# Patient Record
Sex: FEMALE | Race: BLACK OR AFRICAN AMERICAN | Employment: UNEMPLOYED | URBAN - METROPOLITAN AREA
[De-identification: names, ages, dates, MRNs, and addresses within clinical notes are randomized per-mention and may not be internally consistent; named-entity substitution may affect disease eponyms.]

---

## 2017-12-13 ENCOUNTER — HOSPITAL ENCOUNTER (OUTPATIENT)
Dept: WOMENS IMAGING | Age: 62
Discharge: HOME | End: 2017-12-13
Attending: INTERNAL MEDICINE | Admitting: INTERNAL MEDICINE

## 2017-12-13 DIAGNOSIS — Z12.31 VISIT FOR SCREENING MAMMOGRAM: ICD-10-CM

## 2018-01-22 ENCOUNTER — HOSPITAL ENCOUNTER (OUTPATIENT)
Dept: ULTRASOUND IMAGING | Age: 63
Discharge: HOME | End: 2018-01-22
Attending: PHYSICIAN ASSISTANT | Admitting: PHYSICIAN ASSISTANT

## 2018-01-22 DIAGNOSIS — N95.0 POST-MENOPAUSAL BLEEDING: ICD-10-CM

## 2018-01-22 DIAGNOSIS — C78.80 SECONDARY MALIGNANT NEOPLASM OF DIGESTIVE ORGAN (HCC): ICD-10-CM

## 2019-01-28 ENCOUNTER — HOSPITAL ENCOUNTER (OUTPATIENT)
Dept: WOMENS IMAGING | Age: 64
Discharge: HOME | End: 2019-01-28
Payer: OTHER GOVERNMENT

## 2019-01-28 ENCOUNTER — HOSPITAL ENCOUNTER (OUTPATIENT)
Dept: ULTRASOUND IMAGING | Age: 64
Discharge: HOME | End: 2019-01-28
Payer: OTHER GOVERNMENT

## 2019-01-28 DIAGNOSIS — N63.20 LEFT BREAST MASS: ICD-10-CM

## 2019-01-28 DIAGNOSIS — N63.0 LUMP OR MASS IN BREAST: ICD-10-CM

## 2019-01-28 PROCEDURE — 76642 ULTRASOUND BREAST LIMITED: CPT

## 2019-01-28 PROCEDURE — 77066 DX MAMMO INCL CAD BI: CPT

## 2019-11-14 ENCOUNTER — APPOINTMENT (OUTPATIENT)
Dept: GENERAL RADIOLOGY | Age: 64
End: 2019-11-14
Payer: OTHER GOVERNMENT

## 2019-11-14 ENCOUNTER — APPOINTMENT (OUTPATIENT)
Dept: CT IMAGING | Age: 64
End: 2019-11-14
Payer: OTHER GOVERNMENT

## 2019-11-14 ENCOUNTER — HOSPITAL ENCOUNTER (EMERGENCY)
Age: 64
Discharge: HOME | End: 2019-11-15
Attending: EMERGENCY MEDICINE
Payer: OTHER GOVERNMENT

## 2019-11-14 DIAGNOSIS — E87.6 HYPOKALEMIA: ICD-10-CM

## 2019-11-14 DIAGNOSIS — R79.89 ELEVATED LACTIC ACID LEVEL: Primary | ICD-10-CM

## 2019-11-14 DIAGNOSIS — R79.0 LOW MAGNESIUM LEVEL: ICD-10-CM

## 2019-11-14 DIAGNOSIS — D72.829 LEUKOCYTOSIS, UNSPECIFIED TYPE: ICD-10-CM

## 2019-11-14 DIAGNOSIS — G89.18 POST-OP PAIN: ICD-10-CM

## 2019-11-14 LAB
ALBUMIN SERPL-MCNC: 3.5 GM/DL (ref 3.4–5)
ALP BLD-CCNC: 87 IU/L (ref 40–129)
ALT SERPL-CCNC: 13 U/L (ref 10–40)
ANION GAP SERPL CALCULATED.3IONS-SCNC: 14 MMOL/L (ref 4–16)
AST SERPL-CCNC: 19 IU/L (ref 15–37)
BASOPHILS ABSOLUTE: 0.1 K/CU MM
BASOPHILS RELATIVE PERCENT: 0.4 % (ref 0–1)
BILIRUB SERPL-MCNC: 0.6 MG/DL (ref 0–1)
BUN BLDV-MCNC: 5 MG/DL (ref 6–23)
CALCIUM SERPL-MCNC: 9.6 MG/DL (ref 8.3–10.6)
CHLORIDE BLD-SCNC: 98 MMOL/L (ref 99–110)
CO2: 23 MMOL/L (ref 21–32)
CREAT SERPL-MCNC: 0.5 MG/DL (ref 0.6–1.1)
DIFFERENTIAL TYPE: ABNORMAL
EOSINOPHILS ABSOLUTE: 0.2 K/CU MM
EOSINOPHILS RELATIVE PERCENT: 1.1 % (ref 0–3)
GFR AFRICAN AMERICAN: >60 ML/MIN/1.73M2
GFR NON-AFRICAN AMERICAN: >60 ML/MIN/1.73M2
GLUCOSE BLD-MCNC: 317 MG/DL (ref 70–99)
HCT VFR BLD CALC: 44.7 % (ref 37–47)
HEMOGLOBIN: 14.9 GM/DL (ref 12.5–16)
IMMATURE NEUTROPHIL %: 0.4 % (ref 0–0.43)
LACTATE: 2.4 MMOL/L (ref 0.4–2)
LACTATE: ABNORMAL MMOL/L (ref 0.4–2)
LYMPHOCYTES ABSOLUTE: 3.8 K/CU MM
LYMPHOCYTES RELATIVE PERCENT: 27 % (ref 24–44)
MAGNESIUM: 1.6 MG/DL (ref 1.8–2.4)
MCH RBC QN AUTO: 32.1 PG (ref 27–31)
MCHC RBC AUTO-ENTMCNC: 33.3 % (ref 32–36)
MCV RBC AUTO: 96.3 FL (ref 78–100)
MONOCYTES ABSOLUTE: 0.9 K/CU MM
MONOCYTES RELATIVE PERCENT: 6.1 % (ref 0–4)
NUCLEATED RBC %: 0 %
PDW BLD-RTO: 11.8 % (ref 11.7–14.9)
PLATELET # BLD: 253 K/CU MM (ref 140–440)
PMV BLD AUTO: 10 FL (ref 7.5–11.1)
POTASSIUM SERPL-SCNC: 3.3 MMOL/L (ref 3.5–5.1)
RBC # BLD: 4.64 M/CU MM (ref 4.2–5.4)
SEGMENTED NEUTROPHILS ABSOLUTE COUNT: 9.3 K/CU MM
SEGMENTED NEUTROPHILS RELATIVE PERCENT: 65 % (ref 36–66)
SODIUM BLD-SCNC: 135 MMOL/L (ref 135–145)
TOTAL IMMATURE NEUTOROPHIL: 0.05 K/CU MM
TOTAL NUCLEATED RBC: 0 K/CU MM
TOTAL PROTEIN: 6.8 GM/DL (ref 6.4–8.2)
WBC # BLD: 14.2 K/CU MM (ref 4–10.5)

## 2019-11-14 PROCEDURE — 96375 TX/PRO/DX INJ NEW DRUG ADDON: CPT

## 2019-11-14 PROCEDURE — 80053 COMPREHEN METABOLIC PANEL: CPT

## 2019-11-14 PROCEDURE — 81001 URINALYSIS AUTO W/SCOPE: CPT

## 2019-11-14 PROCEDURE — 87040 BLOOD CULTURE FOR BACTERIA: CPT

## 2019-11-14 PROCEDURE — 93005 ELECTROCARDIOGRAM TRACING: CPT | Performed by: EMERGENCY MEDICINE

## 2019-11-14 PROCEDURE — 87086 URINE CULTURE/COLONY COUNT: CPT

## 2019-11-14 PROCEDURE — 96365 THER/PROPH/DIAG IV INF INIT: CPT

## 2019-11-14 PROCEDURE — 96366 THER/PROPH/DIAG IV INF ADDON: CPT

## 2019-11-14 PROCEDURE — 83735 ASSAY OF MAGNESIUM: CPT

## 2019-11-14 PROCEDURE — 71046 X-RAY EXAM CHEST 2 VIEWS: CPT

## 2019-11-14 PROCEDURE — 83605 ASSAY OF LACTIC ACID: CPT

## 2019-11-14 PROCEDURE — 74177 CT ABD & PELVIS W/CONTRAST: CPT

## 2019-11-14 PROCEDURE — 2580000003 HC RX 258: Performed by: PHYSICIAN ASSISTANT

## 2019-11-14 PROCEDURE — 6360000004 HC RX CONTRAST MEDICATION: Performed by: PHYSICIAN ASSISTANT

## 2019-11-14 PROCEDURE — 71275 CT ANGIOGRAPHY CHEST: CPT

## 2019-11-14 PROCEDURE — 99284 EMERGENCY DEPT VISIT MOD MDM: CPT

## 2019-11-14 PROCEDURE — 85025 COMPLETE CBC W/AUTO DIFF WBC: CPT

## 2019-11-14 PROCEDURE — 6360000002 HC RX W HCPCS: Performed by: PHYSICIAN ASSISTANT

## 2019-11-14 RX ORDER — ONDANSETRON 2 MG/ML
4 INJECTION INTRAMUSCULAR; INTRAVENOUS ONCE
Status: COMPLETED | OUTPATIENT
Start: 2019-11-14 | End: 2019-11-14

## 2019-11-14 RX ORDER — SODIUM CHLORIDE 0.9 % (FLUSH) 0.9 %
10 SYRINGE (ML) INJECTION
Status: COMPLETED | OUTPATIENT
Start: 2019-11-14 | End: 2019-11-14

## 2019-11-14 RX ORDER — MORPHINE SULFATE 4 MG/ML
4 INJECTION, SOLUTION INTRAMUSCULAR; INTRAVENOUS ONCE
Status: COMPLETED | OUTPATIENT
Start: 2019-11-14 | End: 2019-11-14

## 2019-11-14 RX ORDER — 0.9 % SODIUM CHLORIDE 0.9 %
1000 INTRAVENOUS SOLUTION INTRAVENOUS ONCE
Status: COMPLETED | OUTPATIENT
Start: 2019-11-14 | End: 2019-11-14

## 2019-11-14 RX ORDER — KETOROLAC TROMETHAMINE 30 MG/ML
30 INJECTION, SOLUTION INTRAMUSCULAR; INTRAVENOUS ONCE
Status: COMPLETED | OUTPATIENT
Start: 2019-11-14 | End: 2019-11-14

## 2019-11-14 RX ORDER — MAGNESIUM SULFATE IN WATER 40 MG/ML
2 INJECTION, SOLUTION INTRAVENOUS ONCE
Status: COMPLETED | OUTPATIENT
Start: 2019-11-14 | End: 2019-11-14

## 2019-11-14 RX ORDER — MORPHINE SULFATE 4 MG/ML
4 INJECTION, SOLUTION INTRAMUSCULAR; INTRAVENOUS EVERY 30 MIN PRN
Status: DISCONTINUED | OUTPATIENT
Start: 2019-11-14 | End: 2019-11-15 | Stop reason: HOSPADM

## 2019-11-14 RX ADMIN — Medication 10 ML: at 21:35

## 2019-11-14 RX ADMIN — MAGNESIUM SULFATE HEPTAHYDRATE 2 G: 40 INJECTION, SOLUTION INTRAVENOUS at 21:09

## 2019-11-14 RX ADMIN — SODIUM CHLORIDE 1000 ML: 9 INJECTION, SOLUTION INTRAVENOUS at 20:15

## 2019-11-14 RX ADMIN — ONDANSETRON 4 MG: 2 INJECTION INTRAMUSCULAR; INTRAVENOUS at 20:16

## 2019-11-14 RX ADMIN — IOPAMIDOL 80 ML: 755 INJECTION, SOLUTION INTRAVENOUS at 21:34

## 2019-11-14 RX ADMIN — KETOROLAC TROMETHAMINE 30 MG: 30 INJECTION, SOLUTION INTRAMUSCULAR; INTRAVENOUS at 21:28

## 2019-11-14 RX ADMIN — MORPHINE SULFATE 4 MG: 4 INJECTION, SOLUTION INTRAMUSCULAR; INTRAVENOUS at 20:16

## 2019-11-14 ASSESSMENT — PAIN SCALES - GENERAL
PAINLEVEL_OUTOF10: 7
PAINLEVEL_OUTOF10: 8

## 2019-11-15 VITALS
HEART RATE: 93 BPM | BODY MASS INDEX: 20.09 KG/M2 | DIASTOLIC BLOOD PRESSURE: 77 MMHG | TEMPERATURE: 97.5 F | OXYGEN SATURATION: 100 % | SYSTOLIC BLOOD PRESSURE: 98 MMHG | RESPIRATION RATE: 18 BRPM | WEIGHT: 125 LBS | HEIGHT: 66 IN

## 2019-11-15 LAB
BACTERIA: ABNORMAL /HPF
BILIRUBIN URINE: NEGATIVE MG/DL
BLOOD, URINE: NEGATIVE
CLARITY: ABNORMAL
COLOR: YELLOW
GLUCOSE, URINE: >500 MG/DL
KETONES, URINE: NEGATIVE MG/DL
LEUKOCYTE ESTERASE, URINE: ABNORMAL
MUCUS: ABNORMAL HPF
NITRITE URINE, QUANTITATIVE: NEGATIVE
PH, URINE: 5 (ref 5–8)
PROTEIN UA: NEGATIVE MG/DL
RBC URINE: 2 /HPF (ref 0–6)
SPECIFIC GRAVITY UA: >1.06 (ref 1–1.03)
SPECIFIC GRAVITY UA: ABNORMAL (ref 1–1.03)
SQUAMOUS EPITHELIAL: 51 /HPF
TRICHOMONAS: ABNORMAL /HPF
UROBILINOGEN, URINE: NORMAL MG/DL (ref 0.2–1)
WBC UA: 5 /HPF (ref 0–5)

## 2019-11-15 PROCEDURE — 93010 ELECTROCARDIOGRAM REPORT: CPT | Performed by: INTERNAL MEDICINE

## 2019-11-15 RX ORDER — CEPHALEXIN 500 MG/1
500 CAPSULE ORAL 2 TIMES DAILY
Qty: 6 CAPSULE | Refills: 0 | Status: SHIPPED | OUTPATIENT
Start: 2019-11-15 | End: 2019-11-18

## 2019-11-15 RX ORDER — OXYCODONE HYDROCHLORIDE AND ACETAMINOPHEN 5; 325 MG/1; MG/1
1 TABLET ORAL EVERY 6 HOURS PRN
Qty: 20 TABLET | Refills: 0 | Status: SHIPPED | OUTPATIENT
Start: 2019-11-15 | End: 2019-11-20

## 2019-11-16 LAB
CULTURE: ABNORMAL
Lab: ABNORMAL
SPECIMEN: ABNORMAL
TOTAL COLONY COUNT: ABNORMAL

## 2019-11-19 LAB
CULTURE: NORMAL
CULTURE: NORMAL
Lab: NORMAL
Lab: NORMAL
SPECIMEN: NORMAL
SPECIMEN: NORMAL

## 2019-11-20 LAB
EKG ATRIAL RATE: 79 BPM
EKG DIAGNOSIS: NORMAL
EKG P AXIS: 59 DEGREES
EKG P-R INTERVAL: 180 MS
EKG Q-T INTERVAL: 428 MS
EKG QRS DURATION: 74 MS
EKG QTC CALCULATION (BAZETT): 490 MS
EKG R AXIS: 18 DEGREES
EKG T AXIS: 27 DEGREES
EKG VENTRICULAR RATE: 79 BPM

## 2020-10-02 ENCOUNTER — HOSPITAL ENCOUNTER (INPATIENT)
Age: 65
LOS: 1 days | Discharge: HOME | DRG: 392 | End: 2020-10-04
Attending: EMERGENCY MEDICINE | Admitting: INTERNAL MEDICINE
Payer: OTHER GOVERNMENT

## 2020-10-02 ENCOUNTER — APPOINTMENT (OUTPATIENT)
Dept: GENERAL RADIOLOGY | Age: 65
DRG: 392 | End: 2020-10-02
Payer: OTHER GOVERNMENT

## 2020-10-02 LAB
ALBUMIN SERPL-MCNC: 4.1 GM/DL (ref 3.4–5)
ALBUMIN SERPL-MCNC: 4.1 GM/DL (ref 3.4–5)
ALP BLD-CCNC: 62 IU/L (ref 40–129)
ALP BLD-CCNC: 62 IU/L (ref 40–129)
ALT SERPL-CCNC: 16 U/L (ref 10–40)
ALT SERPL-CCNC: 16 U/L (ref 10–40)
ANION GAP SERPL CALCULATED.3IONS-SCNC: 13 MMOL/L (ref 4–16)
AST SERPL-CCNC: 22 IU/L (ref 15–37)
AST SERPL-CCNC: 22 IU/L (ref 15–37)
BASOPHILS ABSOLUTE: 0 K/CU MM
BASOPHILS RELATIVE PERCENT: 0.4 % (ref 0–1)
BILIRUB SERPL-MCNC: 0.7 MG/DL (ref 0–1)
BILIRUB SERPL-MCNC: 0.7 MG/DL (ref 0–1)
BILIRUBIN DIRECT: 0.2 MG/DL (ref 0–0.3)
BILIRUBIN, INDIRECT: 0.5 MG/DL (ref 0–0.7)
BUN BLDV-MCNC: 6 MG/DL (ref 6–23)
CALCIUM SERPL-MCNC: 9.2 MG/DL (ref 8.3–10.6)
CHLORIDE BLD-SCNC: 93 MMOL/L (ref 99–110)
CO2: 23 MMOL/L (ref 21–32)
CREAT SERPL-MCNC: 0.6 MG/DL (ref 0.6–1.1)
DIFFERENTIAL TYPE: ABNORMAL
EOSINOPHILS ABSOLUTE: 0.1 K/CU MM
EOSINOPHILS RELATIVE PERCENT: 0.6 % (ref 0–3)
GFR AFRICAN AMERICAN: >60 ML/MIN/1.73M2
GFR NON-AFRICAN AMERICAN: >60 ML/MIN/1.73M2
GLUCOSE BLD-MCNC: 148 MG/DL (ref 70–99)
HCT VFR BLD CALC: 40 % (ref 37–47)
HEMOGLOBIN: 14 GM/DL (ref 12.5–16)
IMMATURE NEUTROPHIL %: 0.2 % (ref 0–0.43)
LIPASE: 8 IU/L (ref 13–60)
LYMPHOCYTES ABSOLUTE: 2.9 K/CU MM
LYMPHOCYTES RELATIVE PERCENT: 29.6 % (ref 24–44)
MCH RBC QN AUTO: 34.2 PG (ref 27–31)
MCHC RBC AUTO-ENTMCNC: 35 % (ref 32–36)
MCV RBC AUTO: 97.8 FL (ref 78–100)
MONOCYTES ABSOLUTE: 0.6 K/CU MM
MONOCYTES RELATIVE PERCENT: 6.3 % (ref 0–4)
NUCLEATED RBC %: 0 %
PDW BLD-RTO: 11.1 % (ref 11.7–14.9)
PLATELET # BLD: 233 K/CU MM (ref 140–440)
PMV BLD AUTO: 9.6 FL (ref 7.5–11.1)
POTASSIUM SERPL-SCNC: 3.7 MMOL/L (ref 3.5–5.1)
PRO-BNP: 108.2 PG/ML
RBC # BLD: 4.09 M/CU MM (ref 4.2–5.4)
SEGMENTED NEUTROPHILS ABSOLUTE COUNT: 6.2 K/CU MM
SEGMENTED NEUTROPHILS RELATIVE PERCENT: 62.9 % (ref 36–66)
SODIUM BLD-SCNC: 129 MMOL/L (ref 135–145)
TOTAL IMMATURE NEUTOROPHIL: 0.02 K/CU MM
TOTAL NUCLEATED RBC: 0 K/CU MM
TOTAL PROTEIN: 6.7 GM/DL (ref 6.4–8.2)
TOTAL PROTEIN: 6.7 GM/DL (ref 6.4–8.2)
TROPONIN T: <0.01 NG/ML
WBC # BLD: 9.9 K/CU MM (ref 4–10.5)

## 2020-10-02 PROCEDURE — 36415 COLL VENOUS BLD VENIPUNCTURE: CPT

## 2020-10-02 PROCEDURE — 96365 THER/PROPH/DIAG IV INF INIT: CPT

## 2020-10-02 PROCEDURE — 82248 BILIRUBIN DIRECT: CPT

## 2020-10-02 PROCEDURE — 80053 COMPREHEN METABOLIC PANEL: CPT

## 2020-10-02 PROCEDURE — 83880 ASSAY OF NATRIURETIC PEPTIDE: CPT

## 2020-10-02 PROCEDURE — 93005 ELECTROCARDIOGRAM TRACING: CPT | Performed by: EMERGENCY MEDICINE

## 2020-10-02 PROCEDURE — 2580000003 HC RX 258: Performed by: EMERGENCY MEDICINE

## 2020-10-02 PROCEDURE — 96375 TX/PRO/DX INJ NEW DRUG ADDON: CPT

## 2020-10-02 PROCEDURE — 82805 BLOOD GASES W/O2 SATURATION: CPT

## 2020-10-02 PROCEDURE — 85025 COMPLETE CBC W/AUTO DIFF WBC: CPT

## 2020-10-02 PROCEDURE — 99285 EMERGENCY DEPT VISIT HI MDM: CPT

## 2020-10-02 PROCEDURE — 80048 BASIC METABOLIC PNL TOTAL CA: CPT

## 2020-10-02 PROCEDURE — 84484 ASSAY OF TROPONIN QUANT: CPT

## 2020-10-02 PROCEDURE — 71046 X-RAY EXAM CHEST 2 VIEWS: CPT

## 2020-10-02 PROCEDURE — 6360000002 HC RX W HCPCS: Performed by: EMERGENCY MEDICINE

## 2020-10-02 PROCEDURE — 83690 ASSAY OF LIPASE: CPT

## 2020-10-02 RX ORDER — SODIUM CHLORIDE, SODIUM LACTATE, POTASSIUM CHLORIDE, CALCIUM CHLORIDE 600; 310; 30; 20 MG/100ML; MG/100ML; MG/100ML; MG/100ML
1000 INJECTION, SOLUTION INTRAVENOUS ONCE
Status: COMPLETED | OUTPATIENT
Start: 2020-10-02 | End: 2020-10-03

## 2020-10-02 RX ORDER — MORPHINE SULFATE 4 MG/ML
2 INJECTION, SOLUTION INTRAMUSCULAR; INTRAVENOUS EVERY 30 MIN PRN
Status: DISCONTINUED | OUTPATIENT
Start: 2020-10-02 | End: 2020-10-03 | Stop reason: ALTCHOICE

## 2020-10-02 RX ORDER — ONDANSETRON 2 MG/ML
4 INJECTION INTRAMUSCULAR; INTRAVENOUS EVERY 30 MIN PRN
Status: DISCONTINUED | OUTPATIENT
Start: 2020-10-02 | End: 2020-10-03 | Stop reason: SDUPTHER

## 2020-10-02 RX ADMIN — ONDANSETRON 4 MG: 2 INJECTION INTRAMUSCULAR; INTRAVENOUS at 23:21

## 2020-10-02 RX ADMIN — SODIUM CHLORIDE, POTASSIUM CHLORIDE, SODIUM LACTATE AND CALCIUM CHLORIDE 1000 ML: 600; 310; 30; 20 INJECTION, SOLUTION INTRAVENOUS at 23:21

## 2020-10-02 RX ADMIN — MORPHINE SULFATE 2 MG: 4 INJECTION, SOLUTION INTRAMUSCULAR; INTRAVENOUS at 23:22

## 2020-10-02 ASSESSMENT — PAIN SCALES - GENERAL
PAINLEVEL_OUTOF10: 5
PAINLEVEL_OUTOF10: 5

## 2020-10-02 ASSESSMENT — PAIN DESCRIPTION - PAIN TYPE: TYPE: ACUTE PAIN

## 2020-10-02 ASSESSMENT — PAIN DESCRIPTION - LOCATION: LOCATION: CHEST

## 2020-10-02 NOTE — ED PROVIDER NOTES
As physician-in-triage, I performed a virtual medical screening history and physical exam on this patient. HISTORY OF PRESENT ILLNESS  Hobart Nyhan is a 60 y.o. female with history of hypertension and diabetes who presents emergency department with complaints of chest pain and nausea with vomiting. Patient states over the last 3 to 4 days she has been experiencing pain in the substernal region of the chest and epigastric region. Pain radiates up into the esophageal region and is described as a throbbing and aching sensation. She does have some associated nausea as well and states that when the pain is severe she does feel short of breath. She has no prior history of MI or stroke that she is aware of. She has never experienced symptoms such as this in the past.  She does feel the symptoms are exacerbated with eating and drinking but states that the symptoms occurred separately eating or drinking and she cannot call any episodes of choking on food. Denies fevers, chills, dizziness, lightheadedness. PHYSICAL EXAM  BP (!) 114/90   Pulse 91   Temp 98.3 °F (36.8 °C) (Oral)   Resp 20   SpO2 96%     On exam, the patient appears in no acute distress. Speech is clear. Breathing is unlabored.   Moves all extremities      Orders: CBC, BMP, EKG, troponin, hepatic panel, lipase, chest x-ray     Osiris Cervantes DO  10/02/20 [id number]

## 2020-10-02 NOTE — ED PROVIDER NOTES
12 lead EKG per my interpretation:  Normal Sinus Rhythm 93  Axis is   Normal  QTc is  452  There is no specific T wave changes appreciated. There is no specific ST wave changes appreciated.     Prior EKG to compare with was not available        Reg English DO  10/02/20 2000

## 2020-10-03 ENCOUNTER — ANESTHESIA EVENT (OUTPATIENT)
Dept: ENDOSCOPY | Age: 65
DRG: 392 | End: 2020-10-03
Payer: OTHER GOVERNMENT

## 2020-10-03 ENCOUNTER — ANESTHESIA (OUTPATIENT)
Dept: ENDOSCOPY | Age: 65
DRG: 392 | End: 2020-10-03
Payer: OTHER GOVERNMENT

## 2020-10-03 ENCOUNTER — APPOINTMENT (OUTPATIENT)
Dept: CT IMAGING | Age: 65
DRG: 392 | End: 2020-10-03
Payer: OTHER GOVERNMENT

## 2020-10-03 VITALS — OXYGEN SATURATION: 95 % | DIASTOLIC BLOOD PRESSURE: 52 MMHG | SYSTOLIC BLOOD PRESSURE: 70 MMHG

## 2020-10-03 PROBLEM — R07.9 CHEST PAIN: Status: ACTIVE | Noted: 2020-10-03

## 2020-10-03 LAB
ALBUMIN SERPL-MCNC: 3.9 GM/DL (ref 3.4–5)
ALP BLD-CCNC: 55 IU/L (ref 40–128)
ALT SERPL-CCNC: 15 U/L (ref 10–40)
ANION GAP SERPL CALCULATED.3IONS-SCNC: 12 MMOL/L (ref 4–16)
APTT: 31 SECONDS (ref 25.1–37.1)
AST SERPL-CCNC: 18 IU/L (ref 15–37)
BACTERIA: ABNORMAL /HPF
BASE EXCESS MIXED: 1.3 (ref 0–2.3)
BILIRUB SERPL-MCNC: 0.7 MG/DL (ref 0–1)
BILIRUBIN URINE: NEGATIVE MG/DL
BLOOD, URINE: NEGATIVE
BUN BLDV-MCNC: 6 MG/DL (ref 6–23)
CALCIUM SERPL-MCNC: 8.7 MG/DL (ref 8.3–10.6)
CHLORIDE BLD-SCNC: 99 MMOL/L (ref 99–110)
CLARITY: ABNORMAL
CO2: 25 MMOL/L (ref 21–32)
COLOR: YELLOW
COMMENT: ABNORMAL
CREAT SERPL-MCNC: 0.7 MG/DL (ref 0.6–1.1)
GFR AFRICAN AMERICAN: >60 ML/MIN/1.73M2
GFR NON-AFRICAN AMERICAN: >60 ML/MIN/1.73M2
GLUCOSE BLD-MCNC: 129 MG/DL (ref 70–99)
GLUCOSE BLD-MCNC: 129 MG/DL (ref 70–99)
GLUCOSE BLD-MCNC: 133 MG/DL (ref 70–99)
GLUCOSE BLD-MCNC: 142 MG/DL (ref 70–99)
GLUCOSE BLD-MCNC: 185 MG/DL (ref 70–99)
GLUCOSE, URINE: NEGATIVE MG/DL
HCO3 VENOUS: 27.2 MMOL/L (ref 19–25)
INR BLD: 0.99 INDEX
KETONES, URINE: NEGATIVE MG/DL
LACTATE: 1.2 MMOL/L (ref 0.4–2)
LEUKOCYTE ESTERASE, URINE: ABNORMAL
MUCUS: ABNORMAL HPF
NITRITE URINE, QUANTITATIVE: NEGATIVE
O2 SAT, VEN: 52.6 % (ref 50–70)
PCO2, VEN: 47 MMHG (ref 38–52)
PH VENOUS: 7.37 (ref 7.32–7.42)
PH, URINE: 6 (ref 5–8)
PO2, VEN: 29 MMHG (ref 28–48)
POTASSIUM SERPL-SCNC: 3.9 MMOL/L (ref 3.5–5.1)
PROTEIN UA: NEGATIVE MG/DL
PROTHROMBIN TIME: 12 SECONDS (ref 11.7–14.5)
RBC URINE: ABNORMAL /HPF (ref 0–6)
SARS-COV-2, NAAT: NOT DETECTED
SODIUM BLD-SCNC: 136 MMOL/L (ref 135–145)
SOURCE: NORMAL
SPECIFIC GRAVITY UA: 1 (ref 1–1.03)
SQUAMOUS EPITHELIAL: 9 /HPF
TOTAL PROTEIN: 5.8 GM/DL (ref 6.4–8.2)
TRICHOMONAS: ABNORMAL /HPF
TROPONIN T: <0.01 NG/ML
UROBILINOGEN, URINE: NORMAL MG/DL (ref 0.2–1)
WBC UA: 4 /HPF (ref 0–5)

## 2020-10-03 PROCEDURE — 2500000003 HC RX 250 WO HCPCS: Performed by: NURSE ANESTHETIST, CERTIFIED REGISTERED

## 2020-10-03 PROCEDURE — 6360000002 HC RX W HCPCS: Performed by: NURSE ANESTHETIST, CERTIFIED REGISTERED

## 2020-10-03 PROCEDURE — 84484 ASSAY OF TROPONIN QUANT: CPT

## 2020-10-03 PROCEDURE — 3609012400 HC EGD TRANSORAL BIOPSY SINGLE/MULTIPLE: Performed by: SPECIALIST

## 2020-10-03 PROCEDURE — 3700000001 HC ADD 15 MINUTES (ANESTHESIA): Performed by: SPECIALIST

## 2020-10-03 PROCEDURE — 96366 THER/PROPH/DIAG IV INF ADDON: CPT

## 2020-10-03 PROCEDURE — 88305 TISSUE EXAM BY PATHOLOGIST: CPT

## 2020-10-03 PROCEDURE — 6360000002 HC RX W HCPCS: Performed by: INTERNAL MEDICINE

## 2020-10-03 PROCEDURE — 94761 N-INVAS EAR/PLS OXIMETRY MLT: CPT

## 2020-10-03 PROCEDURE — 6360000004 HC RX CONTRAST MEDICATION: Performed by: EMERGENCY MEDICINE

## 2020-10-03 PROCEDURE — 83605 ASSAY OF LACTIC ACID: CPT

## 2020-10-03 PROCEDURE — 6370000000 HC RX 637 (ALT 250 FOR IP): Performed by: INTERNAL MEDICINE

## 2020-10-03 PROCEDURE — 85610 PROTHROMBIN TIME: CPT

## 2020-10-03 PROCEDURE — 81001 URINALYSIS AUTO W/SCOPE: CPT

## 2020-10-03 PROCEDURE — 2580000003 HC RX 258: Performed by: EMERGENCY MEDICINE

## 2020-10-03 PROCEDURE — 87086 URINE CULTURE/COLONY COUNT: CPT

## 2020-10-03 PROCEDURE — 2580000003 HC RX 258: Performed by: INTERNAL MEDICINE

## 2020-10-03 PROCEDURE — 2140000000 HC CCU INTERMEDIATE R&B

## 2020-10-03 PROCEDURE — 36415 COLL VENOUS BLD VENIPUNCTURE: CPT

## 2020-10-03 PROCEDURE — 0DB68ZX EXCISION OF STOMACH, VIA NATURAL OR ARTIFICIAL OPENING ENDOSCOPIC, DIAGNOSTIC: ICD-10-PCS | Performed by: SPECIALIST

## 2020-10-03 PROCEDURE — 85730 THROMBOPLASTIN TIME PARTIAL: CPT

## 2020-10-03 PROCEDURE — U0002 COVID-19 LAB TEST NON-CDC: HCPCS

## 2020-10-03 PROCEDURE — 3700000000 HC ANESTHESIA ATTENDED CARE: Performed by: SPECIALIST

## 2020-10-03 PROCEDURE — 74177 CT ABD & PELVIS W/CONTRAST: CPT

## 2020-10-03 PROCEDURE — 93005 ELECTROCARDIOGRAM TRACING: CPT | Performed by: EMERGENCY MEDICINE

## 2020-10-03 PROCEDURE — 88342 IMHCHEM/IMCYTCHM 1ST ANTB: CPT

## 2020-10-03 PROCEDURE — 2709999900 HC NON-CHARGEABLE SUPPLY: Performed by: SPECIALIST

## 2020-10-03 PROCEDURE — 80053 COMPREHEN METABOLIC PANEL: CPT

## 2020-10-03 PROCEDURE — 82962 GLUCOSE BLOOD TEST: CPT

## 2020-10-03 PROCEDURE — 99222 1ST HOSP IP/OBS MODERATE 55: CPT | Performed by: INTERNAL MEDICINE

## 2020-10-03 RX ORDER — NICOTINE POLACRILEX 4 MG
15 LOZENGE BUCCAL PRN
Status: DISCONTINUED | OUTPATIENT
Start: 2020-10-03 | End: 2020-10-04 | Stop reason: HOSPADM

## 2020-10-03 RX ORDER — ACETAMINOPHEN 650 MG/1
650 SUPPOSITORY RECTAL EVERY 6 HOURS PRN
Status: DISCONTINUED | OUTPATIENT
Start: 2020-10-03 | End: 2020-10-04 | Stop reason: HOSPADM

## 2020-10-03 RX ORDER — DEXTROSE MONOHYDRATE 50 MG/ML
100 INJECTION, SOLUTION INTRAVENOUS PRN
Status: DISCONTINUED | OUTPATIENT
Start: 2020-10-03 | End: 2020-10-04 | Stop reason: HOSPADM

## 2020-10-03 RX ORDER — ASPIRIN 81 MG/1
81 TABLET, CHEWABLE ORAL DAILY
Status: DISCONTINUED | OUTPATIENT
Start: 2020-10-04 | End: 2020-10-04 | Stop reason: HOSPADM

## 2020-10-03 RX ORDER — POLYETHYLENE GLYCOL 3350 17 G/17G
17 POWDER, FOR SOLUTION ORAL DAILY PRN
Status: DISCONTINUED | OUTPATIENT
Start: 2020-10-03 | End: 2020-10-04 | Stop reason: HOSPADM

## 2020-10-03 RX ORDER — SODIUM CHLORIDE 0.9 % (FLUSH) 0.9 %
10 SYRINGE (ML) INJECTION EVERY 12 HOURS SCHEDULED
Status: DISCONTINUED | OUTPATIENT
Start: 2020-10-03 | End: 2020-10-04 | Stop reason: HOSPADM

## 2020-10-03 RX ORDER — SODIUM CHLORIDE 0.9 % (FLUSH) 0.9 %
10 SYRINGE (ML) INJECTION 2 TIMES DAILY
Status: DISCONTINUED | OUTPATIENT
Start: 2020-10-03 | End: 2020-10-04 | Stop reason: HOSPADM

## 2020-10-03 RX ORDER — ATORVASTATIN CALCIUM 40 MG/1
40 TABLET, FILM COATED ORAL NIGHTLY
Status: DISCONTINUED | OUTPATIENT
Start: 2020-10-03 | End: 2020-10-04 | Stop reason: HOSPADM

## 2020-10-03 RX ORDER — DEXTROSE MONOHYDRATE 25 G/50ML
12.5 INJECTION, SOLUTION INTRAVENOUS PRN
Status: DISCONTINUED | OUTPATIENT
Start: 2020-10-03 | End: 2020-10-04 | Stop reason: HOSPADM

## 2020-10-03 RX ORDER — CILOSTAZOL 100 MG/1
100 TABLET ORAL 2 TIMES DAILY
Status: DISCONTINUED | OUTPATIENT
Start: 2020-10-03 | End: 2020-10-04 | Stop reason: HOSPADM

## 2020-10-03 RX ORDER — SODIUM CHLORIDE 9 MG/ML
INJECTION, SOLUTION INTRAVENOUS CONTINUOUS PRN
Status: DISCONTINUED | OUTPATIENT
Start: 2020-10-03 | End: 2020-10-03 | Stop reason: SDUPTHER

## 2020-10-03 RX ORDER — PROMETHAZINE HYDROCHLORIDE 25 MG/1
12.5 TABLET ORAL EVERY 6 HOURS PRN
Status: DISCONTINUED | OUTPATIENT
Start: 2020-10-03 | End: 2020-10-04 | Stop reason: HOSPADM

## 2020-10-03 RX ORDER — SODIUM CHLORIDE 9 MG/ML
INJECTION, SOLUTION INTRAVENOUS CONTINUOUS
Status: DISCONTINUED | OUTPATIENT
Start: 2020-10-03 | End: 2020-10-04

## 2020-10-03 RX ORDER — PROPOFOL 10 MG/ML
INJECTION, EMULSION INTRAVENOUS PRN
Status: DISCONTINUED | OUTPATIENT
Start: 2020-10-03 | End: 2020-10-03 | Stop reason: SDUPTHER

## 2020-10-03 RX ORDER — LIDOCAINE HYDROCHLORIDE 20 MG/ML
INJECTION, SOLUTION INFILTRATION; PERINEURAL PRN
Status: DISCONTINUED | OUTPATIENT
Start: 2020-10-03 | End: 2020-10-03 | Stop reason: SDUPTHER

## 2020-10-03 RX ORDER — 0.9 % SODIUM CHLORIDE 0.9 %
1000 INTRAVENOUS SOLUTION INTRAVENOUS ONCE
Status: COMPLETED | OUTPATIENT
Start: 2020-10-03 | End: 2020-10-03

## 2020-10-03 RX ORDER — ACETAMINOPHEN 325 MG/1
650 TABLET ORAL EVERY 6 HOURS PRN
Status: DISCONTINUED | OUTPATIENT
Start: 2020-10-03 | End: 2020-10-04 | Stop reason: HOSPADM

## 2020-10-03 RX ORDER — SUCRALFATE 1 G/1
1 TABLET ORAL EVERY 6 HOURS SCHEDULED
Status: DISCONTINUED | OUTPATIENT
Start: 2020-10-03 | End: 2020-10-04 | Stop reason: HOSPADM

## 2020-10-03 RX ORDER — PANTOPRAZOLE SODIUM 40 MG/1
40 TABLET, DELAYED RELEASE ORAL
Status: DISCONTINUED | OUTPATIENT
Start: 2020-10-03 | End: 2020-10-04 | Stop reason: HOSPADM

## 2020-10-03 RX ORDER — CLONIDINE HYDROCHLORIDE 0.1 MG/1
0.1 TABLET ORAL 2 TIMES DAILY
Status: DISCONTINUED | OUTPATIENT
Start: 2020-10-03 | End: 2020-10-04 | Stop reason: HOSPADM

## 2020-10-03 RX ORDER — SODIUM CHLORIDE 0.9 % (FLUSH) 0.9 %
10 SYRINGE (ML) INJECTION PRN
Status: DISCONTINUED | OUTPATIENT
Start: 2020-10-03 | End: 2020-10-04 | Stop reason: HOSPADM

## 2020-10-03 RX ORDER — CILOSTAZOL 100 MG/1
100 TABLET ORAL 2 TIMES DAILY
COMMUNITY

## 2020-10-03 RX ORDER — ONDANSETRON 2 MG/ML
4 INJECTION INTRAMUSCULAR; INTRAVENOUS EVERY 6 HOURS PRN
Status: DISCONTINUED | OUTPATIENT
Start: 2020-10-03 | End: 2020-10-04 | Stop reason: HOSPADM

## 2020-10-03 RX ADMIN — SODIUM CHLORIDE, PRESERVATIVE FREE 10 ML: 5 INJECTION INTRAVENOUS at 11:43

## 2020-10-03 RX ADMIN — LISINOPRIL 15 MG: 5 TABLET ORAL at 11:37

## 2020-10-03 RX ADMIN — SODIUM CHLORIDE 1000 ML: 9 INJECTION, SOLUTION INTRAVENOUS at 02:47

## 2020-10-03 RX ADMIN — CILOSTAZOL 100 MG: 100 TABLET ORAL at 11:31

## 2020-10-03 RX ADMIN — IOPAMIDOL 80 ML: 755 INJECTION, SOLUTION INTRAVENOUS at 01:03

## 2020-10-03 RX ADMIN — SODIUM CHLORIDE: 9 INJECTION, SOLUTION INTRAVENOUS at 04:14

## 2020-10-03 RX ADMIN — SODIUM CHLORIDE: 9 INJECTION, SOLUTION INTRAVENOUS at 20:01

## 2020-10-03 RX ADMIN — SODIUM CHLORIDE, PRESERVATIVE FREE 10 ML: 5 INJECTION INTRAVENOUS at 21:10

## 2020-10-03 RX ADMIN — CLONIDINE HYDROCHLORIDE 0.1 MG: 0.1 TABLET ORAL at 21:08

## 2020-10-03 RX ADMIN — PANTOPRAZOLE SODIUM 40 MG: 40 TABLET, DELAYED RELEASE ORAL at 17:11

## 2020-10-03 RX ADMIN — ATORVASTATIN CALCIUM 40 MG: 40 TABLET, FILM COATED ORAL at 21:08

## 2020-10-03 RX ADMIN — SUCRALFATE 1 G: 1 TABLET ORAL at 05:43

## 2020-10-03 RX ADMIN — SODIUM CHLORIDE, PRESERVATIVE FREE 10 ML: 5 INJECTION INTRAVENOUS at 01:03

## 2020-10-03 RX ADMIN — ENOXAPARIN SODIUM 40 MG: 40 INJECTION SUBCUTANEOUS at 11:34

## 2020-10-03 RX ADMIN — LIDOCAINE HYDROCHLORIDE 100 MG: 20 INJECTION, SOLUTION INFILTRATION; PERINEURAL at 10:38

## 2020-10-03 RX ADMIN — PANTOPRAZOLE SODIUM 40 MG: 40 TABLET, DELAYED RELEASE ORAL at 05:43

## 2020-10-03 RX ADMIN — ACETAMINOPHEN 650 MG: 325 TABLET ORAL at 21:08

## 2020-10-03 RX ADMIN — SODIUM CHLORIDE, PRESERVATIVE FREE 10 ML: 5 INJECTION INTRAVENOUS at 04:13

## 2020-10-03 RX ADMIN — PROPOFOL 200 MG: 10 INJECTION, EMULSION INTRAVENOUS at 10:38

## 2020-10-03 RX ADMIN — SUCRALFATE 1 G: 1 TABLET ORAL at 11:33

## 2020-10-03 RX ADMIN — CLONIDINE HYDROCHLORIDE 0.1 MG: 0.1 TABLET ORAL at 11:32

## 2020-10-03 RX ADMIN — CILOSTAZOL 100 MG: 100 TABLET ORAL at 21:08

## 2020-10-03 RX ADMIN — SODIUM CHLORIDE: 9 INJECTION, SOLUTION INTRAVENOUS at 10:27

## 2020-10-03 RX ADMIN — SUCRALFATE 1 G: 1 TABLET ORAL at 17:14

## 2020-10-03 ASSESSMENT — ENCOUNTER SYMPTOMS
EYE REDNESS: 0
SHORTNESS OF BREATH: 1
ABDOMINAL PAIN: 0
ORTHOPNEA: 0
APNEA: 0
PHOTOPHOBIA: 0
FACIAL SWELLING: 0
VOICE CHANGE: 0
TROUBLE SWALLOWING: 0
BACK PAIN: 0
COUGH: 0
EYE ITCHING: 0
EYES NEGATIVE: 1
EYE DISCHARGE: 0
BLOOD IN STOOL: 0
CHEST TIGHTNESS: 0
HEARTBURN: 0
WHEEZING: 0
NAUSEA: 1
STRIDOR: 0
RECTAL PAIN: 0
VOMITING: 1
CHOKING: 0
CONSTIPATION: 0
SINUS PRESSURE: 0
SINUS PAIN: 0
EYE PAIN: 0
SORE THROAT: 0
DIARRHEA: 0
COLOR CHANGE: 0
RHINORRHEA: 0

## 2020-10-03 ASSESSMENT — PAIN DESCRIPTION - LOCATION: LOCATION: BACK

## 2020-10-03 ASSESSMENT — PAIN SCALES - GENERAL
PAINLEVEL_OUTOF10: 0
PAINLEVEL_OUTOF10: 7
PAINLEVEL_OUTOF10: 7
PAINLEVEL_OUTOF10: 0

## 2020-10-03 ASSESSMENT — PAIN DESCRIPTION - PROGRESSION: CLINICAL_PROGRESSION: GRADUALLY WORSENING

## 2020-10-03 ASSESSMENT — PAIN DESCRIPTION - DESCRIPTORS: DESCRIPTORS: ACHING

## 2020-10-03 ASSESSMENT — PAIN DESCRIPTION - ORIENTATION: ORIENTATION: LOWER

## 2020-10-03 ASSESSMENT — HEART SCORE: ECG: 1

## 2020-10-03 ASSESSMENT — PAIN DESCRIPTION - FREQUENCY: FREQUENCY: INTERMITTENT

## 2020-10-03 ASSESSMENT — PAIN DESCRIPTION - PAIN TYPE: TYPE: CHRONIC PAIN

## 2020-10-03 NOTE — ED PROVIDER NOTES
[id number] ENCOUNTER      Pt Name: Rick Cantu  MRN: [id number]  [place] 1955  Date of evaluation: 10/2/2020  Provider: Jane Hollingsworth, [address] [place] [address]       Chief Complaint   Patient presents with    Chest Pain    Shortness of Breath    Emesis         HISTORY OF PRESENT ILLNESS      Rick Cantu is a 60 y.o. female who presents to the emergency department  for   Chief Complaint   Patient presents with    Chest Pain    Shortness of Breath    Emesis       The history is provided by the patient, the spouse and medical records. No  was used. Chest Pain   Pain location:  Substernal area  Pain quality: aching, crushing and tightness    Pain radiates to:  Does not radiate  Pain severity:  Moderate  Duration:  4 days  Timing:  Intermittent  Progression:  Waxing and waning  Chronicity:  New  Context: breathing and eating    Context: not drug use, not intercourse, not lifting, not movement, not raising an arm, not at rest, not stress and not trauma    Relieved by:  None tried  Worsened by:  Nothing  Ineffective treatments:  None tried  Associated symptoms: anorexia, diaphoresis, nausea, palpitations, shortness of breath and vomiting    Associated symptoms: no abdominal pain, no AICD problem, no anxiety, no back pain, no claudication, no cough, no dizziness, no dysphagia, no fatigue, no fever, no headache, no heartburn, no lower extremity edema, no near-syncope, no numbness, no orthopnea, no PND, no syncope and no weakness    Risk factors: diabetes mellitus, high cholesterol, hypertension and smoking    Risk factors: no coronary artery disease, no immobilization, not male, not obese, no prior DVT/PE and no surgery          Nursing Notes, Triage Notes & Vital Signs were reviewed.       REVIEW OF SYSTEMS    (2-9 systems for level 4, 10 or more for level 5)     Review of Systems   Constitutional: Positive Taking? Authorizing Provider   metFORMIN (GLUCOPHAGE) 500 MG tablet Take 500 mg by mouth 2 times daily. Historical Provider, MD   lisinopril (PRINIVIL;ZESTRIL) 5 MG tablet Take 15 mg by mouth daily. Historical Provider, MD   cloNIDine (CATAPRES) 0.1 MG tablet Take 0.1 mg by mouth 2 times daily. Historical Provider, MD   simvastatin (ZOCOR) 20 MG tablet Take 20 mg by mouth nightly. Historical Provider, MD        There is no problem list on file for this patient. SURGICAL HISTORY       Past Surgical History:   Procedure Laterality Date    LUNG CANCER SURGERY Left 10/2019         CURRENT MEDICATIONS       Previous Medications    CLONIDINE (CATAPRES) 0.1 MG TABLET    Take 0.1 mg by mouth 2 times daily. LISINOPRIL (PRINIVIL;ZESTRIL) 5 MG TABLET    Take 15 mg by mouth daily. METFORMIN (GLUCOPHAGE) 500 MG TABLET    Take 500 mg by mouth 2 times daily. SIMVASTATIN (ZOCOR) 20 MG TABLET    Take 20 mg by mouth nightly. ALLERGIES     Patient has no known allergies. FAMILY HISTORY     No family history on file. SOCIAL HISTORY       Social History     Socioeconomic History    Marital status:      Spouse name: None    Number of children: None    Years of education: None    Highest education level: None   Occupational History    None   Social Needs    Financial resource strain: None    Food insecurity     Worry: None     Inability: None    Transportation needs     Medical: None     Non-medical: None   Tobacco Use    Smoking status: Former Smoker     Packs/day: 1.00     Types: Cigarettes    Smokeless tobacco: Never Used    Tobacco comment: 10/2019   Substance and Sexual Activity    Alcohol use:  Yes     Alcohol/week: 1.0 standard drinks     Types: 1 Cans of beer per week    Drug use: Yes     Types: Marijuana    Sexual activity: Yes     Partners: Male   Lifestyle    Physical activity     Days per week: None     Minutes per session: None    Stress: None   Relationships  Social connections     Talks on phone: None     Gets together: None     Attends Restorationism service: None     Active member of club or organization: None     Attends meetings of clubs or organizations: None     Relationship status: None    Intimate partner violence     Fear of current or ex partner: None     Emotionally abused: None     Physically abused: None     Forced sexual activity: None   Other Topics Concern    None   Social History Narrative    None       SCREENINGS    [place] Coma Scale  Eye Opening: Spontaneous  Best Verbal Response: Oriented  Best Motor Response: Obeys commands  [place] Coma Scale Score: 15 Heart Score for chest pain patients  History: Moderately Suspicious  ECG: Non-Specifc repolarization disturbance/LBTB/PM  Patient Age: > 39 and < 65 years  *Risk factors for Atherosclerotic disease: Cigarette smoking, Diabetes Mellitus, Hypercholesterolemia  Risk Factors: > 3 Risk factors or history of atherosclerotic disease*  Troponin: < 1X normal limit  Heart Score Total: 5        PHYSICAL EXAM    (up to 7 for level 4, 8 or more for level 5)     ED Triage Vitals [10/02/20 1953]   BP Temp Temp Source Pulse Resp SpO2 Height Weight   (!) 114/90 98.3 °F (36.8 °C) Oral 91 20 96 % -- --       Physical Exam  Vitals signs and nursing note reviewed. Constitutional:       General: She is not in acute distress. Appearance: She is well-developed. She is not diaphoretic. HENT:      Head: Normocephalic and atraumatic. Right Ear: External ear normal.      Left Ear: External ear normal.      Nose: Nose normal.      Mouth/Throat:      Pharynx: No oropharyngeal exudate. Eyes:      General: No scleral icterus. Right eye: No discharge. Left eye: No discharge. Pupils: Pupils are equal, round, and reactive to light. Neck:      Musculoskeletal: Normal range of motion. Thyroid: No thyromegaly. Vascular: No JVD. Trachea: No tracheal deviation.    Cardiovascular: Rate and Rhythm: Normal rate and regular rhythm. Heart sounds: Normal heart sounds. No murmur. No friction rub. No gallop. Pulmonary:      Effort: Pulmonary effort is normal. No respiratory distress. Breath sounds: Normal breath sounds. No stridor. No wheezing or rales. Chest:      Chest wall: No tenderness. Abdominal:      General: Bowel sounds are normal. There is no distension. Palpations: Abdomen is soft. There is no mass. Tenderness: There is no abdominal tenderness. There is no guarding or rebound. Musculoskeletal: Normal range of motion. General: No tenderness or deformity. Lymphadenopathy:      Cervical: No cervical adenopathy. Skin:     General: Skin is warm. Capillary Refill: Capillary refill takes less than 2 seconds. Coloration: Skin is not pale. Findings: No erythema or rash. Neurological:      Mental Status: She is alert and oriented to person, place, and time. Cranial Nerves: No cranial nerve deficit. Sensory: No sensory deficit. Motor: No abnormal muscle tone. Coordination: Coordination normal.      Deep Tendon Reflexes: Reflexes normal.   Psychiatric:         Behavior: Behavior normal.         Thought Content:  Thought content normal.         Judgment: Judgment normal.         DIAGNOSTIC RESULTS     Labs Reviewed   CBC WITH AUTO DIFFERENTIAL - Abnormal; Notable for the following components:       Result Value    RBC 4.09 (*)     MCH 34.2 (*)     RDW 11.1 (*)     Monocytes % 6.3 (*)     All other components within normal limits   COMPREHENSIVE METABOLIC PANEL - Abnormal; Notable for the following components:    Sodium 129 (*)     Chloride 93 (*)     Glucose 148 (*)     All other components within normal limits   LIPASE - Abnormal; Notable for the following components:    Lipase 8 (*)     All other components within normal limits   CULTURE, URINE   TROPONIN   HEPATIC FUNCTION PANEL   BRAIN NATRIURETIC PEPTIDE   COMPREHENSIVE METABOLIC PANEL W/ REFLEX TO MG FOR LOW K   LIPASE   URINALYSIS   LACTIC ACID, PLASMA   BLOOD GAS, VENOUS          EKG: All EKG's are interpreted by the Emergency Department Physician who either signs or Co-signs this chart in the absence of a cardiologist.       EKG Interpretation     Interpreted by emergency department physician    Rhythm: normal sinus   Rate: normal  Axis: normal  Ectopy: none  Conduction: normal  ST Segments: no acute change  T Waves: no acute change  Q Waves: none    Clinical Impression: no acute changes    Tami Echeverria     RADIOLOGY:     Non-plain film images such as CT, Ultrasound and MRI are read by the radiologist. Plain radiographic images are visualized and preliminarily interpreted by the emergency physician. Interpretation per the Radiologist below, if available at the time of this note:    CT ABDOMEN PELVIS W IV CONTRAST Additional Contrast? None   Preliminary Result   1. No acute findings. 2. Stable thickening of the gastric antrum is noted which could be related to   gastritis. This can be further evaluated with endoscopy non emergently. 3. Diffuse fatty infiltration of the liver. 4. Uterine leiomyomas. These are not significantly changed since the prior   examination of November 14, 2019. This can be further evaluated with MRI of   the pelvis non emergently.          XR CHEST (2 VW)   Final Result   Stable chest radiograph, without evidence of acute cardiopulmonary disease               ED BEDSIDE ULTRASOUND:   Performed by ED Physician Tami Echeverria DO       LABS:  Labs Reviewed   CBC WITH AUTO DIFFERENTIAL - Abnormal; Notable for the following components:       Result Value    RBC 4.09 (*)     MCH 34.2 (*)     RDW 11.1 (*)     Monocytes % 6.3 (*)     All other components within normal limits   COMPREHENSIVE METABOLIC PANEL - Abnormal; Notable for the following components:    Sodium 129 (*)     Chloride 93 (*)     Glucose 148 (*)     All other components within normal limits   LIPASE - Abnormal; Notable for the following components:    Lipase 8 (*)     All other components within normal limits   CULTURE, URINE   TROPONIN   HEPATIC FUNCTION PANEL   BRAIN NATRIURETIC PEPTIDE   COMPREHENSIVE METABOLIC PANEL W/ REFLEX TO MG FOR LOW K   LIPASE   URINALYSIS   LACTIC ACID, PLASMA   BLOOD GAS, VENOUS       All other labs were within normal range or not returned as of this dictation. EMERGENCY DEPARTMENT COURSE and DIFFERENTIAL DIAGNOSIS/MDM:   Vitals:    Vitals:    10/02/20 1953 10/02/20 2235 10/02/20 2332 10/03/20 0002   BP: (!) 114/90 (!) 143/86 130/82 123/72   Pulse: 91 74 74 74   Resp: 20 11 12 11   Temp: 98.3 °F (36.8 °C)      TempSrc: Oral      SpO2: 96% 99% 100% 98%           MDM  Number of Diagnoses or Management Options  Diagnosis management comments: 29-year-old female presents emergency department with chief complaint of chest pain, shortness of breath and vomiting. Patient reports symptoms have been intermittent over the last 4 days. Patient reports that vomiting can be elicited with food intake but she has had chest pain or shortness of breath independent of food intake. Patient does have multiple cardiac risk factors. Lab work, EKG and chest x-ray are unremarkable. Patient's heart score, however, is still 5. Will admit to hospitalist service for further cardiac rule out. Patient received Zofran in the emergency department.        Amount and/or Complexity of Data Reviewed  Clinical lab tests: ordered and reviewed  Tests in the radiology section of CPT®: ordered and reviewed  Tests in the medicine section of CPT®: ordered and reviewed    Risk of Complications, Morbidity, and/or Mortality  Presenting problems: moderate  Diagnostic procedures: moderate  Management options: moderate    Critical Care  Total time providing critical care: < 30 minutes    Patient Progress  Patient progress: improved        REASSESSMENT          CRITICAL CARE TIME     This excludes seperately billable procedures and family discussion time. Critical care time provided for obtaining history, conducting a physical exam, performing and monitoring interventions, ordering, collecting and interpreting tests, and establishing medical decision-making. There was a potential for life/limb threatening pathology requiring close evaluation and intervention with concern for patient decompensation. CONSULTS:  IP CONSULT TO HOSPITALIST    PROCEDURES:  None performed unless otherwise noted below     Procedures        FINAL IMPRESSION      1. Precordial pain    2. Dyspnea and respiratory abnormalities          DISPOSITION/PLAN   DISPOSITION Decision To Admit 10/03/2020 02:18:13 AM      PATIENT REFERRED TO:  No follow-up provider specified. DISCHARGE MEDICATIONS:  New Prescriptions    No medications on file       ED Provider Disposition Time  DISPOSITION Decision To Admit 10/03/2020 02:18:13 AM      Appropriate personal protective equipment was worn during the patient's evaluation. These included surgical, eye protection, surgical mask or in 95 respirator and gloves. The patient was also placed in a surgical mask for the prevention of possible spread of respiratory viral illnesses. The Patient was instructed to read the package inserts with any medication that was prescribed. Major potential reactions and medication interactions were discussed. The Patient understands that there are numerous possible adverse reactions not covered. The patient was also instructed to arrange follow-up with his or her primary care provider for review of any pending labwork or incidental findings on any radiology results that were obtained. All efforts were made to discuss any incidental findings that require further monitoring. Controlled Substances Monitoring:     No flowsheet data found.     (Please note that portions of this note were completed with a voice recognition program.  Efforts were made to edit the dictations but occasionally words are mis-transcribed.)    Nataliia Becker DO (electronically signed)  Attending Emergency Physician            Nataliia Becker DO  10/03/20 [id number]

## 2020-10-03 NOTE — ANESTHESIA PRE PROCEDURE
Department of Anesthesiology  Preprocedure Note       Name:  Aaron Marin   Age:  60 y.o.  :  1955                                          MRN:  [id number]         Date:  10/3/2020      Surgeon: Luis Coulter):  King Harry MD    Procedure: Procedure(s):  EGD ESOPHAGOGASTRODUODENOSCOPY    Medications prior to admission:   Prior to Admission medications    Medication Sig Start Date End Date Taking? Authorizing Provider   cilostazol (PLETAL) 100 MG tablet Take 100 mg by mouth 2 times daily   Yes Historical Provider, MD   metFORMIN (GLUCOPHAGE) 500 MG tablet Take 500 mg by mouth 2 times daily. Historical Provider, MD   lisinopril (PRINIVIL;ZESTRIL) 5 MG tablet Take 15 mg by mouth daily. Historical Provider, MD   cloNIDine (CATAPRES) 0.1 MG tablet Take 0.1 mg by mouth 2 times daily.     Historical Provider, MD   simvastatin (ZOCOR) 20 MG tablet Take 40 mg by mouth nightly     Historical Provider, MD       Current medications:    Current Facility-Administered Medications   Medication Dose Route Frequency Provider Last Rate Last Dose    sodium chloride flush 0.9 % injection 10 mL  10 mL Intravenous BID Tiffany Portillo, DO   10 mL at 10/03/20 0413    sodium chloride flush 0.9 % injection 10 mL  10 mL Intravenous 2 times per day Reji Hidalgo MD        sodium chloride flush 0.9 % injection 10 mL  10 mL Intravenous PRN Reji Hidalgo MD        acetaminophen (TYLENOL) tablet 650 mg  650 mg Oral Q6H PRN Reji Hidalgo MD        Or   [facility] acetaminophen (TYLENOL) suppository 650 mg  650 mg Rectal Q6H PRN Reji Hidalgo MD        polyethylene glycol (GLYCOLAX) packet 17 g  17 g Oral Daily PRN Reji Hidalgo MD        promethazine (PHENERGAN) tablet 12.5 mg  12.5 mg Oral Q6H PRN Reji Hidalgo MD        Or    ondansetron (ZOFRAN) injection 4 mg  4 mg Intravenous Q6H PRN Reji Hidalgo MD        atorvastatin (LIPITOR) tablet 40 mg  40 mg Oral Nightly Reji Hidalgo MD Alcohol/week: 1.0 standard drinks     Types: 1 Cans of beer per week                                Counseling given: Not Answered  Comment: 10/2019      Vital Signs (Current):   Vitals:    10/03/20 0232 10/03/20 0334 10/03/20 0405 10/03/20 0945   BP: 119/71 (!) 157/77  121/76   Pulse: 74 75  82   Resp: 13 17  14   Temp:  36.6 °C (97.8 °F)  36.8 °C (98.3 °F)   TempSrc:  Oral  Oral   SpO2: 98%      Weight:  147 lb (66.7 kg)     Height:   5' 6\" (1.676 m)                                               BP Readings from Last 3 Encounters:   10/03/20 121/76   11/15/19 98/77       NPO Status:                                                                                 BMI:   Wt Readings from Last 3 Encounters:   10/03/20 147 lb (66.7 kg)   11/14/19 125 lb (56.7 kg)     Body mass index is 23.73 kg/m².     CBC:   Lab Results   Component Value Date    WBC 9.9 10/02/2020    RBC 4.09 10/02/2020    HGB 14.0 10/02/2020    HCT 40.0 10/02/2020    MCV 97.8 10/02/2020    RDW 11.1 10/02/2020     10/02/2020       CMP:   Lab Results   Component Value Date     10/03/2020    K 3.9 10/03/2020    CL 99 10/03/2020    CO2 25 10/03/2020    BUN 6 10/03/2020    CREATININE 0.7 10/03/2020    GFRAA >60 10/03/2020    LABGLOM >60 10/03/2020    GLUCOSE 142 10/03/2020    PROT 5.8 10/03/2020    CALCIUM 8.7 10/03/2020    BILITOT 0.7 10/03/2020    ALKPHOS 55 10/03/2020    AST 18 10/03/2020    ALT 15 10/03/2020       POC Tests:   Recent Labs     10/03/20  0659   POCGLU 129*       Coags:   Lab Results   Component Value Date    PROTIME 12.0 10/03/2020    INR 0.99 10/03/2020    APTT 31.0 10/03/2020       HCG (If Applicable): No results found for: PREGTESTUR, PREGSERUM, HCG, HCGQUANT     ABGs: No results found for: PHART, PO2ART, KQD4MMU, FCH5DYZ, BEART, N9NPLNEM     Type & Screen (If Applicable):  No results found for: LABABO, LABRH    Drug/Infectious Status (If Applicable):  No results found for: HIV, HEPCAB    COVID-19 Screening (If Applicable):   Lab Results   Component Value Date    COVID19 NOT DETECTED 10/03/2020         Anesthesia Evaluation  Patient summary reviewed no history of anesthetic complications:   Airway: Mallampati: I  TM distance: >3 FB   Neck ROM: full  Mouth opening: > = 3 FB Dental:    (+) edentulous      Pulmonary:normal exam                              ROS comment: history of lung cancer, status post left lobectomy, no chemo or radiation  Former Smoker    Cardiovascular:  Exercise tolerance: good (>4 METS),   (+) hypertension:, hyperlipidemia         Beta Blocker:  Not on Beta Blocker         Neuro/Psych:               GI/Hepatic/Renal:            ROS comment: Abnormal CT. Endo/Other:    (+) Diabetes, . Abdominal:           Vascular:   + PVD, aortic or cerebral, . Anesthesia Plan      MAC     ASA 5       Induction: intravenous. Anesthetic plan and risks discussed with patient. CAYLA Estes CRNA   10/3/2020      Pre Anesthesia Evaluation complete. Anesthesia plan, risks, benefits, alternatives, and personnel discussed with patient and/or legal guardian. Patient and/or legal guardian verbalized an understanding and agreed to proceed. Anesthesia plan discussed with care team members and agreed upon.   CAYLA Estes CRNA  10/3/2020

## 2020-10-03 NOTE — CONSULTS
[address] [place] [address], [address] [facility]                                  CONSULTATION    PATIENT NAME: aMteo Reich                       :        1955  MED REC NO:   [id number]                          ROOM:       3105  ACCOUNT NO:   [de-identified]                           ADMIT DATE: 10/02/2020  PROVIDER:     Thao Hopkins MD    CONSULT DATE:  10/03/2020    PRIMARY CARE PROVIDER:  David Medina MD at [facility]. CHIEF COMPLAINT:  Upper abdominal pain with intractable nausea and  vomiting. HISTORY OF PRESENT ILLNESS:  The patient is a 43-year-old white female  who is being followed up at the [facility] with past medical history  significant for history of carcinoma of the lung diagnosed a year ago,  status post surgery with no radiation or chemotherapy, also history of  hypertension, diabetes mellitus, hyperlipidemia, peripheral vascular  disease, who presented to the emergency room yesterday with three-day  history of lower chest/upper abdominal pain along with nausea and  vomiting. There is no history of hematemesis, melena, or hematochezia. In the emergency room, the patient had a chem profile, LFTs and a CBC  drawn, which was unremarkable and the CAT scan of the abdomen and pelvis  was done, which showed stable thickening of the gastric antrum, which  could be related to gastritis, and the patient was admitted for further  workup and management. The patient is on IV Protonix. The patient has  never had an EGD done, but did have a couple of colonoscopies done. The  last colonoscopy was in 2020 at the [facility] in [place]. REVIEW OF SYSTEMS:  CENTRAL NERVOUS SYSTEM:  The patient denies headache or focal  sensorimotor symptoms. CARDIOVASCULAR SYSTEM:  The patient complains of lower chest pain, but  no shortness of breath or leg swelling.   GENITOURINARY SYSTEM:  No history of dysuria, pyuria, or hematuria. MUSCULOSKELETAL SYSTEM:  The patient complains of generalized weakness. RESPIRATORY SYSTEM:  No history of cough, hemoptysis, fever or chills. PAST MEDICAL HISTORY:  Significant for history of carcinoma of the left  lung diagnosed a year ago, status post surgery at [facility], and also  history of hypertension, diabetes mellitus, hyperlipidemia, and  peripheral vascular disease. FAMILY HISTORY:  The patient's father was diagnosed with carcinoma of  the lung. MEDICATIONS:  Please refer to the chart. The patient is on Pletal for  her peripheral vascular disease. SOCIOECONOMIC HISTORY:  The patient is a former smoker and the patient  also gives a history of EtOH use and heavy on occasion. There is also  history of recreational drug use and the patient smokes marijuana. PAST SURGICAL HISTORY:  The patient has had left lung surgery done. ALLERGIES:  No known drug allergies. PHYSICAL EXAMINATION:  GENERAL:  Shows a 63-year-old -American female of average build  and nutritional status, who is lying flat in bed, in no acute distress. She is awake, alert, and oriented, and pleasant to talk with. VITAL SIGNS:  Stable. HEENT:  Shows skull to be atraumatic. NECK:  Supple. CHEST:  Clear with diminished breath sounds. HEART:  S1 and S2 are normal.  ABDOMEN:  Soft with mild tenderness in the upper abdomen. No guarding  or rigidity. Liver and spleen are not palpable. Bowel sounds are  present. RECTAL:  Deferred. CNS:  Shows the patient to be awake, alert and oriented. There are no  focal sensorimotor signs. MUSCULOSKELETAL:  Shows evidence of degenerative joint disease changes. LABORATORY DATA:  As above mentioned.     IMPRESSION:  A 63-year-old white female with recently diagnosed  carcinoma of the lung presents with lower chest/upper abdominal pain  along with intractable nausea and vomiting and rule out peptic ulcer  disease/gastroesophageal reflux

## 2020-10-03 NOTE — H&P
HISTORY AND PHYSICAL  (Hospitalist, Internal Medicine)  IDENTIFYING INFORMATION   PATIENT:  Lake Chamberlain  MRN:  [id number]  ADMIT DATE: 10/2/2020      CHIEF COMPLAINT   Chest pain    HISTORY OF PRESENT ILLNESS   Lake Chamberlain is a 60 y.o. female with history of lung cancer, status post left lobectomy, no chemo or radiation, peripheral vascular disease, hypertension, hyperlipidemia, diabetes mellitus type 2 presented to ED with complaints of chest pain for the last 3 to 4 days. Patient pointing out to her lower sternum, epigastric area, described as sharp, 10/10 in intensity, intermittent, worse after eating. Patient also reported having nausea, vomiting after p.o. intake. Pain reported to be better after vomiting. Also reported pain worse with lying down. Admits to having food stuck in her throat. Denied similar symptoms in the past.  Denied any acidity or acid reflux symptoms. Denied any fever, chills, cough. At presentation patient was noted to have BP 1 one 4/90, HR 91, RR 20, temperature 98.3, saturating 96% on room air. Lab work significant for sodium 129, random glucose 148. UA-leukocyte esterase moderate, WBC 4, bacteria rare. Venous blood gas-pH 7.37, PCO2 47, PO2 29. Chest x-ray-no acute process. CT abdomen/pelvis-stable thickening of the gastric antrum noted which could be related to gastritis. Patient never had endoscopy. PAST MEDICAL HISTORY PAST SURGICAL HISTORY    history of lung cancer, status post left lobectomy, no chemo or radiation, peripheral vascular disease, hypertension, hyperlipidemia, diabetes mellitus type 2  left lobectomy for lung cancer-10/2019, tubal ligation   FAMILY HISTORY SOCIAL HISTORY    Reviewed and noncontributory   quit smoking a year ago, admits to drinking 6 pack beer per week, marijuana occasionally.    MEDICATIONS ALLERGIES   Simvastatin, cilostazol, metformin, clonidine, lisinopril   no known drug allergies       PAST MEDICAL, SURGICAL, FAMILY, and SOCIAL HISTORY         Past Medical History:   Diagnosis Date    Cancer ([facility]     Diabetes mellitus ([facility]     Hyperlipidemia     Hypertension      Past Surgical History:   Procedure Laterality Date    LUNG CANCER SURGERY Left 10/2019     No family history on file. Family Hx of HTN  Family Hx as reviewed above, otherwise non-contributory  Social History     Socioeconomic History    Marital status:      Spouse name: None    Number of children: None    Years of education: None    Highest education level: None   Occupational History    None   Social Needs    Financial resource strain: None    Food insecurity     Worry: None     Inability: None    Transportation needs     Medical: None     Non-medical: None   Tobacco Use    Smoking status: Former Smoker     Packs/day: 1.00     Types: Cigarettes    Smokeless tobacco: Never Used    Tobacco comment: 10/2019   Substance and Sexual Activity    Alcohol use: Yes     Alcohol/week: 1.0 standard drinks     Types: 1 Cans of beer per week    Drug use: Yes     Types: Marijuana    Sexual activity: Yes     Partners: Male   Lifestyle    Physical activity     Days per week: None     Minutes per session: None    Stress: None   Relationships    Social connections     Talks on phone: None     Gets together: None     Attends [employer] service: None     Active member of club or organization: None     Attends meetings of clubs or organizations: None     Relationship status: None    Intimate partner violence     Fear of current or ex partner: None     Emotionally abused: None     Physically abused: None     Forced sexual activity: None   Other Topics Concern    None   Social History Narrative    None       MEDICATIONS   Medications Prior to Admission  Not in a hospital admission.     Current Medications  Current Facility-Administered Medications   Medication Dose Route Frequency Provider Last Rate Last Dose    0.9 % sodium chloride bolus  1,000 mL Intravenous Once Ulus Fleet, DO        sodium chloride flush 0.9 % injection 10 mL  10 mL Intravenous BID Ulus Fleet, DO   10 mL at 10/03/20 0103    ondansetron (ZOFRAN) injection 4 mg  4 mg Intravenous Q30 Min PRN Ulus Fleet, DO   4 mg at 10/02/20 2321    morphine sulfate (PF) injection 2 mg  2 mg Intravenous Q30 Min PRN Ulus Fleet, DO   2 mg at 10/02/20 2322     Current Outpatient Medications   Medication Sig Dispense Refill    metFORMIN (GLUCOPHAGE) 500 MG tablet Take 500 mg by mouth 2 times daily.  lisinopril (PRINIVIL;ZESTRIL) 5 MG tablet Take 15 mg by mouth daily.  cloNIDine (CATAPRES) 0.1 MG tablet Take 0.1 mg by mouth 2 times daily.  simvastatin (ZOCOR) 20 MG tablet Take 20 mg by mouth nightly. Allergies  No Known Allergies    REVIEW OF SYSTEMS   Within above limitations. 14 point review of systems reviewed. Pertinent positive or negative as per HPI or otherwise negative per 14 point systems review. PHYSICAL EXAM     Wt Readings from Last 3 Encounters:   11/14/19 125 lb (56.7 kg)       Blood pressure 123/72, pulse 74, temperature 98.3 °F (36.8 °C), temperature source Oral, resp. rate 11, SpO2 98 %, not currently breastfeeding. GEN  -Awake, alert, NAD.   EYES   -PERRL. HENT  -MM are moist.   RESP  -LS CTA equal bilat, no wheezes, rales or rhonchi. Symmetric chest movement. No respiratory distress noted. C/V  -S1/S2 auscultated. RRR. No peripheral edema. No reproducible chest wall tenderness. GI  -Abdomen is soft non-distended, epigastric tenderness. No masses or guarding. + BS in all quadrants. Rectal exam deferred.   -No CVA tenderness. Rogers catheter is not present. MS  -B/L extremities- No gross joint deformities. No swelling, intact sensation symmetrical.   SKIN  -Normal coloration, warm, dry. NEURO  -CN 2-12 appear grossly intact, normal speech, no lateralizing weakness. PSYC  -Awake, alert, oriented x 4. Appropriate affect.      LABS AND IMAGING Results for Dakotah Phelps (MRN [id number]) as of 10/3/2020 05:15   Ref.  Range 10/2/2020 20:34   Sodium Latest Ref Range: 135 - 145 MMOL/L 129 (L)   Potassium Latest Ref Range: 3.5 - 5.1 MMOL/L 3.7   Chloride Latest Ref Range: 99 - 110 mMol/L 93 (L)   CO2 Latest Ref Range: 21 - 32 MMOL/L 23   BUN Latest Ref Range: 6 - 23 MG/DL 6   Creatinine Latest Ref Range: 0.6 - 1.1 MG/DL 0.6   Anion Gap Latest Ref Range: 4 - 16  13   GFR Non- Latest Ref Range: >60 mL/min/1.73m2 >60   GFR African American Latest Ref Range: >60 mL/min/1.73m2 >60   Glucose Latest Ref Range: 70 - 99 MG/ (H)   Calcium Latest Ref Range: 8.3 - 10.6 MG/DL 9.2   Total Protein Latest Ref Range: 6.4 - 8.2 GM/DL 6.7   Pro-BNP Latest Ref Range: <300 PG/.2   Troponin T Latest Ref Range: <0.01 NG/ML <0.010   Albumin Latest Ref Range: 3.4 - 5.0 GM/DL 4.1   Alk Phos Latest Ref Range: 40 - 129 IU/L 62   ALT Latest Ref Range: 10 - 40 U/L 16   AST Latest Ref Range: 15 - 37 IU/L 22   Bilirubin Latest Ref Range: 0.0 - 1.0 MG/DL 0.7   Bilirubin, Direct Latest Ref Range: 0.0 - 0.3 MG/DL    Bilirubin, Indirect Latest Ref Range: 0 - 0.7 MG/DL    Lipase Latest Ref Range: 13 - 60 IU/L 8 (L)   WBC Latest Ref Range: 4.0 - 10.5 K/CU MM 9.9   RBC Latest Ref Range: 4.2 - 5.4 M/CU MM 4.09 (L)   Hemoglobin Quant Latest Ref Range: 12.5 - 16.0 GM/DL 14.0   Hematocrit Latest Ref Range: 37 - 47 % 40.0   MCV Latest Ref Range: 78 - 100 FL 97.8   MCH Latest Ref Range: 27 - 31 PG 34.2 (H)   MCHC Latest Ref Range: 32.0 - 36.0 % 35.0   MPV Latest Ref Range: 7.5 - 11.1 FL 9.6   RDW Latest Ref Range: 11.7 - 14.9 % 11.1 (L)   Platelet Count Latest Ref Range: 140 - 440 K/CU    Lymphocyte % Latest Ref Range: 24 - 44 % 29.6   Monocytes % Latest Ref Range: 0 - 4 % 6.3 (H)   Eosinophils % Latest Ref Range: 0 - 3 % 0.6   Basophils % Latest Ref Range: 0 - 1 % 0.4   Lymphocytes Absolute Latest Units: K/CU MM 2.9   Monocytes Absolute Latest Units: K/CU MM 0.6 Eosinophils Absolute Latest Units: K/CU MM 0.1   Basophils Absolute Latest Units: K/CU MM 0.0   Differential Type Unknown AUTOMATED DIFFERENTIAL   Segs Relative Latest Ref Range: 36 - 66 % 62.9   Segs Absolute Latest Units: K/CU MM 6.2   Nucleated RBC % Latest Units: % 0.0   Immature Neutrophil % Latest Ref Range: 0 - 0.43 % 0.2   Total Immature Neutrophil Latest Units: K/CU MM 0.02   Total Nucleated RBC Latest Units: K/CU MM 0.0     Results for Patrick Davis (MRN [id number]) as of 10/3/2020 05:15   Ref. Range 10/2/2020 22:45   pH, Regino Latest Ref Range: 7.32 - 7.42  7.37   pCO2, Regino Latest Ref Range: 38 - 52 mmHG 47   pO2, Regino Latest Ref Range: 28 - 48 mmHG 29   HCO3, Venous Latest Ref Range: 19 - 25 MMOL/L 27.2 (H)   O2 Sat, Regino Latest Ref Range: 50 - 70 % 52.6   Base Exc, Mixed Latest Ref Range: 0 - 2.3  1.3     Results for Patrick Davis (MRN [id number]) as of 10/3/2020 05:15   Ref.  Range 10/3/2020 00:51   Color, UA Latest Ref Range: YELLOW  YELLOW   Clarity, UA Latest Ref Range: CLEAR  HAZY (A)   Bilirubin, Urine Latest Ref Range: NEGATIVE MG/DL NEGATIVE   Ketones, Urine Latest Ref Range: NEGATIVE MG/DL NEGATIVE   Specific Gravity, UA Latest Ref Range: 1.001 - 1.035  1.005   Blood, Urine Latest Ref Range: NEGATIVE  NEGATIVE   Protein, UA Latest Ref Range: NEGATIVE MG/DL NEGATIVE   Urobilinogen, Urine Latest Ref Range: 0.2 - 1.0 MG/DL NORMAL   Leukocyte Esterase, Urine Latest Ref Range: NEGATIVE  MODERATE (A)   Glucose, Urine Latest Ref Range: NEGATIVE MG/DL NEGATIVE   Nitrite Urine, Quantitative Latest Ref Range: NEGATIVE  NEGATIVE   pH, Urine Latest Ref Range: 5.0 - 8.0  6.0   Mucus, UA Latest Ref Range: NEGATIVE HPF RARE (A)   WBC, UA Latest Ref Range: 0 - 5 /HPF 4   RBC, UA Latest Ref Range: 0 - 6 /HPF NONE SEEN   Squam Epithel, UA Latest Units: /HPF 9   Bacteria, UA Latest Ref Range: NEGATIVE /HPF RARE (A)   Trichomonas, UA Latest Ref Range: NONE SEEN /HPF NONE SEEN     Recent Imaging    CT ABDOMEN PELVIS W gastric antral mucosal thickening may be due to gastritis or underdistention.  -Started on Protonix, sucralfate  -GI consult. #.  Mild hyponatremia: Sodium 129  -Could be secondary to nausea vomiting and volume depletion  -Continue IV fluids. #. history of lung cancer, status post left lobectomy, no chemo or radiation    #.  peripheral vascular disease  -Patient follows up at [facility]  -He is on cilostazol    #. Hypertension-patient is on clonidine, lisinopril    #. Hyperlipidemia-continue simvastatin    #. diabetes mellitus type 2  -Patient is on metformin 500 mg twice daily  -Continue insulin sliding scale with hypoglycemia protocol    DVT Prophylaxis: Lovenox  GI Prophylaxis: Protonix   code Status: FULL.       Case d/w ED physician    Stevie Trinh MD  Hospitalist, Internal Medicine  10/3/2020 at 2:37 AM

## 2020-10-03 NOTE — PROGRESS NOTES
Nutrient Intake, Diet Advancement/Tolerance  Physical Signs/Symptoms Outcomes:  Biochemical Data, GI Status, Skin, Weight, Nausea or Vomiting     Discharge Planning:     Too soon to determine     Electronically signed by Radha Jernigan RD, LD on 10/3/20 at 11:55 AM EDT    Contact: [phone]

## 2020-10-03 NOTE — BRIEF OP NOTE
Brief Postoperative Note      Daniel Martin is a 60 y.o. female     Pre-operative Diagnosis: ABNORMAL CT SCAN    Post-operative Diagnosis:HH--GASTRITIS    Procedure:EGD WITH BX    Anesthesia: MAC    Surgeons/Assistants:Valeriy Zabala     Estimated Blood Loss: NONE    Complications: None    Specimens: were obtained      Valeriy Zabala   10/3/2020   10:53 AM

## 2020-10-03 NOTE — PROGRESS NOTES
Hospitalist Progress Note      Name:  Cuco Lujan /Age/Sex: 1955  (66 y.o. female)   MRN & CSN:  [id number] & [id number] Admission Date/Time: 10/2/2020 10:06 PM   Location:  [address] [place] [address]- PCP: Jennifer Lezama MD         Hospital Day: 2    Assessment and Plan:   Cuco Lujan is a 60 y.o.  female  who presents with <principal problem not specified>    >  Chest pain:  -Troponin negative, EKG did not reveal any significant ST-T wave changes.  -Serial troponins, repeat EKG. -Consulted cardiology.     > Thickening of the gastric antrum:  -CT abdomen/pelvis-stable thickening of the gastric antrum is noted which could be related to gastritis. CT abdomen/pelvis-2019 gastric antral mucosal thickening may be due to gastritis or underdistention.  -Started on Protonix, sucralfate  -GI consulted. 10/03 EGD with Gastritis     >  Mild hyponatremia: Sodium 129  -Could be secondary to nausea vomiting and volume depletion  -resolved s/p IVF     > history of lung cancer, status post left lobectomy, no chemo or radiation     >  peripheral vascular disease  -Patient follows up at [place]  -He is on cilostazol     > Hypertension-patient is on clonidine, lisinopril     > Hyperlipidemia-continue simvastatin     > diabetes mellitus type 2  -Patient is on metformin 500 mg twice daily  -Continue insulin sliding scale with hypoglycemia protocol       Diet DIET GENERAL; Carb Control: 5 carb choices (75 gms)/meal   DVT Prophylaxis ? Lovenox   Code Status Full Code   Disposition  Home pending consult rec, anticipate tomorrow     History of Present Illness:     Pt S&E. Chest pain and abd pain resolved, no F/C, no N/V.     10-14 point ROS reviewed negative, unless as noted above    Objective:        Intake/Output Summary (Last 24 hours) at 10/3/2020 1302  Last data filed at 10/3/2020 1102  Gross per 24 hour   Intake 300 ml   Output --   Net 300 ml      Vitals:   Vitals:    10/03/20 1230   BP: (!) 149/90   Pulse: 75   Resp: 13 Temp: 98.3 °F (36.8 °C)   SpO2:      Physical Exam:    GEN Awake female, cooperative, no apparent distress. RESP Clear to auscultation, no wheezes, rales or rhonchi. Symmetric chest movement . CARDIO/VASC S1/S2 auscultated. Regular rate. GI Abdomen is soft without significant tenderness, Bowel sounds are normoactive. MSK No gross joint deformities. Spontaneous movement of all extremities  SKIN Normal coloration, warm, dry. NEURO normal speech, no lateralizing weakness. PSYCH Awake, alert, oriented x 4. Affect appropriate.     Medications:   Medications:    sodium chloride flush  10 mL Intravenous BID    sodium chloride flush  10 mL Intravenous 2 times per day    atorvastatin  40 mg Oral Nightly    [START ON 10/4/2020] aspirin  81 mg Oral Daily    enoxaparin  40 mg Subcutaneous Daily    pantoprazole  40 mg Oral BID AC    sucralfate  1 g Oral 4 times per day    insulin lispro  0-6 Units Subcutaneous TID WC    insulin lispro  0-3 Units Subcutaneous Nightly    cloNIDine  0.1 mg Oral BID    lisinopril  15 mg Oral Daily    cilostazol  100 mg Oral BID      Infusions:    sodium chloride 100 mL/hr at 10/03/20 0414    dextrose       PRN Meds: sodium chloride flush, 10 mL, PRN  acetaminophen, 650 mg, Q6H PRN    Or  acetaminophen, 650 mg, Q6H PRN  polyethylene glycol, 17 g, Daily PRN  promethazine, 12.5 mg, Q6H PRN    Or  ondansetron, 4 mg, Q6H PRN  glucose, 15 g, PRN  dextrose, 12.5 g, PRN  glucagon (rDNA), 1 mg, PRN  dextrose, 100 mL/hr, PRN      Electronically signed by Constantin Lake MD on 10/3/2020 at 1:02 PM

## 2020-10-03 NOTE — PROGRESS NOTES
1100 PHONE REPORT TO Pj Cruz RN, ADVISED OF FINDINGS AND ABLE TO HAVE DIABETIC DIET PER DR Alivia Davis

## 2020-10-03 NOTE — ANESTHESIA POSTPROCEDURE EVALUATION
Department of Anesthesiology  Postprocedure Note    Patient: Davin Salazar  MRN: [id number]  [place]: 1955  Date of evaluation: 10/3/2020  Time:  11:02 AM     Procedure Summary     Date:  10/03/20 Room / Location:  [address]    Anesthesia Start:  1026 Anesthesia Stop:  5298    Procedure:  EGD BIOPSY (N/A ) Diagnosis:  (abnormal ct scan)    Surgeon:  Delia Mitchell MD Responsible Provider:  CAYLA Duvall CRNA    Anesthesia Type:  MAC ASA Status:  5          Anesthesia Type: MAC    Kavya Phase I:  10    Kavya Phase II:  10    Last vitals: Reviewed and per EMR flowsheets.        Anesthesia Post Evaluation    Patient location during evaluation: bedside  Patient participation: complete - patient participated  Level of consciousness: awake and alert  Pain score: 0  Airway patency: patent  Nausea & Vomiting: no nausea and no vomiting  Complications: no  Cardiovascular status: hemodynamically stable  Respiratory status: acceptable, room air, spontaneous ventilation and nonlabored ventilation  Hydration status: euvolemic

## 2020-10-03 NOTE — PLAN OF CARE
Problem: Falls - Risk of:  Goal: Will remain free from falls  Description: Will remain free from falls  10/3/2020 1130 by Sharri Wolfe RN  Outcome: Ongoing  10/3/2020 0359 by Daniel Araujo RN  Outcome: Ongoing  Goal: Absence of physical injury  Description: Absence of physical injury  10/3/2020 1130 by Sharri Wolfe RN  Outcome: Ongoing  10/3/2020 0359 by Daniel Araujo RN  Outcome: Ongoing     Problem: Pain:  Goal: Pain level will decrease  Description: Pain level will decrease  10/3/2020 1130 by Sharri Wolfe RN  Outcome: Ongoing  10/3/2020 0359 by Daniel Araujo RN  Outcome: Ongoing  Goal: Control of acute pain  Description: Control of acute pain  10/3/2020 1130 by Sharri Wolfe RN  Outcome: Ongoing  10/3/2020 0359 by Daniel Araujo RN  Outcome: Ongoing  Goal: Control of chronic pain  Description: Control of chronic pain  10/3/2020 1130 by Sharri Wolfe RN  Outcome: Ongoing  10/3/2020 0359 by Daniel Araujo RN  Outcome: Ongoing

## 2020-10-03 NOTE — ED NOTES
Seen in [address] [facility], [address] [place] [address]  10/02/20 2047

## 2020-10-03 NOTE — CONSULTS
chloride flush 0.9 % injection 10 mL, BID  sodium chloride flush 0.9 % injection 10 mL, 2 times per day  sodium chloride flush 0.9 % injection 10 mL, PRN  acetaminophen (TYLENOL) tablet 650 mg, Q6H PRN    Or  acetaminophen (TYLENOL) suppository 650 mg, Q6H PRN  polyethylene glycol (GLYCOLAX) packet 17 g, Daily PRN  promethazine (PHENERGAN) tablet 12.5 mg, Q6H PRN    Or  ondansetron (ZOFRAN) injection 4 mg, Q6H PRN  atorvastatin (LIPITOR) tablet 40 mg, Nightly  [START ON 10/4/2020] aspirin chewable tablet 81 mg, Daily  0.9 % sodium chloride infusion, Continuous  enoxaparin (LOVENOX) injection 40 mg, Daily  pantoprazole (PROTONIX) tablet 40 mg, BID AC  sucralfate (CARAFATE) tablet 1 g, 4 times per day  insulin lispro (HUMALOG) injection vial 0-6 Units, TID WC  insulin lispro (HUMALOG) injection vial 0-3 Units, Nightly  glucose (GLUTOSE) 40 % oral gel 15 g, PRN  dextrose 50 % IV solution, PRN  glucagon (rDNA) injection 1 mg, PRN  dextrose 5 % solution, PRN  cloNIDine (CATAPRES) tablet 0.1 mg, BID  lisinopril (PRINIVIL;ZESTRIL) tablet 15 mg, Daily  cilostazol (PLETAL) tablet 100 mg, BID      Current Facility-Administered Medications   Medication Dose Route Frequency Provider Last Rate Last Dose    sodium chloride flush 0.9 % injection 10 mL  10 mL Intravenous BID Zo Lopez DO   10 mL at 10/03/20 1143    sodium chloride flush 0.9 % injection 10 mL  10 mL Intravenous 2 times per day Jayme Francisco MD        sodium chloride flush 0.9 % injection 10 mL  10 mL Intravenous PRN Jayme Francisco MD        acetaminophen (TYLENOL) tablet 650 mg  650 mg Oral Q6H PRN Jayme Francisco MD        Or   [facility] acetaminophen (TYLENOL) suppository 650 mg  650 mg Rectal Q6H PRN Jayme Francisco MD        polyethylene glycol (GLYCOLAX) packet 17 g  17 g Oral Daily PRN Jayme Francisco MD        promethazine (PHENERGAN) tablet 12.5 mg  12.5 mg Oral Q6H PRN Jayme Francisco MD        Or    ondansetron [facility]) injection 4 mg  4 mg Intravenous Q6H PRN Moriah Saucedo MD        atorvastatin (LIPITOR) tablet 40 mg  40 mg Oral Nightly Moriah Saucedo MD        [START ON 10/4/2020] aspirin chewable tablet 81 mg  81 mg Oral Daily Moriah Saucedo MD        0.9 % sodium chloride infusion   Intravenous Continuous Moriah Saucedo  mL/hr at 10/03/20 0414      enoxaparin (LOVENOX) injection 40 mg  40 mg Subcutaneous Daily Moriah Saucedo MD   40 mg at 10/03/20 1134    pantoprazole (PROTONIX) tablet 40 mg  40 mg Oral BID AC Moriah Saucedo MD   40 mg at 10/03/20 1711    sucralfate (CARAFATE) tablet 1 g  1 g Oral 4 times per day Moriah Saucedo MD   1 g at 10/03/20 1714    insulin lispro (HUMALOG) injection vial 0-6 Units  0-6 Units Subcutaneous TID WC Moriah Saucedo MD   1 Units at 10/03/20 1714    insulin lispro (HUMALOG) injection vial 0-3 Units  0-3 Units Subcutaneous Nightly Moriah Saucedo MD        glucose (GLUTOSE) 40 % oral gel 15 g  15 g Oral PRN Moriah Saucedo MD        dextrose 50 % IV solution  12.5 g Intravenous PRN Moriah Saucedo MD        glucagon (rDNA) injection 1 mg  1 mg Intramuscular PRN Moriah Saucedo MD        dextrose 5 % solution  100 mL/hr Intravenous PRN Moriah Saucedo MD        cloNIDine (CATAPRES) tablet 0.1 mg  0.1 mg Oral BID Moriah Saucedo MD   0.1 mg at 10/03/20 1132    lisinopril (PRINIVIL;ZESTRIL) tablet 15 mg  15 mg Oral Daily Moriah Saucedo MD   15 mg at 10/03/20 1137    cilostazol (PLETAL) tablet 100 mg  100 mg Oral BID Moriah Saucedo MD   100 mg at 10/03/20 1131     Review of Systems:   · Constitutional: No Fever or Weight Loss   · Eyes: No Decreased Vision  · ENT: No Headaches, Hearing Loss or Vertigo  · Cardiovascular: As per HPI  · Respiratory: As per HPI  · Gastrointestinal: No abdominal pain, appetite loss, blood in stools, constipation, diarrhea or heartburn  · Genitourinary: No dysuria, trouble voiding, or hematuria  · Musculoskeletal:  No gait disturbance, weakness or joint complaints  · Integumentary: No rash or pruritis  · Neurological: No TIA or stroke symptoms  · Psychiatric: No anxiety or depression  · Endocrine: No malaise, fatigue or temperature intolerance  · Hematologic/Lymphatic: No bleeding problems, blood clots or swollen lymph nodes  · Allergic/Immunologic: No nasal congestion or hives  All systems negative except as marked. Physical Examination:    Vitals:    10/03/20 0945 10/03/20 1149 10/03/20 1230 10/03/20 1600   BP: 121/76  (!) 149/90 110/72   Pulse: 82  75 77   Resp: 14 15 13 14   Temp: 98.3 °F (36.8 °C)  98.3 °F (36.8 °C) 98 °F (36.7 °C)   TempSrc: Oral  Oral Oral   SpO2:  95%  95%   Weight:       Height:  5' 6\" (1.676 m)         General Appearance:  No distress, conversant    Constitutional:  Well developed, Well nourished, No acute distress, Non-toxic appearance. HENT:  Normocephalic, Atraumatic, Bilateral external ears normal, Oropharynx moist, No oral exudates, Nose normal. Neck- Normal range of motion, No tenderness, Supple, No stridor,no apical-carotid delay  Lymphatics : no palpable lymph nodes  Eyes:  PERRL, EOMI, Conjunctiva normal, No discharge. Respiratory:  Normal breath sounds, No respiratory distress, No wheezing, No chest tenderness. ,no use of accessory muscles, crackles Absent   Cardiovascular: (PMI) apex non displaced,no lifts no thrills, ankle swelling Absent  , 1+, s1 and s2 audible,Murmur. Absent , JVD not noted    Abdomen /GI:  Bowel sounds normal, Soft, No tenderness, No masses, No gross visceromegaly   :  No costovertebral angle tenderness   Musculoskeletal:  No edema, no tenderness, no deformities.  Back- no tenderness  Integument:  Well hydrated, no rash   Lymphatic:  No lymphadenopathy noted   Neurologic:  Alert & oriented x 3, CN 2-12 normal, normal motor function, normal sensory function, no focal deficits noted Medical decision making and Data review:    Lab Review   Recent Labs     10/02/20  2034   WBC 9.9   HGB 14.0   HCT 40.0         Recent Labs     10/03/20  0554      K 3.9   CL 99   CO2 25   BUN 6   CREATININE 0.7     Recent Labs     10/02/20  2034 10/03/20  0554   AST 22  22 18   ALT 16  16 15   BILIDIR 0.2  --    BILITOT 0.7  0.7 0.7   ALKPHOS 62  62 55     Recent Labs     10/02/20  2034 10/03/20  0554   TROPONINT <0.010 <0.010       Recent Labs     10/02/20  2034   PROBNP 108.2     Lab Results   Component Value Date    INR 0.99 10/03/2020    PROTIME 12.0 10/03/2020       EKG: (reviewed by myself)    ECHO:(reviewed by myself)    Chest Xray:(reviewed by myself)  Xr Chest (2 Vw)    Result Date: 10/2/2020  EXAMINATION: TWO XRAY VIEWS OF THE CHEST 10/2/2020 7:15 pm COMPARISON: November 14, 2019 HISTORY: ORDERING SYSTEM PROVIDED HISTORY: shortness of breath TECHNOLOGIST PROVIDED HISTORY: Reason for exam:->shortness of breath Reason for Exam: shortness of breath, chest pain Acuity: Acute Type of Exam: Initial Additional signs and symptoms: na Relevant Medical/Surgical History: diabetes, hypertension FINDINGS: Adequate inspiration is noted. Postsurgical changes again seen involving the left hemithorax, related to partial pneumonectomy. Right lung is clear. Heart size, mediastinal contours and osseous structures are stable. Stable chest radiograph, without evidence of acute cardiopulmonary disease     Ct Abdomen Pelvis W Iv Contrast Additional Contrast? None    Result Date: 10/3/2020  EXAMINATION: CT OF THE ABDOMEN AND PELVIS WITH CONTRAST 10/3/2020 1:01 am TECHNIQUE: CT of the abdomen and pelvis was performed with the administration of intravenous contrast. Multiplanar reformatted images are provided for review. Dose modulation, iterative reconstruction, and/or weight based adjustment of the mA/kV was utilized to reduce the radiation dose to as low as reasonably achievable.  COMPARISON: CT of the abdomen and pelvis dated November 14, 2019 HISTORY: ORDERING SYSTEM PROVIDED HISTORY: abd pain TECHNOLOGIST PROVIDED HISTORY: Reason for exam:->abd pain Additional Contrast?->None Reason for Exam: abd pain Acuity: Acute Type of Exam: Initial Additional signs and symptoms: To ED for c/o chest pain ,shortness of breath and vomiting x 4 days Relevant Medical/Surgical History: isovue 370 80 ml FINDINGS: Lower Chest: There are no focal consolidations. Organs: Diffuse fatty infiltration of the liver is noted. The gallbladder, spleen, adrenal glands, pancreas, and kidneys are unremarkable. GI/Bowel: There is no bowel obstruction. The appendix is normal.  Stable thickening of the gastric antrum is noted which could be related to gastritis. Pelvis: The bladder is unremarkable. Multiple leiomyomas are seen within the uterus. Some of these are calcified. There is no free fluid. Small bilateral fat containing inguinal hernias are noted. Peritoneum/Retroperitoneum: There is no free air or lymphadenopathy. Diffuse atherosclerotic disease of the aorta is noted. Bones/Soft Tissues: No destructive osseous lesions are identified. 1. No acute findings. 2. Stable thickening of the gastric antrum is noted which could be related to gastritis. This can be further evaluated with endoscopy non emergently. 3. Diffuse fatty infiltration of the liver. 4. Uterine leiomyomas. These are not significantly changed since the prior examination of November 14, 2019. This can be further evaluated with MRI of the pelvis non emergently. All labs, medications and tests reviewed by myself including data  from outside source , patient and available family . Continue all other medications of all above medical condition listed as is. Impression:  Active Problems:    Chest pain  Resolved Problems:    * No resolved hospital problems. *      Assessment: 60 y. o.year old with PMH of  has a past medical history of Cancer ([facility] [address]

## 2020-10-04 VITALS
TEMPERATURE: 98.5 F | SYSTOLIC BLOOD PRESSURE: 139 MMHG | OXYGEN SATURATION: 99 % | BODY MASS INDEX: 24.84 KG/M2 | RESPIRATION RATE: 17 BRPM | HEIGHT: 66 IN | HEART RATE: 69 BPM | WEIGHT: 154.56 LBS | DIASTOLIC BLOOD PRESSURE: 99 MMHG

## 2020-10-04 PROBLEM — K29.00 ACUTE GASTRITIS WITHOUT HEMORRHAGE: Status: ACTIVE | Noted: 2020-10-04

## 2020-10-04 LAB
ANION GAP SERPL CALCULATED.3IONS-SCNC: 9 MMOL/L (ref 4–16)
BUN BLDV-MCNC: 4 MG/DL (ref 6–23)
CALCIUM SERPL-MCNC: 7.9 MG/DL (ref 8.3–10.6)
CHLORIDE BLD-SCNC: 106 MMOL/L (ref 99–110)
CO2: 23 MMOL/L (ref 21–32)
CREAT SERPL-MCNC: 0.7 MG/DL (ref 0.6–1.1)
CULTURE: NORMAL
EKG ATRIAL RATE: 93 BPM
EKG DIAGNOSIS: NORMAL
EKG P AXIS: 64 DEGREES
EKG P-R INTERVAL: 208 MS
EKG Q-T INTERVAL: 364 MS
EKG QRS DURATION: 66 MS
EKG QTC CALCULATION (BAZETT): 452 MS
EKG R AXIS: -13 DEGREES
EKG T AXIS: 40 DEGREES
EKG VENTRICULAR RATE: 93 BPM
GFR AFRICAN AMERICAN: >60 ML/MIN/1.73M2
GFR NON-AFRICAN AMERICAN: >60 ML/MIN/1.73M2
GLUCOSE BLD-MCNC: 152 MG/DL (ref 70–99)
GLUCOSE BLD-MCNC: 160 MG/DL (ref 70–99)
GLUCOSE BLD-MCNC: 171 MG/DL (ref 70–99)
HCT VFR BLD CALC: 34.7 % (ref 37–47)
HEMOGLOBIN: 11.9 GM/DL (ref 12.5–16)
Lab: NORMAL
MCH RBC QN AUTO: 34.3 PG (ref 27–31)
MCHC RBC AUTO-ENTMCNC: 34.3 % (ref 32–36)
MCV RBC AUTO: 100 FL (ref 78–100)
PDW BLD-RTO: 11.3 % (ref 11.7–14.9)
PLATELET # BLD: 185 K/CU MM (ref 140–440)
PMV BLD AUTO: 8.8 FL (ref 7.5–11.1)
POTASSIUM SERPL-SCNC: 3.7 MMOL/L (ref 3.5–5.1)
RBC # BLD: 3.47 M/CU MM (ref 4.2–5.4)
SODIUM BLD-SCNC: 138 MMOL/L (ref 135–145)
SPECIMEN: NORMAL
WBC # BLD: 8.9 K/CU MM (ref 4–10.5)

## 2020-10-04 PROCEDURE — 6360000002 HC RX W HCPCS: Performed by: INTERNAL MEDICINE

## 2020-10-04 PROCEDURE — 36415 COLL VENOUS BLD VENIPUNCTURE: CPT

## 2020-10-04 PROCEDURE — 93010 ELECTROCARDIOGRAM REPORT: CPT | Performed by: INTERNAL MEDICINE

## 2020-10-04 PROCEDURE — 80048 BASIC METABOLIC PNL TOTAL CA: CPT

## 2020-10-04 PROCEDURE — 93005 ELECTROCARDIOGRAM TRACING: CPT | Performed by: INTERNAL MEDICINE

## 2020-10-04 PROCEDURE — 82962 GLUCOSE BLOOD TEST: CPT

## 2020-10-04 PROCEDURE — 2580000003 HC RX 258: Performed by: INTERNAL MEDICINE

## 2020-10-04 PROCEDURE — 6370000000 HC RX 637 (ALT 250 FOR IP): Performed by: INTERNAL MEDICINE

## 2020-10-04 PROCEDURE — 94761 N-INVAS EAR/PLS OXIMETRY MLT: CPT

## 2020-10-04 PROCEDURE — 85027 COMPLETE CBC AUTOMATED: CPT

## 2020-10-04 RX ORDER — PANTOPRAZOLE SODIUM 40 MG/1
40 TABLET, DELAYED RELEASE ORAL DAILY
Qty: 30 TABLET | Refills: 0 | Status: SHIPPED | OUTPATIENT
Start: 2020-10-04 | End: 2022-08-29

## 2020-10-04 RX ADMIN — ASPIRIN 81 MG CHEWABLE TABLET 81 MG: 81 TABLET CHEWABLE at 07:58

## 2020-10-04 RX ADMIN — CLONIDINE HYDROCHLORIDE 0.1 MG: 0.1 TABLET ORAL at 07:58

## 2020-10-04 RX ADMIN — SUCRALFATE 1 G: 1 TABLET ORAL at 00:07

## 2020-10-04 RX ADMIN — SUCRALFATE 1 G: 1 TABLET ORAL at 05:54

## 2020-10-04 RX ADMIN — SODIUM CHLORIDE: 9 INJECTION, SOLUTION INTRAVENOUS at 05:53

## 2020-10-04 RX ADMIN — SUCRALFATE 1 G: 1 TABLET ORAL at 11:59

## 2020-10-04 RX ADMIN — ENOXAPARIN SODIUM 40 MG: 40 INJECTION SUBCUTANEOUS at 07:58

## 2020-10-04 RX ADMIN — LISINOPRIL 15 MG: 5 TABLET ORAL at 07:58

## 2020-10-04 RX ADMIN — CILOSTAZOL 100 MG: 100 TABLET ORAL at 07:58

## 2020-10-04 RX ADMIN — PANTOPRAZOLE SODIUM 40 MG: 40 TABLET, DELAYED RELEASE ORAL at 05:53

## 2020-10-04 ASSESSMENT — PAIN SCALES - GENERAL
PAINLEVEL_OUTOF10: 0
PAINLEVEL_OUTOF10: 0

## 2020-10-04 NOTE — DISCHARGE SUMMARY
Discharge Summary    Name:  Luzmaria Rojas /Age/Sex:   (60 y.o. female)   MRN & CSN:  [id number] & [id number] Admission Date/Time: 10/2/2020 10:06 PM   Attending:  Naya Oconnell MD Discharging Physician: Aysha Persaud MD     HPI:     Luzmaria Rojas is a 60 y.o. female with history of lung cancer, status post left lobectomy, no chemo or radiation, peripheral vascular disease, hypertension, hyperlipidemia, diabetes mellitus type 2 presented to ED with complaints of chest pain for the last 3 to 4 days. Patient pointing out to her lower sternum, epigastric area, described as sharp, 10/10 in intensity, intermittent, worse after eating. Patient also reported having nausea, vomiting after p.o. intake. Pain reported to be better after vomiting. Also reported pain worse with lying down. Admits to having food stuck in her throat. Denied similar symptoms in the past.  Denied any acidity or acid reflux symptoms. Denied any fever, chills, cough. At presentation patient was noted to have BP 1 one 4/90, HR 91, RR 20, temperature 98.3, saturating 96% on room air. Lab work significant for sodium 129, random glucose 148. UA-leukocyte esterase moderate, WBC 4, bacteria rare. Venous blood gas-pH 7.37, PCO2 47, PO2 29. Chest x-ray-no acute process. CT abdomen/pelvis-stable thickening of the gastric antrum noted which could be related to gastritis. Patient never had endoscopy. Hospital Course:   Luzmaria Rojas is a 60 y.o.  female  who presents with Acute gastritis without hemorrhage    >  Chest pain:  -Troponin negative, EKG did not reveal any significant ST-T wave changes.  -Consulted cardiology.   - impression was pain referred from GI, no further cardiac workup recommended  - chest pain resolved.      > Thickening of the gastric antrum:  -CT abdomen/pelvis-stable thickening of the gastric antrum is noted which could be related to gastritis.  CT abdomen/pelvis-2019 gastric antral mucosal thickening may be due to gastritis or underdistention.  -Started on Protonix, sucralfate  -GI consulted. 10/03 EGD with Gastritis  - PPI recommended, pt sx and clinically improved and was discharged home in a stable condition.      >  Mild hyponatremia: Sodium 129  -Could be secondary to nausea vomiting and volume depletion  -resolved s/p IVF     > history of lung cancer, status post left lobectomy, no chemo or radiation     >  peripheral vascular disease  -Patient follows up at [place]  -He is on cilostazol     > Hypertension-patient is on clonidine, lisinopril     > Hyperlipidemia-continue simvastatin     > diabetes mellitus type 2  -Patient is on metformin 500 mg twice daily  -Continue insulin sliding scale with hypoglycemia protocol       The patient expressed appropriate understanding of and agreement with the discharge recommendations, medications, and plan. Consults this admission:  IP CONSULT TO HOSPITALIST  IP CONSULT TO CARDIOLOGY  IP CONSULT TO GI    Discharge Instruction:   Follow up appointments:     See AVS    Disposition: Discharged to:   ? Home  Condition on discharge: Stable    Discharge Medications:      [facility] Medication Instructions BBE:[id number]    Printed on:10/04/20 0913   Medication Information                      cilostazol (PLETAL) 100 MG tablet  Take 100 mg by mouth 2 times daily             cloNIDine (CATAPRES) 0.1 MG tablet  Take 0.1 mg by mouth 2 times daily. lisinopril (PRINIVIL;ZESTRIL) 5 MG tablet  Take 15 mg by mouth daily. metFORMIN (GLUCOPHAGE) 500 MG tablet  Take 500 mg by mouth 2 times daily.              pantoprazole (PROTONIX) 40 MG tablet  Take 1 tablet by mouth daily             simvastatin (ZOCOR) 20 MG tablet  Take 40 mg by mouth nightly                  Objective Findings at Discharge:   /77   Pulse 73   Temp 98.3 °F (36.8 °C) (Oral)   Resp 12   Ht 5' 6\" (1.676 m)   Wt 154 lb 9 oz (70.1 kg)   SpO2 96%   BMI 24.95 kg/m² PHYSICAL EXAM   GEN    Awake female, cooperative, no apparent distress. RESP  Clear to auscultation, no wheezes, rales or rhonchi. Symmetric chest movement . CARDIO/VASC           S1/S2 auscultated. Regular rate. GI        Abdomen is soft without significant tenderness, Bowel sounds are normoactive. MSK    No gross joint deformities. Spontaneous movement of all extremities  SKIN    Normal coloration, warm, dry. NEURO           normal speech, no lateralizing weakness. PSYCH            Awake, alert, oriented x 4. Affect appropriate.     BMP/CBC  Recent Labs     10/02/20  2034 10/03/20  0554 10/04/20  0401   * 136 138   K 3.7 3.9 3.7   CL 93* 99 106   CO2 23 25 23   BUN 6 6 4*   CREATININE 0.6 0.7 0.7   WBC 9.9  --  8.9   HCT 40.0  --  34.7*     --  185       IMAGING:  Xr Chest (2 Vw)    Result Date: 10/2/2020  EXAMINATION: TWO XRAY VIEWS OF THE CHEST 10/2/2020 7:15 pm COMPARISON: November 14, 2019 HISTORY: ORDERING SYSTEM PROVIDED HISTORY: shortness of breath TECHNOLOGIST PROVIDED HISTORY: Reason for exam:->shortness of breath Reason for Exam: shortness of breath, chest pain Acuity: Acute Type of Exam: Initial Additional signs and symptoms: na Relevant Medical/Surgical History: diabetes, hypertension FINDINGS: Adequate inspiration is noted. Postsurgical changes again seen involving the left hemithorax, related to partial pneumonectomy. Right lung is clear. Heart size, mediastinal contours and osseous structures are stable. Stable chest radiograph, without evidence of acute cardiopulmonary disease     Ct Abdomen Pelvis W Iv Contrast Additional Contrast? None    Result Date: 10/3/2020  EXAMINATION: CT OF THE ABDOMEN AND PELVIS WITH CONTRAST 10/3/2020 1:01 am TECHNIQUE: CT of the abdomen and pelvis was performed with the administration of intravenous contrast. Multiplanar reformatted images are provided for review.  Dose modulation, iterative reconstruction, and/or weight based adjustment of the mA/kV was utilized to reduce the radiation dose to as low as reasonably achievable. COMPARISON: CT of the abdomen and pelvis dated November 14, 2019 HISTORY: ORDERING SYSTEM PROVIDED HISTORY: abd pain TECHNOLOGIST PROVIDED HISTORY: Reason for exam:->abd pain Additional Contrast?->None Reason for Exam: abd pain Acuity: Acute Type of Exam: Initial Additional signs and symptoms: To ED for c/o chest pain ,shortness of breath and vomiting x 4 days Relevant Medical/Surgical History: isovue 370 80 ml FINDINGS: Lower Chest: There are no focal consolidations. Organs: Diffuse fatty infiltration of the liver is noted. The gallbladder, spleen, adrenal glands, pancreas, and kidneys are unremarkable. GI/Bowel: There is no bowel obstruction. The appendix is normal.  Stable thickening of the gastric antrum is noted which could be related to gastritis. Pelvis: The bladder is unremarkable. Multiple leiomyomas are seen within the uterus. Some of these are calcified. There is no free fluid. Small bilateral fat containing inguinal hernias are noted. Peritoneum/Retroperitoneum: There is no free air or lymphadenopathy. Diffuse atherosclerotic disease of the aorta is noted. Bones/Soft Tissues: No destructive osseous lesions are identified. 1. No acute findings. 2. Stable thickening of the gastric antrum is noted which could be related to gastritis. This can be further evaluated with endoscopy non emergently. 3. Diffuse fatty infiltration of the liver. 4. Uterine leiomyomas. These are not significantly changed since the prior examination of November 14, 2019. This can be further evaluated with MRI of the pelvis non emergently.        Discharge Time of 35 minutes    Electronically signed by Billie Dubin, MD on 10/4/2020 at 9:13 AM

## 2020-10-04 NOTE — PLAN OF CARE
Problem: Falls - Risk of:  Goal: Will remain free from falls  Description: Will remain free from falls  10/4/2020 1138 by Payal Wallace RN  Outcome: Ongoing  10/3/2020 2254 by Mattie Ho RN  Outcome: Ongoing  Goal: Absence of physical injury  Description: Absence of physical injury  10/4/2020 1138 by Payal Wallace RN  Outcome: Ongoing  10/3/2020 2254 by Mattie Ho RN  Outcome: Ongoing     Problem: Pain:  Goal: Pain level will decrease  Description: Pain level will decrease  10/4/2020 1138 by Payal Wallace RN  Outcome: Ongoing  10/3/2020 2254 by Mattie Ho RN  Outcome: Ongoing  Goal: Control of acute pain  Description: Control of acute pain  10/4/2020 1138 by Payal Wallace RN  Outcome: Ongoing  10/3/2020 2254 by Mattie Ho RN  Outcome: Ongoing  Goal: Control of chronic pain  Description: Control of chronic pain  10/4/2020 1138 by Payal Wallace RN  Outcome: Ongoing  10/3/2020 2254 by Mattie Ho RN  Outcome: Ongoing

## 2020-10-04 NOTE — PROGRESS NOTES
DOING WELL C/O MILD UPPER ABD DISCOMFORT  VITALS STABLE   LABS NOTED   HOME TODAY WILL F/U ON BX RESULTS

## 2020-10-05 LAB
EKG ATRIAL RATE: 67 BPM
EKG ATRIAL RATE: 70 BPM
EKG ATRIAL RATE: 72 BPM
EKG DIAGNOSIS: NORMAL
EKG P AXIS: 24 DEGREES
EKG P AXIS: 65 DEGREES
EKG P AXIS: 68 DEGREES
EKG P-R INTERVAL: 190 MS
EKG P-R INTERVAL: 212 MS
EKG P-R INTERVAL: 224 MS
EKG Q-T INTERVAL: 410 MS
EKG Q-T INTERVAL: 422 MS
EKG Q-T INTERVAL: 438 MS
EKG QRS DURATION: 70 MS
EKG QRS DURATION: 76 MS
EKG QRS DURATION: 78 MS
EKG QTC CALCULATION (BAZETT): 442 MS
EKG QTC CALCULATION (BAZETT): 445 MS
EKG QTC CALCULATION (BAZETT): 479 MS
EKG R AXIS: 26 DEGREES
EKG R AXIS: 32 DEGREES
EKG R AXIS: 42 DEGREES
EKG T AXIS: 26 DEGREES
EKG T AXIS: 32 DEGREES
EKG T AXIS: 38 DEGREES
EKG VENTRICULAR RATE: 67 BPM
EKG VENTRICULAR RATE: 70 BPM
EKG VENTRICULAR RATE: 72 BPM

## 2020-10-05 PROCEDURE — 93010 ELECTROCARDIOGRAM REPORT: CPT | Performed by: INTERNAL MEDICINE

## 2020-10-29 ENCOUNTER — HOSPITAL ENCOUNTER (OUTPATIENT)
Dept: WOMENS IMAGING | Age: 65
Discharge: HOME | End: 2020-10-29
Payer: OTHER GOVERNMENT

## 2020-10-29 PROCEDURE — 77067 SCR MAMMO BI INCL CAD: CPT

## 2022-01-01 ENCOUNTER — APPOINTMENT (OUTPATIENT)
Dept: GENERAL RADIOLOGY | Age: 67
DRG: 377 | End: 2022-01-01
Attending: STUDENT IN AN ORGANIZED HEALTH CARE EDUCATION/TRAINING PROGRAM
Payer: MEDICARE

## 2022-01-01 ENCOUNTER — HOSPITAL ENCOUNTER (INPATIENT)
Age: 67
LOS: 3 days | DRG: 951 | End: 2022-12-25
Attending: FAMILY MEDICINE | Admitting: FAMILY MEDICINE
Payer: COMMERCIAL

## 2022-01-01 ENCOUNTER — HOSPITAL ENCOUNTER (INPATIENT)
Age: 67
LOS: 11 days | Discharge: ANOTHER HOSPITAL | DRG: 180 | End: 2022-12-19
Attending: FAMILY MEDICINE | Admitting: STUDENT IN AN ORGANIZED HEALTH CARE EDUCATION/TRAINING PROGRAM
Payer: MEDICARE

## 2022-01-01 ENCOUNTER — HOSPITAL ENCOUNTER (INPATIENT)
Age: 67
LOS: 3 days | Discharge: HOSPICE | DRG: 377 | End: 2022-12-22
Attending: STUDENT IN AN ORGANIZED HEALTH CARE EDUCATION/TRAINING PROGRAM | Admitting: STUDENT IN AN ORGANIZED HEALTH CARE EDUCATION/TRAINING PROGRAM
Payer: MEDICARE

## 2022-01-01 ENCOUNTER — TELEPHONE (OUTPATIENT)
Dept: WOUND CARE | Age: 67
End: 2022-01-01

## 2022-01-01 VITALS
WEIGHT: 196.21 LBS | DIASTOLIC BLOOD PRESSURE: 60 MMHG | RESPIRATION RATE: 16 BRPM | OXYGEN SATURATION: 93 % | TEMPERATURE: 100.4 F | HEIGHT: 66 IN | BODY MASS INDEX: 31.53 KG/M2 | HEART RATE: 92 BPM | SYSTOLIC BLOOD PRESSURE: 108 MMHG

## 2022-01-01 VITALS
OXYGEN SATURATION: 100 % | RESPIRATION RATE: 14 BRPM | SYSTOLIC BLOOD PRESSURE: 91 MMHG | TEMPERATURE: 98.4 F | WEIGHT: 185.6 LBS | HEART RATE: 77 BPM | BODY MASS INDEX: 29.83 KG/M2 | HEIGHT: 66 IN | DIASTOLIC BLOOD PRESSURE: 65 MMHG

## 2022-01-01 VITALS
HEART RATE: 112 BPM | WEIGHT: 185 LBS | OXYGEN SATURATION: 99 % | TEMPERATURE: 98.9 F | RESPIRATION RATE: 20 BRPM | SYSTOLIC BLOOD PRESSURE: 59 MMHG | DIASTOLIC BLOOD PRESSURE: 38 MMHG | BODY MASS INDEX: 29.86 KG/M2

## 2022-01-01 LAB
ANION GAP SERPL CALCULATED.3IONS-SCNC: 15 MMOL/L (ref 4–16)
ANION GAP SERPL CALCULATED.3IONS-SCNC: 17 MMOL/L (ref 4–16)
BASOPHILS ABSOLUTE: 0 K/CU MM
BASOPHILS ABSOLUTE: 0 K/CU MM
BASOPHILS RELATIVE PERCENT: 0.2 % (ref 0–1)
BASOPHILS RELATIVE PERCENT: 0.3 % (ref 0–1)
BUN BLDV-MCNC: 26 MG/DL (ref 6–23)
BUN BLDV-MCNC: 30 MG/DL (ref 6–23)
CALCIUM SERPL-MCNC: 9.3 MG/DL (ref 8.3–10.6)
CALCIUM SERPL-MCNC: 9.4 MG/DL (ref 8.3–10.6)
CHLORIDE BLD-SCNC: 106 MMOL/L (ref 99–110)
CHLORIDE BLD-SCNC: 110 MMOL/L (ref 99–110)
CO2: 22 MMOL/L (ref 21–32)
CO2: 23 MMOL/L (ref 21–32)
CREAT SERPL-MCNC: 0.8 MG/DL (ref 0.6–1.1)
CREAT SERPL-MCNC: 0.9 MG/DL (ref 0.6–1.1)
DIFFERENTIAL TYPE: ABNORMAL
DIFFERENTIAL TYPE: ABNORMAL
EKG ATRIAL RATE: 104 BPM
EKG DIAGNOSIS: NORMAL
EKG P AXIS: 89 DEGREES
EKG P-R INTERVAL: 152 MS
EKG Q-T INTERVAL: 340 MS
EKG QRS DURATION: 86 MS
EKG QTC CALCULATION (BAZETT): 447 MS
EKG R AXIS: 102 DEGREES
EKG T AXIS: 66 DEGREES
EKG VENTRICULAR RATE: 104 BPM
EOSINOPHILS ABSOLUTE: 0 K/CU MM
EOSINOPHILS ABSOLUTE: 0.1 K/CU MM
EOSINOPHILS RELATIVE PERCENT: 0 % (ref 0–3)
EOSINOPHILS RELATIVE PERCENT: 0.3 % (ref 0–3)
GFR SERPL CREATININE-BSD FRML MDRD: >60 ML/MIN/1.73M2
GFR SERPL CREATININE-BSD FRML MDRD: >60 ML/MIN/1.73M2
GLUCOSE BLD-MCNC: 114 MG/DL (ref 70–99)
GLUCOSE BLD-MCNC: 119 MG/DL (ref 70–99)
GLUCOSE BLD-MCNC: 120 MG/DL (ref 70–99)
GLUCOSE BLD-MCNC: 121 MG/DL (ref 70–99)
GLUCOSE BLD-MCNC: 122 MG/DL (ref 70–99)
GLUCOSE BLD-MCNC: 125 MG/DL (ref 70–99)
GLUCOSE BLD-MCNC: 135 MG/DL (ref 70–99)
GLUCOSE BLD-MCNC: 135 MG/DL (ref 70–99)
GLUCOSE BLD-MCNC: 139 MG/DL (ref 70–99)
GLUCOSE BLD-MCNC: 146 MG/DL (ref 70–99)
GLUCOSE BLD-MCNC: 148 MG/DL (ref 70–99)
GLUCOSE BLD-MCNC: 149 MG/DL (ref 70–99)
GLUCOSE BLD-MCNC: 150 MG/DL (ref 70–99)
GLUCOSE BLD-MCNC: 151 MG/DL (ref 70–99)
GLUCOSE BLD-MCNC: 153 MG/DL (ref 70–99)
GLUCOSE BLD-MCNC: 155 MG/DL (ref 70–99)
GLUCOSE BLD-MCNC: 158 MG/DL (ref 70–99)
GLUCOSE BLD-MCNC: 159 MG/DL (ref 70–99)
GLUCOSE BLD-MCNC: 159 MG/DL (ref 70–99)
GLUCOSE BLD-MCNC: 164 MG/DL (ref 70–99)
GLUCOSE BLD-MCNC: 164 MG/DL (ref 70–99)
GLUCOSE BLD-MCNC: 169 MG/DL (ref 70–99)
GLUCOSE BLD-MCNC: 169 MG/DL (ref 70–99)
GLUCOSE BLD-MCNC: 170 MG/DL (ref 70–99)
GLUCOSE BLD-MCNC: 172 MG/DL (ref 70–99)
GLUCOSE BLD-MCNC: 174 MG/DL (ref 70–99)
GLUCOSE BLD-MCNC: 174 MG/DL (ref 70–99)
GLUCOSE BLD-MCNC: 177 MG/DL (ref 70–99)
GLUCOSE BLD-MCNC: 178 MG/DL (ref 70–99)
GLUCOSE BLD-MCNC: 179 MG/DL (ref 70–99)
GLUCOSE BLD-MCNC: 180 MG/DL (ref 70–99)
GLUCOSE BLD-MCNC: 181 MG/DL (ref 70–99)
GLUCOSE BLD-MCNC: 184 MG/DL (ref 70–99)
GLUCOSE BLD-MCNC: 184 MG/DL (ref 70–99)
GLUCOSE BLD-MCNC: 185 MG/DL (ref 70–99)
GLUCOSE BLD-MCNC: 187 MG/DL (ref 70–99)
GLUCOSE BLD-MCNC: 187 MG/DL (ref 70–99)
GLUCOSE BLD-MCNC: 188 MG/DL (ref 70–99)
GLUCOSE BLD-MCNC: 188 MG/DL (ref 70–99)
GLUCOSE BLD-MCNC: 189 MG/DL (ref 70–99)
GLUCOSE BLD-MCNC: 191 MG/DL (ref 70–99)
GLUCOSE BLD-MCNC: 193 MG/DL (ref 70–99)
GLUCOSE BLD-MCNC: 195 MG/DL (ref 70–99)
GLUCOSE BLD-MCNC: 197 MG/DL (ref 70–99)
GLUCOSE BLD-MCNC: 197 MG/DL (ref 70–99)
GLUCOSE BLD-MCNC: 202 MG/DL (ref 70–99)
GLUCOSE BLD-MCNC: 202 MG/DL (ref 70–99)
GLUCOSE BLD-MCNC: 204 MG/DL (ref 70–99)
GLUCOSE BLD-MCNC: 206 MG/DL (ref 70–99)
GLUCOSE BLD-MCNC: 208 MG/DL (ref 70–99)
GLUCOSE BLD-MCNC: 211 MG/DL (ref 70–99)
GLUCOSE BLD-MCNC: 237 MG/DL (ref 70–99)
GLUCOSE BLD-MCNC: 241 MG/DL (ref 70–99)
GLUCOSE BLD-MCNC: 244 MG/DL (ref 70–99)
GLUCOSE BLD-MCNC: 262 MG/DL (ref 70–99)
GLUCOSE BLD-MCNC: 288 MG/DL (ref 70–99)
GLUCOSE BLD-MCNC: 67 MG/DL (ref 70–99)
GLUCOSE BLD-MCNC: 72 MG/DL (ref 70–99)
GLUCOSE BLD-MCNC: 82 MG/DL (ref 70–99)
GLUCOSE BLD-MCNC: 84 MG/DL (ref 70–99)
GLUCOSE BLD-MCNC: 88 MG/DL (ref 70–99)
GLUCOSE BLD-MCNC: 89 MG/DL (ref 70–99)
GLUCOSE BLD-MCNC: 92 MG/DL (ref 70–99)
HCT VFR BLD CALC: 30 % (ref 37–47)
HCT VFR BLD CALC: 31.5 % (ref 37–47)
HEMOGLOBIN: 8.9 GM/DL (ref 12.5–16)
HEMOGLOBIN: 9.4 GM/DL (ref 12.5–16)
IMMATURE NEUTROPHIL %: 0.4 % (ref 0–0.43)
IMMATURE NEUTROPHIL %: 0.5 % (ref 0–0.43)
LACTATE: 1.4 MMOL/L (ref 0.5–1.9)
LYMPHOCYTES ABSOLUTE: 1.3 K/CU MM
LYMPHOCYTES ABSOLUTE: 2.3 K/CU MM
LYMPHOCYTES RELATIVE PERCENT: 15.9 % (ref 24–44)
LYMPHOCYTES RELATIVE PERCENT: 6.6 % (ref 24–44)
MCH RBC QN AUTO: 27.4 PG (ref 27–31)
MCH RBC QN AUTO: 27.4 PG (ref 27–31)
MCHC RBC AUTO-ENTMCNC: 29.7 % (ref 32–36)
MCHC RBC AUTO-ENTMCNC: 29.8 % (ref 32–36)
MCV RBC AUTO: 91.8 FL (ref 78–100)
MCV RBC AUTO: 92.3 FL (ref 78–100)
MONOCYTES ABSOLUTE: 1.1 K/CU MM
MONOCYTES ABSOLUTE: 1.2 K/CU MM
MONOCYTES RELATIVE PERCENT: 6 % (ref 0–4)
MONOCYTES RELATIVE PERCENT: 7.2 % (ref 0–4)
NUCLEATED RBC %: 0 %
NUCLEATED RBC %: 0 %
PDW BLD-RTO: 13.8 % (ref 11.7–14.9)
PDW BLD-RTO: 14 % (ref 11.7–14.9)
PLATELET # BLD: 348 K/CU MM (ref 140–440)
PLATELET # BLD: 365 K/CU MM (ref 140–440)
PMV BLD AUTO: 8.5 FL (ref 7.5–11.1)
PMV BLD AUTO: 8.9 FL (ref 7.5–11.1)
POTASSIUM SERPL-SCNC: 4 MMOL/L (ref 3.5–5.1)
POTASSIUM SERPL-SCNC: 4.1 MMOL/L (ref 3.5–5.1)
RBC # BLD: 3.25 M/CU MM (ref 4.2–5.4)
RBC # BLD: 3.43 M/CU MM (ref 4.2–5.4)
SEGMENTED NEUTROPHILS ABSOLUTE COUNT: 11.2 K/CU MM
SEGMENTED NEUTROPHILS ABSOLUTE COUNT: 16.6 K/CU MM
SEGMENTED NEUTROPHILS RELATIVE PERCENT: 75.9 % (ref 36–66)
SEGMENTED NEUTROPHILS RELATIVE PERCENT: 86.7 % (ref 36–66)
SODIUM BLD-SCNC: 144 MMOL/L (ref 135–145)
SODIUM BLD-SCNC: 149 MMOL/L (ref 135–145)
TOTAL IMMATURE NEUTOROPHIL: 0.06 K/CU MM
TOTAL IMMATURE NEUTOROPHIL: 0.09 K/CU MM
TOTAL NUCLEATED RBC: 0 K/CU MM
TOTAL NUCLEATED RBC: 0 K/CU MM
TROPONIN T: 0.01 NG/ML
WBC # BLD: 14.7 K/CU MM (ref 4–10.5)
WBC # BLD: 19.1 K/CU MM (ref 4–10.5)

## 2022-01-01 PROCEDURE — 1250000000 HC SEMI PRIVATE HOSPICE R&B

## 2022-01-01 PROCEDURE — 6370000000 HC RX 637 (ALT 250 FOR IP): Performed by: FAMILY MEDICINE

## 2022-01-01 PROCEDURE — 94761 N-INVAS EAR/PLS OXIMETRY MLT: CPT

## 2022-01-01 PROCEDURE — 2500000003 HC RX 250 WO HCPCS: Performed by: FAMILY MEDICINE

## 2022-01-01 PROCEDURE — 2700000000 HC OXYGEN THERAPY PER DAY

## 2022-01-01 PROCEDURE — C9254 INJECTION, LACOSAMIDE: HCPCS | Performed by: FAMILY MEDICINE

## 2022-01-01 PROCEDURE — 6360000002 HC RX W HCPCS: Performed by: FAMILY MEDICINE

## 2022-01-01 PROCEDURE — 82962 GLUCOSE BLOOD TEST: CPT

## 2022-01-01 PROCEDURE — 1200000000 HC SEMI PRIVATE

## 2022-01-01 PROCEDURE — 2580000003 HC RX 258: Performed by: FAMILY MEDICINE

## 2022-01-01 PROCEDURE — 99231 SBSQ HOSP IP/OBS SF/LOW 25: CPT | Performed by: INTERNAL MEDICINE

## 2022-01-01 PROCEDURE — 89220 SPUTUM SPECIMEN COLLECTION: CPT

## 2022-01-01 PROCEDURE — 99213 OFFICE O/P EST LOW 20 MIN: CPT

## 2022-01-01 PROCEDURE — 87635 SARS-COV-2 COVID-19 AMP PRB: CPT

## 2022-01-01 PROCEDURE — 87040 BLOOD CULTURE FOR BACTERIA: CPT

## 2022-01-01 PROCEDURE — 84484 ASSAY OF TROPONIN QUANT: CPT

## 2022-01-01 PROCEDURE — 85025 COMPLETE CBC W/AUTO DIFF WBC: CPT

## 2022-01-01 PROCEDURE — 5A1935Z RESPIRATORY VENTILATION, LESS THAN 24 CONSECUTIVE HOURS: ICD-10-PCS | Performed by: FAMILY MEDICINE

## 2022-01-01 PROCEDURE — 36415 COLL VENOUS BLD VENIPUNCTURE: CPT

## 2022-01-01 PROCEDURE — 80048 BASIC METABOLIC PNL TOTAL CA: CPT

## 2022-01-01 PROCEDURE — 83605 ASSAY OF LACTIC ACID: CPT

## 2022-01-01 PROCEDURE — 71045 X-RAY EXAM CHEST 1 VIEW: CPT

## 2022-01-01 PROCEDURE — 93005 ELECTROCARDIOGRAM TRACING: CPT | Performed by: STUDENT IN AN ORGANIZED HEALTH CARE EDUCATION/TRAINING PROGRAM

## 2022-01-01 PROCEDURE — 93010 ELECTROCARDIOGRAM REPORT: CPT | Performed by: INTERNAL MEDICINE

## 2022-01-01 RX ORDER — MORPHINE SULFATE 2 MG/ML
2 INJECTION, SOLUTION INTRAMUSCULAR; INTRAVENOUS
Status: DISCONTINUED | OUTPATIENT
Start: 2022-01-01 | End: 2022-01-01 | Stop reason: HOSPADM

## 2022-01-01 RX ORDER — SCOLOPAMINE TRANSDERMAL SYSTEM 1 MG/1
1 PATCH, EXTENDED RELEASE TRANSDERMAL
Status: CANCELLED | OUTPATIENT
Start: 2022-01-01

## 2022-01-01 RX ORDER — LOSARTAN POTASSIUM 25 MG/1
50 TABLET ORAL DAILY
Status: CANCELLED | OUTPATIENT
Start: 2022-01-01

## 2022-01-01 RX ORDER — INSULIN LISPRO 100 [IU]/ML
0-8 INJECTION, SOLUTION INTRAVENOUS; SUBCUTANEOUS
Status: DISCONTINUED | OUTPATIENT
Start: 2022-01-01 | End: 2022-01-01

## 2022-01-01 RX ORDER — SODIUM CHLORIDE 9 MG/ML
INJECTION, SOLUTION INTRAVENOUS PRN
Status: DISCONTINUED | OUTPATIENT
Start: 2022-01-01 | End: 2022-01-01 | Stop reason: HOSPADM

## 2022-01-01 RX ORDER — ACETAMINOPHEN 160 MG/5ML
650 SUSPENSION, ORAL (FINAL DOSE FORM) ORAL EVERY 6 HOURS PRN
Status: DISCONTINUED | OUTPATIENT
Start: 2022-01-01 | End: 2022-01-01 | Stop reason: HOSPADM

## 2022-01-01 RX ORDER — DEXTROSE MONOHYDRATE 100 MG/ML
INJECTION, SOLUTION INTRAVENOUS CONTINUOUS PRN
Status: CANCELLED | OUTPATIENT
Start: 2022-01-01

## 2022-01-01 RX ORDER — INSULIN LISPRO 100 [IU]/ML
0-8 INJECTION, SOLUTION INTRAVENOUS; SUBCUTANEOUS
Status: CANCELLED | OUTPATIENT
Start: 2022-01-01

## 2022-01-01 RX ORDER — LOSARTAN POTASSIUM 25 MG/1
50 TABLET ORAL DAILY
Status: DISCONTINUED | OUTPATIENT
Start: 2022-01-01 | End: 2022-01-01 | Stop reason: HOSPADM

## 2022-01-01 RX ORDER — INSULIN LISPRO 100 [IU]/ML
0-4 INJECTION, SOLUTION INTRAVENOUS; SUBCUTANEOUS NIGHTLY
Status: DISCONTINUED | OUTPATIENT
Start: 2022-01-01 | End: 2022-01-01

## 2022-01-01 RX ORDER — METHADONE HYDROCHLORIDE 10 MG/ML
5 CONCENTRATE ORAL 2 TIMES DAILY
Status: DISCONTINUED | OUTPATIENT
Start: 2022-01-01 | End: 2022-01-01 | Stop reason: HOSPADM

## 2022-01-01 RX ORDER — ACETAMINOPHEN 160 MG/5ML
650 SOLUTION ORAL EVERY 6 HOURS PRN
Status: DISCONTINUED | OUTPATIENT
Start: 2022-01-01 | End: 2022-01-01 | Stop reason: HOSPADM

## 2022-01-01 RX ORDER — ACETAMINOPHEN 650 MG/1
650 SUPPOSITORY RECTAL EVERY 4 HOURS PRN
Status: CANCELLED | OUTPATIENT
Start: 2022-01-01

## 2022-01-01 RX ORDER — LORAZEPAM 2 MG/ML
1 INJECTION INTRAMUSCULAR
Status: DISCONTINUED | OUTPATIENT
Start: 2022-01-01 | End: 2022-01-01 | Stop reason: HOSPADM

## 2022-01-01 RX ORDER — INSULIN LISPRO 100 [IU]/ML
0-4 INJECTION, SOLUTION INTRAVENOUS; SUBCUTANEOUS NIGHTLY
Status: CANCELLED | OUTPATIENT
Start: 2022-01-01

## 2022-01-01 RX ORDER — METHADONE HYDROCHLORIDE 10 MG/ML
5 CONCENTRATE ORAL 2 TIMES DAILY
Status: CANCELLED | OUTPATIENT
Start: 2022-01-01

## 2022-01-01 RX ORDER — DEXTROSE MONOHYDRATE 100 MG/ML
INJECTION, SOLUTION INTRAVENOUS CONTINUOUS PRN
Status: DISCONTINUED | OUTPATIENT
Start: 2022-01-01 | End: 2022-01-01

## 2022-01-01 RX ORDER — INSULIN LISPRO 100 [IU]/ML
0-8 INJECTION, SOLUTION INTRAVENOUS; SUBCUTANEOUS
Status: DISCONTINUED | OUTPATIENT
Start: 2022-01-01 | End: 2022-01-01 | Stop reason: HOSPADM

## 2022-01-01 RX ORDER — SODIUM CHLORIDE 0.9 % (FLUSH) 0.9 %
5-40 SYRINGE (ML) INJECTION EVERY 12 HOURS SCHEDULED
Status: CANCELLED | OUTPATIENT
Start: 2022-01-01

## 2022-01-01 RX ORDER — BISACODYL 10 MG
10 SUPPOSITORY, RECTAL RECTAL DAILY PRN
Status: CANCELLED | OUTPATIENT
Start: 2022-01-01

## 2022-01-01 RX ORDER — SCOLOPAMINE TRANSDERMAL SYSTEM 1 MG/1
1 PATCH, EXTENDED RELEASE TRANSDERMAL
Status: DISCONTINUED | OUTPATIENT
Start: 2022-01-01 | End: 2022-01-01 | Stop reason: HOSPADM

## 2022-01-01 RX ORDER — LORAZEPAM 2 MG/ML
0.5 INJECTION INTRAMUSCULAR EVERY 4 HOURS PRN
Status: CANCELLED | OUTPATIENT
Start: 2022-01-01

## 2022-01-01 RX ORDER — LORAZEPAM 2 MG/ML
2 INJECTION INTRAMUSCULAR
Status: COMPLETED | OUTPATIENT
Start: 2022-01-01 | End: 2022-01-01

## 2022-01-01 RX ORDER — BISACODYL 10 MG
10 SUPPOSITORY, RECTAL RECTAL DAILY PRN
Status: DISCONTINUED | OUTPATIENT
Start: 2022-01-01 | End: 2022-01-01 | Stop reason: HOSPADM

## 2022-01-01 RX ORDER — ACETAMINOPHEN 650 MG/1
650 SUPPOSITORY RECTAL EVERY 4 HOURS PRN
Status: DISCONTINUED | OUTPATIENT
Start: 2022-01-01 | End: 2022-01-01 | Stop reason: HOSPADM

## 2022-01-01 RX ORDER — MORPHINE SULFATE 2 MG/ML
2 INJECTION, SOLUTION INTRAMUSCULAR; INTRAVENOUS EVERY 4 HOURS PRN
Status: CANCELLED | OUTPATIENT
Start: 2022-01-01

## 2022-01-01 RX ORDER — SULFAMETHOXAZOLE AND TRIMETHOPRIM 800; 160 MG/1; MG/1
1 TABLET ORAL EVERY 12 HOURS SCHEDULED
Status: DISCONTINUED | OUTPATIENT
Start: 2022-01-01 | End: 2022-01-01 | Stop reason: HOSPADM

## 2022-01-01 RX ORDER — CARVEDILOL 6.25 MG/1
6.25 TABLET ORAL 2 TIMES DAILY
Status: DISCONTINUED | OUTPATIENT
Start: 2022-01-01 | End: 2022-01-01 | Stop reason: HOSPADM

## 2022-01-01 RX ORDER — ACETAMINOPHEN 160 MG/5ML
650 SUSPENSION, ORAL (FINAL DOSE FORM) ORAL EVERY 6 HOURS PRN
Status: CANCELLED | OUTPATIENT
Start: 2022-01-01

## 2022-01-01 RX ORDER — INSULIN GLARGINE 100 [IU]/ML
10 INJECTION, SOLUTION SUBCUTANEOUS
Status: CANCELLED | OUTPATIENT
Start: 2022-01-01

## 2022-01-01 RX ORDER — LACOSAMIDE 10 MG/ML
200 SOLUTION ORAL 2 TIMES DAILY
Status: DISCONTINUED | OUTPATIENT
Start: 2022-01-01 | End: 2022-01-01 | Stop reason: ALTCHOICE

## 2022-01-01 RX ORDER — LOSARTAN POTASSIUM 25 MG/1
50 TABLET ORAL DAILY
Status: DISCONTINUED | OUTPATIENT
Start: 2022-01-01 | End: 2022-01-01

## 2022-01-01 RX ORDER — CARVEDILOL 6.25 MG/1
12.5 TABLET ORAL 2 TIMES DAILY WITH MEALS
Status: DISCONTINUED | OUTPATIENT
Start: 2022-01-01 | End: 2022-01-01

## 2022-01-01 RX ORDER — INSULIN GLARGINE 100 [IU]/ML
10 INJECTION, SOLUTION SUBCUTANEOUS
Status: DISCONTINUED | OUTPATIENT
Start: 2022-01-01 | End: 2022-01-01 | Stop reason: HOSPADM

## 2022-01-01 RX ORDER — GLYCOPYRROLATE 0.2 MG/ML
0.2 INJECTION INTRAMUSCULAR; INTRAVENOUS EVERY 4 HOURS PRN
Status: DISCONTINUED | OUTPATIENT
Start: 2022-01-01 | End: 2022-01-01 | Stop reason: HOSPADM

## 2022-01-01 RX ORDER — SULFAMETHOXAZOLE AND TRIMETHOPRIM 800; 160 MG/1; MG/1
1 TABLET ORAL EVERY 12 HOURS SCHEDULED
Status: CANCELLED | OUTPATIENT
Start: 2022-01-01 | End: 2023-01-12

## 2022-01-01 RX ORDER — LORAZEPAM 2 MG/ML
0.5 INJECTION INTRAMUSCULAR
Status: DISCONTINUED | OUTPATIENT
Start: 2022-01-01 | End: 2022-01-01 | Stop reason: HOSPADM

## 2022-01-01 RX ORDER — INSULIN GLARGINE 100 [IU]/ML
20 INJECTION, SOLUTION SUBCUTANEOUS
Status: DISCONTINUED | OUTPATIENT
Start: 2022-01-01 | End: 2022-01-01

## 2022-01-01 RX ORDER — HALOPERIDOL 5 MG/ML
1 INJECTION INTRAMUSCULAR
Status: DISCONTINUED | OUTPATIENT
Start: 2022-01-01 | End: 2022-01-01 | Stop reason: HOSPADM

## 2022-01-01 RX ORDER — SODIUM CHLORIDE 0.9 % (FLUSH) 0.9 %
5-40 SYRINGE (ML) INJECTION PRN
Status: CANCELLED | OUTPATIENT
Start: 2022-01-01

## 2022-01-01 RX ORDER — LACOSAMIDE 10 MG/ML
200 SOLUTION ORAL 2 TIMES DAILY
Status: DISCONTINUED | OUTPATIENT
Start: 2022-01-01 | End: 2022-01-01

## 2022-01-01 RX ORDER — MORPHINE SULFATE 2 MG/ML
2 INJECTION, SOLUTION INTRAMUSCULAR; INTRAVENOUS EVERY 4 HOURS PRN
Status: DISCONTINUED | OUTPATIENT
Start: 2022-01-01 | End: 2022-01-01 | Stop reason: HOSPADM

## 2022-01-01 RX ORDER — DEXTROSE MONOHYDRATE 100 MG/ML
INJECTION, SOLUTION INTRAVENOUS CONTINUOUS PRN
Status: DISCONTINUED | OUTPATIENT
Start: 2022-01-01 | End: 2022-01-01 | Stop reason: HOSPADM

## 2022-01-01 RX ORDER — MORPHINE SULFATE 4 MG/ML
4 INJECTION, SOLUTION INTRAMUSCULAR; INTRAVENOUS
Status: DISCONTINUED | OUTPATIENT
Start: 2022-01-01 | End: 2022-01-01 | Stop reason: HOSPADM

## 2022-01-01 RX ORDER — SODIUM CHLORIDE 0.9 % (FLUSH) 0.9 %
5-40 SYRINGE (ML) INJECTION EVERY 12 HOURS SCHEDULED
Status: DISCONTINUED | OUTPATIENT
Start: 2022-01-01 | End: 2022-01-01 | Stop reason: HOSPADM

## 2022-01-01 RX ORDER — LORAZEPAM 2 MG/ML
0.5 INJECTION INTRAMUSCULAR
Status: CANCELLED | OUTPATIENT
Start: 2022-01-01

## 2022-01-01 RX ORDER — MORPHINE SULFATE 2 MG/ML
2 INJECTION, SOLUTION INTRAMUSCULAR; INTRAVENOUS
Status: CANCELLED | OUTPATIENT
Start: 2022-01-01

## 2022-01-01 RX ORDER — METHADONE HYDROCHLORIDE 10 MG/ML
3 CONCENTRATE ORAL 2 TIMES DAILY
Status: DISCONTINUED | OUTPATIENT
Start: 2022-01-01 | End: 2022-01-01

## 2022-01-01 RX ORDER — CARVEDILOL 6.25 MG/1
12.5 TABLET ORAL 2 TIMES DAILY WITH MEALS
Status: CANCELLED | OUTPATIENT
Start: 2022-01-01

## 2022-01-01 RX ORDER — SODIUM CHLORIDE 9 MG/ML
INJECTION, SOLUTION INTRAVENOUS PRN
Status: CANCELLED | OUTPATIENT
Start: 2022-01-01

## 2022-01-01 RX ORDER — SODIUM CHLORIDE 0.9 % (FLUSH) 0.9 %
5-40 SYRINGE (ML) INJECTION PRN
Status: DISCONTINUED | OUTPATIENT
Start: 2022-01-01 | End: 2022-01-01 | Stop reason: HOSPADM

## 2022-01-01 RX ORDER — INSULIN GLARGINE 100 [IU]/ML
10 INJECTION, SOLUTION SUBCUTANEOUS
Status: DISCONTINUED | OUTPATIENT
Start: 2022-01-01 | End: 2022-01-01

## 2022-01-01 RX ORDER — INSULIN LISPRO 100 [IU]/ML
0-4 INJECTION, SOLUTION INTRAVENOUS; SUBCUTANEOUS NIGHTLY
Status: DISCONTINUED | OUTPATIENT
Start: 2022-01-01 | End: 2022-01-01 | Stop reason: HOSPADM

## 2022-01-01 RX ORDER — GLYCOPYRROLATE 0.2 MG/ML
0.2 INJECTION INTRAMUSCULAR; INTRAVENOUS EVERY 4 HOURS PRN
Status: CANCELLED | OUTPATIENT
Start: 2022-01-01

## 2022-01-01 RX ORDER — CARVEDILOL 6.25 MG/1
12.5 TABLET ORAL 2 TIMES DAILY WITH MEALS
Status: DISCONTINUED | OUTPATIENT
Start: 2022-01-01 | End: 2022-01-01 | Stop reason: HOSPADM

## 2022-01-01 RX ORDER — ENOXAPARIN SODIUM 100 MG/ML
30 INJECTION SUBCUTANEOUS EVERY 24 HOURS
Status: DISCONTINUED | OUTPATIENT
Start: 2022-01-01 | End: 2022-01-01 | Stop reason: HOSPADM

## 2022-01-01 RX ORDER — LORAZEPAM 2 MG/ML
0.5 INJECTION INTRAMUSCULAR EVERY 4 HOURS PRN
Status: DISCONTINUED | OUTPATIENT
Start: 2022-01-01 | End: 2022-01-01 | Stop reason: HOSPADM

## 2022-01-01 RX ORDER — METHADONE HYDROCHLORIDE 10 MG/ML
5 CONCENTRATE ORAL 2 TIMES DAILY
Status: DISCONTINUED | OUTPATIENT
Start: 2022-01-01 | End: 2022-01-01

## 2022-01-01 RX ADMIN — ACETAMINOPHEN 650 MG: 650 SUPPOSITORY RECTAL at 08:25

## 2022-01-01 RX ADMIN — SULFAMETHOXAZOLE AND TRIMETHOPRIM 1 TABLET: 800; 160 TABLET ORAL at 09:12

## 2022-01-01 RX ADMIN — MORPHINE SULFATE 2 MG: 2 INJECTION, SOLUTION INTRAMUSCULAR; INTRAVENOUS at 00:03

## 2022-01-01 RX ADMIN — SODIUM CHLORIDE, PRESERVATIVE FREE 10 ML: 5 INJECTION INTRAVENOUS at 20:49

## 2022-01-01 RX ADMIN — LOSARTAN POTASSIUM 50 MG: 25 TABLET, FILM COATED ORAL at 10:09

## 2022-01-01 RX ADMIN — LOSARTAN POTASSIUM 50 MG: 25 TABLET, FILM COATED ORAL at 10:37

## 2022-01-01 RX ADMIN — SULFAMETHOXAZOLE AND TRIMETHOPRIM 1 TABLET: 800; 160 TABLET ORAL at 10:06

## 2022-01-01 RX ADMIN — SODIUM CHLORIDE 200 MG: 9 INJECTION, SOLUTION INTRAVENOUS at 11:06

## 2022-01-01 RX ADMIN — INSULIN LISPRO 4 UNITS: 100 INJECTION, SOLUTION INTRAVENOUS; SUBCUTANEOUS at 09:09

## 2022-01-01 RX ADMIN — HALOPERIDOL LACTATE 1 MG: 5 INJECTION, SOLUTION INTRAMUSCULAR at 04:20

## 2022-01-01 RX ADMIN — SULFAMETHOXAZOLE AND TRIMETHOPRIM 1 TABLET: 800; 160 TABLET ORAL at 20:28

## 2022-01-01 RX ADMIN — SODIUM CHLORIDE, PRESERVATIVE FREE 10 ML: 5 INJECTION INTRAVENOUS at 09:45

## 2022-01-01 RX ADMIN — MORPHINE SULFATE 2 MG: 2 INJECTION, SOLUTION INTRAMUSCULAR; INTRAVENOUS at 06:09

## 2022-01-01 RX ADMIN — LOSARTAN POTASSIUM 50 MG: 25 TABLET, FILM COATED ORAL at 10:24

## 2022-01-01 RX ADMIN — METHADONE HYDROCHLORIDE 5 MG: 10 CONCENTRATE ORAL at 20:48

## 2022-01-01 RX ADMIN — MORPHINE SULFATE 4 MG: 4 INJECTION, SOLUTION INTRAMUSCULAR; INTRAVENOUS at 23:37

## 2022-01-01 RX ADMIN — GLYCOPYRROLATE 0.2 MG: 1 INJECTION INTRAMUSCULAR; INTRAVENOUS at 21:39

## 2022-01-01 RX ADMIN — LORAZEPAM 1 MG: 2 INJECTION INTRAMUSCULAR at 05:23

## 2022-01-01 RX ADMIN — MORPHINE SULFATE 4 MG: 4 INJECTION, SOLUTION INTRAMUSCULAR; INTRAVENOUS at 22:41

## 2022-01-01 RX ADMIN — SODIUM CHLORIDE, PRESERVATIVE FREE 10 ML: 5 INJECTION INTRAVENOUS at 22:13

## 2022-01-01 RX ADMIN — SULFAMETHOXAZOLE AND TRIMETHOPRIM 1 TABLET: 800; 160 TABLET ORAL at 09:08

## 2022-01-01 RX ADMIN — LORAZEPAM 1 MG: 2 INJECTION INTRAMUSCULAR at 17:09

## 2022-01-01 RX ADMIN — SODIUM CHLORIDE 200 MG: 9 INJECTION, SOLUTION INTRAVENOUS at 21:55

## 2022-01-01 RX ADMIN — SULFAMETHOXAZOLE AND TRIMETHOPRIM 1 TABLET: 800; 160 TABLET ORAL at 22:53

## 2022-01-01 RX ADMIN — SODIUM CHLORIDE 200 MG: 9 INJECTION, SOLUTION INTRAVENOUS at 22:35

## 2022-01-01 RX ADMIN — METHADONE HYDROCHLORIDE 5 MG: 10 CONCENTRATE ORAL at 20:19

## 2022-01-01 RX ADMIN — METHADONE HYDROCHLORIDE 3 MG: 10 CONCENTRATE ORAL at 10:35

## 2022-01-01 RX ADMIN — SODIUM CHLORIDE, PRESERVATIVE FREE 10 ML: 5 INJECTION INTRAVENOUS at 09:33

## 2022-01-01 RX ADMIN — INSULIN LISPRO 2 UNITS: 100 INJECTION, SOLUTION INTRAVENOUS; SUBCUTANEOUS at 13:07

## 2022-01-01 RX ADMIN — MORPHINE SULFATE 2 MG: 2 INJECTION, SOLUTION INTRAMUSCULAR; INTRAVENOUS at 01:42

## 2022-01-01 RX ADMIN — SULFAMETHOXAZOLE AND TRIMETHOPRIM 1 TABLET: 800; 160 TABLET ORAL at 22:08

## 2022-01-01 RX ADMIN — METHADONE HYDROCHLORIDE 5 MG: 10 CONCENTRATE ORAL at 09:07

## 2022-01-01 RX ADMIN — METHADONE HYDROCHLORIDE 3 MG: 10 CONCENTRATE ORAL at 22:10

## 2022-01-01 RX ADMIN — SULFAMETHOXAZOLE AND TRIMETHOPRIM 1 TABLET: 800; 160 TABLET ORAL at 21:28

## 2022-01-01 RX ADMIN — LOSARTAN POTASSIUM 50 MG: 25 TABLET, FILM COATED ORAL at 09:31

## 2022-01-01 RX ADMIN — SULFAMETHOXAZOLE AND TRIMETHOPRIM 1 TABLET: 800; 160 TABLET ORAL at 10:25

## 2022-01-01 RX ADMIN — SODIUM CHLORIDE, PRESERVATIVE FREE 10 ML: 5 INJECTION INTRAVENOUS at 20:19

## 2022-01-01 RX ADMIN — SULFAMETHOXAZOLE AND TRIMETHOPRIM 1 TABLET: 800; 160 TABLET ORAL at 22:35

## 2022-01-01 RX ADMIN — SODIUM CHLORIDE, PRESERVATIVE FREE 10 ML: 5 INJECTION INTRAVENOUS at 09:08

## 2022-01-01 RX ADMIN — LOSARTAN POTASSIUM 50 MG: 25 TABLET, FILM COATED ORAL at 08:24

## 2022-01-01 RX ADMIN — MORPHINE SULFATE 2 MG: 2 INJECTION, SOLUTION INTRAMUSCULAR; INTRAVENOUS at 04:00

## 2022-01-01 RX ADMIN — DEXTROSE MONOHYDRATE: 100 INJECTION, SOLUTION INTRAVENOUS at 17:07

## 2022-01-01 RX ADMIN — LORAZEPAM 1 MG: 2 INJECTION INTRAMUSCULAR at 03:13

## 2022-01-01 RX ADMIN — SODIUM CHLORIDE, PRESERVATIVE FREE 10 ML: 5 INJECTION INTRAVENOUS at 10:48

## 2022-01-01 RX ADMIN — MORPHINE SULFATE 2 MG: 2 INJECTION, SOLUTION INTRAMUSCULAR; INTRAVENOUS at 11:56

## 2022-01-01 RX ADMIN — LORAZEPAM 0.5 MG: 2 INJECTION INTRAMUSCULAR at 17:20

## 2022-01-01 RX ADMIN — MORPHINE SULFATE 2 MG: 2 INJECTION, SOLUTION INTRAMUSCULAR; INTRAVENOUS at 16:08

## 2022-01-01 RX ADMIN — MORPHINE SULFATE 4 MG: 4 INJECTION, SOLUTION INTRAMUSCULAR; INTRAVENOUS at 21:39

## 2022-01-01 RX ADMIN — MORPHINE SULFATE 2 MG: 2 INJECTION, SOLUTION INTRAMUSCULAR; INTRAVENOUS at 04:55

## 2022-01-01 RX ADMIN — INSULIN GLARGINE 20 UNITS: 100 INJECTION, SOLUTION SUBCUTANEOUS at 10:21

## 2022-01-01 RX ADMIN — LOSARTAN POTASSIUM 50 MG: 25 TABLET, FILM COATED ORAL at 11:55

## 2022-01-01 RX ADMIN — SODIUM CHLORIDE 200 MG: 9 INJECTION, SOLUTION INTRAVENOUS at 00:02

## 2022-01-01 RX ADMIN — CARVEDILOL 12.5 MG: 6.25 TABLET, FILM COATED ORAL at 19:04

## 2022-01-01 RX ADMIN — SODIUM CHLORIDE 200 MG: 9 INJECTION, SOLUTION INTRAVENOUS at 20:38

## 2022-01-01 RX ADMIN — CARVEDILOL 12.5 MG: 6.25 TABLET, FILM COATED ORAL at 10:08

## 2022-01-01 RX ADMIN — CARVEDILOL 12.5 MG: 6.25 TABLET, FILM COATED ORAL at 15:37

## 2022-01-01 RX ADMIN — MORPHINE SULFATE 4 MG: 4 INJECTION, SOLUTION INTRAMUSCULAR; INTRAVENOUS at 10:14

## 2022-01-01 RX ADMIN — METHADONE HYDROCHLORIDE 5 MG: 10 CONCENTRATE ORAL at 10:25

## 2022-01-01 RX ADMIN — CARVEDILOL 12.5 MG: 6.25 TABLET, FILM COATED ORAL at 11:55

## 2022-01-01 RX ADMIN — SODIUM CHLORIDE: 9 INJECTION, SOLUTION INTRAVENOUS at 21:06

## 2022-01-01 RX ADMIN — SODIUM CHLORIDE 200 MG: 9 INJECTION, SOLUTION INTRAVENOUS at 08:39

## 2022-01-01 RX ADMIN — MORPHINE SULFATE 2 MG: 2 INJECTION, SOLUTION INTRAMUSCULAR; INTRAVENOUS at 05:32

## 2022-01-01 RX ADMIN — MORPHINE SULFATE 4 MG: 4 INJECTION, SOLUTION INTRAMUSCULAR; INTRAVENOUS at 17:25

## 2022-01-01 RX ADMIN — LORAZEPAM 1 MG: 2 INJECTION INTRAMUSCULAR at 08:44

## 2022-01-01 RX ADMIN — MORPHINE SULFATE 2 MG: 2 INJECTION, SOLUTION INTRAMUSCULAR; INTRAVENOUS at 18:03

## 2022-01-01 RX ADMIN — SODIUM CHLORIDE 200 MG: 9 INJECTION, SOLUTION INTRAVENOUS at 21:07

## 2022-01-01 RX ADMIN — MORPHINE SULFATE 4 MG: 4 INJECTION, SOLUTION INTRAMUSCULAR; INTRAVENOUS at 05:59

## 2022-01-01 RX ADMIN — SODIUM CHLORIDE 200 MG: 9 INJECTION, SOLUTION INTRAVENOUS at 21:09

## 2022-01-01 RX ADMIN — METHADONE HYDROCHLORIDE 5 MG: 10 CONCENTRATE ORAL at 20:33

## 2022-01-01 RX ADMIN — CARVEDILOL 12.5 MG: 6.25 TABLET, FILM COATED ORAL at 08:24

## 2022-01-01 RX ADMIN — INSULIN LISPRO 2 UNITS: 100 INJECTION, SOLUTION INTRAVENOUS; SUBCUTANEOUS at 09:29

## 2022-01-01 RX ADMIN — SODIUM CHLORIDE 200 MG: 9 INJECTION, SOLUTION INTRAVENOUS at 21:21

## 2022-01-01 RX ADMIN — INSULIN GLARGINE 20 UNITS: 100 INJECTION, SOLUTION SUBCUTANEOUS at 10:45

## 2022-01-01 RX ADMIN — SULFAMETHOXAZOLE AND TRIMETHOPRIM 1 TABLET: 800; 160 TABLET ORAL at 21:55

## 2022-01-01 RX ADMIN — MORPHINE SULFATE 2 MG: 2 INJECTION, SOLUTION INTRAMUSCULAR; INTRAVENOUS at 16:15

## 2022-01-01 RX ADMIN — SODIUM CHLORIDE 200 MG: 9 INJECTION, SOLUTION INTRAVENOUS at 22:07

## 2022-01-01 RX ADMIN — LORAZEPAM 0.5 MG: 2 INJECTION INTRAMUSCULAR at 20:00

## 2022-01-01 RX ADMIN — SODIUM CHLORIDE, PRESERVATIVE FREE 10 ML: 5 INJECTION INTRAVENOUS at 20:28

## 2022-01-01 RX ADMIN — SODIUM CHLORIDE 200 MG: 9 INJECTION, SOLUTION INTRAVENOUS at 09:07

## 2022-01-01 RX ADMIN — MORPHINE SULFATE 2 MG: 2 INJECTION, SOLUTION INTRAMUSCULAR; INTRAVENOUS at 06:23

## 2022-01-01 RX ADMIN — HALOPERIDOL LACTATE 1 MG: 5 INJECTION, SOLUTION INTRAMUSCULAR at 09:12

## 2022-01-01 RX ADMIN — SODIUM CHLORIDE, PRESERVATIVE FREE 10 ML: 5 INJECTION INTRAVENOUS at 20:36

## 2022-01-01 RX ADMIN — SULFAMETHOXAZOLE AND TRIMETHOPRIM 1 TABLET: 800; 160 TABLET ORAL at 09:16

## 2022-01-01 RX ADMIN — SODIUM CHLORIDE, PRESERVATIVE FREE 10 ML: 5 INJECTION INTRAVENOUS at 21:27

## 2022-01-01 RX ADMIN — METHADONE HYDROCHLORIDE 3 MG: 10 CONCENTRATE ORAL at 20:34

## 2022-01-01 RX ADMIN — METHADONE HYDROCHLORIDE 5 MG: 10 CONCENTRATE ORAL at 21:16

## 2022-01-01 RX ADMIN — LORAZEPAM 1 MG: 2 INJECTION INTRAMUSCULAR at 15:38

## 2022-01-01 RX ADMIN — MORPHINE SULFATE 4 MG: 4 INJECTION, SOLUTION INTRAMUSCULAR; INTRAVENOUS at 14:18

## 2022-01-01 RX ADMIN — METHADONE HYDROCHLORIDE 5 MG: 10 CONCENTRATE ORAL at 22:39

## 2022-01-01 RX ADMIN — LORAZEPAM 0.5 MG: 2 INJECTION INTRAMUSCULAR at 18:03

## 2022-01-01 RX ADMIN — SODIUM CHLORIDE 200 MG: 9 INJECTION, SOLUTION INTRAVENOUS at 10:11

## 2022-01-01 RX ADMIN — INSULIN GLARGINE 20 UNITS: 100 INJECTION, SOLUTION SUBCUTANEOUS at 09:12

## 2022-01-01 RX ADMIN — SODIUM CHLORIDE 25 ML: 9 INJECTION, SOLUTION INTRAVENOUS at 10:16

## 2022-01-01 RX ADMIN — CARVEDILOL 12.5 MG: 6.25 TABLET, FILM COATED ORAL at 10:24

## 2022-01-01 RX ADMIN — INSULIN LISPRO 2 UNITS: 100 INJECTION, SOLUTION INTRAVENOUS; SUBCUTANEOUS at 11:43

## 2022-01-01 RX ADMIN — METHADONE HYDROCHLORIDE 5 MG: 10 CONCENTRATE ORAL at 20:00

## 2022-01-01 RX ADMIN — METHADONE HYDROCHLORIDE 5 MG: 10 CONCENTRATE ORAL at 11:54

## 2022-01-01 RX ADMIN — CARVEDILOL 6.25 MG: 6.25 TABLET, FILM COATED ORAL at 10:05

## 2022-01-01 RX ADMIN — MORPHINE SULFATE 2 MG: 2 INJECTION, SOLUTION INTRAMUSCULAR; INTRAVENOUS at 10:40

## 2022-01-01 RX ADMIN — LORAZEPAM 1 MG: 2 INJECTION INTRAMUSCULAR at 00:16

## 2022-01-01 RX ADMIN — INSULIN LISPRO 2 UNITS: 100 INJECTION, SOLUTION INTRAVENOUS; SUBCUTANEOUS at 17:31

## 2022-01-01 RX ADMIN — SODIUM CHLORIDE 200 MG: 9 INJECTION, SOLUTION INTRAVENOUS at 11:58

## 2022-01-01 RX ADMIN — SULFAMETHOXAZOLE AND TRIMETHOPRIM 1 TABLET: 800; 160 TABLET ORAL at 21:16

## 2022-01-01 RX ADMIN — SULFAMETHOXAZOLE AND TRIMETHOPRIM 1 TABLET: 800; 160 TABLET ORAL at 09:04

## 2022-01-01 RX ADMIN — SULFAMETHOXAZOLE AND TRIMETHOPRIM 1 TABLET: 800; 160 TABLET ORAL at 20:19

## 2022-01-01 RX ADMIN — METHADONE HYDROCHLORIDE 5 MG: 10 CONCENTRATE ORAL at 09:04

## 2022-01-01 RX ADMIN — SODIUM CHLORIDE 200 MG: 9 INJECTION, SOLUTION INTRAVENOUS at 09:06

## 2022-01-01 RX ADMIN — METHADONE HYDROCHLORIDE 3 MG: 10 CONCENTRATE ORAL at 08:44

## 2022-01-01 RX ADMIN — LORAZEPAM 2 MG: 2 INJECTION INTRAMUSCULAR at 17:58

## 2022-01-01 RX ADMIN — ACETAMINOPHEN 650 MG: 650 SUPPOSITORY RECTAL at 17:59

## 2022-01-01 RX ADMIN — SULFAMETHOXAZOLE AND TRIMETHOPRIM 1 TABLET: 800; 160 TABLET ORAL at 11:55

## 2022-01-01 RX ADMIN — LORAZEPAM 1 MG: 2 INJECTION INTRAMUSCULAR at 21:17

## 2022-01-01 RX ADMIN — SULFAMETHOXAZOLE AND TRIMETHOPRIM 1 TABLET: 800; 160 TABLET ORAL at 20:38

## 2022-01-01 RX ADMIN — HALOPERIDOL LACTATE 1 MG: 5 INJECTION, SOLUTION INTRAMUSCULAR at 13:04

## 2022-01-01 RX ADMIN — LORAZEPAM 1 MG: 2 INJECTION INTRAMUSCULAR at 10:01

## 2022-01-01 RX ADMIN — METHADONE HYDROCHLORIDE 5 MG: 10 CONCENTRATE ORAL at 13:26

## 2022-01-01 RX ADMIN — SODIUM CHLORIDE, PRESERVATIVE FREE 10 ML: 5 INJECTION INTRAVENOUS at 22:39

## 2022-01-01 RX ADMIN — SODIUM CHLORIDE: 9 INJECTION, SOLUTION INTRAVENOUS at 10:38

## 2022-01-01 RX ADMIN — LOSARTAN POTASSIUM 50 MG: 25 TABLET, FILM COATED ORAL at 09:27

## 2022-01-01 RX ADMIN — SULFAMETHOXAZOLE AND TRIMETHOPRIM 1 TABLET: 800; 160 TABLET ORAL at 20:59

## 2022-01-01 RX ADMIN — MORPHINE SULFATE 4 MG: 4 INJECTION, SOLUTION INTRAMUSCULAR; INTRAVENOUS at 12:22

## 2022-01-01 RX ADMIN — MORPHINE SULFATE 4 MG: 4 INJECTION, SOLUTION INTRAMUSCULAR; INTRAVENOUS at 12:34

## 2022-01-01 RX ADMIN — CARVEDILOL 12.5 MG: 6.25 TABLET, FILM COATED ORAL at 17:31

## 2022-01-01 RX ADMIN — MORPHINE SULFATE 4 MG: 4 INJECTION, SOLUTION INTRAMUSCULAR; INTRAVENOUS at 06:24

## 2022-01-01 RX ADMIN — SODIUM CHLORIDE, PRESERVATIVE FREE 10 ML: 5 INJECTION INTRAVENOUS at 21:56

## 2022-01-01 RX ADMIN — GLYCOPYRROLATE 0.2 MG: 1 INJECTION INTRAMUSCULAR; INTRAVENOUS at 21:18

## 2022-01-01 RX ADMIN — SULFAMETHOXAZOLE AND TRIMETHOPRIM 1 TABLET: 800; 160 TABLET ORAL at 09:31

## 2022-01-01 RX ADMIN — MORPHINE SULFATE 2 MG: 2 INJECTION, SOLUTION INTRAMUSCULAR; INTRAVENOUS at 11:27

## 2022-01-01 RX ADMIN — LORAZEPAM 1 MG: 2 INJECTION INTRAMUSCULAR at 13:04

## 2022-01-01 RX ADMIN — LORAZEPAM 0.5 MG: 2 INJECTION INTRAMUSCULAR at 02:55

## 2022-01-01 RX ADMIN — LORAZEPAM 1 MG: 2 INJECTION INTRAMUSCULAR at 18:44

## 2022-01-01 RX ADMIN — LOSARTAN POTASSIUM 50 MG: 25 TABLET, FILM COATED ORAL at 08:56

## 2022-01-01 RX ADMIN — SODIUM CHLORIDE 200 MG: 9 INJECTION, SOLUTION INTRAVENOUS at 09:11

## 2022-01-01 RX ADMIN — SULFAMETHOXAZOLE AND TRIMETHOPRIM 1 TABLET: 800; 160 TABLET ORAL at 08:25

## 2022-01-01 RX ADMIN — SODIUM CHLORIDE 200 MG: 9 INJECTION, SOLUTION INTRAVENOUS at 09:38

## 2022-01-01 RX ADMIN — SODIUM CHLORIDE, PRESERVATIVE FREE 10 ML: 5 INJECTION INTRAVENOUS at 10:05

## 2022-01-01 RX ADMIN — METHADONE HYDROCHLORIDE 5 MG: 10 CONCENTRATE ORAL at 20:59

## 2022-01-01 RX ADMIN — SODIUM CHLORIDE, PRESERVATIVE FREE 10 ML: 5 INJECTION INTRAVENOUS at 00:02

## 2022-01-01 RX ADMIN — INSULIN LISPRO 2 UNITS: 100 INJECTION, SOLUTION INTRAVENOUS; SUBCUTANEOUS at 17:20

## 2022-01-01 RX ADMIN — MORPHINE SULFATE 4 MG: 4 INJECTION, SOLUTION INTRAMUSCULAR; INTRAVENOUS at 01:40

## 2022-01-01 RX ADMIN — MORPHINE SULFATE 4 MG: 4 INJECTION, SOLUTION INTRAMUSCULAR; INTRAVENOUS at 17:07

## 2022-01-01 RX ADMIN — SODIUM CHLORIDE 200 MG: 9 INJECTION, SOLUTION INTRAVENOUS at 09:44

## 2022-01-01 RX ADMIN — LOSARTAN POTASSIUM 50 MG: 25 TABLET, FILM COATED ORAL at 09:45

## 2022-01-01 RX ADMIN — MORPHINE SULFATE 2 MG: 2 INJECTION, SOLUTION INTRAMUSCULAR; INTRAVENOUS at 15:37

## 2022-01-01 RX ADMIN — CARVEDILOL 12.5 MG: 6.25 TABLET, FILM COATED ORAL at 09:31

## 2022-01-01 RX ADMIN — SODIUM CHLORIDE, PRESERVATIVE FREE 10 ML: 5 INJECTION INTRAVENOUS at 09:47

## 2022-01-01 RX ADMIN — INSULIN GLARGINE 10 UNITS: 100 INJECTION, SOLUTION SUBCUTANEOUS at 09:09

## 2022-01-01 RX ADMIN — CARVEDILOL 12.5 MG: 6.25 TABLET, FILM COATED ORAL at 10:37

## 2022-01-01 RX ADMIN — SODIUM CHLORIDE, PRESERVATIVE FREE 10 ML: 5 INJECTION INTRAVENOUS at 08:58

## 2022-01-01 RX ADMIN — SODIUM CHLORIDE 200 MG: 9 INJECTION, SOLUTION INTRAVENOUS at 08:55

## 2022-01-01 RX ADMIN — SULFAMETHOXAZOLE AND TRIMETHOPRIM 1 TABLET: 800; 160 TABLET ORAL at 08:56

## 2022-01-01 RX ADMIN — LORAZEPAM 1 MG: 2 INJECTION INTRAMUSCULAR at 06:44

## 2022-01-01 RX ADMIN — MORPHINE SULFATE 2 MG: 2 INJECTION, SOLUTION INTRAMUSCULAR; INTRAVENOUS at 04:21

## 2022-01-01 RX ADMIN — MORPHINE SULFATE 4 MG: 4 INJECTION, SOLUTION INTRAMUSCULAR; INTRAVENOUS at 02:20

## 2022-01-01 RX ADMIN — SULFAMETHOXAZOLE AND TRIMETHOPRIM 1 TABLET: 800; 160 TABLET ORAL at 20:48

## 2022-01-01 RX ADMIN — LACOSAMIDE 200 MG: 10 SOLUTION ORAL at 20:48

## 2022-01-01 RX ADMIN — LORAZEPAM 0.5 MG: 2 INJECTION INTRAMUSCULAR at 16:15

## 2022-01-01 RX ADMIN — SODIUM CHLORIDE, PRESERVATIVE FREE 10 ML: 5 INJECTION INTRAVENOUS at 20:38

## 2022-01-01 RX ADMIN — LORAZEPAM 1 MG: 2 INJECTION INTRAMUSCULAR at 14:45

## 2022-01-01 RX ADMIN — SODIUM CHLORIDE 200 MG: 9 INJECTION, SOLUTION INTRAVENOUS at 20:45

## 2022-01-01 RX ADMIN — LORAZEPAM 2 MG: 2 INJECTION INTRAMUSCULAR at 10:17

## 2022-01-01 RX ADMIN — METHADONE HYDROCHLORIDE 3 MG: 10 CONCENTRATE ORAL at 20:37

## 2022-01-01 RX ADMIN — MORPHINE SULFATE 4 MG: 4 INJECTION, SOLUTION INTRAMUSCULAR; INTRAVENOUS at 16:29

## 2022-01-01 RX ADMIN — SODIUM CHLORIDE 200 MG: 9 INJECTION, SOLUTION INTRAVENOUS at 21:10

## 2022-01-01 RX ADMIN — MORPHINE SULFATE 4 MG: 4 INJECTION, SOLUTION INTRAMUSCULAR; INTRAVENOUS at 12:42

## 2022-01-01 RX ADMIN — LORAZEPAM 1 MG: 2 INJECTION INTRAMUSCULAR at 04:21

## 2022-01-01 RX ADMIN — CARVEDILOL 12.5 MG: 6.25 TABLET, FILM COATED ORAL at 17:25

## 2022-01-01 RX ADMIN — LORAZEPAM 1 MG: 2 INJECTION INTRAMUSCULAR at 13:15

## 2022-01-01 RX ADMIN — MORPHINE SULFATE 2 MG: 2 INJECTION, SOLUTION INTRAMUSCULAR; INTRAVENOUS at 23:02

## 2022-01-01 RX ADMIN — SODIUM CHLORIDE, PRESERVATIVE FREE 10 ML: 5 INJECTION INTRAVENOUS at 21:00

## 2022-01-01 RX ADMIN — CARVEDILOL 12.5 MG: 6.25 TABLET, FILM COATED ORAL at 10:10

## 2022-01-01 RX ADMIN — GLYCOPYRROLATE 0.2 MG: 1 INJECTION INTRAMUSCULAR; INTRAVENOUS at 06:23

## 2022-01-01 RX ADMIN — LORAZEPAM 0.5 MG: 2 INJECTION INTRAMUSCULAR at 01:31

## 2022-01-01 RX ADMIN — LORAZEPAM 1 MG: 2 INJECTION INTRAMUSCULAR at 21:48

## 2022-01-01 RX ADMIN — SODIUM CHLORIDE, PRESERVATIVE FREE 10 ML: 5 INJECTION INTRAVENOUS at 09:06

## 2022-01-01 RX ADMIN — SODIUM CHLORIDE, PRESERVATIVE FREE 10 ML: 5 INJECTION INTRAVENOUS at 10:21

## 2022-01-01 RX ADMIN — SODIUM CHLORIDE 200 MG: 9 INJECTION, SOLUTION INTRAVENOUS at 22:18

## 2022-01-01 RX ADMIN — CARVEDILOL 12.5 MG: 6.25 TABLET, FILM COATED ORAL at 09:46

## 2022-01-01 RX ADMIN — SODIUM CHLORIDE: 9 INJECTION, SOLUTION INTRAVENOUS at 20:37

## 2022-01-01 RX ADMIN — GLYCOPYRROLATE 0.2 MG: 1 INJECTION INTRAMUSCULAR; INTRAVENOUS at 05:54

## 2022-01-01 RX ADMIN — MORPHINE SULFATE 2 MG: 2 INJECTION, SOLUTION INTRAMUSCULAR; INTRAVENOUS at 08:46

## 2022-01-01 RX ADMIN — METHADONE HYDROCHLORIDE 3 MG: 10 CONCENTRATE ORAL at 09:12

## 2022-01-01 RX ADMIN — LORAZEPAM 1 MG: 2 INJECTION INTRAMUSCULAR at 08:01

## 2022-01-01 RX ADMIN — SODIUM CHLORIDE, PRESERVATIVE FREE 10 ML: 5 INJECTION INTRAVENOUS at 10:47

## 2022-01-01 RX ADMIN — SODIUM CHLORIDE: 9 INJECTION, SOLUTION INTRAVENOUS at 21:54

## 2022-01-01 RX ADMIN — CARVEDILOL 12.5 MG: 6.25 TABLET, FILM COATED ORAL at 08:56

## 2022-01-01 RX ADMIN — METHADONE HYDROCHLORIDE 5 MG: 10 CONCENTRATE ORAL at 11:25

## 2022-01-01 RX ADMIN — MORPHINE SULFATE 2 MG: 2 INJECTION, SOLUTION INTRAMUSCULAR; INTRAVENOUS at 17:58

## 2022-01-01 RX ADMIN — MORPHINE SULFATE 4 MG: 4 INJECTION, SOLUTION INTRAMUSCULAR; INTRAVENOUS at 22:30

## 2022-01-01 RX ADMIN — MORPHINE SULFATE 4 MG: 4 INJECTION, SOLUTION INTRAMUSCULAR; INTRAVENOUS at 12:43

## 2022-01-01 RX ADMIN — SODIUM CHLORIDE 200 MG: 9 INJECTION, SOLUTION INTRAVENOUS at 20:26

## 2022-01-01 RX ADMIN — METHADONE HYDROCHLORIDE 5 MG: 10 CONCENTRATE ORAL at 10:18

## 2022-01-01 RX ADMIN — LORAZEPAM 0.5 MG: 2 INJECTION INTRAMUSCULAR at 20:56

## 2022-01-01 RX ADMIN — GLYCOPYRROLATE 0.2 MG: 1 INJECTION INTRAMUSCULAR; INTRAVENOUS at 15:36

## 2022-01-01 RX ADMIN — MORPHINE SULFATE 2 MG: 2 INJECTION, SOLUTION INTRAMUSCULAR; INTRAVENOUS at 17:09

## 2022-01-01 RX ADMIN — MORPHINE SULFATE 4 MG: 4 INJECTION, SOLUTION INTRAMUSCULAR; INTRAVENOUS at 04:16

## 2022-01-01 RX ADMIN — SULFAMETHOXAZOLE AND TRIMETHOPRIM 1 TABLET: 800; 160 TABLET ORAL at 09:45

## 2022-01-01 RX ADMIN — LOSARTAN POTASSIUM 50 MG: 25 TABLET, FILM COATED ORAL at 10:06

## 2022-01-01 RX ADMIN — SULFAMETHOXAZOLE AND TRIMETHOPRIM 1 TABLET: 800; 160 TABLET ORAL at 21:03

## 2022-01-01 RX ADMIN — SODIUM CHLORIDE 200 MG: 9 INJECTION, SOLUTION INTRAVENOUS at 10:08

## 2022-01-01 RX ADMIN — SODIUM CHLORIDE, PRESERVATIVE FREE 10 ML: 5 INJECTION INTRAVENOUS at 21:18

## 2022-01-01 RX ADMIN — GLYCOPYRROLATE 0.2 MG: 1 INJECTION INTRAMUSCULAR; INTRAVENOUS at 01:42

## 2022-01-01 RX ADMIN — METHADONE HYDROCHLORIDE 5 MG: 10 CONCENTRATE ORAL at 21:03

## 2022-01-01 RX ADMIN — SODIUM CHLORIDE, PRESERVATIVE FREE 10 ML: 5 INJECTION INTRAVENOUS at 10:07

## 2022-01-01 RX ADMIN — MORPHINE SULFATE 2 MG: 2 INJECTION, SOLUTION INTRAMUSCULAR; INTRAVENOUS at 12:35

## 2022-01-01 RX ADMIN — SODIUM CHLORIDE, PRESERVATIVE FREE 10 ML: 5 INJECTION INTRAVENOUS at 10:18

## 2022-01-01 RX ADMIN — METHADONE HYDROCHLORIDE 5 MG: 10 CONCENTRATE ORAL at 10:37

## 2022-01-01 RX ADMIN — METHADONE HYDROCHLORIDE 5 MG: 10 CONCENTRATE ORAL at 22:53

## 2022-01-01 RX ADMIN — LORAZEPAM 0.5 MG: 2 INJECTION INTRAMUSCULAR at 23:15

## 2022-01-01 RX ADMIN — MORPHINE SULFATE 2 MG: 2 INJECTION, SOLUTION INTRAMUSCULAR; INTRAVENOUS at 12:34

## 2022-01-01 RX ADMIN — SULFAMETHOXAZOLE AND TRIMETHOPRIM 1 TABLET: 800; 160 TABLET ORAL at 10:09

## 2022-01-01 RX ADMIN — INSULIN LISPRO 2 UNITS: 100 INJECTION, SOLUTION INTRAVENOUS; SUBCUTANEOUS at 17:25

## 2022-01-01 RX ADMIN — CARVEDILOL 12.5 MG: 6.25 TABLET, FILM COATED ORAL at 09:26

## 2022-01-01 RX ADMIN — SODIUM CHLORIDE 200 MG: 9 INJECTION, SOLUTION INTRAVENOUS at 10:40

## 2022-01-01 RX ADMIN — GLYCOPYRROLATE 0.2 MG: 1 INJECTION INTRAMUSCULAR; INTRAVENOUS at 01:43

## 2022-01-01 RX ADMIN — SODIUM CHLORIDE 200 MG: 9 INJECTION, SOLUTION INTRAVENOUS at 21:02

## 2022-01-01 RX ADMIN — MORPHINE SULFATE 2 MG: 2 INJECTION, SOLUTION INTRAMUSCULAR; INTRAVENOUS at 23:15

## 2022-01-01 RX ADMIN — METHADONE HYDROCHLORIDE 5 MG: 10 CONCENTRATE ORAL at 09:27

## 2022-01-01 RX ADMIN — LORAZEPAM 2 MG: 2 INJECTION INTRAMUSCULAR at 12:34

## 2022-01-01 RX ADMIN — MORPHINE SULFATE 2 MG: 2 INJECTION, SOLUTION INTRAMUSCULAR; INTRAVENOUS at 05:23

## 2022-01-01 RX ADMIN — SODIUM CHLORIDE, PRESERVATIVE FREE 10 ML: 5 INJECTION INTRAVENOUS at 20:59

## 2022-01-01 RX ADMIN — LORAZEPAM 1 MG: 2 INJECTION INTRAMUSCULAR at 01:40

## 2022-01-01 RX ADMIN — SODIUM CHLORIDE, PRESERVATIVE FREE 10 ML: 5 INJECTION INTRAVENOUS at 21:07

## 2022-01-01 RX ADMIN — GLYCOPYRROLATE 0.2 MG: 1 INJECTION INTRAMUSCULAR; INTRAVENOUS at 02:20

## 2022-01-01 RX ADMIN — MORPHINE SULFATE 4 MG: 4 INJECTION, SOLUTION INTRAMUSCULAR; INTRAVENOUS at 10:17

## 2022-01-01 RX ADMIN — MORPHINE SULFATE 2 MG: 2 INJECTION, SOLUTION INTRAMUSCULAR; INTRAVENOUS at 01:49

## 2022-01-01 RX ADMIN — SODIUM CHLORIDE 200 MG: 9 INJECTION, SOLUTION INTRAVENOUS at 10:17

## 2022-01-01 RX ADMIN — MORPHINE SULFATE 2 MG: 2 INJECTION, SOLUTION INTRAMUSCULAR; INTRAVENOUS at 00:17

## 2022-01-01 RX ADMIN — MORPHINE SULFATE 2 MG: 2 INJECTION, SOLUTION INTRAMUSCULAR; INTRAVENOUS at 21:15

## 2022-01-01 RX ADMIN — LORAZEPAM 1 MG: 2 INJECTION INTRAMUSCULAR at 11:09

## 2022-01-01 RX ADMIN — MORPHINE SULFATE 4 MG: 4 INJECTION, SOLUTION INTRAMUSCULAR; INTRAVENOUS at 14:27

## 2022-01-01 RX ADMIN — GLYCOPYRROLATE 0.2 MG: 1 INJECTION INTRAMUSCULAR; INTRAVENOUS at 10:28

## 2022-01-01 RX ADMIN — MORPHINE SULFATE 2 MG: 2 INJECTION, SOLUTION INTRAMUSCULAR; INTRAVENOUS at 10:27

## 2022-01-01 RX ADMIN — METHADONE HYDROCHLORIDE 3 MG: 10 CONCENTRATE ORAL at 19:57

## 2022-01-01 RX ADMIN — MORPHINE SULFATE 4 MG: 4 INJECTION, SOLUTION INTRAMUSCULAR; INTRAVENOUS at 09:22

## 2022-01-01 RX ADMIN — SULFAMETHOXAZOLE AND TRIMETHOPRIM 1 TABLET: 800; 160 TABLET ORAL at 10:05

## 2022-01-01 RX ADMIN — METHADONE HYDROCHLORIDE 5 MG: 10 CONCENTRATE ORAL at 10:06

## 2022-01-01 RX ADMIN — LORAZEPAM 1 MG: 2 INJECTION INTRAMUSCULAR at 10:06

## 2022-01-01 RX ADMIN — MORPHINE SULFATE 2 MG: 2 INJECTION, SOLUTION INTRAMUSCULAR; INTRAVENOUS at 05:41

## 2022-01-01 RX ADMIN — SODIUM CHLORIDE 200 MG: 9 INJECTION, SOLUTION INTRAVENOUS at 10:36

## 2022-01-01 RX ADMIN — ACETAMINOPHEN 650 MG: 650 SUPPOSITORY RECTAL at 16:31

## 2022-01-01 RX ADMIN — SULFAMETHOXAZOLE AND TRIMETHOPRIM 1 TABLET: 800; 160 TABLET ORAL at 22:38

## 2022-01-01 RX ADMIN — MORPHINE SULFATE 4 MG: 4 INJECTION, SOLUTION INTRAMUSCULAR; INTRAVENOUS at 18:44

## 2022-01-01 RX ADMIN — ACETAMINOPHEN 650 MG: 650 SUPPOSITORY RECTAL at 21:11

## 2022-01-01 RX ADMIN — SODIUM CHLORIDE 200 MG: 9 INJECTION, SOLUTION INTRAVENOUS at 20:30

## 2022-01-01 RX ADMIN — GLYCOPYRROLATE 0.2 MG: 1 INJECTION INTRAMUSCULAR; INTRAVENOUS at 05:42

## 2022-01-01 RX ADMIN — METHADONE HYDROCHLORIDE 5 MG: 10 CONCENTRATE ORAL at 16:16

## 2022-01-01 RX ADMIN — SULFAMETHOXAZOLE AND TRIMETHOPRIM 1 TABLET: 800; 160 TABLET ORAL at 22:42

## 2022-01-01 RX ADMIN — SODIUM CHLORIDE, PRESERVATIVE FREE 10 ML: 5 INJECTION INTRAVENOUS at 11:40

## 2022-01-01 RX ADMIN — MORPHINE SULFATE 2 MG: 2 INJECTION, SOLUTION INTRAMUSCULAR; INTRAVENOUS at 18:58

## 2022-01-01 RX ADMIN — METHADONE HYDROCHLORIDE 5 MG: 10 CONCENTRATE ORAL at 21:11

## 2022-01-01 RX ADMIN — HALOPERIDOL LACTATE 1 MG: 5 INJECTION, SOLUTION INTRAMUSCULAR at 16:29

## 2022-01-01 RX ADMIN — MORPHINE SULFATE 4 MG: 4 INJECTION, SOLUTION INTRAMUSCULAR; INTRAVENOUS at 22:08

## 2022-01-01 RX ADMIN — INSULIN GLARGINE 20 UNITS: 100 INJECTION, SOLUTION SUBCUTANEOUS at 10:03

## 2022-01-01 RX ADMIN — SULFAMETHOXAZOLE AND TRIMETHOPRIM 1 TABLET: 800; 160 TABLET ORAL at 09:27

## 2022-01-01 RX ADMIN — CARVEDILOL 12.5 MG: 6.25 TABLET, FILM COATED ORAL at 18:58

## 2022-01-01 RX ADMIN — SODIUM CHLORIDE, PRESERVATIVE FREE 10 ML: 5 INJECTION INTRAVENOUS at 10:36

## 2022-01-01 RX ADMIN — LORAZEPAM 0.5 MG: 2 INJECTION INTRAMUSCULAR at 16:17

## 2022-01-01 RX ADMIN — SULFAMETHOXAZOLE AND TRIMETHOPRIM 1 TABLET: 800; 160 TABLET ORAL at 10:22

## 2022-01-01 RX ADMIN — CARVEDILOL 12.5 MG: 6.25 TABLET, FILM COATED ORAL at 18:00

## 2022-01-01 RX ADMIN — INSULIN GLARGINE 20 UNITS: 100 INJECTION, SOLUTION SUBCUTANEOUS at 10:31

## 2022-01-01 RX ADMIN — SODIUM CHLORIDE 200 MG: 9 INJECTION, SOLUTION INTRAVENOUS at 09:17

## 2022-01-01 RX ADMIN — SODIUM CHLORIDE, PRESERVATIVE FREE 10 ML: 5 INJECTION INTRAVENOUS at 22:07

## 2022-01-01 RX ADMIN — SODIUM CHLORIDE, PRESERVATIVE FREE 10 ML: 5 INJECTION INTRAVENOUS at 09:20

## 2022-01-01 ASSESSMENT — PAIN SCALES - PAIN ASSESSMENT IN ADVANCED DEMENTIA (PAINAD)
CONSOLABILITY: 0
BODYLANGUAGE: 0
BREATHING: 1
BODYLANGUAGE: 0
BREATHING: 1
BREATHING: 0
BREATHING: 0
CONSOLABILITY: 0
NEGVOCALIZATION: 1
CONSOLABILITY: 0
NEGVOCALIZATION: 1
NEGVOCALIZATION: 1
FACIALEXPRESSION: 0
NEGVOCALIZATION: 1
TOTALSCORE: 2
NEGVOCALIZATION: 1
TOTALSCORE: 2
TOTALSCORE: 2
BODYLANGUAGE: 0
NEGVOCALIZATION: 1
BODYLANGUAGE: 0
BREATHING: 1
NEGVOCALIZATION: 1
BODYLANGUAGE: 0
BREATHING: 1
NEGVOCALIZATION: 1
BODYLANGUAGE: 2
NEGVOCALIZATION: 1
BREATHING: 1
TOTALSCORE: 2
BODYLANGUAGE: 0
FACIALEXPRESSION: 0
BREATHING: 1
BODYLANGUAGE: 0
TOTALSCORE: 2
FACIALEXPRESSION: 0
NEGVOCALIZATION: 0
BREATHING: 1
TOTALSCORE: 2
BREATHING: 1
TOTALSCORE: 2
BODYLANGUAGE: 0
BODYLANGUAGE: 0
FACIALEXPRESSION: 0
BODYLANGUAGE: 0
FACIALEXPRESSION: 0
BREATHING: 1
BODYLANGUAGE: 0
TOTALSCORE: 2
BREATHING: 1
BODYLANGUAGE: 0
TOTALSCORE: 2
BODYLANGUAGE: 2
NEGVOCALIZATION: 1
FACIALEXPRESSION: 0
TOTALSCORE: 2
BREATHING: 1
BODYLANGUAGE: 0
NEGVOCALIZATION: 1
FACIALEXPRESSION: 0
BREATHING: 1
NEGVOCALIZATION: 1
CONSOLABILITY: 2
CONSOLABILITY: 0
CONSOLABILITY: 1
CONSOLABILITY: 0
BREATHING: 1
TOTALSCORE: 2
FACIALEXPRESSION: 0
TOTALSCORE: 2
BODYLANGUAGE: 0
NEGVOCALIZATION: 1
FACIALEXPRESSION: 2
NEGVOCALIZATION: 1
BODYLANGUAGE: 0
BREATHING: 1
NEGVOCALIZATION: 0
FACIALEXPRESSION: 0
FACIALEXPRESSION: 0
TOTALSCORE: 2
CONSOLABILITY: 0
TOTALSCORE: 2
BREATHING: 1
BODYLANGUAGE: 2
FACIALEXPRESSION: 0
BREATHING: 1
FACIALEXPRESSION: 2
NEGVOCALIZATION: 1
BREATHING: 1
CONSOLABILITY: 2
TOTALSCORE: 2
NEGVOCALIZATION: 1
CONSOLABILITY: 1
BODYLANGUAGE: 0
NEGVOCALIZATION: 1
TOTALSCORE: 2
BODYLANGUAGE: 0
TOTALSCORE: 5
FACIALEXPRESSION: 0
CONSOLABILITY: 0
BREATHING: 1
BREATHING: 1
CONSOLABILITY: 0
FACIALEXPRESSION: 0
BREATHING: 1
TOTALSCORE: 6
TOTALSCORE: 2
CONSOLABILITY: 0
BODYLANGUAGE: 0
TOTALSCORE: 2
NEGVOCALIZATION: 1
FACIALEXPRESSION: 0
BODYLANGUAGE: 0
NEGVOCALIZATION: 1
CONSOLABILITY: 0
FACIALEXPRESSION: 2
FACIALEXPRESSION: 0
NEGVOCALIZATION: 1
BREATHING: 0
BREATHING: 1
BODYLANGUAGE: 0
TOTALSCORE: 2
CONSOLABILITY: 0
FACIALEXPRESSION: 0
TOTALSCORE: 7
CONSOLABILITY: 0
CONSOLABILITY: 0
BREATHING: 1
FACIALEXPRESSION: 2
CONSOLABILITY: 0
BREATHING: 1
FACIALEXPRESSION: 0
CONSOLABILITY: 1
TOTALSCORE: 5
FACIALEXPRESSION: 0
BREATHING: 1
BREATHING: 1
FACIALEXPRESSION: 2
BREATHING: 1
FACIALEXPRESSION: 0
CONSOLABILITY: 0
BODYLANGUAGE: 0
NEGVOCALIZATION: 1
TOTALSCORE: 2
TOTALSCORE: 2
NEGVOCALIZATION: 1
BREATHING: 1
NEGVOCALIZATION: 1
FACIALEXPRESSION: 2
CONSOLABILITY: 0
FACIALEXPRESSION: 0
TOTALSCORE: 2
CONSOLABILITY: 0
TOTALSCORE: 2
FACIALEXPRESSION: 0
BODYLANGUAGE: 0
BREATHING: 1
NEGVOCALIZATION: 0
CONSOLABILITY: 0
TOTALSCORE: 2
NEGVOCALIZATION: 1
BODYLANGUAGE: 2
NEGVOCALIZATION: 1
BREATHING: 1
CONSOLABILITY: 0
NEGVOCALIZATION: 1
FACIALEXPRESSION: 0
FACIALEXPRESSION: 0
BODYLANGUAGE: 0
FACIALEXPRESSION: 0
TOTALSCORE: 2
FACIALEXPRESSION: 0
TOTALSCORE: 2
NEGVOCALIZATION: 0
BODYLANGUAGE: 2
CONSOLABILITY: 0
NEGVOCALIZATION: 1
CONSOLABILITY: 0
BODYLANGUAGE: 0
BREATHING: 0
CONSOLABILITY: 0
FACIALEXPRESSION: 0
BREATHING: 1
BODYLANGUAGE: 0
CONSOLABILITY: 0
CONSOLABILITY: 0
FACIALEXPRESSION: 0
NEGVOCALIZATION: 0
BODYLANGUAGE: 0
TOTALSCORE: 2
BREATHING: 1
CONSOLABILITY: 1
TOTALSCORE: 2
NEGVOCALIZATION: 1
NEGVOCALIZATION: 1
FACIALEXPRESSION: 0
CONSOLABILITY: 0
CONSOLABILITY: 0
TOTALSCORE: 2
TOTALSCORE: 2
NEGVOCALIZATION: 1
FACIALEXPRESSION: 0
BREATHING: 1
TOTALSCORE: 2
BREATHING: 1
CONSOLABILITY: 0
BODYLANGUAGE: 2
BREATHING: 1
FACIALEXPRESSION: 0
NEGVOCALIZATION: 1
BODYLANGUAGE: 0
CONSOLABILITY: 0
BREATHING: 1
TOTALSCORE: 2
CONSOLABILITY: 0
BODYLANGUAGE: 0
FACIALEXPRESSION: 0
CONSOLABILITY: 0
FACIALEXPRESSION: 0
NEGVOCALIZATION: 1
BODYLANGUAGE: 0
BODYLANGUAGE: 0
CONSOLABILITY: 0
NEGVOCALIZATION: 0
TOTALSCORE: 2
BODYLANGUAGE: 0
TOTALSCORE: 5
BREATHING: 1
FACIALEXPRESSION: 0
BREATHING: 0
NEGVOCALIZATION: 1
NEGVOCALIZATION: 1
BODYLANGUAGE: 0
CONSOLABILITY: 0
CONSOLABILITY: 0
BODYLANGUAGE: 2
FACIALEXPRESSION: 0
TOTALSCORE: 5
BODYLANGUAGE: 0
BODYLANGUAGE: 0
NEGVOCALIZATION: 1
BREATHING: 0
TOTALSCORE: 2
NEGVOCALIZATION: 1
TOTALSCORE: 2
CONSOLABILITY: 0
NEGVOCALIZATION: 1
CONSOLABILITY: 1
CONSOLABILITY: 0
CONSOLABILITY: 0
BODYLANGUAGE: 0
TOTALSCORE: 2
NEGVOCALIZATION: 0
BODYLANGUAGE: 0
CONSOLABILITY: 0
FACIALEXPRESSION: 2
TOTALSCORE: 2
TOTALSCORE: 5
BODYLANGUAGE: 0
BREATHING: 1
BREATHING: 1

## 2022-01-01 ASSESSMENT — PAIN - FUNCTIONAL ASSESSMENT
PAIN_FUNCTIONAL_ASSESSMENT: INTOLERABLE, UNABLE TO DO ANY ACTIVE OR PASSIVE ACTIVITIES

## 2022-01-01 ASSESSMENT — PAIN SCALES - GENERAL
PAINLEVEL_OUTOF10: 0
PAINLEVEL_OUTOF10: 6
PAINLEVEL_OUTOF10: 0
PAINLEVEL_OUTOF10: 3
PAINLEVEL_OUTOF10: 0
PAINLEVEL_OUTOF10: 7
PAINLEVEL_OUTOF10: 0

## 2022-01-01 ASSESSMENT — PAIN DESCRIPTION - FREQUENCY
FREQUENCY: CONTINUOUS

## 2022-01-01 ASSESSMENT — PAIN DESCRIPTION - ONSET
ONSET: UNABLE TO COMMUNICATE

## 2022-01-01 ASSESSMENT — PAIN SCALES - WONG BAKER
WONGBAKER_NUMERICALRESPONSE: 0
WONGBAKER_NUMERICALRESPONSE: 2
WONGBAKER_NUMERICALRESPONSE: 0

## 2022-01-01 ASSESSMENT — PAIN DESCRIPTION - PAIN TYPE
TYPE: CHRONIC PAIN

## 2022-07-26 ENCOUNTER — HOSPITAL ENCOUNTER (OUTPATIENT)
Dept: WOUND CARE | Age: 67
Discharge: HOME | End: 2022-07-26
Payer: MEDICARE

## 2022-07-26 VITALS
RESPIRATION RATE: 18 BRPM | DIASTOLIC BLOOD PRESSURE: 85 MMHG | SYSTOLIC BLOOD PRESSURE: 137 MMHG | TEMPERATURE: 97.4 F | HEART RATE: 102 BPM

## 2022-07-26 DIAGNOSIS — Z87.891 HISTORY OF NICOTINE DEPENDENCE: ICD-10-CM

## 2022-07-26 DIAGNOSIS — E11.621 DIABETIC ULCER OF TOE OF LEFT FOOT ASSOCIATED WITH TYPE 2 DIABETES MELLITUS, WITH FAT LAYER EXPOSED (HCC): Primary | ICD-10-CM

## 2022-07-26 DIAGNOSIS — L97.522 DIABETIC ULCER OF TOE OF LEFT FOOT ASSOCIATED WITH TYPE 2 DIABETES MELLITUS, WITH FAT LAYER EXPOSED (HCC): Primary | ICD-10-CM

## 2022-07-26 DIAGNOSIS — I73.9 PERIPHERAL VASCULAR DISEASE (HCC): ICD-10-CM

## 2022-07-26 DIAGNOSIS — E11.42 TYPE 2 DIABETES MELLITUS WITH PERIPHERAL NEUROPATHY (HCC): ICD-10-CM

## 2022-07-26 PROCEDURE — 99213 OFFICE O/P EST LOW 20 MIN: CPT

## 2022-07-26 PROCEDURE — 88307 TISSUE EXAM BY PATHOLOGIST: CPT | Performed by: PATHOLOGY

## 2022-07-26 PROCEDURE — 11044 DBRDMT BONE 1ST 20 SQ CM/<: CPT

## 2022-07-26 PROCEDURE — 88311 DECALCIFY TISSUE: CPT | Performed by: PATHOLOGY

## 2022-07-26 ASSESSMENT — PAIN DESCRIPTION - FREQUENCY: FREQUENCY: CONTINUOUS

## 2022-07-26 ASSESSMENT — PAIN DESCRIPTION - LOCATION: LOCATION: TOE (COMMENT WHICH ONE)

## 2022-07-26 ASSESSMENT — PAIN SCALES - GENERAL: PAINLEVEL_OUTOF10: 2

## 2022-07-26 ASSESSMENT — PAIN DESCRIPTION - ONSET: ONSET: ON-GOING

## 2022-07-26 ASSESSMENT — PAIN DESCRIPTION - ORIENTATION: ORIENTATION: RIGHT

## 2022-07-26 ASSESSMENT — PAIN - FUNCTIONAL ASSESSMENT: PAIN_FUNCTIONAL_ASSESSMENT: PREVENTS OR INTERFERES SOME ACTIVE ACTIVITIES AND ADLS

## 2022-07-26 ASSESSMENT — PAIN DESCRIPTION - DESCRIPTORS: DESCRIPTORS: THROBBING

## 2022-07-26 NOTE — DISCHARGE INSTRUCTIONS
PHYSICIAN ORDERS AND DISCHARGE INSTRUCTIONS    NOTE: Upon discharge from the [address], you will receive a patient experience survey. We would be grateful if you would take the time to fill this survey out. Wound care order history:     MILAGRO's   Right       Left    Date:   Cultures:  Bone Fragment sent to pathology 7/26/22   Grafts:     Antibiotics:        Continuing wound care orders and information:                Residence:                Continue home health care with: [facility]   Your wound-care supplies will be provided by: Wound cleansing:     Do not scrub or use excessive force. Wash hands with soap and water before and after dressing changes. Prior to applying a clean dressing, cleanse wound with normal saline, wound cleanser, or mild soap and water. Ask the physician or nurse before getting the wound(s) wet in a shower    Daily Wound management:   Keep weight off wounds and reposition every 2 hours. Avoid standing for long periods of time. Apply wraps/stockings in AM and remove at bedtime. If swelling is present, elevate legs to the level of the heart or above for 30 minutes 4-5 times a day and/or when sitting. When taking antibiotics take entire prescription as ordered by physician do not stop taking until medicine is all gone. Orders for this week: 7/26/22       FAX ORDERS TO [address] [place] [address]! Rx:     Left Great Toe - Wash with soap and water, pat dry. Apply betadine damp gauze to wound bed. Cover with dry dressing (ABD) and secure with conform and tape. Give surgical shoe today in clinic 7/26/22      Follow up with Dr Lindy Lo in 1 week in the wound care center. Call [phone] for any questions or concerns.   Date__________   Time____________

## 2022-07-26 NOTE — PLAN OF CARE
Problem: Chronic Conditions and Co-morbidities  Goal: Patient's chronic conditions and co-morbidity symptoms are monitored and maintained or improved  Outcome: Progressing Towards Goal     Problem: Pain  Goal: Verbalizes/displays adequate comfort level or baseline comfort level  Outcome: Progressing Towards Goal     Problem: Wound:  Goal: Will show signs of wound healing; wound closure and no evidence of infection  Description: Will show signs of wound healing; wound closure and no evidence of infection  Outcome: Progressing Towards Goal

## 2022-07-27 PROBLEM — E11.621 DIABETIC ULCER OF TOE OF LEFT FOOT ASSOCIATED WITH TYPE 2 DIABETES MELLITUS, WITH FAT LAYER EXPOSED (HCC): Status: ACTIVE | Noted: 2022-01-01

## 2022-07-27 PROBLEM — I73.9 PERIPHERAL VASCULAR DISEASE (HCC): Status: ACTIVE | Noted: 2022-01-01

## 2022-07-27 PROBLEM — L97.522 DIABETIC ULCER OF TOE OF LEFT FOOT ASSOCIATED WITH TYPE 2 DIABETES MELLITUS, WITH FAT LAYER EXPOSED (HCC): Status: ACTIVE | Noted: 2022-07-27

## 2022-07-27 PROBLEM — E11.42 TYPE 2 DIABETES MELLITUS WITH PERIPHERAL NEUROPATHY (HCC): Status: ACTIVE | Noted: 2022-07-27

## 2022-07-27 PROBLEM — Z87.891 HISTORY OF NICOTINE DEPENDENCE: Status: ACTIVE | Noted: 2022-01-01

## 2022-07-27 RX ORDER — BETAMETHASONE DIPROPIONATE 0.05 %
OINTMENT (GRAM) TOPICAL ONCE
Status: CANCELLED | OUTPATIENT
Start: 2022-07-27 | End: 2022-07-27

## 2022-07-27 RX ORDER — GINSENG 100 MG
CAPSULE ORAL ONCE
Status: CANCELLED | OUTPATIENT
Start: 2022-07-27 | End: 2022-07-27

## 2022-07-27 RX ORDER — BACITRACIN ZINC AND POLYMYXIN B SULFATE 500; 1000 [USP'U]/G; [USP'U]/G
OINTMENT TOPICAL ONCE
Status: CANCELLED | OUTPATIENT
Start: 2022-07-27 | End: 2022-07-27

## 2022-07-27 RX ORDER — LIDOCAINE HYDROCHLORIDE 20 MG/ML
JELLY TOPICAL ONCE
Status: CANCELLED | OUTPATIENT
Start: 2022-07-27 | End: 2022-07-27

## 2022-07-27 RX ORDER — GENTAMICIN SULFATE 1 MG/G
OINTMENT TOPICAL ONCE
Status: CANCELLED | OUTPATIENT
Start: 2022-07-27 | End: 2022-07-27

## 2022-07-27 RX ORDER — CLOBETASOL PROPIONATE 0.5 MG/G
OINTMENT TOPICAL ONCE
Status: CANCELLED | OUTPATIENT
Start: 2022-07-27 | End: 2022-07-27

## 2022-07-27 RX ORDER — LIDOCAINE HYDROCHLORIDE 40 MG/ML
SOLUTION TOPICAL ONCE
Status: CANCELLED | OUTPATIENT
Start: 2022-07-27 | End: 2022-07-27

## 2022-07-27 RX ORDER — LIDOCAINE 40 MG/G
CREAM TOPICAL ONCE
Status: CANCELLED | OUTPATIENT
Start: 2022-07-27 | End: 2022-07-27

## 2022-07-27 RX ORDER — BACITRACIN, NEOMYCIN, POLYMYXIN B 400; 3.5; 5 [USP'U]/G; MG/G; [USP'U]/G
OINTMENT TOPICAL ONCE
Status: CANCELLED | OUTPATIENT
Start: 2022-07-27 | End: 2022-07-27

## 2022-07-27 RX ORDER — LIDOCAINE 50 MG/G
OINTMENT TOPICAL ONCE
Status: CANCELLED | OUTPATIENT
Start: 2022-07-27 | End: 2022-07-27

## 2022-07-27 NOTE — PROGRESS NOTES
Wound Care Center Progress Note With Procedure    Campos Garcia  AGE: 61 y.o. GENDER: female  : 1955  EPISODE DATE:  2022     Subjective:     Chief Complaint   Patient presents with    Wound Check     Right great toe          HISTORY of PRESENT ILLNESS      Campos Garcia is a 61 y.o. female who presents today for Follow-up of wound to the great toe the left foot. I saw her last week in my office. No real changes from previous. Still getting some pain to the area. No worsening changes or drainage. They did not set up the MRI or vascular studies that I asked him to get. She is a diabetic and she does get neuropathy in her feet with numbness stinging and burning secondary to this. Does have a history of smoking and has had lung surgery in the past.  Has also had cancer in the past.          PAST MEDICAL HISTORY        Diagnosis Date    Cancer ([facility]     Diabetes mellitus ([facility]     Hyperlipidemia     Hypertension        PAST SURGICAL HISTORY    Past Surgical History:   Procedure Laterality Date    LUNG CANCER SURGERY Left 10/2019    UPPER GASTROINTESTINAL ENDOSCOPY N/A 10/3/2020    EGD BIOPSY performed by Jagjit Enciso MD at [facility]    History reviewed. No pertinent family history. SOCIAL HISTORY    Social History     Tobacco Use    Smoking status: Former     Packs/day: 1.00     Types: Cigarettes    Smokeless tobacco: Never    Tobacco comments:     10/2019   Substance Use Topics    Alcohol use:  Yes     Alcohol/week: 1.0 standard drink     Types: 1 Cans of beer per week    Drug use: Yes     Types: Marijuana (Weed)       ALLERGIES    No Known Allergies    MEDICATIONS    Current Outpatient Medications on File Prior to Encounter   Medication Sig Dispense Refill    pantoprazole (PROTONIX) 40 MG tablet Take 1 tablet by mouth daily 30 tablet 0    cilostazol (PLETAL) 100 MG tablet Take 100 mg by mouth 2 times daily      metFORMIN (GLUCOPHAGE) 500 MG tablet Take 500 mg by mouth 2 times daily. lisinopril (PRINIVIL;ZESTRIL) 5 MG tablet Take 15 mg by mouth daily. cloNIDine (CATAPRES) 0.1 MG tablet Take 0.1 mg by mouth 2 times daily. simvastatin (ZOCOR) 20 MG tablet Take 40 mg by mouth nightly        No current facility-administered medications on file prior to encounter. REVIEW OF SYSTEMS    Pertinent items are noted in HPI. Constitutional: Negative for systemic symptoms including fever, chills and malaise. Objective:      /85   Pulse (!) 102   Temp 97.4 °F (36.3 °C) (Temporal)   Resp 18     PHYSICAL EXAM    General: The patient is in no acute distress. Mental status:  Patient is appropriate, is  oriented to place and plan of care. Dermatologic exam: Visual inspection of the periwound reveals the skin to be moist  Wound exam: see wound description below in procedure note      Assessment:     Problem List Items Addressed This Visit          Circulatory    Peripheral vascular disease ([address]       Endocrine    Diabetic ulcer of toe of left foot associated with type 2 diabetes mellitus, with fat layer exposed ([address] - Primary    Relevant Orders    SURGICAL PATHOLOGY    MRI FOOT LEFT WO CONTRAST    Type 2 diabetes mellitus with peripheral neuropathy ([address]       Other    History of nicotine dependence     Procedure Note    Indications:  Based on my examination of this patient's wound(s) today, sharp excision into necrotic subcutaneous tissue is required to promote healing and evaluate the extent of previous healing. Performed by: Sancho Hills DPM    Consent obtained: Yes    Time out taken:  Yes    Pain Control: lidocaine      Debridement:Excisional Debridement    Using #15 blade scalpel the wound(s) was/were sharply debrided down through and including the removal of subcutaneous tissue.         Devitalized Tissue Debrided:  necrotic/eschar    Pre Debridement Measurements:  Are located in the Wound Documentation Flow Sheet    All active wounds listed below with today's date are evaluated  Wound(s)    debrided this date include # : 1     Post  Debridement Measurements:  Wound 07/26/22 Toe (Comment  which one) Anterior; Left #1 great toe (Active)   Wound Etiology Diabetic 07/26/22 1509   Dressing Status New dressing applied 07/26/22 1550   Wound Cleansed Wound cleanser 07/26/22 1509   Wound Length (cm) 2.2 cm 07/26/22 1509   Wound Width (cm) 2.1 cm 07/26/22 1509   Wound Depth (cm) 0.1 cm 07/26/22 1509   Wound Surface Area (cm^2) 4.62 cm^2 07/26/22 1509   Wound Volume (cm^3) 0.462 cm^3 07/26/22 1509   Post-Procedure Length (cm) 2.2 cm 07/26/22 1538   Post-Procedure Width (cm) 2.1 cm 07/26/22 1538   Post-Procedure Depth (cm) 0.1 cm 07/26/22 1538   Post-Procedure Surface Area (cm^2) 4.62 cm^2 07/26/22 1538   Post-Procedure Volume (cm^3) 0.462 cm^3 07/26/22 1538   Distance Tunneling (cm) 0 cm 07/26/22 1509   Tunneling Position ___ O'Clock 0 07/26/22 1509   Undermining Starts ___ O'Clock 0 07/26/22 1509   Undermining Ends___ O'Clock 0 07/26/22 1509   Undermining Maxium Distance (cm) 0 07/26/22 1509   Wound Assessment Slough 07/26/22 1509   Drainage Amount Moderate 07/26/22 1509   Drainage Description Yellow;Serous 07/26/22 1509   Odor None 07/26/22 1509   Lauren-wound Assessment Hyperpigmented 07/26/22 1509   Margins Defined edges 07/26/22 1509   Wound Thickness Description not for Pressure Injury Full thickness 07/26/22 1509   Number of days: 0       Percent of Wound(s) Debrided: approximately 80%    Total  Area  Debrided:  4.62 sq cm     Bleeding:  None    Hemostasis Achieved:  not needed    Procedural Pain:  0  / 10     Post Procedural Pain:  0 / 10     Response to treatment:  Well tolerated by patient. Status of wound progress and description from last visit:   stable. Plan:       Discharge Instructions         PHYSICIAN ORDERS AND DISCHARGE INSTRUCTIONS    NOTE: Upon discharge from the [address], you will receive a patient experience survey.  We would be grateful if you would take the time to fill this survey out. Wound care order history:     MILAGRO's   Right       Left    Date:   Cultures:  Bone Fragment sent to pathology 7/26/22   Grafts:     Antibiotics:        Continuing wound care orders and information:                Residence:                Continue home health care with: [facility]   Your wound-care supplies will be provided by: Wound cleansing:     Do not scrub or use excessive force. Wash hands with soap and water before and after dressing changes. Prior to applying a clean dressing, cleanse wound with normal saline, wound cleanser, or mild soap and water. Ask the physician or nurse before getting the wound(s) wet in a shower    Daily Wound management:   Keep weight off wounds and reposition every 2 hours. Avoid standing for long periods of time. Apply wraps/stockings in AM and remove at bedtime. If swelling is present, elevate legs to the level of the heart or above for 30 minutes 4-5 times a day and/or when sitting. When taking antibiotics take entire prescription as ordered by physician do not stop taking until medicine is all gone. Orders for this week: 7/26/22       FAX ORDERS TO [address] [place] [address]! Rx:     Left Great Toe - Wash with soap and water, pat dry. Apply betadine damp gauze to wound bed. Cover with dry dressing (ABD) and secure with conform and tape. Give surgical shoe today in clinic 7/26/22      Follow up with Dr Thad Red in 1 week in the wound care center. Call [phone] for any questions or concerns. Date__________   Time____________          Treatment Note Wound 07/26/22 Toe (Comment  which one) Anterior; Left #1 great toe-Dressing/Treatment:  (Beatdine, ABD, Conform)    Written Patient Dismissal Instructions Given     -Patient was seen, evaluated, and treated today.    present for entirety of examination.  -Conservative and surgical treatment options discussed in detail with him today.  -I saw this patient last week in my office with chronic nonhealing wound to the great toe of the left foot  -They did not schedule arterial studies or MRI that I asked them to get. -Placed today for arterial studies and MRI of the left foot  -Clinically no changes in appearance of the wound from previous. Monitor antibiotics at this time.  -There was a piece of distal phalanx of the hallux that was noted during the debridement of the subcutaneous tissue. This was obtained and sent to pathology for further evaluation.   This is a separate medically necessary procedure to determine underlying pathology and guide treatment going forward.  -Stressed importance of getting imaging MRI and vascular studies to help with guidance of wound healing to the left great toe  -Discussed with them that she is high risk for amputation of the digit given the nature of the wound and the chronicity of it as well as the piece of exposed bone.  -Any signs of infection Before seeing the patient again, which I went over with the patient, Instructed him to go the emergency room for further evaluation  -Follow-up 1 week for recheck sooner if needed         Electronically signed by Gil Rutledge DPM on 7/27/2022 at 12:10 PM

## 2022-08-02 ENCOUNTER — HOSPITAL ENCOUNTER (OUTPATIENT)
Dept: WOUND CARE | Age: 67
Discharge: HOME | DRG: 854 | End: 2022-08-02
Payer: MEDICARE

## 2022-08-02 VITALS
DIASTOLIC BLOOD PRESSURE: 76 MMHG | TEMPERATURE: 97.8 F | HEART RATE: 104 BPM | SYSTOLIC BLOOD PRESSURE: 148 MMHG | RESPIRATION RATE: 18 BRPM

## 2022-08-02 DIAGNOSIS — Z87.891 HISTORY OF NICOTINE DEPENDENCE: ICD-10-CM

## 2022-08-02 DIAGNOSIS — I73.9 PERIPHERAL VASCULAR DISEASE (HCC): ICD-10-CM

## 2022-08-02 DIAGNOSIS — I70.229 REST PAIN OF LOWER EXTREMITY DUE TO ATHEROSCLEROSIS (HCC): ICD-10-CM

## 2022-08-02 DIAGNOSIS — L97.522 DIABETIC ULCER OF TOE OF LEFT FOOT ASSOCIATED WITH TYPE 2 DIABETES MELLITUS, WITH FAT LAYER EXPOSED (HCC): Primary | ICD-10-CM

## 2022-08-02 DIAGNOSIS — E11.42 TYPE 2 DIABETES MELLITUS WITH PERIPHERAL NEUROPATHY (HCC): ICD-10-CM

## 2022-08-02 DIAGNOSIS — E11.621 DIABETIC ULCER OF TOE OF LEFT FOOT ASSOCIATED WITH TYPE 2 DIABETES MELLITUS, WITH FAT LAYER EXPOSED (HCC): Primary | ICD-10-CM

## 2022-08-02 PROCEDURE — 11042 DBRDMT SUBQ TIS 1ST 20SQCM/<: CPT

## 2022-08-02 RX ORDER — GENTAMICIN SULFATE 1 MG/G
OINTMENT TOPICAL ONCE
Status: CANCELLED | OUTPATIENT
Start: 2022-08-02 | End: 2022-08-02

## 2022-08-02 RX ORDER — HYDROCODONE BITARTRATE AND ACETAMINOPHEN 5; 325 MG/1; MG/1
1 TABLET ORAL EVERY 4 HOURS PRN
Qty: 10 TABLET | Refills: 0 | Status: ON HOLD | OUTPATIENT
Start: 2022-08-02 | End: 2022-08-11

## 2022-08-02 RX ORDER — CLOBETASOL PROPIONATE 0.5 MG/G
OINTMENT TOPICAL ONCE
Status: CANCELLED | OUTPATIENT
Start: 2022-08-02 | End: 2022-08-02

## 2022-08-02 RX ORDER — LIDOCAINE HYDROCHLORIDE 40 MG/ML
SOLUTION TOPICAL ONCE
Status: CANCELLED | OUTPATIENT
Start: 2022-08-02 | End: 2022-08-02

## 2022-08-02 RX ORDER — BACITRACIN ZINC AND POLYMYXIN B SULFATE 500; 1000 [USP'U]/G; [USP'U]/G
OINTMENT TOPICAL ONCE
Status: CANCELLED | OUTPATIENT
Start: 2022-08-02 | End: 2022-08-02

## 2022-08-02 RX ORDER — LIDOCAINE 40 MG/G
CREAM TOPICAL ONCE
Status: CANCELLED | OUTPATIENT
Start: 2022-08-02 | End: 2022-08-02

## 2022-08-02 RX ORDER — LIDOCAINE HYDROCHLORIDE 20 MG/ML
JELLY TOPICAL ONCE
Status: CANCELLED | OUTPATIENT
Start: 2022-08-02 | End: 2022-08-02

## 2022-08-02 RX ORDER — BETAMETHASONE DIPROPIONATE 0.05 %
OINTMENT (GRAM) TOPICAL ONCE
Status: CANCELLED | OUTPATIENT
Start: 2022-08-02 | End: 2022-08-02

## 2022-08-02 RX ORDER — BACITRACIN, NEOMYCIN, POLYMYXIN B 400; 3.5; 5 [USP'U]/G; MG/G; [USP'U]/G
OINTMENT TOPICAL ONCE
Status: CANCELLED | OUTPATIENT
Start: 2022-08-02 | End: 2022-08-02

## 2022-08-02 RX ORDER — LIDOCAINE 50 MG/G
OINTMENT TOPICAL ONCE
Status: CANCELLED | OUTPATIENT
Start: 2022-08-02 | End: 2022-08-02

## 2022-08-02 RX ORDER — GINSENG 100 MG
CAPSULE ORAL ONCE
Status: CANCELLED | OUTPATIENT
Start: 2022-08-02 | End: 2022-08-02

## 2022-08-02 NOTE — PROGRESS NOTES
Wound Care Center Progress Note With Procedure    Shannan Soliman  AGE: 61 y.o. GENDER: female  : 1955  EPISODE DATE:  2022     Subjective:     Chief Complaint   Patient presents with    Wound Check     Left toe           HISTORY of PRESENT ILLNESS      Shannan Soliman is a 61 y.o. female who presents today for Follow-up of wound to the great toe the left foot. She is getting some worsening pain to the left great toe today. Getting some redness to the area as well. Did not get arterial studies but was called about scheduling MRI. PAST MEDICAL HISTORY        Diagnosis Date    Cancer ([facility]     Diabetes mellitus ([facility] [address]     Hyperlipidemia     Hypertension        PAST SURGICAL HISTORY    Past Surgical History:   Procedure Laterality Date    LUNG CANCER SURGERY Left 10/2019    UPPER GASTROINTESTINAL ENDOSCOPY N/A 10/3/2020    EGD BIOPSY performed by Yoselin Ro MD at [facility]    History reviewed. No pertinent family history. SOCIAL HISTORY    Social History     Tobacco Use    Smoking status: Former     Packs/day: 1.00     Types: Cigarettes    Smokeless tobacco: Never    Tobacco comments:     10/2019   Substance Use Topics    Alcohol use: Yes     Alcohol/week: 1.0 standard drink     Types: 1 Cans of beer per week    Drug use: Yes     Types: Marijuana (Weed)       ALLERGIES    No Known Allergies    MEDICATIONS    Current Outpatient Medications on File Prior to Encounter   Medication Sig Dispense Refill    pantoprazole (PROTONIX) 40 MG tablet Take 1 tablet by mouth daily 30 tablet 0    cilostazol (PLETAL) 100 MG tablet Take 100 mg by mouth 2 times daily      metFORMIN (GLUCOPHAGE) 500 MG tablet Take 500 mg by mouth 2 times daily. lisinopril (PRINIVIL;ZESTRIL) 5 MG tablet Take 15 mg by mouth daily. cloNIDine (CATAPRES) 0.1 MG tablet Take 0.1 mg by mouth 2 times daily.       simvastatin (ZOCOR) 20 MG tablet Take 40 mg by mouth nightly        No current facility-administered medications on file prior to encounter. REVIEW OF SYSTEMS    Pertinent items are noted in HPI. Constitutional: Negative for systemic symptoms including fever, chills and malaise. Objective:      BP (!) 148/76   Pulse (!) 104   Temp 97.8 °F (36.6 °C) (Temporal)   Resp 18     PHYSICAL EXAM    General: The patient is in no acute distress. Mental status:  Patient is appropriate, is  oriented to place and plan of care. Dermatologic exam: Visual inspection of the periwound reveals the skin to be moist  Wound exam: see wound description below in procedure note      Assessment:     Problem List Items Addressed This Visit          Circulatory    Peripheral vascular disease ([address]    Relevant Orders    Initiate Outpatient Wound Care Protocol       Endocrine    Diabetic ulcer of toe of left foot associated with type 2 diabetes mellitus, with fat layer exposed ([address] - Primary    Relevant Medications    HYDROcodone-acetaminophen ([facility]) 5-325 MG per tablet    Other Relevant Orders    Initiate Outpatient Wound Care Protocol    Type 2 diabetes mellitus with peripheral neuropathy ([address]    Relevant Orders    Initiate Outpatient Wound Care Protocol       Other    History of nicotine dependence    Relevant Orders    Initiate Outpatient Wound Care Protocol     Other Visit Diagnoses       Rest pain of lower extremity due to atherosclerosis ([address]              Procedure Note    Indications:  Based on my examination of this patient's wound(s) today, sharp excision into necrotic subcutaneous tissue is required to promote healing and evaluate the extent of previous healing.     Performed by: Viky Sheldon DPM    Consent obtained: Yes    Time out taken:  Yes    Pain Control: lidocaine      Debridement:Excisional Debridement    Using #15 blade scalpel the wound(s) was/were sharply debrided down through and including the removal of bone        Devitalized Tissue Debrided:  necrotic/eschar    Pre Debridement Measurements:  Are located in the Wound Documentation Flow Sheet    All active wounds listed below with today's date are evaluated  Wound(s)    debrided this date include # : 1     Wound 07/26/22 Toe (Comment  which one) Anterior; Left #1 great toe (Active)   Wound Image   08/02/22 1506   Wound Etiology Diabetic 08/02/22 1506   Dressing Status New dressing applied 07/26/22 1550   Wound Cleansed Wound cleanser 08/02/22 1506   Offloading for Diabetic Foot Ulcers Offloading not required 08/02/22 1506   Wound Length (cm) 2 cm 08/02/22 1506   Wound Width (cm) 2.5 cm 08/02/22 1506   Wound Depth (cm) 0.1 cm 08/02/22 1506   Wound Surface Area (cm^2) 5 cm^2 08/02/22 1506   Change in Wound Size % (l*w) -8.23 08/02/22 1506   Wound Volume (cm^3) 0.5 cm^3 08/02/22 1506   Wound Healing % -8 08/02/22 1506   Post-Procedure Length (cm) 2 cm 08/02/22 1528   Post-Procedure Width (cm) 2.5 cm 08/02/22 1528   Post-Procedure Depth (cm) 0.1 cm 08/02/22 1528   Post-Procedure Surface Area (cm^2) 5 cm^2 08/02/22 1528   Post-Procedure Volume (cm^3) 0.5 cm^3 08/02/22 1528   Distance Tunneling (cm) 0 cm 08/02/22 1506   Tunneling Position ___ O'Clock 0 08/02/22 1506   Undermining Starts ___ O'Clock 0 08/02/22 1506   Undermining Ends___ O'Clock 0 08/02/22 1506   Undermining Maxium Distance (cm) 0 08/02/22 1506   Wound Assessment Slough 08/02/22 1506   Drainage Amount Moderate 08/02/22 1506   Drainage Description Yellow;Serous 08/02/22 1506   Odor None 08/02/22 1506   Lauren-wound Assessment Hyperpigmented 08/02/22 1506   Margins Defined edges 08/02/22 1506   Wound Thickness Description not for Pressure Injury Full thickness 08/02/22 1506   Number of days: 7           Percent of Wound(s) Debrided: approximately 80%    Total  Area  Debrided:  5 sq cm     Bleeding:  None    Hemostasis Achieved:  not needed    Procedural Pain:  0  / 10     Post Procedural Pain:  0 / 10     Response to treatment:  Well tolerated by patient.      Status of wound progress and description from last visit:   stable. Plan:       Discharge Instructions         PHYSICIAN ORDERS AND DISCHARGE INSTRUCTIONS     NOTE: Upon discharge from the [address], you will receive a patient experience survey. We would be grateful if you would take the time to fill this survey out. Wound care order history:                 MILAGRO's   Right       Left               Date:              Cultures:  Bone Fragment sent to pathology 7/26/22              Grafts:                Antibiotics:           Continuing wound care orders and information:                 Residence:                Continue home health care with: [facility]              Your wound-care supplies will be provided by: Wound cleansing:              Do not scrub or use excessive force. Wash hands with soap and water before and after dressing changes. Prior to applying a clean dressing, cleanse wound with normal saline, wound cleanser, or mild soap and water. Ask the physician or nurse before getting the wound(s) wet in a shower     Daily Wound management:              Keep weight off wounds and reposition every 2 hours. Avoid standing for long periods of time. Apply wraps/stockings in AM and remove at bedtime. If swelling is present, elevate legs to the level of the heart or above for 30 minutes 4-5 times a day and/or when sitting. When taking antibiotics take entire prescription as ordered by physician do not stop taking until medicine is all gone. Orders for this week: 8/2/22                  FAX ORDERS TO [address] [place] [address]! Rx:     Left Great Toe - Wash with soap and water, pat dry. Apply betadine damp gauze to wound bed. Cover with dry dressing (ABD) and secure with conform and tape.      Recommended to go to the ER within the next 24 hours to start IV atb and workup for partial amputation of Left Great Toe. Gave surgical shoe in clinic 7/26/22        Follow up with Dr Radha Bojorquez in 1 week in the wound care center. Call [phone] for any questions or concerns. Date__________   Time____________        Treatment Note Wound 07/26/22 Toe (Comment  which one) Anterior; Left #1 great toe-Dressing/Treatment:  (betadine moist gauze, dry 4x4 guze conform tape)    Written Patient Dismissal Instructions Given     -Patient was seen, evaluated, and treated today.  present for entirety of examination.  -Conservative and surgical treatment options discussed in detail with him today. -Pathology that was sent on last visit of the distal phalanx was positive for acute osteomyelitis.  -Discussed plan options with patient moving forward at this time. -Given increasing redness and pain to the left great toe and in the setting of bone infection, I recommended that the patient go to the hospital for IV antibiotics and work-up for amputation of the left great toe. -Recommend she get arterial studies and MRI of the left foot as well for surgical planning.  -Recommend she go to the hospital within the next 24 hours.   -Betadine wet-to-dry dressing applied to the hallux today.  -I will see patient once consulted for her in the hospital.         Electronically signed by Maria C Be DPM on 8/2/2022 at 4:19 PM

## 2022-08-02 NOTE — DISCHARGE INSTRUCTIONS
PHYSICIAN ORDERS AND DISCHARGE INSTRUCTIONS     NOTE: Upon discharge from the [address], you will receive a patient experience survey. We would be grateful if you would take the time to fill this survey out. Wound care order history:                 MILAGRO's   Right       Left               Date:              Cultures:  Bone Fragment sent to pathology 7/26/22              Grafts:                Antibiotics:           Continuing wound care orders and information:                 Residence:                Continue home health care with: [facility]              Your wound-care supplies will be provided by: Wound cleansing:              Do not scrub or use excessive force. Wash hands with soap and water before and after dressing changes. Prior to applying a clean dressing, cleanse wound with normal saline, wound cleanser, or mild soap and water. Ask the physician or nurse before getting the wound(s) wet in a shower     Daily Wound management:              Keep weight off wounds and reposition every 2 hours. Avoid standing for long periods of time. Apply wraps/stockings in AM and remove at bedtime. If swelling is present, elevate legs to the level of the heart or above for 30 minutes 4-5 times a day and/or when sitting. When taking antibiotics take entire prescription as ordered by physician do not stop taking until medicine is all gone. Orders for this week: 8/2/22                  FAX ORDERS TO [address] [place] [address]! Rx:     Left Great Toe - Wash with soap and water, pat dry. Apply betadine damp gauze to wound bed. Cover with dry dressing (ABD) and secure with conform and tape. Recommended to go to the ER within the next 24 hours to start IV atb and workup for partial amputation of Left Great Toe.       Gave surgical shoe in clinic 7/26/22        Follow up with Dr Mahsa Odonnell in 1 week in the wound care center. Call [phone] for any questions or concerns.   Date__________   Time____________

## 2022-08-03 ENCOUNTER — APPOINTMENT (OUTPATIENT)
Dept: GENERAL RADIOLOGY | Age: 67
DRG: 854 | End: 2022-08-03
Payer: MEDICARE

## 2022-08-03 ENCOUNTER — APPOINTMENT (OUTPATIENT)
Dept: ULTRASOUND IMAGING | Age: 67
DRG: 854 | End: 2022-08-03
Payer: MEDICARE

## 2022-08-03 ENCOUNTER — HOSPITAL ENCOUNTER (INPATIENT)
Age: 67
LOS: 8 days | Discharge: HOME | DRG: 854 | End: 2022-08-11
Attending: EMERGENCY MEDICINE | Admitting: STUDENT IN AN ORGANIZED HEALTH CARE EDUCATION/TRAINING PROGRAM
Payer: MEDICARE

## 2022-08-03 DIAGNOSIS — E11.621 DIABETIC ULCER OF TOE OF LEFT FOOT ASSOCIATED WITH TYPE 2 DIABETES MELLITUS, WITH FAT LAYER EXPOSED (HCC): ICD-10-CM

## 2022-08-03 DIAGNOSIS — E11.628 DIABETIC FOOT INFECTION (HCC): ICD-10-CM

## 2022-08-03 DIAGNOSIS — L08.9 DIABETIC FOOT INFECTION (HCC): ICD-10-CM

## 2022-08-03 DIAGNOSIS — A41.9 SEPSIS, DUE TO UNSPECIFIED ORGANISM, UNSPECIFIED WHETHER ACUTE ORGAN DYSFUNCTION PRESENT (HCC): Primary | ICD-10-CM

## 2022-08-03 DIAGNOSIS — L97.522 DIABETIC ULCER OF TOE OF LEFT FOOT ASSOCIATED WITH TYPE 2 DIABETES MELLITUS, WITH FAT LAYER EXPOSED (HCC): ICD-10-CM

## 2022-08-03 DIAGNOSIS — M86.8X7 OTHER OSTEOMYELITIS OF LEFT FOOT (HCC): ICD-10-CM

## 2022-08-03 PROBLEM — R65.20 SEVERE SEPSIS (HCC): Status: ACTIVE | Noted: 2022-01-01

## 2022-08-03 LAB
ALBUMIN SERPL-MCNC: 4.1 GM/DL (ref 3.4–5)
ALP BLD-CCNC: 85 IU/L (ref 40–129)
ALT SERPL-CCNC: 8 U/L (ref 10–40)
ANION GAP SERPL CALCULATED.3IONS-SCNC: 18 MMOL/L (ref 4–16)
AST SERPL-CCNC: 19 IU/L (ref 15–37)
BASOPHILS ABSOLUTE: 0.1 K/CU MM
BASOPHILS RELATIVE PERCENT: 0.4 % (ref 0–1)
BILIRUB SERPL-MCNC: 0.3 MG/DL (ref 0–1)
BUN BLDV-MCNC: 10 MG/DL (ref 6–23)
CALCIUM SERPL-MCNC: 8.7 MG/DL (ref 8.3–10.6)
CHLORIDE BLD-SCNC: 103 MMOL/L (ref 99–110)
CO2: 19 MMOL/L (ref 21–32)
CREAT SERPL-MCNC: 0.5 MG/DL (ref 0.6–1.1)
DIFFERENTIAL TYPE: ABNORMAL
EOSINOPHILS ABSOLUTE: 0.1 K/CU MM
EOSINOPHILS RELATIVE PERCENT: 0.4 % (ref 0–3)
GFR AFRICAN AMERICAN: >60 ML/MIN/1.73M2
GFR NON-AFRICAN AMERICAN: >60 ML/MIN/1.73M2
GLUCOSE BLD-MCNC: 258 MG/DL (ref 70–99)
HCT VFR BLD CALC: 36.6 % (ref 37–47)
HEMOGLOBIN: 12.1 GM/DL (ref 12.5–16)
IMMATURE NEUTROPHIL %: 0.4 % (ref 0–0.43)
LACTIC ACID, SEPSIS: 3.9 MMOL/L (ref 0.5–1.9)
LACTIC ACID, SEPSIS: 6 MMOL/L (ref 0.5–1.9)
LYMPHOCYTES ABSOLUTE: 3 K/CU MM
LYMPHOCYTES RELATIVE PERCENT: 21.9 % (ref 24–44)
MAGNESIUM: 1.1 MG/DL (ref 1.8–2.4)
MCH RBC QN AUTO: 31.8 PG (ref 27–31)
MCHC RBC AUTO-ENTMCNC: 33.1 % (ref 32–36)
MCV RBC AUTO: 96.1 FL (ref 78–100)
MONOCYTES ABSOLUTE: 0.8 K/CU MM
MONOCYTES RELATIVE PERCENT: 6.1 % (ref 0–4)
NUCLEATED RBC %: 0 %
PDW BLD-RTO: 12.3 % (ref 11.7–14.9)
PLATELET # BLD: 395 K/CU MM (ref 140–440)
PMV BLD AUTO: 9.2 FL (ref 7.5–11.1)
POTASSIUM SERPL-SCNC: 3.3 MMOL/L (ref 3.5–5.1)
RBC # BLD: 3.81 M/CU MM (ref 4.2–5.4)
SEGMENTED NEUTROPHILS ABSOLUTE COUNT: 9.8 K/CU MM
SEGMENTED NEUTROPHILS RELATIVE PERCENT: 70.8 % (ref 36–66)
SODIUM BLD-SCNC: 140 MMOL/L (ref 135–145)
TOTAL IMMATURE NEUTOROPHIL: 0.05 K/CU MM
TOTAL NUCLEATED RBC: 0 K/CU MM
TOTAL PROTEIN: 6.5 GM/DL (ref 6.4–8.2)
WBC # BLD: 13.8 K/CU MM (ref 4–10.5)

## 2022-08-03 PROCEDURE — 6360000002 HC RX W HCPCS: Performed by: EMERGENCY MEDICINE

## 2022-08-03 PROCEDURE — 85025 COMPLETE CBC W/AUTO DIFF WBC: CPT

## 2022-08-03 PROCEDURE — 93926 LOWER EXTREMITY STUDY: CPT

## 2022-08-03 PROCEDURE — 87040 BLOOD CULTURE FOR BACTERIA: CPT

## 2022-08-03 PROCEDURE — 36415 COLL VENOUS BLD VENIPUNCTURE: CPT

## 2022-08-03 PROCEDURE — 99285 EMERGENCY DEPT VISIT HI MDM: CPT

## 2022-08-03 PROCEDURE — 83036 HEMOGLOBIN GLYCOSYLATED A1C: CPT

## 2022-08-03 PROCEDURE — 80053 COMPREHEN METABOLIC PANEL: CPT

## 2022-08-03 PROCEDURE — 73660 X-RAY EXAM OF TOE(S): CPT

## 2022-08-03 PROCEDURE — 96365 THER/PROPH/DIAG IV INF INIT: CPT

## 2022-08-03 PROCEDURE — 83605 ASSAY OF LACTIC ACID: CPT

## 2022-08-03 PROCEDURE — 2580000003 HC RX 258: Performed by: EMERGENCY MEDICINE

## 2022-08-03 PROCEDURE — 1200000000 HC SEMI PRIVATE

## 2022-08-03 PROCEDURE — 83735 ASSAY OF MAGNESIUM: CPT

## 2022-08-03 PROCEDURE — 6370000000 HC RX 637 (ALT 250 FOR IP): Performed by: EMERGENCY MEDICINE

## 2022-08-03 PROCEDURE — 96367 TX/PROPH/DG ADDL SEQ IV INF: CPT

## 2022-08-03 RX ORDER — SODIUM CHLORIDE 0.9 % (FLUSH) 0.9 %
5-40 SYRINGE (ML) INJECTION EVERY 12 HOURS SCHEDULED
Status: DISCONTINUED | OUTPATIENT
Start: 2022-08-03 | End: 2022-08-03

## 2022-08-03 RX ORDER — DEXTROSE MONOHYDRATE 100 MG/ML
INJECTION, SOLUTION INTRAVENOUS CONTINUOUS PRN
Status: DISCONTINUED | OUTPATIENT
Start: 2022-08-03 | End: 2022-08-11 | Stop reason: HOSPADM

## 2022-08-03 RX ORDER — SODIUM CHLORIDE 9 MG/ML
INJECTION, SOLUTION INTRAVENOUS CONTINUOUS
Status: DISCONTINUED | OUTPATIENT
Start: 2022-08-03 | End: 2022-08-04

## 2022-08-03 RX ORDER — SODIUM CHLORIDE 9 MG/ML
INJECTION, SOLUTION INTRAVENOUS PRN
Status: DISCONTINUED | OUTPATIENT
Start: 2022-08-03 | End: 2022-08-11 | Stop reason: HOSPADM

## 2022-08-03 RX ORDER — ATORVASTATIN CALCIUM 10 MG/1
10 TABLET, FILM COATED ORAL DAILY
Status: DISCONTINUED | OUTPATIENT
Start: 2022-08-04 | End: 2022-08-08

## 2022-08-03 RX ORDER — LISINOPRIL 5 MG/1
15 TABLET ORAL DAILY
Status: DISCONTINUED | OUTPATIENT
Start: 2022-08-04 | End: 2022-08-07

## 2022-08-03 RX ORDER — ACETAMINOPHEN 650 MG/1
650 SUPPOSITORY RECTAL EVERY 6 HOURS PRN
Status: DISCONTINUED | OUTPATIENT
Start: 2022-08-03 | End: 2022-08-05

## 2022-08-03 RX ORDER — SODIUM CHLORIDE 9 MG/ML
INJECTION, SOLUTION INTRAVENOUS PRN
Status: DISCONTINUED | OUTPATIENT
Start: 2022-08-03 | End: 2022-08-03

## 2022-08-03 RX ORDER — ENOXAPARIN SODIUM 100 MG/ML
40 INJECTION SUBCUTANEOUS DAILY
Status: DISCONTINUED | OUTPATIENT
Start: 2022-08-04 | End: 2022-08-08

## 2022-08-03 RX ORDER — INSULIN LISPRO 100 [IU]/ML
0-8 INJECTION, SOLUTION INTRAVENOUS; SUBCUTANEOUS
Status: DISCONTINUED | OUTPATIENT
Start: 2022-08-04 | End: 2022-08-11 | Stop reason: HOSPADM

## 2022-08-03 RX ORDER — GABAPENTIN 300 MG/1
300 CAPSULE ORAL NIGHTLY
COMMUNITY
Start: 2022-03-15

## 2022-08-03 RX ORDER — SODIUM CHLORIDE 0.9 % (FLUSH) 0.9 %
5-40 SYRINGE (ML) INJECTION PRN
Status: DISCONTINUED | OUTPATIENT
Start: 2022-08-03 | End: 2022-08-03

## 2022-08-03 RX ORDER — INSULIN LISPRO 100 [IU]/ML
0-4 INJECTION, SOLUTION INTRAVENOUS; SUBCUTANEOUS NIGHTLY
Status: DISCONTINUED | OUTPATIENT
Start: 2022-08-04 | End: 2022-08-11 | Stop reason: HOSPADM

## 2022-08-03 RX ORDER — HYDROCODONE BITARTRATE AND ACETAMINOPHEN 5; 325 MG/1; MG/1
1 TABLET ORAL ONCE
Status: COMPLETED | OUTPATIENT
Start: 2022-08-03 | End: 2022-08-03

## 2022-08-03 RX ORDER — ACETAMINOPHEN 325 MG/1
650 TABLET ORAL EVERY 6 HOURS PRN
Status: DISCONTINUED | OUTPATIENT
Start: 2022-08-03 | End: 2022-08-05

## 2022-08-03 RX ORDER — CILOSTAZOL 100 MG/1
100 TABLET ORAL 2 TIMES DAILY
Status: DISCONTINUED | OUTPATIENT
Start: 2022-08-04 | End: 2022-08-11 | Stop reason: HOSPADM

## 2022-08-03 RX ORDER — ONDANSETRON 4 MG/1
4 TABLET, ORALLY DISINTEGRATING ORAL EVERY 8 HOURS PRN
Status: DISCONTINUED | OUTPATIENT
Start: 2022-08-03 | End: 2022-08-11 | Stop reason: HOSPADM

## 2022-08-03 RX ORDER — SODIUM CHLORIDE 0.9 % (FLUSH) 0.9 %
5-40 SYRINGE (ML) INJECTION PRN
Status: DISCONTINUED | OUTPATIENT
Start: 2022-08-03 | End: 2022-08-11 | Stop reason: HOSPADM

## 2022-08-03 RX ORDER — HYDROCODONE BITARTRATE AND ACETAMINOPHEN 5; 325 MG/1; MG/1
1 TABLET ORAL EVERY 4 HOURS PRN
Status: DISCONTINUED | OUTPATIENT
Start: 2022-08-03 | End: 2022-08-05

## 2022-08-03 RX ORDER — ONDANSETRON 2 MG/ML
4 INJECTION INTRAMUSCULAR; INTRAVENOUS EVERY 6 HOURS PRN
Status: DISCONTINUED | OUTPATIENT
Start: 2022-08-03 | End: 2022-08-11 | Stop reason: HOSPADM

## 2022-08-03 RX ORDER — SODIUM CHLORIDE 0.9 % (FLUSH) 0.9 %
5-40 SYRINGE (ML) INJECTION 2 TIMES DAILY
Status: DISCONTINUED | OUTPATIENT
Start: 2022-08-04 | End: 2022-08-11 | Stop reason: HOSPADM

## 2022-08-03 RX ORDER — POLYETHYLENE GLYCOL 3350 17 G/17G
17 POWDER, FOR SOLUTION ORAL DAILY PRN
Status: DISCONTINUED | OUTPATIENT
Start: 2022-08-03 | End: 2022-08-11 | Stop reason: HOSPADM

## 2022-08-03 RX ORDER — GABAPENTIN 300 MG/1
300 CAPSULE ORAL NIGHTLY
Status: DISCONTINUED | OUTPATIENT
Start: 2022-08-04 | End: 2022-08-11 | Stop reason: HOSPADM

## 2022-08-03 RX ORDER — PANTOPRAZOLE SODIUM 40 MG/1
40 TABLET, DELAYED RELEASE ORAL DAILY
Status: DISCONTINUED | OUTPATIENT
Start: 2022-08-04 | End: 2022-08-11 | Stop reason: HOSPADM

## 2022-08-03 RX ORDER — CLONIDINE HYDROCHLORIDE 0.1 MG/1
0.1 TABLET ORAL 2 TIMES DAILY
Status: DISCONTINUED | OUTPATIENT
Start: 2022-08-04 | End: 2022-08-07

## 2022-08-03 RX ORDER — GLIMEPIRIDE 2 MG/1
1 TABLET ORAL DAILY
COMMUNITY
Start: 2022-07-19

## 2022-08-03 RX ORDER — 0.9 % SODIUM CHLORIDE 0.9 %
30 INTRAVENOUS SOLUTION INTRAVENOUS ONCE
Status: COMPLETED | OUTPATIENT
Start: 2022-08-03 | End: 2022-08-04

## 2022-08-03 RX ADMIN — HYDROCODONE BITARTRATE AND ACETAMINOPHEN 1 TABLET: 5; 325 TABLET ORAL at 18:17

## 2022-08-03 RX ADMIN — SODIUM CHLORIDE 1659 ML: 0.9 INJECTION, SOLUTION INTRAVENOUS at 20:02

## 2022-08-03 RX ADMIN — PIPERACILLIN AND TAZOBACTAM 4500 MG: 4; .5 INJECTION, POWDER, FOR SOLUTION INTRAVENOUS at 19:29

## 2022-08-03 RX ADMIN — VANCOMYCIN HYDROCHLORIDE 1000 MG: 1 INJECTION, POWDER, LYOPHILIZED, FOR SOLUTION INTRAVENOUS at 18:20

## 2022-08-03 ASSESSMENT — PAIN DESCRIPTION - DESCRIPTORS
DESCRIPTORS: ACHING
DESCRIPTORS: ACHING

## 2022-08-03 ASSESSMENT — ENCOUNTER SYMPTOMS
COUGH: 0
NAUSEA: 0
DIARRHEA: 0
SHORTNESS OF BREATH: 0
EYE REDNESS: 0
VOMITING: 0

## 2022-08-03 ASSESSMENT — PAIN DESCRIPTION - FREQUENCY: FREQUENCY: CONTINUOUS

## 2022-08-03 ASSESSMENT — PAIN DESCRIPTION - LOCATION
LOCATION: TOE (COMMENT WHICH ONE)

## 2022-08-03 ASSESSMENT — PAIN DESCRIPTION - ORIENTATION
ORIENTATION: LEFT

## 2022-08-03 ASSESSMENT — PAIN DESCRIPTION - PAIN TYPE: TYPE: ACUTE PAIN

## 2022-08-03 ASSESSMENT — PAIN SCALES - GENERAL
PAINLEVEL_OUTOF10: 8
PAINLEVEL_OUTOF10: 10
PAINLEVEL_OUTOF10: 7

## 2022-08-03 ASSESSMENT — LIFESTYLE VARIABLES: HOW OFTEN DO YOU HAVE A DRINK CONTAINING ALCOHOL: NEVER

## 2022-08-03 NOTE — ED PROVIDER NOTES
EMERGENCY DEPARTMENT ENCOUNTER    Patient: Cosme Daniels  MRN: [id number]  : 1955  Date of Evaluation: 8/3/2022  ED Provider:  Theron Pichardo MD    CHIEF COMPLAINT  Chief Complaint   Patient presents with    Toe Injury     Injured by pediatrist approx 4 weeks ago. Been getting worse since and states it is now infected     Wound Check       HPI  Cosme Daniels is a 61 y.o. female who presents with complaints of left great toe he and redness and swelling which has been present for about a month after she allegedly had her nails trimmed by a podiatrist at a local [place] facility. Symptoms are moderate to severe and constant. Pain worsened with palpation. She has also developed some necrosis at the tip of her left great toe. She states that she is seeing a different podiatrist now, Dr. Justyna Merrill, and was seen by him yesterday. She is reporting that the podiatrist has advised that she presented to the emergency department for admission for IV antibiotics and amputation of the left great toe. She is not currently on antibiotics. She denies fever. Denies any other associated symptoms or complaints or concerns.       REVIEW OF SYSTEMS    Constitutional: negative for fever, chills  Neurological: negative for HA, focal weakness, loss of sensation  Ophthalmic: negative for vision change  ENT: negative for congestion, rhinorrhea  Cardiovascular: negative for chest pain  Respiratory: negative for SOB, cough  GI: negative for abdominal pain, nausea, vomiting, diarrhea, constipation  : negative for dysuria, hematuria  Musculoskeletal: As in HPI  Dermatological: negative for itching  Heme: Negative for bleeding, bruising      PAST MEDICAL HISTORY  Past Medical History:   Diagnosis Date    Cancer ([facility]     Diabetes mellitus ([facility]     Hyperlipidemia     Hypertension        CURRENT MEDICATIONS  [unfilled]    ALLERGIES  No Known Allergies    SURGICAL HISTORY  Past Surgical History:   Procedure Laterality Date    LUNG CANCER SURGERY Left 10/2019    UPPER GASTROINTESTINAL ENDOSCOPY N/A 10/3/2020    EGD BIOPSY performed by Tony Perez MD at [facility]  History reviewed. No pertinent family history. SOCIAL HISTORY  Social History     Socioeconomic History    Marital status:      Spouse name: None    Number of children: None    Years of education: None    Highest education level: None   Tobacco Use    Smoking status: Former     Packs/day: 1.00     Types: Cigarettes    Smokeless tobacco: Never    Tobacco comments:     10/2019   Substance and Sexual Activity    Alcohol use: Yes     Alcohol/week: 1.0 standard drink     Types: 1 Cans of beer per week    Drug use: Yes     Types: Marijuana Nya Hailey)    Sexual activity: Yes     Partners: Male         **Past medical, family and social histories, and nursing notes reviewed and verified by me**      PHYSICAL EXAM  VITAL SIGNS:   ED Triage Vitals [08/03/22 1548]   Enc Vitals Group      /78      Heart Rate (!) 108      Resp 17      Temp 98.6 °F (37 °C)      Temp Source Oral      SpO2 100 %      Weight 122 lb (55.3 kg)      Height 5' 5\" (1.651 m)      Head Circumference       Peak Flow       Pain Score       Pain Loc       Pain Edu? Excl. in [address]? Vitals during ED course were reviewed and are as charted. Constitutional: Minimal distress, Non-toxic appearance  Eyes: Conjunctiva normal, No discharge  HENT: Normocephalic, Atraumatic  Neck: Supple, no stridor, no grossly visible or palpable masses  Cardiovascular: Regular rate and rhythm, No murmurs, No rubs, No gallops  Pulmonary/Chest: Normal breath sounds, No respiratory distress or accessory muscle use, No wheezing, crackles or rhonchi. Abdomen: Soft, nondistended and nonrigid, No tenderness or peritoneal signs, No masses  Extremities: Erythema and edema left great toe with necrosis of the medial tip of the left great toe. Erythema does extend to the dorsum of the mid left foot.   No fluctuance or crepitus. Otherwise elsewhere there are no gross deformities, no edema, no tenderness. She has palpable distal pulses.   Neurologic: Normal motor function, Normal sensory function, No focal deficits  Skin: Warm, Dry, No erythema, No rash, No cyanosis, No mottling  Psychiatric: Alert and oriented x3, Affect normal        RADIOLOGY/PROCEDURES/LABS/MEDICATIONS ADMINISTERED:    I have reviewed and interpreted all of the currently available lab results from this visit (if applicable):  Results for orders placed or performed during the hospital encounter of 08/03/22   CBC with Auto Differential   Result Value Ref Range    WBC 13.8 (H) 4.0 - 10.5 K/CU MM    RBC 3.81 (L) 4.2 - 5.4 M/CU MM    Hemoglobin 12.1 (L) 12.5 - 16.0 GM/DL    Hematocrit 36.6 (L) 37 - 47 %    MCV 96.1 78 - 100 FL    MCH 31.8 (H) 27 - 31 PG    MCHC 33.1 32.0 - 36.0 %    RDW 12.3 11.7 - 14.9 %    Platelets 866 568 - 867 K/CU MM    MPV 9.2 7.5 - 11.1 FL    Differential Type AUTOMATED DIFFERENTIAL     Segs Relative 70.8 (H) 36 - 66 %    Lymphocytes % 21.9 (L) 24 - 44 %    Monocytes % 6.1 (H) 0 - 4 %    Eosinophils % 0.4 0 - 3 %    Basophils % 0.4 0 - 1 %    Segs Absolute 9.8 K/CU MM    Lymphocytes Absolute 3.0 K/CU MM    Monocytes Absolute 0.8 K/CU MM    Eosinophils Absolute 0.1 K/CU MM    Basophils Absolute 0.1 K/CU MM    Nucleated RBC % 0.0 %    Total Nucleated RBC 0.0 K/CU MM    Total Immature Neutrophil 0.05 K/CU MM    Immature Neutrophil % 0.4 0 - 0.43 %   Comprehensive Metabolic Panel w/ Reflex to MG   Result Value Ref Range    Sodium 140 135 - 145 MMOL/L    Potassium 3.3 (L) 3.5 - 5.1 MMOL/L    Chloride 103 99 - 110 mMol/L    CO2 19 (L) 21 - 32 MMOL/L    BUN 10 6 - 23 MG/DL    Creatinine 0.5 (L) 0.6 - 1.1 MG/DL    Glucose 258 (H) 70 - 99 MG/DL    Calcium 8.7 8.3 - 10.6 MG/DL    Albumin 4.1 3.4 - 5.0 GM/DL    Total Protein 6.5 6.4 - 8.2 GM/DL    Total Bilirubin 0.3 0.0 - 1.0 MG/DL    ALT 8 (L) 10 - 40 U/L    AST 19 15 - 37 IU/L    Alkaline Phosphatase 85 40 - 129 IU/L    GFR Non-African American >60 >60 mL/min/1.73m2    GFR African American >60 >60 mL/min/1.73m2    Anion Gap 18 (H) 4 - 16   Lactate, Sepsis   Result Value Ref Range    Lactic Acid, Sepsis 6.0 (HH) 0.5 - 1.9 mMOL/L   Lactate, Sepsis   Result Value Ref Range    Lactic Acid, Sepsis 3.9 (HH) 0.5 - 1.9 mMOL/L   Magnesium   Result Value Ref Range    Magnesium 1.1 (L) 1.8 - 2.4 mg/dl          ABNORMAL LABS:  Labs Reviewed   CBC WITH AUTO DIFFERENTIAL - Abnormal; Notable for the following components:       Result Value    WBC 13.8 (*)     RBC 3.81 (*)     Hemoglobin 12.1 (*)     Hematocrit 36.6 (*)     MCH 31.8 (*)     Segs Relative 70.8 (*)     Lymphocytes % 21.9 (*)     Monocytes % 6.1 (*)     All other components within normal limits   COMPREHENSIVE METABOLIC PANEL W/ REFLEX TO MG FOR LOW K - Abnormal; Notable for the following components:    Potassium 3.3 (*)     CO2 19 (*)     Creatinine 0.5 (*)     Glucose 258 (*)     ALT 8 (*)     Anion Gap 18 (*)     All other components within normal limits   LACTATE, SEPSIS - Abnormal; Notable for the following components:    Lactic Acid, Sepsis 6.0 (*)     All other components within normal limits   LACTATE, SEPSIS - Abnormal; Notable for the following components:    Lactic Acid, Sepsis 3.9 (*)     All other components within normal limits   MAGNESIUM - Abnormal; Notable for the following components:    Magnesium 1.1 (*)     All other components within normal limits   CULTURE, BLOOD 1   CULTURE, BLOOD 2   LACTATE, SEPSIS   LACTATE, SEPSIS   HEMOGLOBIN A1C   COMPREHENSIVE METABOLIC PANEL W/ REFLEX TO MG FOR LOW K   LACTIC ACID   PROCALCITONIN   CBC WITH AUTO DIFFERENTIAL   PROTIME-INR   POCT GLUCOSE         IMAGING STUDIES ORDERED:  XR TOE LEFT (MIN 2 VIEWS)  VL DUP LOWER EXTREMITY ARTERIES LEFT  MRI FOOT LEFT W WO CONTRAST  IP CONSULT TO PODIATRY  IP CONSULT TO HOSPITALIST  TELEMETRY MONITORING  VITAL SIGNS  ELEVATE EXTREMITY  INTAKE AND OUTPUT  NOTIFY PHYSICIAN (SPECIFY)  NURSING COMMUNICATION  ADMIT TO INPATIENT  FULL CODE  ADULT DIET  REASON FOR NOT ORDERING SEPSIS FLUID BOLUS  REASON FOR NO CHEMICAL VTE PROPHYLAXIS  REASON FOR NOT SELECTING BASAL INSULIN  DAILY WEIGHTS  HYPOGLYCEMIA TREATMENT: BG LESS THAN 70 MG/DL AND PATIENT ALERT  HYPOGLYCEMIA TREATMENT: BG LESS THAN 70 MG/DL AND PATIENT NOT ALERT  UP WITH ASSISTANCE    I have personally viewed the imaging studies. The radiologist interpretation is:   VL DUP LOWER EXTREMITY ARTERIES LEFT   Final Result   1. Diffuse monophasic waveform seen throughout the left lower extremity   suggestive of severe iliac as well as left lower extremity peripheral   arterial disease. 2. Occluded mid posterior tibial artery, as well as the mid and distal   anterior tibial and peroneal arteries. No continuous runoff. 3. The obtain velocities are very low throughout the left lower extremity   consistent with severe inflow disease. XR TOE LEFT (MIN 2 VIEWS)   Final Result   Soft tissue ulceration involving the great toe. Osteolysis of the great toe   distal phalanx is seen, which is worrisome for osteomyelitis.          MRI FOOT LEFT W WO CONTRAST    (Results Pending)         MEDICATIONS ADMINISTERED:  Medications   sodium chloride flush 0.9 % injection 5-40 mL (has no administration in time range)   sodium chloride flush 0.9 % injection 5-40 mL (has no administration in time range)   0.9 % sodium chloride infusion (has no administration in time range)   enoxaparin (LOVENOX) injection 40 mg (has no administration in time range)   ondansetron (ZOFRAN-ODT) disintegrating tablet 4 mg (has no administration in time range)     Or   ondansetron (ZOFRAN) injection 4 mg (has no administration in time range)   polyethylene glycol (GLYCOLAX) packet 17 g (has no administration in time range)   acetaminophen (TYLENOL) tablet 650 mg (has no administration in time range)     Or   acetaminophen (TYLENOL) suppository 650 mg (has no administration in time range)   sodium chloride flush 0.9 % injection 5-40 mL (has no administration in time range)   sodium chloride flush 0.9 % injection 5-40 mL (has no administration in time range)   0.9 % sodium chloride infusion (has no administration in time range)   pantoprazole (PROTONIX) tablet 40 mg (has no administration in time range)   atorvastatin (LIPITOR) tablet 10 mg (has no administration in time range)   lisinopril (PRINIVIL;ZESTRIL) tablet 15 mg (has no administration in time range)   HYDROcodone-acetaminophen (NORCO) 5-325 MG per tablet 1 tablet (has no administration in time range)   cloNIDine (CATAPRES) tablet 0.1 mg (has no administration in time range)   cilostazol (PLETAL) tablet 100 mg (has no administration in time range)   glucose chewable tablet 16 g (has no administration in time range)   dextrose bolus 10% 125 mL (has no administration in time range)     Or   dextrose bolus 10% 250 mL (has no administration in time range)   glucagon (rDNA) injection 1 mg (has no administration in time range)   dextrose 10 % infusion (has no administration in time range)   0.9 % sodium chloride infusion (has no administration in time range)   piperacillin-tazobactam (ZOSYN) 3,375 mg in dextrose 5 % 50 mL IVPB extended infusion (mini-bag) (has no administration in time range)   vancomycin (VANCOCIN) 750 mg in dextrose 5 % 250 mL IVPB ([id number]) (has no administration in time range)   insulin lispro (HUMALOG) injection vial 0-8 Units (has no administration in time range)   insulin lispro (HUMALOG) injection vial 0-4 Units (has no administration in time range)   gabapentin (NEURONTIN) capsule 300 mg (has no administration in time range)   piperacillin-tazobactam (ZOSYN) 4,500 mg in dextrose 5 % 100 mL IVPB (mini-bag) (0 mg IntraVENous Stopped 8/3/22 2028)   vancomycin (VANCOCIN) 1,000 mg in dextrose 5 % 250 mL IVPB ([id number]) (0 mg IntraVENous Stopped 8/3/22 1925) HYDROcodone-acetaminophen (NORCO) 5-325 MG per tablet 1 tablet (1 tablet Oral Given 8/3/22 1817)   0.9 % sodium chloride IV bolus 1,659 mL (1,659 mLs IntraVENous New Bag 8/3/22 2002)         COURSE & MEDICAL DECISION MAKING  Last vitals: /72   Pulse 92   Temp 97.8 °F (36.6 °C) (Oral)   Resp 18   Ht 5' 4\" (1.626 m)   Wt 122 lb (55.3 kg)   SpO2 100%   BMI 20.94 kg/m²     62-year-old female with sepsis secondary to diabetic foot infection with concern for osteomyelitis. She has been started on antibiotics. I have discussed her case with her podiatrist, Dr. Earl Lord, who would like to have her admitted and is requesting vascular studies and an MRI of the foot. Additional workup and treatment in the ED as documented above. Pt will be admitted for further evaluation and treatment. I discussed the case with the hospitalist who agreed to admit the patient. Plan discussed with pt and/or family who expressed understanding and agreement with plan. Admitted in stable condition. Clinical Impression:  1. Sepsis, due to unspecified organism, unspecified whether acute organ dysfunction present ([address]    2. Diabetic foot infection ([facility]        Disposition referral (if applicable):  No follow-up provider specified. Disposition medications (if applicable):  Current Discharge Medication List          ED Provider Disposition Time  DISPOSITION Admitted 08/03/2022 09:12:38 PM          Electronically signed by: Sophia Ghosh M.D., 8/3/2022 10:57 PM      This dictation was created with voice recognition software. While attempts have been made to review the dictation as it is transcribed, on occasion the spoken word can be misinterpreted by the technology leading to omissions or inappropriate words, phrases or sentences.         Edward Roberson MD  08/03/22 5810

## 2022-08-04 ENCOUNTER — APPOINTMENT (OUTPATIENT)
Dept: MRI IMAGING | Age: 67
DRG: 854 | End: 2022-08-04
Payer: MEDICARE

## 2022-08-04 LAB
ALBUMIN SERPL-MCNC: 3.1 GM/DL (ref 3.4–5)
ALBUMIN SERPL-MCNC: 3.1 GM/DL (ref 3.4–5)
ALP BLD-CCNC: 67 IU/L (ref 40–128)
ALP BLD-CCNC: 67 IU/L (ref 40–128)
ALT SERPL-CCNC: 7 U/L (ref 10–40)
ALT SERPL-CCNC: 7 U/L (ref 10–40)
ANION GAP SERPL CALCULATED.3IONS-SCNC: 12 MMOL/L (ref 4–16)
ANION GAP SERPL CALCULATED.3IONS-SCNC: 14 MMOL/L (ref 4–16)
AST SERPL-CCNC: 14 IU/L (ref 15–37)
AST SERPL-CCNC: 16 IU/L (ref 15–37)
BASOPHILS ABSOLUTE: 0.1 K/CU MM
BASOPHILS ABSOLUTE: 0.1 K/CU MM
BASOPHILS RELATIVE PERCENT: 0.5 % (ref 0–1)
BASOPHILS RELATIVE PERCENT: 0.6 % (ref 0–1)
BILIRUB SERPL-MCNC: 0.3 MG/DL (ref 0–1)
BILIRUB SERPL-MCNC: 0.3 MG/DL (ref 0–1)
BUN BLDV-MCNC: 4 MG/DL (ref 6–23)
BUN BLDV-MCNC: 7 MG/DL (ref 6–23)
CALCIUM IONIZED: 3.96 MG/DL (ref 4.48–5.28)
CALCIUM SERPL-MCNC: 7.6 MG/DL (ref 8.3–10.6)
CALCIUM SERPL-MCNC: 7.8 MG/DL (ref 8.3–10.6)
CHLORIDE BLD-SCNC: 105 MMOL/L (ref 99–110)
CHLORIDE BLD-SCNC: 107 MMOL/L (ref 99–110)
CO2: 19 MMOL/L (ref 21–32)
CO2: 20 MMOL/L (ref 21–32)
CREAT SERPL-MCNC: 0.5 MG/DL (ref 0.6–1.1)
CREAT SERPL-MCNC: 0.5 MG/DL (ref 0.6–1.1)
DIFFERENTIAL TYPE: ABNORMAL
DIFFERENTIAL TYPE: ABNORMAL
EOSINOPHILS ABSOLUTE: 0.1 K/CU MM
EOSINOPHILS ABSOLUTE: 0.1 K/CU MM
EOSINOPHILS RELATIVE PERCENT: 0.7 % (ref 0–3)
EOSINOPHILS RELATIVE PERCENT: 1.1 % (ref 0–3)
ESTIMATED AVERAGE GLUCOSE: 148 MG/DL
GFR AFRICAN AMERICAN: >60 ML/MIN/1.73M2
GFR AFRICAN AMERICAN: >60 ML/MIN/1.73M2
GFR NON-AFRICAN AMERICAN: >60 ML/MIN/1.73M2
GFR NON-AFRICAN AMERICAN: >60 ML/MIN/1.73M2
GLUCOSE BLD-MCNC: 129 MG/DL (ref 70–99)
GLUCOSE BLD-MCNC: 130 MG/DL (ref 70–99)
GLUCOSE BLD-MCNC: 150 MG/DL (ref 70–99)
GLUCOSE BLD-MCNC: 163 MG/DL (ref 70–99)
GLUCOSE BLD-MCNC: 179 MG/DL (ref 70–99)
GLUCOSE BLD-MCNC: 248 MG/DL (ref 70–99)
HBA1C MFR BLD: 6.8 % (ref 4.2–6.3)
HCT VFR BLD CALC: 31.3 % (ref 37–47)
HCT VFR BLD CALC: 32 % (ref 37–47)
HEMOGLOBIN: 10.4 GM/DL (ref 12.5–16)
HEMOGLOBIN: 10.5 GM/DL (ref 12.5–16)
IMMATURE NEUTROPHIL %: 0.2 % (ref 0–0.43)
IMMATURE NEUTROPHIL %: 0.3 % (ref 0–0.43)
INR BLD: 0.99 INDEX
IONIZED CA: 0.99 MMOL/L (ref 1.12–1.32)
LACTATE: 3.3 MMOL/L (ref 0.4–2)
LACTATE: 3.6 MMOL/L (ref 0.4–2)
LACTATE: 3.8 MMOL/L (ref 0.4–2)
LACTATE: 3.9 MMOL/L (ref 0.4–2)
LACTATE: 4.4 MMOL/L (ref 0.4–2)
LACTIC ACID, SEPSIS: 4.8 MMOL/L (ref 0.5–1.9)
LYMPHOCYTES ABSOLUTE: 2.4 K/CU MM
LYMPHOCYTES ABSOLUTE: 3.3 K/CU MM
LYMPHOCYTES RELATIVE PERCENT: 19.7 % (ref 24–44)
LYMPHOCYTES RELATIVE PERCENT: 26.8 % (ref 24–44)
MAGNESIUM: 1 MG/DL (ref 1.8–2.4)
MAGNESIUM: 2.2 MG/DL (ref 1.8–2.4)
MCH RBC QN AUTO: 31.3 PG (ref 27–31)
MCH RBC QN AUTO: 31.5 PG (ref 27–31)
MCHC RBC AUTO-ENTMCNC: 32.8 % (ref 32–36)
MCHC RBC AUTO-ENTMCNC: 33.2 % (ref 32–36)
MCV RBC AUTO: 94.8 FL (ref 78–100)
MCV RBC AUTO: 95.5 FL (ref 78–100)
MONOCYTES ABSOLUTE: 0.8 K/CU MM
MONOCYTES ABSOLUTE: 1 K/CU MM
MONOCYTES RELATIVE PERCENT: 6.6 % (ref 0–4)
MONOCYTES RELATIVE PERCENT: 7.9 % (ref 0–4)
NUCLEATED RBC %: 0 %
NUCLEATED RBC %: 0 %
PDW BLD-RTO: 12.4 % (ref 11.7–14.9)
PDW BLD-RTO: 12.4 % (ref 11.7–14.9)
PLATELET # BLD: 303 K/CU MM (ref 140–440)
PLATELET # BLD: 343 K/CU MM (ref 140–440)
PMV BLD AUTO: 9 FL (ref 7.5–11.1)
PMV BLD AUTO: 9.8 FL (ref 7.5–11.1)
POTASSIUM SERPL-SCNC: 3.4 MMOL/L (ref 3.5–5.1)
POTASSIUM SERPL-SCNC: 3.6 MMOL/L (ref 3.5–5.1)
PROCALCITONIN: 0.1
PROTHROMBIN TIME: 12.8 SECONDS (ref 11.7–14.5)
RBC # BLD: 3.3 M/CU MM (ref 4.2–5.4)
RBC # BLD: 3.35 M/CU MM (ref 4.2–5.4)
SEGMENTED NEUTROPHILS ABSOLUTE COUNT: 7.8 K/CU MM
SEGMENTED NEUTROPHILS ABSOLUTE COUNT: 8.8 K/CU MM
SEGMENTED NEUTROPHILS RELATIVE PERCENT: 63.5 % (ref 36–66)
SEGMENTED NEUTROPHILS RELATIVE PERCENT: 72.1 % (ref 36–66)
SODIUM BLD-SCNC: 138 MMOL/L (ref 135–145)
SODIUM BLD-SCNC: 139 MMOL/L (ref 135–145)
TOTAL IMMATURE NEUTOROPHIL: 0.03 K/CU MM
TOTAL IMMATURE NEUTOROPHIL: 0.04 K/CU MM
TOTAL NUCLEATED RBC: 0 K/CU MM
TOTAL NUCLEATED RBC: 0 K/CU MM
TOTAL PROTEIN: 5 GM/DL (ref 6.4–8.2)
TOTAL PROTEIN: 5.1 GM/DL (ref 6.4–8.2)
WBC # BLD: 12.2 K/CU MM (ref 4–10.5)
WBC # BLD: 12.2 K/CU MM (ref 4–10.5)

## 2022-08-04 PROCEDURE — 6360000002 HC RX W HCPCS: Performed by: NURSE PRACTITIONER

## 2022-08-04 PROCEDURE — 99211 OFF/OP EST MAY X REQ PHY/QHP: CPT

## 2022-08-04 PROCEDURE — 2580000003 HC RX 258: Performed by: STUDENT IN AN ORGANIZED HEALTH CARE EDUCATION/TRAINING PROGRAM

## 2022-08-04 PROCEDURE — 85610 PROTHROMBIN TIME: CPT

## 2022-08-04 PROCEDURE — A9579 GAD-BASE MR CONTRAST NOS,1ML: HCPCS | Performed by: NURSE PRACTITIONER

## 2022-08-04 PROCEDURE — 2580000003 HC RX 258: Performed by: NURSE PRACTITIONER

## 2022-08-04 PROCEDURE — 94761 N-INVAS EAR/PLS OXIMETRY MLT: CPT

## 2022-08-04 PROCEDURE — 6370000000 HC RX 637 (ALT 250 FOR IP): Performed by: NURSE PRACTITIONER

## 2022-08-04 PROCEDURE — 82962 GLUCOSE BLOOD TEST: CPT

## 2022-08-04 PROCEDURE — 85025 COMPLETE CBC W/AUTO DIFF WBC: CPT

## 2022-08-04 PROCEDURE — 80053 COMPREHEN METABOLIC PANEL: CPT

## 2022-08-04 PROCEDURE — 36415 COLL VENOUS BLD VENIPUNCTURE: CPT

## 2022-08-04 PROCEDURE — 1200000000 HC SEMI PRIVATE

## 2022-08-04 PROCEDURE — 83735 ASSAY OF MAGNESIUM: CPT

## 2022-08-04 PROCEDURE — 84145 PROCALCITONIN (PCT): CPT

## 2022-08-04 PROCEDURE — 82330 ASSAY OF CALCIUM: CPT

## 2022-08-04 PROCEDURE — 73720 MRI LWR EXTREMITY W/O&W/DYE: CPT

## 2022-08-04 PROCEDURE — 6360000002 HC RX W HCPCS: Performed by: STUDENT IN AN ORGANIZED HEALTH CARE EDUCATION/TRAINING PROGRAM

## 2022-08-04 PROCEDURE — 6360000004 HC RX CONTRAST MEDICATION: Performed by: NURSE PRACTITIONER

## 2022-08-04 PROCEDURE — 6370000000 HC RX 637 (ALT 250 FOR IP): Performed by: STUDENT IN AN ORGANIZED HEALTH CARE EDUCATION/TRAINING PROGRAM

## 2022-08-04 PROCEDURE — 83605 ASSAY OF LACTIC ACID: CPT

## 2022-08-04 RX ORDER — CALCIUM GLUCONATE 20 MG/ML
2000 INJECTION, SOLUTION INTRAVENOUS ONCE
Status: COMPLETED | OUTPATIENT
Start: 2022-08-04 | End: 2022-08-04

## 2022-08-04 RX ORDER — MAGNESIUM SULFATE 4 G/50ML
4000 INJECTION INTRAVENOUS ONCE
Status: COMPLETED | OUTPATIENT
Start: 2022-08-04 | End: 2022-08-04

## 2022-08-04 RX ORDER — POTASSIUM CHLORIDE 20 MEQ/1
40 TABLET, EXTENDED RELEASE ORAL ONCE
Status: COMPLETED | OUTPATIENT
Start: 2022-08-04 | End: 2022-08-04

## 2022-08-04 RX ORDER — SODIUM CHLORIDE 9 MG/ML
1000 INJECTION, SOLUTION INTRAVENOUS ONCE
Status: COMPLETED | OUTPATIENT
Start: 2022-08-04 | End: 2022-08-04

## 2022-08-04 RX ADMIN — PIPERACILLIN AND TAZOBACTAM 3375 MG: 3; .375 INJECTION, POWDER, LYOPHILIZED, FOR SOLUTION INTRAVENOUS at 01:58

## 2022-08-04 RX ADMIN — CILOSTAZOL 100 MG: 100 TABLET ORAL at 09:51

## 2022-08-04 RX ADMIN — CLONIDINE HYDROCHLORIDE 0.1 MG: 0.1 TABLET ORAL at 09:51

## 2022-08-04 RX ADMIN — MAGNESIUM SULFATE HEPTAHYDRATE 4000 MG: 80 INJECTION, SOLUTION INTRAVENOUS at 10:09

## 2022-08-04 RX ADMIN — VANCOMYCIN HYDROCHLORIDE 1000 MG: 1 INJECTION, POWDER, LYOPHILIZED, FOR SOLUTION INTRAVENOUS at 06:15

## 2022-08-04 RX ADMIN — SODIUM CHLORIDE 1000 ML: 9 INJECTION, SOLUTION INTRAVENOUS at 15:05

## 2022-08-04 RX ADMIN — CILOSTAZOL 100 MG: 100 TABLET ORAL at 21:53

## 2022-08-04 RX ADMIN — POTASSIUM CHLORIDE 40 MEQ: 1500 TABLET, EXTENDED RELEASE ORAL at 10:16

## 2022-08-04 RX ADMIN — SODIUM CHLORIDE: 9 INJECTION, SOLUTION INTRAVENOUS at 20:27

## 2022-08-04 RX ADMIN — HYDROCODONE BITARTRATE AND ACETAMINOPHEN 1 TABLET: 5; 325 TABLET ORAL at 20:25

## 2022-08-04 RX ADMIN — SODIUM CHLORIDE: 9 INJECTION, SOLUTION INTRAVENOUS at 09:10

## 2022-08-04 RX ADMIN — LISINOPRIL 15 MG: 5 TABLET ORAL at 09:48

## 2022-08-04 RX ADMIN — CALCIUM GLUCONATE 2000 MG: 20 INJECTION, SOLUTION INTRAVENOUS at 13:00

## 2022-08-04 RX ADMIN — ENOXAPARIN SODIUM 40 MG: 100 INJECTION SUBCUTANEOUS at 09:52

## 2022-08-04 RX ADMIN — PIPERACILLIN AND TAZOBACTAM 3375 MG: 3; .375 INJECTION, POWDER, LYOPHILIZED, FOR SOLUTION INTRAVENOUS at 17:20

## 2022-08-04 RX ADMIN — SODIUM CHLORIDE: 9 INJECTION, SOLUTION INTRAVENOUS at 00:50

## 2022-08-04 RX ADMIN — PIPERACILLIN AND TAZOBACTAM 3375 MG: 3; .375 INJECTION, POWDER, LYOPHILIZED, FOR SOLUTION INTRAVENOUS at 20:29

## 2022-08-04 RX ADMIN — GABAPENTIN 300 MG: 300 CAPSULE ORAL at 01:49

## 2022-08-04 RX ADMIN — CILOSTAZOL 100 MG: 100 TABLET ORAL at 01:50

## 2022-08-04 RX ADMIN — SODIUM CHLORIDE, PRESERVATIVE FREE 10 ML: 5 INJECTION INTRAVENOUS at 04:59

## 2022-08-04 RX ADMIN — CLONIDINE HYDROCHLORIDE 0.1 MG: 0.1 TABLET ORAL at 01:54

## 2022-08-04 RX ADMIN — GADOTERIDOL 12 ML: 279.3 INJECTION, SOLUTION INTRAVENOUS at 16:43

## 2022-08-04 RX ADMIN — GABAPENTIN 300 MG: 300 CAPSULE ORAL at 21:53

## 2022-08-04 RX ADMIN — ATORVASTATIN CALCIUM 10 MG: 10 TABLET, FILM COATED ORAL at 09:49

## 2022-08-04 RX ADMIN — PANTOPRAZOLE SODIUM 40 MG: 40 TABLET, DELAYED RELEASE ORAL at 06:15

## 2022-08-04 RX ADMIN — VANCOMYCIN HYDROCHLORIDE 1000 MG: 1 INJECTION, POWDER, LYOPHILIZED, FOR SOLUTION INTRAVENOUS at 18:49

## 2022-08-04 RX ADMIN — CLONIDINE HYDROCHLORIDE 0.1 MG: 0.1 TABLET ORAL at 21:53

## 2022-08-04 ASSESSMENT — PAIN DESCRIPTION - LOCATION: LOCATION: FOOT;SHOULDER

## 2022-08-04 ASSESSMENT — PAIN SCALES - GENERAL: PAINLEVEL_OUTOF10: 7

## 2022-08-04 NOTE — SIGNIFICANT EVENT
Asked to check on patient for rising lactic acid in spite of fluids. Currently 4.4. Patient admitted for sepsis 2/2 diabetic wound infection of left great toe. Patient appears to be completely fine, laying in bed asymptomatic. Vitals stable with HR in 80s. She states she has been peeing a lot of light yellow urine, likely secondary to the 2.6 liters of fluid she has received. BUN;Cr ratio is 4, futher supporting euvolemic status. This likely represents type B lactic acidosis. Patient is certainly not hypoperfusing globally. Some tissue hypoperfusion at the site of the gangrenous toe may be contributing. Also, she is being treated for brain mass s/p excision and recently received Gamma knife therapy at [facility] in the last week. This could possibly also be another source of lactic acid. Furthermore, she is diabetic and hyperglycemic which could be another source. Whatever the case, she is not in need of additional fluid at this time. I will be discontinuing the continuous fluid order that is set to be initiated after the completion of her current 1 liter NS bolus.     Asha Calloway MD

## 2022-08-04 NOTE — PROGRESS NOTES
[address] [facility]  consulted by KENNETH Youssef for monitoring and adjustment. Indication for treatment: Severe Sepsis secondary to cellulitis with suspected osteomylitis  Goal trough: [] 10-15 mcg/mL or [x] 15-20 mcg/ml  AUC/TORRI: [] <500 or [x] 400-600    Pertinent Laboratory Values:   Temp Readings from Last 3 Encounters:   08/03/22 97.8 °F (36.6 °C) (Oral)   08/02/22 97.8 °F (36.6 °C) (Temporal)   07/26/22 97.4 °F (36.3 °C) (Temporal)     Recent Labs     08/03/22  1808   WBC 13.8*     Recent Labs     08/03/22  1808   BUN 10   CREATININE 0.5*     Estimated Creatinine Clearance: 98 mL/min (A) (based on SCr of 0.5 mg/dL (L)). No intake or output data in the 24 hours ending 08/03/22 2323    Pertinent Cultures:  Date    Source    Results  08/03   Blood    Collected           Assessment:  SCr = 0.5, BUN = 10, and no I/O data  Day(s) of therapy: 1 of 7  Vancomycin concentration:   08/05 - To be collected    Plan:  Vancomycin 1,000 mg IV Q 12 Hours  Predicted AUC: 516; Predicted Trough: 14.9  Pharmacy will continue to monitor patient and adjust therapy as indicated    [address] 08/05/2022 @ 5 AM    Thank you for the consult.   Gabriele Berry, [facility]  8/3/2022 11:23 PM

## 2022-08-04 NOTE — PROGRESS NOTES
Comprehensive Nutrition Assessment    Type and Reason for Visit:  Initial, Positive Nutrition Screen (weight loss, poor intake)    Nutrition Recommendations/Plan:   Continue current carb controlled diet   2. Offer high protein oral nutrition supplement during stay  3. Will continue to follow up during stay     Malnutrition Assessment:  Malnutrition Status: At risk for malnutrition (Comment) (08/04/22 1996)    Context:  Chronic Illness       Nutrition Assessment:    Admit with sepsis, toe injury. Currently on carb controlled diet with hx DM. Noted has PEG with hx craniotomy and dysphagia, not using PEG at this time and per patient plan for removal soon. Reports good appetite and meal intake, limited po data at this time. Will follow at moderate nutrition risk at this time. Nutrition Related Findings:    resting in bed, reports weight stable at this time, lunch tray in room ate ~ 50%, hx DM HbA1c 6.8%,  hx adenocarcinoma, brain mets with  right frontal craniotomy Wound Type: Diabetic Ulcer (toe ulcer)       Current Nutrition Intake & Therapies:    Average Meal Intake: Unable to assess  Average Supplements Intake: None Ordered  ADULT DIET; Regular; 5 carb choices (75 gm/meal)    Anthropometric Measures:  Height: 5' 4\" (162.6 cm)  Ideal Body Weight (IBW): 120 lbs (55 kg)       Current Body Weight: 121 lb 14.6 oz (55.3 kg), 101.6 % IBW. Weight Source: Stated  Current BMI (kg/m2): 20.9  Usual Body Weight: 125 lb 3.5 oz (56.8 kg) (hx July)  % Weight Change (Calculated): -2.6  Weight Adjustment For: No Adjustment                 BMI Categories: Normal Weight (BMI 18.5-24. 9)    Estimated Daily Nutrient Needs:  Energy Requirements Based On: Kcal/kg  Weight Used for Energy Requirements: Current  Energy (kcal/day): 2221-7860 (30-35 mart/kg)  Weight Used for Protein Requirements: Current  Protein (g/day): 66-77 (1.2-1.4 g/kg0  Method Used for Fluid Requirements: 1 ml/kcal  Fluid (ml/day): 4066-5647    Nutrition Diagnosis: Predicted inadequate energy intake related to increase demand for energy/nutrients as evidenced by wounds    Nutrition Interventions:   Food and/or Nutrient Delivery: Continue Current Diet, Start Oral Nutrition Supplement  Nutrition Education/Counseling: No recommendation at this time  Coordination of Nutrition Care: Continue to monitor while inpatient       Goals:     Goals: PO intake 75% or greater, by next RD assessment       Nutrition Monitoring and Evaluation:   Behavioral-Environmental Outcomes: None Identified  Food/Nutrient Intake Outcomes: Diet Advancement/Tolerance, Food and Nutrient Intake  Physical Signs/Symptoms Outcomes: Meal Time Behavior, Skin, Biochemical Data, Weight    Discharge Planning:    Continue current diet     Agustina Juan RD, LD  Contact: [phone]

## 2022-08-04 NOTE — PROGRESS NOTES
V2.0  [facility] [place]      Name:  Carina Ramsey /Age/Sex: 1955  (64 y.o. female)   MRN & CSN:  [id number] & [id number] Encounter Date/Time: 2022 7:32 AM EDT    Location:  [facility]0/1110-A PCP: Del Durán MD       Hospital Day: 2    Assessment and Plan:   Carina Ramsey is a 61 y.o. female with pmh of DM2, HTN, HLD who presents with Severe sepsis ([address]      Plan:    Severe sepsis 2/2 cellulitis with suspected osteomyelitis  Nonhealing diabetic foot ulcer  Leukocytosis, lactic acidosis, tachycardia. X-ray left foot with soft tissue ulceration involving the great toe, also concern for osteomyelitis. -Vancomycin and Zosyn  -IVF hydration  -Podiatry following  -Arterial duplex studies  -LLE MRI left foot ordered and pending  -F/U blood cultures, wound culture, MRSA swab, procalcitonin, lactate    Non-insulin-dependent diabetes mellitus type 2  -Hold home p.o. meds  -SSI, glucose checks, hypoglycemia protocol  -Strict glucose control to help with wound healing    Hypomagnesemia, hypokalemia  -Replete as required      Diet ADULT DIET; Regular; 5 carb choices (75 gm/meal)   DVT Prophylaxis [x] Lovenox, []  Heparin, [] SCDs, [] Ambulation,  [] Eliquis, [] Xarelto  [] Coumadin   Code Status Full Code   Disposition From: Home  Expected Disposition: Home  Estimated Date of Discharge: 2-3 days  Patient requires continued admission due to severe sepsis   Surrogate Decision Maker/ POA      Subjective:     Chief Complaint: Toe Injury (Injured by pediatrist approx 4 weeks ago. Been getting worse since and states it is now infected ) and Wound Check       Carina Ramsey is a 61 y.o. female who presents at request of her podiatrist with nonhealing left toe ulcer for past month associated with some necrosis at the tip of her left toe. Was assessed by podiatry the day prior to presentation, it was suggested that she present to ED for IV antibiotics and amputation of left great toe.        Review of Systems:    Review of Systems    No fevers, chills, cough, SOB, palpitations, chest pain, diarrhea, nausea, vomiting, abdominal pain. Objective:   No intake or output data in the 24 hours ending 08/04/22 0732     Vitals:   Vitals:    08/04/22 0145   BP: 113/72   Pulse: 86   Resp: 14   Temp: 97.5 °F (36.4 °C)   SpO2: 100%       Physical Exam:     General: NAD  Eyes: EOMI  ENT: neck supple  Cardiovascular: Regular rate. Respiratory: Clear to auscultation  Gastrointestinal: Soft, non tender  Genitourinary: no suprapubic tenderness  Musculoskeletal: No edema  Skin: Ulcer of left great toe, tenderness on palpation  Neuro: Alert. Psych: Mood appropriate.      Medications:   Medications:    enoxaparin  40 mg SubCUTAneous Daily    sodium chloride flush  5-40 mL IntraVENous BID    pantoprazole  40 mg Oral Daily    atorvastatin  10 mg Oral Daily    lisinopril  15 mg Oral Daily    cloNIDine  0.1 mg Oral BID    cilostazol  100 mg Oral BID    piperacillin-tazobactam  3,375 mg IntraVENous Q6H    vancomycin  1,000 mg IntraVENous Q12H    insulin lispro  0-8 Units SubCUTAneous TID WC    insulin lispro  0-4 Units SubCUTAneous Nightly    gabapentin  300 mg Oral Nightly      Infusions:    sodium chloride      dextrose      sodium chloride 150 mL/hr at 08/04/22 0050     PRN Meds: ondansetron, 4 mg, Q8H PRN   Or  ondansetron, 4 mg, Q6H PRN  polyethylene glycol, 17 g, Daily PRN  acetaminophen, 650 mg, Q6H PRN   Or  acetaminophen, 650 mg, Q6H PRN  sodium chloride flush, 5-40 mL, PRN  sodium chloride, , PRN  HYDROcodone-acetaminophen, 1 tablet, Q4H PRN  glucose, 4 tablet, PRN  dextrose bolus, 125 mL, PRN   Or  dextrose bolus, 250 mL, PRN  glucagon (rDNA), 1 mg, PRN  dextrose, , Continuous PRN        Labs      Recent Results (from the past 24 hour(s))   CBC with Auto Differential    Collection Time: 08/03/22  6:08 PM   Result Value Ref Range    WBC 13.8 (H) 4.0 - 10.5 K/CU MM    RBC 3.81 (L) 4.2 - 5.4 M/CU MM    Hemoglobin Collection Time: 08/03/22 11:28 PM   Result Value Ref Range    Hemoglobin A1C 6.8 (H) 4.2 - 6.3 %    eAG 148 mg/dL   POCT Glucose    Collection Time: 08/04/22  1:47 AM   Result Value Ref Range    POC Glucose 129 (H) 70 - 99 MG/DL   Comprehensive Metabolic Panel w/ Reflex to MG    Collection Time: 08/04/22  3:35 AM   Result Value Ref Range    Sodium 138 135 - 145 MMOL/L    Potassium 3.4 (L) 3.5 - 5.1 MMOL/L    Chloride 105 99 - 110 mMol/L    CO2 19 (L) 21 - 32 MMOL/L    BUN 7 6 - 23 MG/DL    Creatinine 0.5 (L) 0.6 - 1.1 MG/DL    Glucose 130 (H) 70 - 99 MG/DL    Calcium 7.6 (L) 8.3 - 10.6 MG/DL    Albumin 3.1 (L) 3.4 - 5.0 GM/DL    Total Protein 5.0 (L) 6.4 - 8.2 GM/DL    Total Bilirubin 0.3 0.0 - 1.0 MG/DL    ALT 7 (L) 10 - 40 U/L    AST 16 15 - 37 IU/L    Alkaline Phosphatase 67 40 - 128 IU/L    GFR Non-African American >60 >60 mL/min/1.73m2    GFR African American >60 >60 mL/min/1.73m2    Anion Gap 14 4 - 16   Lactic Acid    Collection Time: 08/04/22  3:35 AM   Result Value Ref Range    Lactate 3.9 (HH) 0.4 - 2.0 mMOL/L   Procalcitonin    Collection Time: 08/04/22  3:35 AM   Result Value Ref Range    Procalcitonin 0.097    CBC with Auto Differential    Collection Time: 08/04/22  3:35 AM   Result Value Ref Range    WBC 12.2 (H) 4.0 - 10.5 K/CU MM    RBC 3.35 (L) 4.2 - 5.4 M/CU MM    Hemoglobin 10.5 (L) 12.5 - 16.0 GM/DL    Hematocrit 32.0 (L) 37 - 47 %    MCV 95.5 78 - 100 FL    MCH 31.3 (H) 27 - 31 PG    MCHC 32.8 32.0 - 36.0 %    RDW 12.4 11.7 - 14.9 %    Platelets 220 010 - 652 K/CU MM    MPV 9.0 7.5 - 11.1 FL    Differential Type AUTOMATED DIFFERENTIAL     Segs Relative 63.5 36 - 66 %    Lymphocytes % 26.8 24 - 44 %    Monocytes % 7.9 (H) 0 - 4 %    Eosinophils % 1.1 0 - 3 %    Basophils % 0.5 0 - 1 %    Segs Absolute 7.8 K/CU MM    Lymphocytes Absolute 3.3 K/CU MM    Monocytes Absolute 1.0 K/CU MM    Eosinophils Absolute 0.1 K/CU MM    Basophils Absolute 0.1 K/CU MM    Nucleated RBC % 0.0 %    Total Nucleated RBC 0.0 K/CU MM    Total Immature Neutrophil 0.03 K/CU MM    Immature Neutrophil % 0.2 0 - 0.43 %   Protime-INR    Collection Time: 08/04/22  3:35 AM   Result Value Ref Range    Protime 12.8 11.7 - 14.5 SECONDS    INR 0.99 INDEX   Magnesium    Collection Time: 08/04/22  3:35 AM   Result Value Ref Range    Magnesium 1.0 (L) 1.8 - 2.4 mg/dl   POCT Glucose    Collection Time: 08/04/22  6:19 AM   Result Value Ref Range    POC Glucose 150 (H) 70 - 99 MG/DL        Imaging/Diagnostics Last 24 Hours   XR TOE LEFT (MIN 2 VIEWS)    Result Date: 8/3/2022  EXAMINATION: 3 XRAY VIEWS OF THE LEFT TOE 8/3/2022 3:13 pm COMPARISON: None. HISTORY: ORDERING SYSTEM PROVIDED HISTORY: left great toe erythema and necrosis TECHNOLOGIST PROVIDED HISTORY: Reason for exam:->left great toe erythema and necrosis Reason for Exam: left great toe erythema and necrosis Additional signs and symptoms: na Relevant Medical/Surgical History: diabetes, hypertension FINDINGS: Soft tissue ulceration of the left great toe noted. Subjacent to that, there is osteolysis involving the great toe distal phalanx. The remaining toes are unremarkable appearance. Soft tissue ulceration involving the great toe. Osteolysis of the great toe distal phalanx is seen, which is worrisome for osteomyelitis. VL DUP LOWER EXTREMITY ARTERIES LEFT    Result Date: 8/3/2022  EXAMINATION: ARTERIAL DUPLEX ULTRASOUND OF THE LEFT LOWER EXTREMITY  8/3/2022 7:29 pm TECHNIQUE: Duplex ultrasound using B-mode/gray scaled imaging, Doppler spectral analysis and color flow Doppler was obtained of the lower extremity. COMPARISON: None. HISTORY: ORDERING SYSTEM PROVIDED HISTORY: Left great toe necrosis TECHNOLOGIST PROVIDED HISTORY: Reason for exam:->Left great toe necrosis FINDINGS: Monophasic waveforms with increased spectral broadening noted throughout the left lower extremity suggestive of severe iliac inflow disease as well as severe lower extremity popliteal artery disease.  Flow velocities were measured as follows: Com. Fem.  8.4 cm/sec SFA Prox. 12 cm/sec SFA Mid.     12 cm/sec SFA Dist.     22 cm/sec Pop. 19 cm/sec PTA            16, 0, 8 cm/sec Peron. 19, 0, 0 cm/sec HANNA            20, 0, 0 cm/sec     1. Diffuse monophasic waveform seen throughout the left lower extremity suggestive of severe iliac as well as left lower extremity peripheral arterial disease. 2. Occluded mid posterior tibial artery, as well as the mid and distal anterior tibial and peroneal arteries. No continuous runoff. 3. The obtain velocities are very low throughout the left lower extremity consistent with severe inflow disease.        Electronically signed by Oscar Ramos MD on 8/4/2022 at 7:32 AM

## 2022-08-04 NOTE — PROGRESS NOTES
4 Eyes Skin Assessment     NAME:  Oziel Cuevas  YOB: 1955  MEDICAL RECORD NUMBER:  [id number]    The patient is being assess for  Transfer to New Unit    I agree that 2 RN's have performed a thorough Head to Toe Skin Assessment on the patient. ALL assessment sites listed below have been assessed. Areas assessed by both nurses:    Head, Face, Ears, Shoulders, Back, Chest, Arms, Elbows, Hands, Sacrum. Buttock, Coccyx, Ischium, and Legs. Feet and Heels        Does the Patient have a Wound?  No noted wound(s)       Jose Angel Prevention initiated:  No   Wound Care Orders initiated:  Yes    Pressure Injury (Stage 3,4, Unstageable, DTI, NWPT, and Complex wounds) if present place referral/consult order under [de-identified] Yes    New and Established Ostomies if present place consult order under : No      Nurse 1 eSignature: Electronically signed by Nahum Zavala LPN on 8/6/09 at 77:51 AM EDT    **SHARE this note so that the co-signing nurse is able to place an eSignature**    Nurse 2 eSignature: {Esignature:[id number]}

## 2022-08-04 NOTE — H&P
V2.0  History and Physical      Name:  Sj Jasso /Age/Sex: 1955  (64 y.o. female)   MRN & CSN:  [id number] & [id number] Encounter Date/Time: 8/3/2022 9:08 PM EDT   Location:  ED[facility]/ED-17 PCP: Kiera Persaud MD       Hospital Day: 1    Assessment and Plan:   Sj Jasso is a 61 y.o. female with a pmh as noted below presents with foot infections, concern for osteomyelitis      Severe Sepsis secondary to cellulitis with suspected osteomylitis  Nonhealing diabetic foot ulcer              Admit to inpatient             X-ray left foot with soft tissue ulceration involving the great toe. Osteolysis of the great toe of distal phalanx seen, which is worrisome for osteomyelitis  Patient hemodynamically stable , no signs of hypotension  Patient follows  with podiatrist Dr. Myra Rincon ED provider discussed case with Dr. Minerva Trujillo he is aware of admission and will follow up with patient tomorrow. Requested arterial duplex studies left lower extremity MRI left foot, ordered and pending              Blood cultures x2, wound culture, MRSA swab nares, add on procalcitonin and lactate  Patient with leukocytosis, lactic acidosis, , tachycardia meets criteria for sepsis  Sepsis fluid bolus given in ER then maintenance IVF  Lactate trending down with fluids  Start vancomycin, and zosyn consult to pharmacy to dose Zosyn   UA and urine culture pending         Noninsulin dependent type 2 diabetes    sliding scale with hypoglycemia protocol   -Hold home oral antihyperglycemics, check hemoglobin A1c      Hypertension,   Hyperlipidemia   Continue home medications  Chronic Conditions: continue home medication as ordered       All testing  and results reviewed with patient . All questions answered. Patient and family voiced understanding    This patient was seen and examined in conjunction with Dr. Rodrigo Villalba. He/She was agreeable with the plan and management as dictated above.     Disposition:   Expected Disposition: Home  Estimated D/C: 3-4 days     Diet No diet orders on file   GI Prophylaxis  [x] PPI,  [] H2 Blocker,  [] Carafate,  [] Diet/Tube Feeds   DVT Prophylaxis [x] Lovenox, []  Heparin, [] SCDs, [] Ambulation,  [] NOAC   Code Status Prior   Surrogate Decision Maker/ POA          History from:     patient, electronic medical record,     History of Present Illness:     Chief Complaint: Severe sepsis ([address]  Flex Haywood is a 61 y.o. female with pmh of non-insulin-dependent type II by diabetes, hypertension, hyperlipidemia who presents the emergency department with complaint of a nonhealing left toe ulcer at the request of her podiatrist Dr. Shaina Hanson. She states ulcer has been present for about a month after she allegedly had her nails trimmed by podiatrist at a local [facility]. She endorses pain in her left toe. Pain worsened with palpation. She has also developed some necrosis at the tip of her left great toe. She is seeing a different podiatrist now, Dr. Shaina Hanson was seen by them yesterday. He advised that she present to the ED for admission, IV antibiotics, and amputation of the left great toe. She denies fevers, chills, additional review of symptoms as noted below     Review of Systems: Need 10 Elements   Review of Systems   Constitutional:  Negative for activity change, appetite change, diaphoresis, fatigue and fever. HENT:  Negative for congestion and postnasal drip. Eyes:  Negative for redness and visual disturbance. Respiratory:  Negative for cough and shortness of breath. Cardiovascular:  Negative for chest pain and palpitations. Gastrointestinal:  Negative for diarrhea, nausea and vomiting. Endocrine: Negative for cold intolerance and heat intolerance. Genitourinary:  Negative for difficulty urinating and dysuria. Musculoskeletal:  Negative for joint swelling and neck pain. Skin:  Positive for pallor and wound. Pain in left great toe   Neurological:  Negative for dizziness and speech difficulty. Abdominal:      General: Abdomen is flat. Bowel sounds are normal. There is no distension. Musculoskeletal:         General: Normal range of motion. Cervical back: Normal range of motion. Feet:      Left foot:      Skin integrity: Ulcer present. Comments: Erythema, edema to the left great toe with necrosis of the medial tip oe. No fluctuance or crepitus  Skin:     General: Skin is warm and dry. Capillary Refill: Capillary refill takes less than 2 seconds. Neurological:      General: No focal deficit present. Mental Status: She is alert and oriented to person, place, and time. Psychiatric:         Mood and Affect: Mood normal.          Past Medical History:   PMHx   Past Medical History:   Diagnosis Date    [facility] ([facility]     Diabetes mellitus ([facility]     Hyperlipidemia     Hypertension      PSHX:  has a past surgical history that includes Lung cancer surgery (Left, 10/2019) and Upper gastrointestinal endoscopy (N/A, 10/3/2020). Allergies: No Known Allergies  Fam HX: Endorses family history of diabetes, heart disease  Soc HX:   Social History     Socioeconomic History    Marital status:      Spouse name: None    Number of children: None    Years of education: None    Highest education level: None   Tobacco Use    Smoking status: Former     Packs/day: 1.00     Types: Cigarettes    Smokeless tobacco: Never    Tobacco comments:     10/2019   Substance and Sexual Activity    Alcohol use:  Yes     Alcohol/week: 1.0 standard drink     Types: 1 Cans of beer per week    Drug use: Yes     Types: Marijuana Candiss Littler)    Sexual activity: Yes     Partners: Male       Medications:   Medications:    Infusions:   PRN Meds:     Labs      CBC:   Recent Labs     08/03/22  1808   WBC 13.8*   HGB 12.1*        BMP:    Recent Labs     08/03/22  1808      K 3.3*      CO2 19*   BUN 10   CREATININE 0.5*   GLUCOSE 258*     Hepatic:   Recent Labs     08/03/22  1808   AST 19   ALT 8*   BILITOT 0.3   ALKPHOS 85     Lipids: No results found for: CHOL, HDL, TRIG  Hemoglobin A1C: No results found for: LABA1C  TSH: No results found for: TSH  Troponin:   Lab Results   Component Value Date/Time    TROPONINT <0.010 10/03/2020 05:54 AM    TROPONINT <0.010 10/02/2020 08:34 PM     Lactic Acid: No results for input(s): LACTA in the last 72 hours. BNP: No results for input(s): PROBNP in the last 72 hours. UA:  Lab Results   Component Value Date/Time    NITRU NEGATIVE 10/03/2020 12:51 AM    COLORU YELLOW 10/03/2020 12:51 AM    WBCUA 4 10/03/2020 12:51 AM    RBCUA NONE SEEN 10/03/2020 12:51 AM    MUCUS RARE 10/03/2020 12:51 AM    TRICHOMONAS NONE SEEN 10/03/2020 12:51 AM    BACTERIA RARE 10/03/2020 12:51 AM    CLARITYU HAZY 10/03/2020 12:51 AM    SPECGRAV 1.005 10/03/2020 12:51 AM    LEUKOCYTESUR MODERATE 10/03/2020 12:51 AM    UROBILINOGEN NORMAL 10/03/2020 12:51 AM    BILIRUBINUR NEGATIVE 10/03/2020 12:51 AM    BLOODU NEGATIVE 10/03/2020 12:51 AM    KETUA NEGATIVE 10/03/2020 12:51 AM     Urine Cultures: No results found for: Magalie Pattee  Blood Cultures: No results found for: BC  No results found for: BLOODCULT2  Organism: No results found for: ORG    Imaging/Diagnostics Last 24 Hours   XR TOE LEFT (MIN 2 VIEWS)    Result Date: 8/3/2022  EXAMINATION: 3 XRAY VIEWS OF THE LEFT TOE 8/3/2022 3:13 pm COMPARISON: None. HISTORY: ORDERING SYSTEM PROVIDED HISTORY: left great toe erythema and necrosis TECHNOLOGIST PROVIDED HISTORY: Reason for exam:->left great toe erythema and necrosis Reason for Exam: left great toe erythema and necrosis Additional signs and symptoms: na Relevant Medical/Surgical History: diabetes, hypertension FINDINGS: Soft tissue ulceration of the left great toe noted. Subjacent to that, there is osteolysis involving the great toe distal phalanx. The remaining toes are unremarkable appearance. Soft tissue ulceration involving the great toe.   Osteolysis of the great toe distal phalanx is seen, which is worrisome for osteomyelitis. Electronically signed by CAYLA Mustafa CNP on 8/3/2022 at 9:21 PM          This dictation was created with voice recognition software. While attempts have been made to review the dictation as it is transcribed, on occasion the spoken word can be misinterpreted by the technology leading to omissions or inappropriate words, phrases or sentences.      Electronically signed by CAYLA Mustafa CNP on 8/3/2022 at 9:21 PM

## 2022-08-04 NOTE — CONSULTS
[facility] Continence Nurse  Consult Note       Angie De La Torre  AGE: 61 y.o. GENDER: female  : 1955  TODAY'S DATE:  2022    Subjective:     Reason for  Evaluation and Assessment: wound care evalMago De La Torre is a 61 y.o. female referred by:   [x] Physician  [] Nursing  [] Other:     Wound Identification:  Wound Type: diabetic  Contributing Factors: diabetes and chronic pressure        PAST MEDICAL HISTORY        Diagnosis Date    Cancer ([facility]     Diabetes mellitus ([facility]     Hyperlipidemia     Hypertension        PAST SURGICAL HISTORY    Past Surgical History:   Procedure Laterality Date    LUNG CANCER SURGERY Left 10/2019    UPPER GASTROINTESTINAL ENDOSCOPY N/A 10/3/2020    EGD BIOPSY performed by Drake Pop MD at [facility]    History reviewed. No pertinent family history. SOCIAL HISTORY    Social History     Tobacco Use    Smoking status: Former     Packs/day: 1.00     Types: Cigarettes    Smokeless tobacco: Never    Tobacco comments:     10/2019   Substance Use Topics    Alcohol use: Yes     Alcohol/week: 1.0 standard drink     Types: 1 Cans of beer per week    Drug use: Yes     Types: Marijuana (Weed)       ALLERGIES    No Known Allergies    MEDICATIONS    No current facility-administered medications on file prior to encounter. Current Outpatient Medications on File Prior to Encounter   Medication Sig Dispense Refill    gabapentin (NEURONTIN) 300 MG capsule 300 mg.      glimepiride (AMARYL) 2 MG tablet Take 1 tablet by mouth in the morning. metFORMIN (GLUCOPHAGE) 1000 MG tablet Take 1 tablet by mouth in the morning and 1 tablet before bedtime. HYDROcodone-acetaminophen (NORCO) 5-325 MG per tablet Take 1 tablet by mouth every 4 hours as needed for Pain for up to 3 days. Intended supply: 3 days.  Take lowest dose possible to manage pain 10 tablet 0    cilostazol (PLETAL) 100 MG tablet Take 100 mg by mouth 2 times daily      lisinopril (PRINIVIL;ZESTRIL) 5 MG tablet Take 15 mg by mouth daily. cloNIDine (CATAPRES) 0.1 MG tablet Take 0.1 mg by mouth 2 times daily.       simvastatin (ZOCOR) 20 MG tablet Take 40 mg by mouth nightly       pantoprazole (PROTONIX) 40 MG tablet Take 1 tablet by mouth daily 30 tablet 0         Objective:      /68   Pulse 87   Temp 97.9 °F (36.6 °C) (Oral)   Resp 16   Ht 5' 4\" (1.626 m)   Wt 122 lb (55.3 kg)   SpO2 100%   BMI 20.94 kg/m²   Jose Angel Risk Score: Jose Angel Scale Score: 17    LABS    CBC:   Lab Results   Component Value Date/Time    WBC 12.2 08/04/2022 03:35 AM    RBC 3.35 08/04/2022 03:35 AM    HGB 10.5 08/04/2022 03:35 AM    HCT 32.0 08/04/2022 03:35 AM    MCV 95.5 08/04/2022 03:35 AM    MCH 31.3 08/04/2022 03:35 AM    MCHC 32.8 08/04/2022 03:35 AM    RDW 12.4 08/04/2022 03:35 AM     08/04/2022 03:35 AM    MPV 9.0 08/04/2022 03:35 AM     CMP:    Lab Results   Component Value Date/Time     08/04/2022 03:35 AM    K 3.4 08/04/2022 03:35 AM     08/04/2022 03:35 AM    CO2 19 08/04/2022 03:35 AM    BUN 7 08/04/2022 03:35 AM    CREATININE 0.5 08/04/2022 03:35 AM    GFRAA >60 08/04/2022 03:35 AM    LABGLOM >60 08/04/2022 03:35 AM    GLUCOSE 130 08/04/2022 03:35 AM    PROT 5.0 08/04/2022 03:35 AM    LABALBU 3.1 08/04/2022 03:35 AM    CALCIUM 7.6 08/04/2022 03:35 AM    BILITOT 0.3 08/04/2022 03:35 AM    ALKPHOS 67 08/04/2022 03:35 AM    AST 16 08/04/2022 03:35 AM    ALT 7 08/04/2022 03:35 AM     Albumin:    Lab Results   Component Value Date/Time    LABALBU 3.1 08/04/2022 03:35 AM     PT/INR:    Lab Results   Component Value Date/Time    PROTIME 12.8 08/04/2022 03:35 AM    INR 0.99 08/04/2022 03:35 AM     HgBA1c:    Lab Results   Component Value Date/Time    LABA1C 6.8 08/03/2022 11:28 PM         Assessment:     Patient Active Problem List   Diagnosis    Chest pain    Dyspnea and respiratory abnormalities    Acute gastritis without hemorrhage    Diabetic ulcer of toe of left foot associated with type 2 diabetes mellitus, with fat layer exposed ([facility]    Type 2 diabetes mellitus with peripheral neuropathy (HCC)    Peripheral vascular disease ([facility]    History of nicotine dependence    [facility]       Measurements:  Wound 07/26/22 Toe (Comment  which one) Anterior; Left #1 great toe (Active)   Wound Image   08/02/22 1506   Wound Etiology Diabetic 08/04/22 0813   Dressing Status New dressing applied 08/04/22 0813   Wound Cleansed Cleansed with saline 08/04/22 0813   Dressing/Treatment ABD;Roll gauze 08/04/22 0813   Offloading for Diabetic Foot Ulcers Offloading not required 08/02/22 1506   Wound Length (cm) 1.5 cm 08/04/22 0813   Wound Width (cm) 2.5 cm 08/04/22 0813   Wound Depth (cm) 0.1 cm 08/04/22 0813   Wound Surface Area (cm^2) 3.75 cm^2 08/04/22 0813   Change in Wound Size % (l*w) 18.83 08/04/22 0813   Wound Volume (cm^3) 0.375 cm^3 08/04/22 0813   Wound Healing % 19 08/04/22 0813   Post-Procedure Length (cm) 2 cm 08/02/22 1528   Post-Procedure Width (cm) 2.5 cm 08/02/22 1528   Post-Procedure Depth (cm) 0.1 cm 08/02/22 1528   Post-Procedure Surface Area (cm^2) 5 cm^2 08/02/22 1528   Post-Procedure Volume (cm^3) 0.5 cm^3 08/02/22 1528   Distance Tunneling (cm) 0 cm 08/04/22 0813   Tunneling Position ___ O'Clock 0 08/04/22 0813   Undermining Starts ___ O'Clock 0 08/04/22 0813   Undermining Ends___ O'Clock 0 08/04/22 0813   Undermining Maxium Distance (cm) 0 08/04/22 0813   Wound Assessment Eschar dry 08/04/22 0813   Drainage Amount None 08/04/22 0813   Drainage Description Sanguinous 08/04/22 0439   Odor None 08/04/22 0813   Lauren-wound Assessment Other (Comment) 08/04/22 0813   Margins Defined edges 08/04/22 0813   Wound Thickness Description not for Pressure Injury Full thickness 08/04/22 0813   Number of days: 8       Response to treatment:  Well tolerated by patient.      Pain Assessment:  Severity:  mild  Quality of pain: sore  Wound Pain Timing/Severity: wound care  Premedicated: no    Plan:     Plan of Care: Wound 07/26/22 Toe (Comment  which one) Anterior; Left #1 great toe-Dressing/Treatment: ABD, Roll gauze (betadine moist gauze)    Patient in bed agreeable to wound care eval left great toe diabetic wound. Pt was sent to the hospital from the wound clinic by Dr Sara Thacker for iv antibiotics and possible partial toe amputation. Left great toe with dry necrosis with surrounding purple/redness to foot. Cleansed with NS, measured and pictured. Applied betadine moist gauze. Rt foot intact and buttocks intact. Atmos air pump placed to the bed. Pt is at mild risk for skin breakdown AEB magnus. Follow magnus orders. Specialty Bed Required : yes  [] Low Air Loss   [x] Pressure Redistribution  [] Fluid Immersion  [] Bariatric  [] Total Pressure Relief  [] Other:     Discharge Plan:  Placement for patient upon discharge: tbd  Hospice Care: no  Patient appropriate for Outpatient [address] [facility] [address]: yes continue after discharge     Patient/Caregiver Teaching:  Level of patient/caregiver understanding able to:   Pt voiced understanding. Electronically signed by Koby Wilkins RN,  on 8/4/2022 at 8:48 AM

## 2022-08-04 NOTE — CARE COORDINATION
This RN CM to pt's room to introduce self and initiate DC planning. Pt alert, oriented and pleasant to speak with. Pt lives at home with  in a 2 story home. Pt was independent with ADLs PTA. Pt does have a walker and cane at home. Pt has home health aide 5 days a week and HHC through [facility]. Pt has reliable transportation, Rx coverage and PCP. Pt denies any DC needs at this time. CM will continue to follow.

## 2022-08-05 ENCOUNTER — ANESTHESIA EVENT (OUTPATIENT)
Dept: OPERATING ROOM | Age: 67
DRG: 854 | End: 2022-08-05
Payer: MEDICARE

## 2022-08-05 LAB
ALBUMIN SERPL-MCNC: 2.8 GM/DL (ref 3.4–5)
ALP BLD-CCNC: 64 IU/L (ref 40–128)
ALT SERPL-CCNC: 7 U/L (ref 10–40)
ANION GAP SERPL CALCULATED.3IONS-SCNC: 9 MMOL/L (ref 4–16)
AST SERPL-CCNC: 14 IU/L (ref 15–37)
BASOPHILS ABSOLUTE: 0.1 K/CU MM
BASOPHILS RELATIVE PERCENT: 0.7 % (ref 0–1)
BILIRUB SERPL-MCNC: 0.2 MG/DL (ref 0–1)
BUN BLDV-MCNC: 5 MG/DL (ref 6–23)
CALCIUM SERPL-MCNC: 8.1 MG/DL (ref 8.3–10.6)
CHLORIDE BLD-SCNC: 112 MMOL/L (ref 99–110)
CO2: 22 MMOL/L (ref 21–32)
CREAT SERPL-MCNC: 0.5 MG/DL (ref 0.6–1.1)
DIFFERENTIAL TYPE: ABNORMAL
DOSE AMOUNT: NORMAL
DOSE TIME: NORMAL
EOSINOPHILS ABSOLUTE: 0.1 K/CU MM
EOSINOPHILS RELATIVE PERCENT: 1.3 % (ref 0–3)
GFR AFRICAN AMERICAN: >60 ML/MIN/1.73M2
GFR NON-AFRICAN AMERICAN: >60 ML/MIN/1.73M2
GLUCOSE BLD-MCNC: 126 MG/DL (ref 70–99)
GLUCOSE BLD-MCNC: 144 MG/DL (ref 70–99)
GLUCOSE BLD-MCNC: 168 MG/DL (ref 70–99)
GLUCOSE BLD-MCNC: 190 MG/DL (ref 70–99)
GLUCOSE BLD-MCNC: 208 MG/DL (ref 70–99)
HCT VFR BLD CALC: 30 % (ref 37–47)
HEMOGLOBIN: 9.6 GM/DL (ref 12.5–16)
IMMATURE NEUTROPHIL %: 0.5 % (ref 0–0.43)
LACTATE: 1.9 MMOL/L (ref 0.4–2)
LYMPHOCYTES ABSOLUTE: 2.8 K/CU MM
LYMPHOCYTES RELATIVE PERCENT: 26.3 % (ref 24–44)
MAGNESIUM: 1.6 MG/DL (ref 1.8–2.4)
MCH RBC QN AUTO: 31.2 PG (ref 27–31)
MCHC RBC AUTO-ENTMCNC: 32 % (ref 32–36)
MCV RBC AUTO: 97.4 FL (ref 78–100)
MONOCYTES ABSOLUTE: 1.1 K/CU MM
MONOCYTES RELATIVE PERCENT: 10 % (ref 0–4)
NUCLEATED RBC %: 0 %
PDW BLD-RTO: 12.5 % (ref 11.7–14.9)
PLATELET # BLD: 314 K/CU MM (ref 140–440)
PMV BLD AUTO: 9.5 FL (ref 7.5–11.1)
POTASSIUM SERPL-SCNC: 4.1 MMOL/L (ref 3.5–5.1)
RBC # BLD: 3.08 M/CU MM (ref 4.2–5.4)
SEGMENTED NEUTROPHILS ABSOLUTE COUNT: 6.6 K/CU MM
SEGMENTED NEUTROPHILS RELATIVE PERCENT: 61.2 % (ref 36–66)
SODIUM BLD-SCNC: 143 MMOL/L (ref 135–145)
TOTAL IMMATURE NEUTOROPHIL: 0.05 K/CU MM
TOTAL NUCLEATED RBC: 0 K/CU MM
TOTAL PROTEIN: 5 GM/DL (ref 6.4–8.2)
VANCOMYCIN TROUGH: 14.9 UG/ML (ref 10–20)
WBC # BLD: 10.8 K/CU MM (ref 4–10.5)

## 2022-08-05 PROCEDURE — 6370000000 HC RX 637 (ALT 250 FOR IP): Performed by: STUDENT IN AN ORGANIZED HEALTH CARE EDUCATION/TRAINING PROGRAM

## 2022-08-05 PROCEDURE — 6370000000 HC RX 637 (ALT 250 FOR IP): Performed by: NURSE PRACTITIONER

## 2022-08-05 PROCEDURE — 6360000002 HC RX W HCPCS: Performed by: STUDENT IN AN ORGANIZED HEALTH CARE EDUCATION/TRAINING PROGRAM

## 2022-08-05 PROCEDURE — 85025 COMPLETE CBC W/AUTO DIFF WBC: CPT

## 2022-08-05 PROCEDURE — 36415 COLL VENOUS BLD VENIPUNCTURE: CPT

## 2022-08-05 PROCEDURE — 83605 ASSAY OF LACTIC ACID: CPT

## 2022-08-05 PROCEDURE — 80053 COMPREHEN METABOLIC PANEL: CPT

## 2022-08-05 PROCEDURE — 6360000002 HC RX W HCPCS: Performed by: NURSE PRACTITIONER

## 2022-08-05 PROCEDURE — 80202 ASSAY OF VANCOMYCIN: CPT

## 2022-08-05 PROCEDURE — 83735 ASSAY OF MAGNESIUM: CPT

## 2022-08-05 PROCEDURE — 1200000000 HC SEMI PRIVATE

## 2022-08-05 PROCEDURE — 82962 GLUCOSE BLOOD TEST: CPT

## 2022-08-05 PROCEDURE — 2580000003 HC RX 258: Performed by: STUDENT IN AN ORGANIZED HEALTH CARE EDUCATION/TRAINING PROGRAM

## 2022-08-05 PROCEDURE — 94761 N-INVAS EAR/PLS OXIMETRY MLT: CPT

## 2022-08-05 PROCEDURE — 2580000003 HC RX 258: Performed by: NURSE PRACTITIONER

## 2022-08-05 RX ORDER — ACETAMINOPHEN 500 MG
500 TABLET ORAL EVERY 6 HOURS
Status: DISCONTINUED | OUTPATIENT
Start: 2022-08-05 | End: 2022-08-11 | Stop reason: HOSPADM

## 2022-08-05 RX ORDER — MORPHINE SULFATE 2 MG/ML
2 INJECTION, SOLUTION INTRAMUSCULAR; INTRAVENOUS EVERY 6 HOURS PRN
Status: COMPLETED | OUTPATIENT
Start: 2022-08-05 | End: 2022-08-06

## 2022-08-05 RX ORDER — MAGNESIUM SULFATE IN WATER 40 MG/ML
2000 INJECTION, SOLUTION INTRAVENOUS ONCE
Status: COMPLETED | OUTPATIENT
Start: 2022-08-05 | End: 2022-08-05

## 2022-08-05 RX ORDER — HYDROCODONE BITARTRATE AND ACETAMINOPHEN 5; 325 MG/1; MG/1
1 TABLET ORAL EVERY 4 HOURS PRN
Status: DISCONTINUED | OUTPATIENT
Start: 2022-08-05 | End: 2022-08-11 | Stop reason: HOSPADM

## 2022-08-05 RX ORDER — SODIUM CHLORIDE, SODIUM LACTATE, POTASSIUM CHLORIDE, CALCIUM CHLORIDE 600; 310; 30; 20 MG/100ML; MG/100ML; MG/100ML; MG/100ML
INJECTION, SOLUTION INTRAVENOUS CONTINUOUS
Status: DISCONTINUED | OUTPATIENT
Start: 2022-08-05 | End: 2022-08-10

## 2022-08-05 RX ADMIN — SODIUM CHLORIDE, PRESERVATIVE FREE 10 ML: 5 INJECTION INTRAVENOUS at 05:52

## 2022-08-05 RX ADMIN — PANTOPRAZOLE SODIUM 40 MG: 40 TABLET, DELAYED RELEASE ORAL at 06:38

## 2022-08-05 RX ADMIN — ACETAMINOPHEN 500 MG: 500 TABLET ORAL at 22:05

## 2022-08-05 RX ADMIN — PIPERACILLIN AND TAZOBACTAM 3375 MG: 3; .375 INJECTION, POWDER, LYOPHILIZED, FOR SOLUTION INTRAVENOUS at 14:41

## 2022-08-05 RX ADMIN — GABAPENTIN 300 MG: 300 CAPSULE ORAL at 21:53

## 2022-08-05 RX ADMIN — PIPERACILLIN AND TAZOBACTAM 3375 MG: 3; .375 INJECTION, POWDER, LYOPHILIZED, FOR SOLUTION INTRAVENOUS at 05:49

## 2022-08-05 RX ADMIN — CLONIDINE HYDROCHLORIDE 0.1 MG: 0.1 TABLET ORAL at 21:54

## 2022-08-05 RX ADMIN — PIPERACILLIN AND TAZOBACTAM 3375 MG: 3; .375 INJECTION, POWDER, LYOPHILIZED, FOR SOLUTION INTRAVENOUS at 21:57

## 2022-08-05 RX ADMIN — VANCOMYCIN HYDROCHLORIDE 1000 MG: 1 INJECTION, POWDER, LYOPHILIZED, FOR SOLUTION INTRAVENOUS at 06:40

## 2022-08-05 RX ADMIN — SODIUM CHLORIDE, POTASSIUM CHLORIDE, SODIUM LACTATE AND CALCIUM CHLORIDE: 600; 310; 30; 20 INJECTION, SOLUTION INTRAVENOUS at 09:33

## 2022-08-05 RX ADMIN — HYDROCODONE BITARTRATE AND ACETAMINOPHEN 1 TABLET: 5; 325 TABLET ORAL at 13:33

## 2022-08-05 RX ADMIN — SODIUM CHLORIDE: 9 INJECTION, SOLUTION INTRAVENOUS at 05:48

## 2022-08-05 RX ADMIN — HYDROCODONE BITARTRATE AND ACETAMINOPHEN 1 TABLET: 5; 325 TABLET ORAL at 19:38

## 2022-08-05 RX ADMIN — CILOSTAZOL 100 MG: 100 TABLET ORAL at 09:03

## 2022-08-05 RX ADMIN — INSULIN LISPRO 2 UNITS: 100 INJECTION, SOLUTION INTRAVENOUS; SUBCUTANEOUS at 17:38

## 2022-08-05 RX ADMIN — ATORVASTATIN CALCIUM 10 MG: 10 TABLET, FILM COATED ORAL at 09:03

## 2022-08-05 RX ADMIN — VANCOMYCIN HYDROCHLORIDE 750 MG: 750 INJECTION, POWDER, LYOPHILIZED, FOR SOLUTION INTRAVENOUS at 22:46

## 2022-08-05 RX ADMIN — CILOSTAZOL 100 MG: 100 TABLET ORAL at 21:53

## 2022-08-05 RX ADMIN — PIPERACILLIN AND TAZOBACTAM 3375 MG: 3; .375 INJECTION, POWDER, LYOPHILIZED, FOR SOLUTION INTRAVENOUS at 09:35

## 2022-08-05 RX ADMIN — MORPHINE SULFATE 2 MG: 2 INJECTION, SOLUTION INTRAMUSCULAR; INTRAVENOUS at 22:05

## 2022-08-05 RX ADMIN — MAGNESIUM SULFATE HEPTAHYDRATE 2000 MG: 2 INJECTION, SOLUTION INTRAVENOUS at 12:16

## 2022-08-05 ASSESSMENT — PAIN SCALES - GENERAL
PAINLEVEL_OUTOF10: 9
PAINLEVEL_OUTOF10: 0
PAINLEVEL_OUTOF10: 9

## 2022-08-05 ASSESSMENT — PAIN DESCRIPTION - DESCRIPTORS: DESCRIPTORS: ACHING;SORE

## 2022-08-05 ASSESSMENT — PAIN DESCRIPTION - LOCATION
LOCATION: FOOT

## 2022-08-05 ASSESSMENT — PAIN DESCRIPTION - ORIENTATION: ORIENTATION: LEFT

## 2022-08-05 ASSESSMENT — ENCOUNTER SYMPTOMS: SHORTNESS OF BREATH: 1

## 2022-08-05 NOTE — CONSULTS
Podiatry Consult Note      CHIEF COMPLAINT:    Chief Complaint   Patient presents with    Toe Injury     Injured by pediatrist approx 4 weeks ago. Been getting worse since and states it is now infected     Wound Check       HISTORY OF PRESENT ILLNESS:      The patient is a 61 y.o. female who presents with medical history as noted below. I saw the patient this past week in the wound care center and did send her into the hospital with concern for worsening infection to the left great toe. Still having pain to the area but not as bad as previous. Denies any constitutional symptoms today.     Past Medical History:    Past Medical History:   Diagnosis Date    Cancer ([facility]     Diabetes mellitus ([facility]     Hyperlipidemia     Hypertension       Past Surgical History:    Past Surgical History:   Procedure Laterality Date    LUNG CANCER SURGERY Left 10/2019    UPPER GASTROINTESTINAL ENDOSCOPY N/A 10/3/2020    EGD BIOPSY performed by Flavio Whalen MD at [facility] ENDOSCOPY     Current Medications:   Current Facility-Administered Medications   Medication Dose Route Frequency Provider Last Rate Last Admin    magnesium sulfate 2000 mg in 50 mL IVPB premix  2,000 mg IntraVENous Once Lizbeth Alston MD        lactated ringers infusion   IntraVENous Continuous Enriqueta Rod MD 75 mL/hr at 08/05/22 0933 New Bag at 08/05/22 0933    enoxaparin (LOVENOX) injection 40 mg  40 mg SubCUTAneous Daily CAYLA Causey CNP   40 mg at 08/04/22 0952    ondansetron (ZOFRAN-ODT) disintegrating tablet 4 mg  4 mg Oral Q8H PRN CAYLA Causey CNP        Or    ondansetron (ZOFRAN) injection 4 mg  4 mg IntraVENous Q6H PRN CAYLA Causey CNP        polyethylene glycol (GLYCOLAX) packet 17 g  17 g Oral Daily PRN CAYLA Causey CNP        acetaminophen (TYLENOL) tablet 650 mg  650 mg Oral Q6H PRN CAYLA Causey CNP        Or    acetaminophen (TYLENOL) suppository 650 mg  650 mg Rectal Q6H PRN [place], APRN - CNP        sodium chloride flush 0.9 % injection 5-40 mL  5-40 mL IntraVENous BID [place], APRN - CNP   10 mL at 08/05/22 0552    sodium chloride flush 0.9 % injection 5-40 mL  5-40 mL IntraVENous PRN Vera I Sheila, APRN - CNP        0.9 % sodium chloride infusion   IntraVENous PRN [place], APRN - CNP 25 mL/hr at 08/05/22 0548 New Bag at 08/05/22 0548    pantoprazole (PROTONIX) tablet 40 mg  40 mg Oral Daily [place], APRN - CNP   40 mg at 08/05/22 8322    atorvastatin (LIPITOR) tablet 10 mg  10 mg Oral Daily [place], APRN - CNP   10 mg at 08/05/22 0480    lisinopril (PRINIVIL;ZESTRIL) tablet 15 mg  15 mg Oral Daily [place], APRN - CNP   15 mg at 08/04/22 0948    HYDROcodone-acetaminophen (NORCO) 5-325 MG per tablet 1 tablet  1 tablet Oral Q4H PRN [place], APRN - CNP   1 tablet at 08/04/22 2025    cloNIDine (CATAPRES) tablet 0.1 mg  0.1 mg Oral BID [place], APRN - CNP   0.1 mg at 08/04/22 2153    cilostazol (PLETAL) tablet 100 mg  100 mg Oral BID [place], APRN - CNP   100 mg at 08/05/22 0903    glucose chewable tablet 16 g  4 tablet Oral PRN Vera I Sheila, APRN - CNP        dextrose bolus 10% 125 mL  125 mL IntraVENous PRN Vera I Sheila, APRN - CNP        Or    dextrose bolus 10% 250 mL  250 mL IntraVENous PRN Vera I Sheila, APRN - CNP        glucagon (rDNA) injection 1 mg  1 mg SubCUTAneous PRN Vera I Sheila, APRN - CNP        dextrose 10 % infusion   IntraVENous Continuous PRN Vera I Sheila, APRN - CNP        piperacillin-tazobactam (ZOSYN) 3,375 mg in dextrose 5 % 50 mL IVPB extended infusion (mini-bag)  3,375 mg IntraVENous Q6H Vera I Sheila, APRN - CNP   Stopped at 08/05/22 1005    vancomycin (VANCOCIN) 1,000 mg in dextrose 5 % 250 mL IVPB ([id number])  1,000 mg IntraVENous Q12H CAYLA Causey CNP   Stopped at 08/05/22 0857    insulin lispro (HUMALOG) injection vial 0-8 Units  0-8 Units SubCUTAneous TID WC CAYLA Causey CNP        insulin lispro (HUMALOG) injection vial 0-4 Units  0-4 Units SubCUTAneous Nightly CAYLA Causey - CNP        gabapentin (NEURONTIN) capsule 300 mg  300 mg Oral Nightly CAYLA Causey CNP   300 mg at 08/04/22 2153      Allergies: Patient has no known allergies. Social History:   Social History     Socioeconomic History    Marital status:      Spouse name: Not on file    Number of children: Not on file    Years of education: Not on file    Highest education level: Not on file   Occupational History    Not on file   Tobacco Use    Smoking status: Former     Packs/day: 1.00     Types: Cigarettes    Smokeless tobacco: Never    Tobacco comments:     10/2019   Substance and Sexual Activity    Alcohol use: Yes     Alcohol/week: 1.0 standard drink     Types: 1 Cans of beer per week    Drug use: Yes     Types: Marijuana Darryle Pimple)    Sexual activity: Yes     Partners: Male   Other Topics Concern    Not on file   Social History Narrative    Not on file     Social Determinants of Health     Financial Resource Strain: Not on file   Food Insecurity: Not on file   Transportation Needs: Not on file   Physical Activity: Not on file   Stress: Not on file   Social Connections: Not on file   Intimate Partner Violence: Not on file   Housing Stability: Not on file     Family History:   History reviewed. No pertinent family history. REVIEW OF SYSTEMS:    Cardiac: Denies chest pain, palpitations, or irregular heart beat  Pulmonary: Denies cough, wheezing, or sputum production  Constitutional: Denies fever, chills, or diarrhea      PHYSICAL EXAM:    BP (!) 93/58   Pulse 86   Temp 98 °F (36.7 °C) (Oral)   Resp 18   Ht 5' 4\" (1.626 m)   Wt 138 lb 14.2 oz (63 kg)   SpO2 99%   BMI 23.84 kg/m²      Vascular: Pedal pulses to the left foot are nonpalpable.   Skin temperature warm to cool proximal tibial tuberosity to distal digits. Capillary refill time sluggish to left great toe  Neurologic: Gross and epicritic sensation are diminished  Dermatologic: Full-thickness ulceration with exposed distal phalanx hallux of the left foot. Surrounding erythema to the left great toe with proximal erythematous streaking to the midfoot is noted. No drainage or abscess noted today. Musculoskeletal: Pain on palpation is noted to affected area of the left foot    Imaging:  (8/4/2022): MRI left foot with and without contrast does show osteomyelitis of the distal phalanx of the left great toe with surrounding soft tissue edema and mild enhancement consistent with cellulitis. No organized drainable fluid collection identified.    (8/3/2022): X-rays of the left toe 2 views obtained on admission to the hospital show soft tissue ulceration of the great toe with osteolysis of the distal phalanx concerning for osteomyelitis    Assessment:   -Osteomyelitis left foot  -Type 2 diabetes mellitus with foot ulceration and necrosis of bone left foot  -Type 2 diabetes mellitus with peripheral neuropathy  -Peripheral vascular disease  -Cellulitis left foot      Plan:    -Patient was seen, evaluated, and treated today.  present for entirety of exam and treatment.  -Chronic wound left great toe that I sent the patient to the hospital for with concern for worsening infection after I saw her in the wound care center on Tuesday  -Leukocytosis and elevated lactic acid on admission to the hospital.  Both these are improving.  -Left foot x-ray concerning for osteomyelitis and left foot MRI shows osteomyelitis of distal phalanx of left great toe  -Discussed treatment options with patient moving forward. Discussed conservative and surgical care.   Patient elects to proceed with hallux amputation with excisional debridement of all nonviable tissue and bone to the left foot.  -All risks benefits and complication of the procedure were explained to the patient with her full understanding. No guarantees were given or implied.  -Patient is a high risk for wound healing complications, worsening bone infection, higher level amputation given her history of diabetes with uncontrolled wounds and history of arterial disease. She understands this  -Given the arterial study findings I recommend vascular surgery consult to see if any intervention needs to be done to improve circulation and healing status to the left foot  -Betadine wet to dry dressing orders placed.  -Surgery scheduled for 7:30 AM this upcoming Monday.  -N.p.o. at midnight Sunday and anticipation for surgery.  -Further recommendations pending intraoperative findings.  -Please contact any questions.       Dillon Isbell DPM    [facility] [address] [place] [address] and Ankle Surgery        Electronically signed by Dillon Isbell DPM on 8/5/2022 at 12:12 PM

## 2022-08-05 NOTE — ANESTHESIA PRE PROCEDURE
Department of Anesthesiology  Preprocedure Note       Name:  Rafal Villela   Age:  61 y.o.  :  1955                                          MRN:  [id number]         Date:  2022      Surgeon: Omar Hunt):  Bubba Lorenzo DPM    Procedure: Procedure(s):  LEFT FOOT HALLUX AMPUTATION EXCISIONAL DEBRIDEMENT ALL NON VIABLE   TISSUE AND BONE    Medications prior to admission:   Prior to Admission medications    Medication Sig Start Date End Date Taking? Authorizing Provider   gabapentin (NEURONTIN) 300 MG capsule 300 mg. 3/15/22   Historical Provider, MD   glimepiride (AMARYL) 2 MG tablet Take 1 tablet by mouth in the morning. 22   Historical Provider, MD   metFORMIN (GLUCOPHAGE) 1000 MG tablet Take 1 tablet by mouth in the morning and 1 tablet before bedtime. 22   Historical Provider, MD   HYDROcodone-acetaminophen (NORCO) 5-325 MG per tablet Take 1 tablet by mouth every 4 hours as needed for Pain for up to 3 days. Intended supply: 3 days. Take lowest dose possible to manage pain 22  Bubba Lorenzo DPM   pantoprazole (PROTONIX) 40 MG tablet Take 1 tablet by mouth daily 10/4/20 11/3/20  Moreno Angel MD   cilostazol (PLETAL) 100 MG tablet Take 100 mg by mouth 2 times daily    Historical Provider, MD   lisinopril (PRINIVIL;ZESTRIL) 5 MG tablet Take 15 mg by mouth daily. Historical Provider, MD   cloNIDine (CATAPRES) 0.1 MG tablet Take 0.1 mg by mouth 2 times daily. Historical Provider, MD   simvastatin (ZOCOR) 20 MG tablet Take 40 mg by mouth nightly     Historical Provider, MD       Current medications:    No current facility-administered medications for this visit. No current outpatient medications on file.      Facility-Administered Medications Ordered in Other Visits   Medication Dose Route Frequency Provider Last Rate Last Admin    magnesium sulfate 2000 mg in 50 mL IVPB premix  2,000 mg IntraVENous Once Rocky North MD 25 mL/hr at 22 1216 2,000 mg at 22 1216    lactated ringers infusion   IntraVENous Continuous Enriqueta Rod MD 75 mL/hr at 08/05/22 0933 New Bag at 08/05/22 0933    enoxaparin (LOVENOX) injection 40 mg  40 mg SubCUTAneous Daily Vera Sosa, APRN - CNP   40 mg at 08/04/22 0952    ondansetron (ZOFRAN-ODT) disintegrating tablet 4 mg  4 mg Oral Q8H PRN Vera I Sheila APRN - CNP        Or    ondansetron (ZOFRAN) injection 4 mg  4 mg IntraVENous Q6H PRN Vera I Sheila, APRN - CNP        polyethylene glycol (GLYCOLAX) packet 17 g  17 g Oral Daily PRN Vera Sosa, APRN - CNP        acetaminophen (TYLENOL) tablet 650 mg  650 mg Oral Q6H PRN Vera I Sheila, APRN - CNP        Or    acetaminophen (TYLENOL) suppository 650 mg  650 mg Rectal Q6H PRN Vera I Sheila, APRN - CNP        sodium chloride flush 0.9 % injection 5-40 mL  5-40 mL IntraVENous BID Vera Sosa, APRN - CNP   10 mL at 08/05/22 0552    sodium chloride flush 0.9 % injection 5-40 mL  5-40 mL IntraVENous PRN Vera I Sheila, APRN - CNP        0.9 % sodium chloride infusion   IntraVENous PRN Leif Hubbard, APRN - CNP 25 mL/hr at 08/05/22 0548 New Bag at 08/05/22 0548    pantoprazole (PROTONIX) tablet 40 mg  40 mg Oral Daily Vera Sosa, APRN - CNP   40 mg at 08/05/22 3862    atorvastatin (LIPITOR) tablet 10 mg  10 mg Oral Daily Vera Sosa, APRN - CNP   10 mg at 08/05/22 0903    lisinopril (PRINIVIL;ZESTRIL) tablet 15 mg  15 mg Oral Daily Vera I Sheila, APRN - CNP   15 mg at 08/04/22 0948    HYDROcodone-acetaminophen (NORCO) 5-325 MG per tablet 1 tablet  1 tablet Oral Q4H PRN Leif Hubbard, APRN - CNP   1 tablet at 08/04/22 2025    cloNIDine (CATAPRES) tablet 0.1 mg  0.1 mg Oral BID Leif Hubbard, APRN - CNP   0.1 mg at 08/04/22 2153    cilostazol (PLETAL) tablet 100 mg  100 mg Oral BID Leif Selma, APRN - CNP   100 mg at 08/05/22 0903    glucose chewable tablet 16 g  4 tablet Oral PRN Bev Huitron, APRN - CNP        dextrose bolus 10% 125 mL  125 mL IntraVENous PRN CAYLA Causey - CNP        Or    dextrose bolus 10% 250 mL  250 mL IntraVENous PRN Vera I Sheila, CAYLA - CNP        glucagon (rDNA) injection 1 mg  1 mg SubCUTAneous PRN Vera I CAYLA Sosa - CNP        dextrose 10 % infusion   IntraVENous Continuous PRN CAYLA Causey - CNP        piperacillin-tazobactam (ZOSYN) 3,375 mg in dextrose 5 % 50 mL IVPB extended infusion (mini-bag)  3,375 mg IntraVENous Q6H Vera Sosa APRN - CNP   Stopped at 08/05/22 1005    vancomycin (VANCOCIN) 1,000 mg in dextrose 5 % 250 mL IVPB (Sdys2Bgp)  1,000 mg IntraVENous Q12H Lauren I Walton Leventhal, APRN - CNP   Stopped at 08/05/22 0857    insulin lispro (HUMALOG) injection vial 0-8 Units  0-8 Units SubCUTAneous TID WC CAYLA Causey - CNP        insulin lispro (HUMALOG) injection vial 0-4 Units  0-4 Units SubCUTAneous Nightly CAYLA Causey - CNP        gabapentin (NEURONTIN) capsule 300 mg  300 mg Oral Nightly Bev SinDAVI chirinosN - CNP   300 mg at 08/04/22 2153       Allergies:  No Known Allergies    Problem List:    Patient Active Problem List   Diagnosis Code    Chest pain R07.9    Dyspnea and respiratory abnormalities R06.00, R06.89    Acute gastritis without hemorrhage K29.00    Diabetic ulcer of toe of left foot associated with type 2 diabetes mellitus, with fat layer exposed ([facility] E11.621, L97.522    Type 2 diabetes mellitus with peripheral neuropathy (HCC) E11.42    Peripheral vascular disease (HCC) I73.9    History of nicotine dependence Z87.891    Severe sepsis (HCC) A41.9, R65.20       Past Medical History:        Diagnosis Date    Cancer ([facility]     Diabetes mellitus ([facility] [facility]     Hyperlipidemia     Hypertension        Past Surgical History:        Procedure Laterality Date    LUNG CANCER SURGERY Left 10/2019    UPPER GASTROINTESTINAL ENDOSCOPY N/A 10/3/2020    EGD BIOPSY performed by Erik Carson MD at [facility] ENDOSCOPY       Social History:    Social History     Tobacco Use    Smoking status: Former     Packs/day: 1.00     Types: Cigarettes    Smokeless tobacco: Never    Tobacco comments:     10/2019   Substance Use Topics    Alcohol use: Yes     Alcohol/week: 1.0 standard drink     Types: 1 Cans of beer per week                                Counseling given: Not Answered  Tobacco comments: 10/2019      Vital Signs (Current): There were no vitals filed for this visit.                                            BP Readings from Last 3 Encounters:   08/05/22 (!) 93/58   08/02/22 (!) 148/76   07/26/22 137/85       NPO Status:                                                                                 BMI:   Wt Readings from Last 3 Encounters:   08/05/22 138 lb 14.2 oz (63 kg)   08/04/22 137 lb 7 oz (62.3 kg)   10/04/20 154 lb 9 oz (70.1 kg)     There is no height or weight on file to calculate BMI.    CBC:   Lab Results   Component Value Date/Time    WBC 10.8 08/05/2022 02:37 AM    RBC 3.08 08/05/2022 02:37 AM    HGB 9.6 08/05/2022 02:37 AM    HCT 30.0 08/05/2022 02:37 AM    MCV 97.4 08/05/2022 02:37 AM    RDW 12.5 08/05/2022 02:37 AM     08/05/2022 02:37 AM       CMP:   Lab Results   Component Value Date/Time     08/05/2022 02:37 AM    K 4.1 08/05/2022 02:37 AM     08/05/2022 02:37 AM    CO2 22 08/05/2022 02:37 AM    BUN 5 08/05/2022 02:37 AM    CREATININE 0.5 08/05/2022 02:37 AM    GFRAA >60 08/05/2022 02:37 AM    LABGLOM >60 08/05/2022 02:37 AM    GLUCOSE 144 08/05/2022 02:37 AM    PROT 5.0 08/05/2022 02:37 AM    CALCIUM 8.1 08/05/2022 02:37 AM    BILITOT 0.2 08/05/2022 02:37 AM    ALKPHOS 64 08/05/2022 02:37 AM    AST 14 08/05/2022 02:37 AM    ALT 7 08/05/2022 02:37 AM       POC Tests:   Recent Labs     08/05/22  1050   POCGLU 190*       Coags:   Lab Results   Component Value Date/Time    PROTIME 12.8 08/04/2022 03:35 AM    INR 0.99 08/04/2022 03:35 AM    APTT 31.0 10/03/2020 05:54 AM       HCG (If Applicable): No results found for: PREGTESTUR, PREGSERUM, HCG, HCGQUANT     ABGs: No results found for: PHART, PO2ART, GTM9UUR, ZVJ9EUW, BEART, S2BRWZPM     Type & Screen (If Applicable):  No results found for: LABABO, LABRH    Drug/Infectious Status (If Applicable):  No results found for: HIV, HEPCAB    COVID-19 Screening (If Applicable):   Lab Results   Component Value Date/Time    COVID19 NOT DETECTED 10/03/2020 09:30 AM         Anesthesia Evaluation  Patient summary reviewed no history of anesthetic complications:   Airway: Mallampati: I  TM distance: >3 FB   Neck ROM: full  Mouth opening: > = 3 FB   Dental:    (+) edentulous      Pulmonary:normal exam    (+) COPD: mild,  shortness of breath:                            ROS comment: history of lung cancer, status post left lobectomy, no chemo or radiation  Former Smoker    Cardiovascular:  Exercise tolerance: good (>4 METS),   (+) hypertension:, hyperlipidemia        Rhythm: regular  Rate: abnormal           Beta Blocker:  Not on Beta Blocker        PE comment:  in preop   Neuro/Psych:               GI/Hepatic/Renal:            ROS comment: Abnormal CT  Acute gastritis . Endo/Other:    (+) DiabetesType II DM, , .                  ROS comment: Diabetes foot ulcer   Sepsis  Abdominal:             Vascular:   + PVD, aortic or cerebral, . Other Findings:             Anesthesia Plan      MAC     ASA 3     (Chart pre-reviewed on 08/05/22  Mg 1.6)  Induction: intravenous. Anesthetic plan and risks discussed with patient. Plan discussed with CRNA. CAYLA Olivas CRNA   8/5/2022      Pre Anesthesia Evaluation complete. Anesthesia plan, risks, benefits, alternatives, and personnel discussed with patient and/or legal guardian. Patient and/or legal guardian verbalized an understanding and agreed to proceed.  Anesthesia plan discussed with care team members and agreed upon.   Tushar Cote, APRN - CRNA  8/5/2022

## 2022-08-05 NOTE — PROGRESS NOTES
In room while attending with pt, pt bp soft, starting on LR 75/hr, mag to be administered for replacement NPO pending surgical decision.

## 2022-08-05 NOTE — PROGRESS NOTES
[address] [facility]  consulted by KENNETH Cole for monitoring and adjustment. Indication for treatment: Severe Sepsis secondary to cellulitis with suspected osteomylitis  Goal trough: [] 10-15 mcg/mL or [x] 15-20 mcg/ml  AUC/TORRI: [] <500 or [x] 400-600    Pertinent Laboratory Values:   Temp Readings from Last 3 Encounters:   08/05/22 (P) 98.4 °F (36.9 °C)   08/02/22 97.8 °F (36.6 °C) (Temporal)   07/26/22 97.4 °F (36.3 °C) (Temporal)     Recent Labs     08/04/22  0335 08/04/22  1053 08/04/22  1341 08/04/22  1718 08/04/22  2245 08/05/22  0237   WBC 12.2*  --  12.2*  --   --  10.8*   LACTATE 3.9*   < > 3.6* 4.4* 3.3* 1.9    < > = values in this interval not displayed. Recent Labs     08/04/22  0335 08/04/22  1341 08/05/22  0237   BUN 7 4* 5*   CREATININE 0.5* 0.5* 0.5*       Estimated Creatinine Clearance: 98 mL/min (A) (based on SCr of 0.5 mg/dL (L)).     Intake/Output Summary (Last 24 hours) at 8/5/2022 1454  Last data filed at 8/5/2022 1337  Gross per 24 hour   Intake 360 ml   Output --   Net 360 ml       Pertinent Cultures:  Date    Source    Results  08/03   Blood    NGTD           Assessment:  Renal trends: stable, WNL  Day(s) of therapy: 2 of 7  Vancomycin concentration:   08/05: 14.9, collected 9h post-dose,     Plan:  Reduce dosing to 750 mg IVPB q12h  Predicted AUC: 434, trough: 13  Plan to collect a level tomorrow AM  Pharmacy will continue to monitor patient and adjust therapy as indicated    [place] 3 8/7 @ 8733    Thank you for the consult,  Waylon Garrison, [address] [place] [address], PharmD  8/5/2022 2:54 PM

## 2022-08-05 NOTE — CARE COORDINATION
Pt discussed in IDR. Pt is scheduled to have toe amputation on Monday. Pt will need Therapy evaluations and recommendations post surgery. CM following.

## 2022-08-05 NOTE — PROGRESS NOTES
V2.0  [facility] Progress Note      Name:  Ifrah Velazquez /Age/Sex: 1955  (61 y.o. female)   MRN & CSN:  [id number] & [id number] Encounter Date/Time: 2022 7:32 AM EDT    Location:  [facility]/1110-A PCP: Katya Morales MD       Hospital Day: 3    Assessment and Plan:   Ifrah Velazquez is a 61 y.o. female with pmh of DM2, HTN, HLD who presents with Severe sepsis ([address]      Plan:    Severe sepsis 2/2 osteomyelitis of left great toe with surrounding cellulitis  Nonhealing diabetic foot ulcer  Leukocytosis, lactic acidosis, tachycardia. X-ray left foot with soft tissue ulceration involving the great toe, also concern for osteomyelitis. MRI foot with osteomyelitis of the distal phalanx of the left great toe with surrounding soft tissue edema and mild enhancement consistent with cellulitis. No organized drainable fluid collection identified. -Vancomycin and Zosyn  -IVF hydration  -Discussed with podiatry: left foot hallux amputation on Monday at 7:30 AM  -Arterial duplex studies  -LLE MRI left foot ordered and pending  -Bcx NGTD  -Lactate normalized  -N.p.o. after midnight     Non-insulin-dependent diabetes mellitus type 2  -Hold home p.o. meds  -SSI, glucose checks, hypoglycemia protocol  -Strict glucose control to help with wound healing    Hypomagnesemia, hypokalemia  -Replete as required      Diet Diet NPO   DVT Prophylaxis [x] Lovenox, []  Heparin, [] SCDs, [] Ambulation,  [] Eliquis, [] Xarelto  [] Coumadin   Code Status Full Code   Disposition From: Home  Expected Disposition: Home  Estimated Date of Discharge: 2-3 days  Patient requires continued admission due to severe sepsis   Surrogate Decision Maker/ POA      Subjective:     Chief Complaint: Toe Injury (Injured by pediatrist approx 4 weeks ago.  Been getting worse since and states it is now infected ) and Wound Check       Ifrah Velazquez is a 61 y.o. female who presents at request of her podiatrist with nonhealing left toe ulcer for past month associated with some necrosis at the tip of her left toe. Was assessed by podiatry the day prior to presentation, it was suggested that she present to ED for IV antibiotics and amputation of left great toe    She feels well today and only complains of toe pain. Is urinating clear yellow urine with adequate amounts. Tolerating PO intake, no BM yet. Review of Systems:    Review of Systems    No fevers, chills, cough, SOB, palpitations, chest pain, diarrhea, nausea, vomiting, abdominal pain. Objective:   No intake or output data in the 24 hours ending 08/05/22 0803     Vitals:   Vitals:    08/05/22 0417   BP: (!) 92/53   Pulse: 86   Resp: 18   Temp: 97.5 °F (36.4 °C)   SpO2: 95%       Physical Exam:     General: NAD  Eyes: EOMI  ENT: neck supple  Cardiovascular: Regular rate. Respiratory: Clear to auscultation  Gastrointestinal: Soft, non tender  Genitourinary: no suprapubic tenderness  Musculoskeletal: No edema  Skin: Ulcer of left great toe, tenderness on palpation  Neuro: Alert. Psych: Mood appropriate.      Medications:   Medications:    magnesium sulfate  2,000 mg IntraVENous Once    enoxaparin  40 mg SubCUTAneous Daily    sodium chloride flush  5-40 mL IntraVENous BID    pantoprazole  40 mg Oral Daily    atorvastatin  10 mg Oral Daily    lisinopril  15 mg Oral Daily    cloNIDine  0.1 mg Oral BID    cilostazol  100 mg Oral BID    piperacillin-tazobactam  3,375 mg IntraVENous Q6H    vancomycin  1,000 mg IntraVENous Q12H    insulin lispro  0-8 Units SubCUTAneous TID WC    insulin lispro  0-4 Units SubCUTAneous Nightly    gabapentin  300 mg Oral Nightly      Infusions:    sodium chloride 25 mL/hr at 08/05/22 0548    dextrose       PRN Meds: ondansetron, 4 mg, Q8H PRN   Or  ondansetron, 4 mg, Q6H PRN  polyethylene glycol, 17 g, Daily PRN  acetaminophen, 650 mg, Q6H PRN   Or  acetaminophen, 650 mg, Q6H PRN  sodium chloride flush, 5-40 mL, PRN  sodium chloride, , PRN  HYDROcodone-acetaminophen, 1 tablet, Q4H PRN  glucose, 4 tablet, PRN  dextrose bolus, 125 mL, PRN   Or  dextrose bolus, 250 mL, PRN  glucagon (rDNA), 1 mg, PRN  dextrose, , Continuous PRN      Labs      Recent Results (from the past 24 hour(s))   POCT Glucose    Collection Time: 08/04/22 10:32 AM   Result Value Ref Range    POC Glucose 163 (H) 70 - 99 MG/DL   Lactic Acid    Collection Time: 08/04/22 10:53 AM   Result Value Ref Range    Lactate 3.8 (HH) 0.4 - 2.0 mMOL/L   Calcium, Ionized    Collection Time: 08/04/22 11:35 AM   Result Value Ref Range    Ionized Ca 0.99 (L) 1.12 - 1.32 mMOL/L    Calcium, Ionized 3.96 (L) 4.48 - 5.28 MG/DL   Lactic Acid    Collection Time: 08/04/22  1:41 PM   Result Value Ref Range    Lactate 3.6 (HH) 0.4 - 2.0 mMOL/L   CBC with Auto Differential    Collection Time: 08/04/22  1:41 PM   Result Value Ref Range    WBC 12.2 (H) 4.0 - 10.5 K/CU MM    RBC 3.30 (L) 4.2 - 5.4 M/CU MM    Hemoglobin 10.4 (L) 12.5 - 16.0 GM/DL    Hematocrit 31.3 (L) 37 - 47 %    MCV 94.8 78 - 100 FL    MCH 31.5 (H) 27 - 31 PG    MCHC 33.2 32.0 - 36.0 %    RDW 12.4 11.7 - 14.9 %    Platelets 381 621 - 807 K/CU MM    MPV 9.8 7.5 - 11.1 FL    Differential Type AUTOMATED DIFFERENTIAL     Segs Relative 72.1 (H) 36 - 66 %    Lymphocytes % 19.7 (L) 24 - 44 %    Monocytes % 6.6 (H) 0 - 4 %    Eosinophils % 0.7 0 - 3 %    Basophils % 0.6 0 - 1 %    Segs Absolute 8.8 K/CU MM    Lymphocytes Absolute 2.4 K/CU MM    Monocytes Absolute 0.8 K/CU MM    Eosinophils Absolute 0.1 K/CU MM    Basophils Absolute 0.1 K/CU MM    Nucleated RBC % 0.0 %    Total Nucleated RBC 0.0 K/CU MM    Total Immature Neutrophil 0.04 K/CU MM    Immature Neutrophil % 0.3 0 - 0.43 %   Magnesium    Collection Time: 08/04/22  1:41 PM   Result Value Ref Range    Magnesium 2.2 1.8 - 2.4 mg/dl   Comprehensive Metabolic Panel    Collection Time: 08/04/22  1:41 PM   Result Value Ref Range    Sodium 139 135 - 145 MMOL/L    Potassium 3.6 3.5 - 5.1 MMOL/L Chloride 107 99 - 110 mMol/L    CO2 20 (L) 21 - 32 MMOL/L    BUN 4 (L) 6 - 23 MG/DL    Creatinine 0.5 (L) 0.6 - 1.1 MG/DL    Glucose 179 (H) 70 - 99 MG/DL    Calcium 7.8 (L) 8.3 - 10.6 MG/DL    Albumin 3.1 (L) 3.4 - 5.0 GM/DL    Total Protein 5.1 (L) 6.4 - 8.2 GM/DL    Total Bilirubin 0.3 0.0 - 1.0 MG/DL    ALT 7 (L) 10 - 40 U/L    AST 14 (L) 15 - 37 IU/L    Alkaline Phosphatase 67 40 - 128 IU/L    GFR Non-African American >60 >60 mL/min/1.73m2    GFR African American >60 >60 mL/min/1.73m2    Anion Gap 12 4 - 16   Lactic Acid    Collection Time: 08/04/22  5:18 PM   Result Value Ref Range    Lactate 4.4 (HH) 0.4 - 2.0 mMOL/L   POCT Glucose    Collection Time: 08/04/22  9:32 PM   Result Value Ref Range    POC Glucose 248 (H) 70 - 99 MG/DL   Lactic Acid    Collection Time: 08/04/22 10:45 PM   Result Value Ref Range    Lactate 3.3 (HH) 0.4 - 2.0 mMOL/L   Lactic Acid    Collection Time: 08/05/22  2:37 AM   Result Value Ref Range    Lactate 1.9 0.4 - 2.0 mMOL/L   CBC with Auto Differential    Collection Time: 08/05/22  2:37 AM   Result Value Ref Range    WBC 10.8 (H) 4.0 - 10.5 K/CU MM    RBC 3.08 (L) 4.2 - 5.4 M/CU MM    Hemoglobin 9.6 (L) 12.5 - 16.0 GM/DL    Hematocrit 30.0 (L) 37 - 47 %    MCV 97.4 78 - 100 FL    MCH 31.2 (H) 27 - 31 PG    MCHC 32.0 32.0 - 36.0 %    RDW 12.5 11.7 - 14.9 %    Platelets 137 760 - 197 K/CU MM    MPV 9.5 7.5 - 11.1 FL    Differential Type AUTOMATED DIFFERENTIAL     Segs Relative 61.2 36 - 66 %    Lymphocytes % 26.3 24 - 44 %    Monocytes % 10.0 (H) 0 - 4 %    Eosinophils % 1.3 0 - 3 %    Basophils % 0.7 0 - 1 %    Segs Absolute 6.6 K/CU MM    Lymphocytes Absolute 2.8 K/CU MM    Monocytes Absolute 1.1 K/CU MM    Eosinophils Absolute 0.1 K/CU MM    Basophils Absolute 0.1 K/CU MM    Nucleated RBC % 0.0 %    Total Nucleated RBC 0.0 K/CU MM    Total Immature Neutrophil 0.05 K/CU MM    Immature Neutrophil % 0.5 (H) 0 - 0.43 %   Magnesium    Collection Time: 08/05/22  2:37 AM   Result Value Ref Range    Magnesium 1.6 (L) 1.8 - 2.4 mg/dl   Comprehensive Metabolic Panel    Collection Time: 08/05/22  2:37 AM   Result Value Ref Range    Sodium 143 135 - 145 MMOL/L    Potassium 4.1 3.5 - 5.1 MMOL/L    Chloride 112 (H) 99 - 110 mMol/L    CO2 22 21 - 32 MMOL/L    BUN 5 (L) 6 - 23 MG/DL    Creatinine 0.5 (L) 0.6 - 1.1 MG/DL    Glucose 144 (H) 70 - 99 MG/DL    Calcium 8.1 (L) 8.3 - 10.6 MG/DL    Albumin 2.8 (L) 3.4 - 5.0 GM/DL    Total Protein 5.0 (L) 6.4 - 8.2 GM/DL    Total Bilirubin 0.2 0.0 - 1.0 MG/DL    ALT 7 (L) 10 - 40 U/L    AST 14 (L) 15 - 37 IU/L    Alkaline Phosphatase 64 40 - 128 IU/L    GFR Non-African American >60 >60 mL/min/1.73m2    GFR African American >60 >60 mL/min/1.73m2    Anion Gap 9 4 - 16   Vancomycin Level, Trough    Collection Time: 08/05/22  6:00 AM   Result Value Ref Range    Vancomycin Tr 14.9 10 - 20 UG/ML    DOSE AMOUNT DOSE AMT. GIVEN - 1,000 mg     DOSE TIME DOSE TIME GIVEN - 0600 / 1800    POCT Glucose    Collection Time: 08/05/22  6:52 AM   Result Value Ref Range    POC Glucose 168 (H) 70 - 99 MG/DL        Imaging/Diagnostics Last 24 Hours   XR TOE LEFT (MIN 2 VIEWS)    Result Date: 8/3/2022  EXAMINATION: 3 XRAY VIEWS OF THE LEFT TOE 8/3/2022 3:13 pm COMPARISON: None. HISTORY: ORDERING SYSTEM PROVIDED HISTORY: left great toe erythema and necrosis TECHNOLOGIST PROVIDED HISTORY: Reason for exam:->left great toe erythema and necrosis Reason for Exam: left great toe erythema and necrosis Additional signs and symptoms: na Relevant Medical/Surgical History: diabetes, hypertension FINDINGS: Soft tissue ulceration of the left great toe noted. Subjacent to that, there is osteolysis involving the great toe distal phalanx. The remaining toes are unremarkable appearance. Soft tissue ulceration involving the great toe. Osteolysis of the great toe distal phalanx is seen, which is worrisome for osteomyelitis.      VL DUP LOWER EXTREMITY ARTERIES LEFT    Result Date: 8/3/2022  EXAMINATION: ARTERIAL DUPLEX ULTRASOUND OF THE LEFT LOWER EXTREMITY  8/3/2022 7:29 pm TECHNIQUE: Duplex ultrasound using B-mode/gray scaled imaging, Doppler spectral analysis and color flow Doppler was obtained of the lower extremity. COMPARISON: None. HISTORY: ORDERING SYSTEM PROVIDED HISTORY: Left great toe necrosis TECHNOLOGIST PROVIDED HISTORY: Reason for exam:->Left great toe necrosis FINDINGS: Monophasic waveforms with increased spectral broadening noted throughout the left lower extremity suggestive of severe iliac inflow disease as well as severe lower extremity popliteal artery disease. Flow velocities were measured as follows: Com. Fem.  8.4 cm/sec SFA Prox. 12 cm/sec SFA Mid.     12 cm/sec SFA Dist.     22 cm/sec Pop. 19 cm/sec PTA            16, 0, 8 cm/sec Peron. 19, 0, 0 cm/sec HANNA            20, 0, 0 cm/sec     1. Diffuse monophasic waveform seen throughout the left lower extremity suggestive of severe iliac as well as left lower extremity peripheral arterial disease. 2. Occluded mid posterior tibial artery, as well as the mid and distal anterior tibial and peroneal arteries. No continuous runoff. 3. The obtain velocities are very low throughout the left lower extremity consistent with severe inflow disease.        Electronically signed by Penny Mckoy MD on 8/5/2022 at 8:03 AM

## 2022-08-05 NOTE — PLAN OF CARE
Problem: Safety - Adult  Goal: Free from fall injury  Outcome: Progressing     Problem: Skin/Tissue Integrity  Goal: Absence of new skin breakdown  Description: 1. Monitor for areas of redness and/or skin breakdown  2. Assess vascular access sites hourly  3. Every 4-6 hours minimum:  Change oxygen saturation probe site  4. Every 4-6 hours:  If on nasal continuous positive airway pressure, respiratory therapy assess nares and determine need for appliance change or resting period.   Outcome: Progressing     Problem: Discharge Planning  Goal: Discharge to home or other facility with appropriate resources  Outcome: Progressing     Problem: Pain  Goal: Verbalizes/displays adequate comfort level or baseline comfort level  Outcome: Progressing     Problem: Chronic Conditions and Co-morbidities  Goal: Patient's chronic conditions and co-morbidity symptoms are monitored and maintained or improved  Outcome: Progressing     Problem: Nutrition Deficit:  Goal: Optimize nutritional status  8/5/2022 0149 by Jenae Bentley LPN  Outcome: Progressing  8/4/2022 1552 by Reva Osgood, RD, LD  Flowsheets (Taken 8/4/2022 1552)  Nutrient intake appropriate for improving, restoring, or maintaining nutritional needs:   Monitor oral intake, labs, and treatment plans   Assess nutritional status and recommend course of action

## 2022-08-06 LAB
ALBUMIN SERPL-MCNC: 2.7 GM/DL (ref 3.4–5)
ALP BLD-CCNC: 64 IU/L (ref 40–128)
ALT SERPL-CCNC: 6 U/L (ref 10–40)
ANION GAP SERPL CALCULATED.3IONS-SCNC: 10 MMOL/L (ref 4–16)
AST SERPL-CCNC: 13 IU/L (ref 15–37)
BASOPHILS ABSOLUTE: 0.1 K/CU MM
BASOPHILS RELATIVE PERCENT: 0.6 % (ref 0–1)
BILIRUB SERPL-MCNC: 0.5 MG/DL (ref 0–1)
BUN BLDV-MCNC: 3 MG/DL (ref 6–23)
CALCIUM SERPL-MCNC: 8.5 MG/DL (ref 8.3–10.6)
CHLORIDE BLD-SCNC: 109 MMOL/L (ref 99–110)
CO2: 24 MMOL/L (ref 21–32)
CREAT SERPL-MCNC: 0.4 MG/DL (ref 0.6–1.1)
DIFFERENTIAL TYPE: ABNORMAL
EOSINOPHILS ABSOLUTE: 0.2 K/CU MM
EOSINOPHILS RELATIVE PERCENT: 1.7 % (ref 0–3)
GFR AFRICAN AMERICAN: >60 ML/MIN/1.73M2
GFR NON-AFRICAN AMERICAN: >60 ML/MIN/1.73M2
GLUCOSE BLD-MCNC: 132 MG/DL (ref 70–99)
GLUCOSE BLD-MCNC: 136 MG/DL (ref 70–99)
GLUCOSE BLD-MCNC: 198 MG/DL (ref 70–99)
GLUCOSE BLD-MCNC: 226 MG/DL (ref 70–99)
HCT VFR BLD CALC: 27.4 % (ref 37–47)
HEMOGLOBIN: 9.2 GM/DL (ref 12.5–16)
IMMATURE NEUTROPHIL %: 0.2 % (ref 0–0.43)
LYMPHOCYTES ABSOLUTE: 2.6 K/CU MM
LYMPHOCYTES RELATIVE PERCENT: 29 % (ref 24–44)
MAGNESIUM: 1.6 MG/DL (ref 1.8–2.4)
MCH RBC QN AUTO: 31.7 PG (ref 27–31)
MCHC RBC AUTO-ENTMCNC: 33.6 % (ref 32–36)
MCV RBC AUTO: 94.5 FL (ref 78–100)
MONOCYTES ABSOLUTE: 0.8 K/CU MM
MONOCYTES RELATIVE PERCENT: 8.3 % (ref 0–4)
NUCLEATED RBC %: 0 %
PDW BLD-RTO: 12.4 % (ref 11.7–14.9)
PLATELET # BLD: 292 K/CU MM (ref 140–440)
PMV BLD AUTO: 8.7 FL (ref 7.5–11.1)
POTASSIUM SERPL-SCNC: 3.3 MMOL/L (ref 3.5–5.1)
RBC # BLD: 2.9 M/CU MM (ref 4.2–5.4)
SEGMENTED NEUTROPHILS ABSOLUTE COUNT: 5.5 K/CU MM
SEGMENTED NEUTROPHILS RELATIVE PERCENT: 60.2 % (ref 36–66)
SODIUM BLD-SCNC: 143 MMOL/L (ref 135–145)
TOTAL IMMATURE NEUTOROPHIL: 0.02 K/CU MM
TOTAL NUCLEATED RBC: 0 K/CU MM
TOTAL PROTEIN: 4.8 GM/DL (ref 6.4–8.2)
WBC # BLD: 9.1 K/CU MM (ref 4–10.5)

## 2022-08-06 PROCEDURE — 94761 N-INVAS EAR/PLS OXIMETRY MLT: CPT

## 2022-08-06 PROCEDURE — 6360000002 HC RX W HCPCS: Performed by: INTERNAL MEDICINE

## 2022-08-06 PROCEDURE — 6360000002 HC RX W HCPCS: Performed by: STUDENT IN AN ORGANIZED HEALTH CARE EDUCATION/TRAINING PROGRAM

## 2022-08-06 PROCEDURE — 1200000000 HC SEMI PRIVATE

## 2022-08-06 PROCEDURE — 83735 ASSAY OF MAGNESIUM: CPT

## 2022-08-06 PROCEDURE — 6370000000 HC RX 637 (ALT 250 FOR IP): Performed by: STUDENT IN AN ORGANIZED HEALTH CARE EDUCATION/TRAINING PROGRAM

## 2022-08-06 PROCEDURE — 6370000000 HC RX 637 (ALT 250 FOR IP): Performed by: NURSE PRACTITIONER

## 2022-08-06 PROCEDURE — 2580000003 HC RX 258: Performed by: NURSE PRACTITIONER

## 2022-08-06 PROCEDURE — 2580000003 HC RX 258: Performed by: STUDENT IN AN ORGANIZED HEALTH CARE EDUCATION/TRAINING PROGRAM

## 2022-08-06 PROCEDURE — 99222 1ST HOSP IP/OBS MODERATE 55: CPT | Performed by: THORACIC SURGERY (CARDIOTHORACIC VASCULAR SURGERY)

## 2022-08-06 PROCEDURE — 82962 GLUCOSE BLOOD TEST: CPT

## 2022-08-06 PROCEDURE — 36415 COLL VENOUS BLD VENIPUNCTURE: CPT

## 2022-08-06 PROCEDURE — 6360000002 HC RX W HCPCS: Performed by: NURSE PRACTITIONER

## 2022-08-06 PROCEDURE — 80053 COMPREHEN METABOLIC PANEL: CPT

## 2022-08-06 PROCEDURE — 85025 COMPLETE CBC W/AUTO DIFF WBC: CPT

## 2022-08-06 RX ORDER — MAGNESIUM SULFATE IN WATER 40 MG/ML
2000 INJECTION, SOLUTION INTRAVENOUS ONCE
Status: COMPLETED | OUTPATIENT
Start: 2022-08-06 | End: 2022-08-06

## 2022-08-06 RX ORDER — MORPHINE SULFATE 2 MG/ML
2 INJECTION, SOLUTION INTRAMUSCULAR; INTRAVENOUS ONCE
Status: COMPLETED | OUTPATIENT
Start: 2022-08-07 | End: 2022-08-06

## 2022-08-06 RX ORDER — POTASSIUM CHLORIDE 20 MEQ/1
40 TABLET, EXTENDED RELEASE ORAL 2 TIMES DAILY WITH MEALS
Status: DISCONTINUED | OUTPATIENT
Start: 2022-08-06 | End: 2022-08-11 | Stop reason: HOSPADM

## 2022-08-06 RX ADMIN — LISINOPRIL 15 MG: 5 TABLET ORAL at 09:37

## 2022-08-06 RX ADMIN — SODIUM CHLORIDE, PRESERVATIVE FREE 10 ML: 5 INJECTION INTRAVENOUS at 09:44

## 2022-08-06 RX ADMIN — MORPHINE SULFATE 2 MG: 2 INJECTION, SOLUTION INTRAMUSCULAR; INTRAVENOUS at 10:16

## 2022-08-06 RX ADMIN — GABAPENTIN 300 MG: 300 CAPSULE ORAL at 21:26

## 2022-08-06 RX ADMIN — ATORVASTATIN CALCIUM 10 MG: 10 TABLET, FILM COATED ORAL at 09:37

## 2022-08-06 RX ADMIN — PIPERACILLIN AND TAZOBACTAM 3375 MG: 3; .375 INJECTION, POWDER, LYOPHILIZED, FOR SOLUTION INTRAVENOUS at 21:46

## 2022-08-06 RX ADMIN — PIPERACILLIN AND TAZOBACTAM 3375 MG: 3; .375 INJECTION, POWDER, LYOPHILIZED, FOR SOLUTION INTRAVENOUS at 02:06

## 2022-08-06 RX ADMIN — ACETAMINOPHEN 500 MG: 500 TABLET ORAL at 21:25

## 2022-08-06 RX ADMIN — POTASSIUM CHLORIDE 40 MEQ: 1500 TABLET, EXTENDED RELEASE ORAL at 10:40

## 2022-08-06 RX ADMIN — VANCOMYCIN HYDROCHLORIDE 750 MG: 750 INJECTION, POWDER, LYOPHILIZED, FOR SOLUTION INTRAVENOUS at 21:35

## 2022-08-06 RX ADMIN — PIPERACILLIN AND TAZOBACTAM 3375 MG: 3; .375 INJECTION, POWDER, LYOPHILIZED, FOR SOLUTION INTRAVENOUS at 10:36

## 2022-08-06 RX ADMIN — SODIUM CHLORIDE, POTASSIUM CHLORIDE, SODIUM LACTATE AND CALCIUM CHLORIDE: 600; 310; 30; 20 INJECTION, SOLUTION INTRAVENOUS at 19:28

## 2022-08-06 RX ADMIN — VANCOMYCIN HYDROCHLORIDE 750 MG: 750 INJECTION, POWDER, LYOPHILIZED, FOR SOLUTION INTRAVENOUS at 10:26

## 2022-08-06 RX ADMIN — CLONIDINE HYDROCHLORIDE 0.1 MG: 0.1 TABLET ORAL at 21:27

## 2022-08-06 RX ADMIN — HYDROCODONE BITARTRATE AND ACETAMINOPHEN 1 TABLET: 5; 325 TABLET ORAL at 21:26

## 2022-08-06 RX ADMIN — MAGNESIUM SULFATE HEPTAHYDRATE 2000 MG: 40 INJECTION, SOLUTION INTRAVENOUS at 10:31

## 2022-08-06 RX ADMIN — ACETAMINOPHEN 500 MG: 500 TABLET ORAL at 15:48

## 2022-08-06 RX ADMIN — ACETAMINOPHEN 500 MG: 500 TABLET ORAL at 09:38

## 2022-08-06 RX ADMIN — HYDROCODONE BITARTRATE AND ACETAMINOPHEN 1 TABLET: 5; 325 TABLET ORAL at 16:58

## 2022-08-06 RX ADMIN — PANTOPRAZOLE SODIUM 40 MG: 40 TABLET, DELAYED RELEASE ORAL at 10:31

## 2022-08-06 RX ADMIN — POTASSIUM CHLORIDE 40 MEQ: 1500 TABLET, EXTENDED RELEASE ORAL at 15:48

## 2022-08-06 RX ADMIN — ENOXAPARIN SODIUM 40 MG: 100 INJECTION SUBCUTANEOUS at 09:37

## 2022-08-06 RX ADMIN — SODIUM CHLORIDE, PRESERVATIVE FREE 10 ML: 5 INJECTION INTRAVENOUS at 22:46

## 2022-08-06 RX ADMIN — CILOSTAZOL 100 MG: 100 TABLET ORAL at 09:38

## 2022-08-06 RX ADMIN — PIPERACILLIN AND TAZOBACTAM 3375 MG: 3; .375 INJECTION, POWDER, LYOPHILIZED, FOR SOLUTION INTRAVENOUS at 15:59

## 2022-08-06 RX ADMIN — ACETAMINOPHEN 500 MG: 500 TABLET ORAL at 05:40

## 2022-08-06 RX ADMIN — SODIUM CHLORIDE: 9 INJECTION, SOLUTION INTRAVENOUS at 21:44

## 2022-08-06 RX ADMIN — CLONIDINE HYDROCHLORIDE 0.1 MG: 0.1 TABLET ORAL at 09:38

## 2022-08-06 RX ADMIN — INSULIN LISPRO 2 UNITS: 100 INJECTION, SOLUTION INTRAVENOUS; SUBCUTANEOUS at 16:56

## 2022-08-06 RX ADMIN — CILOSTAZOL 100 MG: 100 TABLET ORAL at 21:25

## 2022-08-06 RX ADMIN — MORPHINE SULFATE 2 MG: 2 INJECTION, SOLUTION INTRAMUSCULAR; INTRAVENOUS at 23:59

## 2022-08-06 ASSESSMENT — PAIN DESCRIPTION - ORIENTATION
ORIENTATION: LEFT

## 2022-08-06 ASSESSMENT — PAIN DESCRIPTION - PAIN TYPE
TYPE: ACUTE PAIN
TYPE: ACUTE PAIN

## 2022-08-06 ASSESSMENT — PAIN SCALES - GENERAL
PAINLEVEL_OUTOF10: 9
PAINLEVEL_OUTOF10: 9
PAINLEVEL_OUTOF10: 3
PAINLEVEL_OUTOF10: 9
PAINLEVEL_OUTOF10: 3

## 2022-08-06 ASSESSMENT — PAIN DESCRIPTION - LOCATION
LOCATION: TOE (COMMENT WHICH ONE)
LOCATION: FOOT;TOE (COMMENT WHICH ONE)
LOCATION: TOE (COMMENT WHICH ONE)

## 2022-08-06 ASSESSMENT — PAIN DESCRIPTION - FREQUENCY
FREQUENCY: CONTINUOUS
FREQUENCY: CONTINUOUS

## 2022-08-06 ASSESSMENT — PAIN DESCRIPTION - DESCRIPTORS
DESCRIPTORS: BURNING;STABBING;THROBBING
DESCRIPTORS: CRAMPING;THROBBING;SHARP
DESCRIPTORS: STABBING;BURNING

## 2022-08-06 ASSESSMENT — PAIN - FUNCTIONAL ASSESSMENT
PAIN_FUNCTIONAL_ASSESSMENT: PREVENTS OR INTERFERES SOME ACTIVE ACTIVITIES AND ADLS

## 2022-08-06 ASSESSMENT — PAIN DESCRIPTION - ONSET
ONSET: ON-GOING
ONSET: ON-GOING

## 2022-08-06 NOTE — PROGRESS NOTES
V2.0  [facility] [place] Hospitalist Progress Note      Name:  Monica Juárez /Age/Sex: 1955  (61 y.o. female)   MRN & CSN:  [id number] & [id number] Encounter Date/Time: 2022 7:32 AM EDT    Location:  [facility]0/1110-A PCP: Ryan Wall MD       Hospital Day: 4    Assessment and Plan:   Monica Juárez is a 61 y.o. female with pmh of DM2, HTN, HLD who presents with Severe sepsis ([address]      Plan:    Severe sepsis 2/2 osteomyelitis of left great toe with surrounding cellulitis  Nonhealing diabetic foot ulcer  Leukocytosis, lactic acidosis, tachycardia. X-ray left foot with soft tissue ulceration involving the great toe, also concern for osteomyelitis. MRI foot with osteomyelitis of the distal phalanx of the left great toe with surrounding soft tissue edema and mild enhancement consistent with cellulitis. No organized drainable fluid collection identified. -Vancomycin and Zosyn  -IVF hydration  -Discussed with podiatry: left foot hallux amputation on Monday at 7:30 AM  -Vascular surgery consulted for arterial study findings  -Bcx NGTD  -Lactate normalized  -N.p.o. after midnight     Non-insulin-dependent diabetes mellitus type 2  -Hold home p.o. meds  -SSI, glucose checks, hypoglycemia protocol  -Strict glucose control to help with wound healing    Hypomagnesemia, hypokalemia  -Replete as required      Diet ADULT ORAL NUTRITION SUPPLEMENT; Breakfast, Dinner; Low Calorie/High Protein Oral Supplement  ADULT DIET; Regular; 5 carb choices (75 gm/meal)  Diet NPO   DVT Prophylaxis [x] Lovenox, []  Heparin, [] SCDs, [] Ambulation,  [] Eliquis, [] Xarelto  [] Coumadin   Code Status Full Code   Disposition From: Home  Expected Disposition: Home  Estimated Date of Discharge: 2-3 days  Patient requires continued admission due to severe sepsis   Surrogate Decision Maker/ POA      Subjective:     Chief Complaint: Toe Injury (Injured by pediatrist approx 4 weeks ago.  Been getting worse since and states it is now infected ) 2526    sodium chloride 25 mL/hr at 08/05/22 0548    dextrose       PRN Meds: HYDROcodone-acetaminophen, 1 tablet, Q4H PRN  ondansetron, 4 mg, Q8H PRN   Or  ondansetron, 4 mg, Q6H PRN  polyethylene glycol, 17 g, Daily PRN  sodium chloride flush, 5-40 mL, PRN  sodium chloride, , PRN  glucose, 4 tablet, PRN  dextrose bolus, 125 mL, PRN   Or  dextrose bolus, 250 mL, PRN  glucagon (rDNA), 1 mg, PRN  dextrose, , Continuous PRN      Labs      Recent Results (from the past 24 hour(s))   POCT Glucose    Collection Time: 08/05/22  3:44 PM   Result Value Ref Range    POC Glucose 208 (H) 70 - 99 MG/DL   POCT Glucose    Collection Time: 08/05/22  9:22 PM   Result Value Ref Range    POC Glucose 126 (H) 70 - 99 MG/DL   CBC with Auto Differential    Collection Time: 08/06/22  4:53 AM   Result Value Ref Range    WBC 9.1 4.0 - 10.5 K/CU MM    RBC 2.90 (L) 4.2 - 5.4 M/CU MM    Hemoglobin 9.2 (L) 12.5 - 16.0 GM/DL    Hematocrit 27.4 (L) 37 - 47 %    MCV 94.5 78 - 100 FL    MCH 31.7 (H) 27 - 31 PG    MCHC 33.6 32.0 - 36.0 %    RDW 12.4 11.7 - 14.9 %    Platelets 501 603 - 552 K/CU MM    MPV 8.7 7.5 - 11.1 FL    Differential Type AUTOMATED DIFFERENTIAL     Segs Relative 60.2 36 - 66 %    Lymphocytes % 29.0 24 - 44 %    Monocytes % 8.3 (H) 0 - 4 %    Eosinophils % 1.7 0 - 3 %    Basophils % 0.6 0 - 1 %    Segs Absolute 5.5 K/CU MM    Lymphocytes Absolute 2.6 K/CU MM    Monocytes Absolute 0.8 K/CU MM    Eosinophils Absolute 0.2 K/CU MM    Basophils Absolute 0.1 K/CU MM    Nucleated RBC % 0.0 %    Total Nucleated RBC 0.0 K/CU MM    Total Immature Neutrophil 0.02 K/CU MM    Immature Neutrophil % 0.2 0 - 0.43 %   Magnesium    Collection Time: 08/06/22  4:53 AM   Result Value Ref Range    Magnesium 1.6 (L) 1.8 - 2.4 mg/dl   Comprehensive Metabolic Panel    Collection Time: 08/06/22  4:53 AM   Result Value Ref Range    Sodium 143 135 - 145 MMOL/L    Potassium 3.3 (L) 3.5 - 5.1 MMOL/L    Chloride 109 99 - 110 mMol/L    CO2 24 21 - 32 MMOL/L BUN 3 (L) 6 - 23 MG/DL    Creatinine 0.4 (L) 0.6 - 1.1 MG/DL    Glucose 132 (H) 70 - 99 MG/DL    Calcium 8.5 8.3 - 10.6 MG/DL    Albumin 2.7 (L) 3.4 - 5.0 GM/DL    Total Protein 4.8 (L) 6.4 - 8.2 GM/DL    Total Bilirubin 0.5 0.0 - 1.0 MG/DL    ALT 6 (L) 10 - 40 U/L    AST 13 (L) 15 - 37 IU/L    Alkaline Phosphatase 64 40 - 128 IU/L    GFR Non-African American >60 >60 mL/min/1.73m2    GFR African American >60 >60 mL/min/1.73m2    Anion Gap 10 4 - 16   POCT Glucose    Collection Time: 08/06/22  5:56 AM   Result Value Ref Range    POC Glucose 136 (H) 70 - 99 MG/DL        Imaging/Diagnostics Last 24 Hours   XR TOE LEFT (MIN 2 VIEWS)    Result Date: 8/3/2022  EXAMINATION: 3 XRAY VIEWS OF THE LEFT TOE 8/3/2022 3:13 pm COMPARISON: None. HISTORY: ORDERING SYSTEM PROVIDED HISTORY: left great toe erythema and necrosis TECHNOLOGIST PROVIDED HISTORY: Reason for exam:->left great toe erythema and necrosis Reason for Exam: left great toe erythema and necrosis Additional signs and symptoms: na Relevant Medical/Surgical History: diabetes, hypertension FINDINGS: Soft tissue ulceration of the left great toe noted. Subjacent to that, there is osteolysis involving the great toe distal phalanx. The remaining toes are unremarkable appearance. Soft tissue ulceration involving the great toe. Osteolysis of the great toe distal phalanx is seen, which is worrisome for osteomyelitis. VL DUP LOWER EXTREMITY ARTERIES LEFT    Result Date: 8/3/2022  EXAMINATION: ARTERIAL DUPLEX ULTRASOUND OF THE LEFT LOWER EXTREMITY  8/3/2022 7:29 pm TECHNIQUE: Duplex ultrasound using B-mode/gray scaled imaging, Doppler spectral analysis and color flow Doppler was obtained of the lower extremity. COMPARISON: None.  HISTORY: ORDERING SYSTEM PROVIDED HISTORY: Left great toe necrosis TECHNOLOGIST PROVIDED HISTORY: Reason for exam:->Left great toe necrosis FINDINGS: Monophasic waveforms with increased spectral broadening noted throughout the left lower extremity suggestive of severe iliac inflow disease as well as severe lower extremity popliteal artery disease. Flow velocities were measured as follows: Com. Fem.  8.4 cm/sec SFA Prox. 12 cm/sec SFA Mid.     12 cm/sec SFA Dist.     22 cm/sec Pop. 19 cm/sec PTA            16, 0, 8 cm/sec Peron. 19, 0, 0 cm/sec HANNA            20, 0, 0 cm/sec     1. Diffuse monophasic waveform seen throughout the left lower extremity suggestive of severe iliac as well as left lower extremity peripheral arterial disease. 2. Occluded mid posterior tibial artery, as well as the mid and distal anterior tibial and peroneal arteries. No continuous runoff. 3. The obtain velocities are very low throughout the left lower extremity consistent with severe inflow disease.        Electronically signed by Gino Marie MD on 8/6/2022 at 11:35 AM

## 2022-08-06 NOTE — H&P
Consultation/History & Physical    Date of Consultation:  8/6/2022      Surgeon:  Audrey Briceño MD      Referring Provider: Benoit curtis PCP:  Norma Yang MD     Chief Complaint: Nonhealing ulcer left great toe tip  Chief Complaint   Patient presents with    Toe Injury     Injured by pediatrist approx 4 weeks ago. Been getting worse since and states it is now infected     Wound Check       History of Present Illness:  Estrellita Mariee is a 61 y.o. female who presents with nonhealing ulcer left foot. Patient claims to have developed insidious problem at the nailbed in the left big toe which was treated with debridement and wound care without any healing. Patient currently is awaiting podiatry evaluation. Patient claims to have a pain and tingling in the feet bilaterally for several months/year. She attributes that to peripheral neuritis. Patient has multiple comorbid factors including recent extra operative brain metastatic lesion as well as radiation therapy to the occiput at an outside facility details of which are not available. Patient claims to have had with\" clearance of the arteries in the groins bilaterally\" details of which are not available. Patient is a chronic smoker and also has had lung resection for cancer. Patient claims to be ambulated prior to this foot problem although her activities are limited. PMH:   has a past medical history of Cancer ([address] Diabetes mellitus ([address] Hyperlipidemia, and Hypertension. PSH:   has a past surgical history that includes Lung cancer surgery (Left, 10/2019) and Upper gastrointestinal endoscopy (N/A, 10/3/2020). Allergies:  No Known Allergies     Home Meds:    Prior to Admission medications    Medication Sig Start Date End Date Taking? Authorizing Provider   gabapentin (NEURONTIN) 300 MG capsule 300 mg. 3/15/22  Yes Historical Provider, MD   glimepiride (AMARYL) 2 MG tablet Take 1 tablet by mouth in the morning.  7/19/22  Yes Historical Provider, MD   metFORMIN (GLUCOPHAGE) 1000 MG tablet Take 1 tablet by mouth in the morning and 1 tablet before bedtime. 7/19/22  Yes Historical Provider, MD   cilostazol (PLETAL) 100 MG tablet Take 100 mg by mouth 2 times daily   Yes Historical Provider, MD   lisinopril (PRINIVIL;ZESTRIL) 5 MG tablet Take 15 mg by mouth daily. Yes Historical Provider, MD   cloNIDine (CATAPRES) 0.1 MG tablet Take 0.1 mg by mouth 2 times daily. Yes Historical Provider, MD   simvastatin (ZOCOR) 20 MG tablet Take 40 mg by mouth nightly    Yes Historical Provider, MD   pantoprazole (PROTONIX) 40 MG tablet Take 1 tablet by mouth daily 10/4/20 11/3/20  Guilherme Yost MD        Social History     Socioeconomic History    Marital status:      Spouse name: Not on file    Number of children: Not on file    Years of education: Not on file    Highest education level: Not on file   Occupational History    Not on file   Tobacco Use    Smoking status: Former     Packs/day: 1.00     Types: Cigarettes    Smokeless tobacco: Never    Tobacco comments:     10/2019   Substance and Sexual Activity    Alcohol use: Yes     Alcohol/week: 1.0 standard drink     Types: 1 Cans of beer per week    Drug use: Yes     Types: Marijuana Mallorie Lux)    Sexual activity: Yes     Partners: Male   Other Topics Concern    Not on file   Social History Narrative    Not on file     Social Determinants of Health     Financial Resource Strain: Not on file   Food Insecurity: Not on file   Transportation Needs: Not on file   Physical Activity: Not on file   Stress: Not on file   Social Connections: Not on file   Intimate Partner Violence: Not on file   Housing Stability: Not on file       History reviewed. No pertinent family history.     REVIEW OF SYSTEMS:    Constitutional: No Fever, chills change in appetite,  or drastic weight loss/gain   Eyes: No changes in vision  ENT: No Headaches, Hearing Loss or Vertigo  Cardiovascular: No chest pain, chest tightness, palpitations, lower extremity edema, or dyspnea on exertion  Respiratory: No shortness of breath or cough  Gastrointestinal: No abdominal pain, appetite loss, blood in stools, constipation, diarrhea or heartburn  Genitourinary: No dysuria, trouble voiding, or hematuria  Musculoskeletal:  No gait disturbance, weakness or joint complaints  Integumentary: No rash, pallor, or skin lesions  Neurological: No lightheadedness, dizziness, seizure, or loss of consciousness  Psychiatric: No anxiety or depression  Endocrine: No malaise, fatigue or temperature intolerance  Hematologic/Lymphatic: No bleeding problems, blood clots or swollen lymph nodes       Physical Exam    Vital Signs:   BP (!) 131/95   Pulse 89   Temp 98 °F (36.7 °C) (Oral)   Resp 15   Ht 5' 4\" (1.626 m)   Wt 138 lb (62.6 kg)   SpO2 96%   BMI 23.69 kg/m²      Constitutional:  Well developed, Well nourished, No acute distress, Non-toxic appearance. HENT:  Normocephalic, Atraumatic, Bilateral external ears normal, Oropharynx moist, No oral exudates, Neck- Normal range of motion, No tenderness, Supple, No stridor  Eyes:  Pupils equal and round, extraocular muscles intact. Conjunctiva normal, No discharge. Respiratory:  Clear to auscultation,No wheezing or crackles, no signs of respiratory distress   Cardiovascular: Regular Rhythm and rate,S1 and S2 audible, No murmur, rub or gallop, No evidence of JVD. Pulses 2+, no carotid bruit  GI:  Soft, No tenderness, No masses, No gross visceromegaly   :  No costovertebral angle tenderness   Musculoskeletal: No edema present, no tenderness, no deformities. Integument:  Well hydrated, no rash   Lymphatic:  No lymphadenopathy noted   Neurologic:  Alert & oriented x 3. Can move all four appendages separately.  Follows commands  Psychiatric:  Speech and behavior appropriate     MEDICAL DECISION MAKING/TESTING  Ultrasound findings in the inferior extremity were noted       Diffuse monophasic waveform seen throughout the left lower extremity   suggestive of severe iliac as well as left lower extremity peripheral   arterial disease. 2. Occluded mid posterior tibial artery, as well as the mid and distal   anterior tibial and peroneal arteries. No continuous runoff. 3. The obtain velocities are very low throughout the left lower extremity   consistent with severe inflow disease. Labs:    BMP:   Lab Results   Component Value Date/Time     08/06/2022 04:53 AM    K 3.3 08/06/2022 04:53 AM     08/06/2022 04:53 AM    CO2 24 08/06/2022 04:53 AM    BUN 3 08/06/2022 04:53 AM    CREATININE 0.4 08/06/2022 04:53 AM      No results found for: GLU  Lab Results   Component Value Date/Time    MG 1.6 08/06/2022 04:53 AM      CBC:   Lab Results   Component Value Date/Time    WBC 9.1 08/06/2022 04:53 AM    HGB 9.2 08/06/2022 04:53 AM    HCT 27.4 08/06/2022 04:53 AM    MCV 94.5 08/06/2022 04:53 AM     08/06/2022 04:53 AM      Coagulation:   Lab Results   Component Value Date/Time    INR 0.99 08/04/2022 03:35 AM    APTT 31.0 10/03/2020 05:54 AM     Cardiac markers:   Lab Results   Component Value Date/Time    TROPONINI <0.006 04/06/2014 07:00 AM     Lab Results   Component Value Date/Time    LABA1C 6.8 08/03/2022 11:28 PM    No results found for: ALB    Lab Results   Component Value Date/Time    BILITOT 0.5 08/06/2022 04:53 AM    BILIDIR 0.2 10/02/2020 08:34 PM    AST 13 08/06/2022 04:53 AM    ALT 6 08/06/2022 04:53 AM    ALKPHOS 64 08/06/2022 04:53 AM      Blood Gas:  Lab Results   Component Value Date/Time    BECMIX 1.3 10/02/2020 10:45 PM    COMMENT VBG 10/02/2020 10:45 PM     LIVER PROFILE:   Recent Labs     08/04/22  1341 08/05/22  0237 08/06/22  0453   AST 14* 14* 13*   ALT 7* 7* 6*   BILITOT 0.3 0.2 0.5   ALKPHOS 67 64 64     PT/INR:   Recent Labs     08/04/22  0335   PROTIME 12.8   INR 0.99     APTT:   No results for input(s): APTT in the last 72 hours.   BNP:    No results for input(s): BNP in the last 72 hours.      Diagnostic tests ordered:        Records reviewed:  Current med chart     Risk Factors: Chronic smoker; malignancy radiation therapy diabetes mellitus    Diagnosis:  Nonhealing ulcer left big toe; possible severe peripheral vascular disease; multiple comorbid factors as mentioned above  Assessment:  61 y.o. female patient chronic left foot big toe nonhealing ulcer with possible severe peripheral vascular disease. Multiple comorbid factors. Plan:  Baseline CTA of abdominal aorta with distal runoff.   We will follow-up Case discussed with Dr. Luis Payne         Signed:  Nisha Nair MD 8/6/2022 12:34 PM

## 2022-08-07 ENCOUNTER — APPOINTMENT (OUTPATIENT)
Dept: ULTRASOUND IMAGING | Age: 67
DRG: 854 | End: 2022-08-07
Payer: MEDICARE

## 2022-08-07 ENCOUNTER — APPOINTMENT (OUTPATIENT)
Dept: CT IMAGING | Age: 67
DRG: 854 | End: 2022-08-07
Payer: MEDICARE

## 2022-08-07 LAB
DOSE AMOUNT: NORMAL
DOSE TIME: NORMAL
GLUCOSE BLD-MCNC: 130 MG/DL (ref 70–99)
GLUCOSE BLD-MCNC: 170 MG/DL (ref 70–99)
GLUCOSE BLD-MCNC: 179 MG/DL (ref 70–99)
GLUCOSE BLD-MCNC: 189 MG/DL (ref 70–99)
VANCOMYCIN RANDOM: 13.2 UG/ML

## 2022-08-07 PROCEDURE — 1200000000 HC SEMI PRIVATE

## 2022-08-07 PROCEDURE — 36415 COLL VENOUS BLD VENIPUNCTURE: CPT

## 2022-08-07 PROCEDURE — 6370000000 HC RX 637 (ALT 250 FOR IP): Performed by: STUDENT IN AN ORGANIZED HEALTH CARE EDUCATION/TRAINING PROGRAM

## 2022-08-07 PROCEDURE — 2709999900 HC NON-CHARGEABLE SUPPLY

## 2022-08-07 PROCEDURE — 76937 US GUIDE VASCULAR ACCESS: CPT

## 2022-08-07 PROCEDURE — 6360000004 HC RX CONTRAST MEDICATION: Performed by: THORACIC SURGERY (CARDIOTHORACIC VASCULAR SURGERY)

## 2022-08-07 PROCEDURE — 2580000003 HC RX 258: Performed by: NURSE PRACTITIONER

## 2022-08-07 PROCEDURE — 6370000000 HC RX 637 (ALT 250 FOR IP): Performed by: NURSE PRACTITIONER

## 2022-08-07 PROCEDURE — 75635 CT ANGIO ABDOMINAL ARTERIES: CPT

## 2022-08-07 PROCEDURE — 6360000002 HC RX W HCPCS: Performed by: NURSE PRACTITIONER

## 2022-08-07 PROCEDURE — 80202 ASSAY OF VANCOMYCIN: CPT

## 2022-08-07 PROCEDURE — 82962 GLUCOSE BLOOD TEST: CPT

## 2022-08-07 PROCEDURE — 93970 EXTREMITY STUDY: CPT

## 2022-08-07 PROCEDURE — 94761 N-INVAS EAR/PLS OXIMETRY MLT: CPT

## 2022-08-07 RX ORDER — LISINOPRIL 5 MG/1
5 TABLET ORAL DAILY
Status: DISCONTINUED | OUTPATIENT
Start: 2022-08-08 | End: 2022-08-11 | Stop reason: HOSPADM

## 2022-08-07 RX ORDER — DIAPER,BRIEF,INFANT-TODD,DISP
EACH MISCELLANEOUS 2 TIMES DAILY
Status: DISCONTINUED | OUTPATIENT
Start: 2022-08-07 | End: 2022-08-07

## 2022-08-07 RX ORDER — DIAPER,BRIEF,INFANT-TODD,DISP
EACH MISCELLANEOUS 2 TIMES DAILY
Status: DISCONTINUED | OUTPATIENT
Start: 2022-08-07 | End: 2022-08-11 | Stop reason: HOSPADM

## 2022-08-07 RX ADMIN — ACETAMINOPHEN 500 MG: 500 TABLET ORAL at 05:40

## 2022-08-07 RX ADMIN — SODIUM CHLORIDE: 9 INJECTION, SOLUTION INTRAVENOUS at 02:27

## 2022-08-07 RX ADMIN — PIPERACILLIN AND TAZOBACTAM 3375 MG: 3; .375 INJECTION, POWDER, LYOPHILIZED, FOR SOLUTION INTRAVENOUS at 02:28

## 2022-08-07 RX ADMIN — IOPAMIDOL 80 ML: 755 INJECTION, SOLUTION INTRAVENOUS at 15:40

## 2022-08-07 RX ADMIN — PIPERACILLIN AND TAZOBACTAM 3375 MG: 3; .375 INJECTION, POWDER, LYOPHILIZED, FOR SOLUTION INTRAVENOUS at 14:21

## 2022-08-07 RX ADMIN — HYDROCODONE BITARTRATE AND ACETAMINOPHEN 1 TABLET: 5; 325 TABLET ORAL at 12:55

## 2022-08-07 RX ADMIN — HYDROCODONE BITARTRATE AND ACETAMINOPHEN 1 TABLET: 5; 325 TABLET ORAL at 20:14

## 2022-08-07 RX ADMIN — VANCOMYCIN HYDROCHLORIDE 750 MG: 750 INJECTION, POWDER, LYOPHILIZED, FOR SOLUTION INTRAVENOUS at 10:01

## 2022-08-07 RX ADMIN — SODIUM CHLORIDE, PRESERVATIVE FREE 10 ML: 5 INJECTION INTRAVENOUS at 20:29

## 2022-08-07 RX ADMIN — PIPERACILLIN AND TAZOBACTAM 3375 MG: 3; .375 INJECTION, POWDER, LYOPHILIZED, FOR SOLUTION INTRAVENOUS at 08:33

## 2022-08-07 RX ADMIN — HYDROCORTISONE: 1 OINTMENT TOPICAL at 23:13

## 2022-08-07 RX ADMIN — CILOSTAZOL 100 MG: 100 TABLET ORAL at 20:14

## 2022-08-07 RX ADMIN — VANCOMYCIN HYDROCHLORIDE 750 MG: 750 INJECTION, POWDER, LYOPHILIZED, FOR SOLUTION INTRAVENOUS at 20:25

## 2022-08-07 RX ADMIN — CILOSTAZOL 100 MG: 100 TABLET ORAL at 08:20

## 2022-08-07 RX ADMIN — GABAPENTIN 300 MG: 300 CAPSULE ORAL at 20:22

## 2022-08-07 RX ADMIN — POTASSIUM CHLORIDE 40 MEQ: 1500 TABLET, EXTENDED RELEASE ORAL at 16:56

## 2022-08-07 RX ADMIN — ACETAMINOPHEN 500 MG: 500 TABLET ORAL at 22:54

## 2022-08-07 RX ADMIN — POTASSIUM CHLORIDE 40 MEQ: 1500 TABLET, EXTENDED RELEASE ORAL at 08:20

## 2022-08-07 RX ADMIN — SODIUM CHLORIDE, PRESERVATIVE FREE 10 ML: 5 INJECTION INTRAVENOUS at 08:20

## 2022-08-07 RX ADMIN — ATORVASTATIN CALCIUM 10 MG: 10 TABLET, FILM COATED ORAL at 08:20

## 2022-08-07 RX ADMIN — PIPERACILLIN AND TAZOBACTAM 3375 MG: 3; .375 INJECTION, POWDER, LYOPHILIZED, FOR SOLUTION INTRAVENOUS at 20:17

## 2022-08-07 RX ADMIN — PANTOPRAZOLE SODIUM 40 MG: 40 TABLET, DELAYED RELEASE ORAL at 05:40

## 2022-08-07 RX ADMIN — ENOXAPARIN SODIUM 40 MG: 100 INJECTION SUBCUTANEOUS at 08:20

## 2022-08-07 RX ADMIN — HYDROCODONE BITARTRATE AND ACETAMINOPHEN 1 TABLET: 5; 325 TABLET ORAL at 08:19

## 2022-08-07 RX ADMIN — HYDROCODONE BITARTRATE AND ACETAMINOPHEN 1 TABLET: 5; 325 TABLET ORAL at 02:37

## 2022-08-07 RX ADMIN — ACETAMINOPHEN 500 MG: 500 TABLET ORAL at 10:50

## 2022-08-07 RX ADMIN — HYDROCODONE BITARTRATE AND ACETAMINOPHEN 1 TABLET: 5; 325 TABLET ORAL at 16:56

## 2022-08-07 ASSESSMENT — PAIN SCALES - GENERAL
PAINLEVEL_OUTOF10: 8
PAINLEVEL_OUTOF10: 8
PAINLEVEL_OUTOF10: 9
PAINLEVEL_OUTOF10: 8
PAINLEVEL_OUTOF10: 7
PAINLEVEL_OUTOF10: 6
PAINLEVEL_OUTOF10: 5
PAINLEVEL_OUTOF10: 9
PAINLEVEL_OUTOF10: 6
PAINLEVEL_OUTOF10: 7

## 2022-08-07 ASSESSMENT — PAIN DESCRIPTION - ORIENTATION
ORIENTATION: LEFT

## 2022-08-07 ASSESSMENT — PAIN DESCRIPTION - LOCATION
LOCATION: FOOT
LOCATION: FOOT
LOCATION: TOE (COMMENT WHICH ONE)
LOCATION: TOE (COMMENT WHICH ONE)

## 2022-08-07 ASSESSMENT — PAIN SCALES - WONG BAKER
WONGBAKER_NUMERICALRESPONSE: 0
WONGBAKER_NUMERICALRESPONSE: 2;4

## 2022-08-07 ASSESSMENT — PAIN DESCRIPTION - FREQUENCY: FREQUENCY: CONTINUOUS

## 2022-08-07 ASSESSMENT — PAIN DESCRIPTION - PAIN TYPE
TYPE: ACUTE PAIN
TYPE: ACUTE PAIN

## 2022-08-07 ASSESSMENT — PAIN DESCRIPTION - DESCRIPTORS
DESCRIPTORS: DISCOMFORT
DESCRIPTORS: ACHING;THROBBING

## 2022-08-07 ASSESSMENT — PAIN DESCRIPTION - ONSET: ONSET: ON-GOING

## 2022-08-07 NOTE — CONSULTS
Consult completed. Procedure explained and verbal consent obtained. [facility] Endurance Extended Dwell 20g 6cm peripheral IV initiated x 1 attempt using ultrasound guided technique in the left anterior forearm without difficulty or complications. Brisk blood return noted, no resistance with saline flush. Patient tolerated well. Primary nurse [facility] notified.

## 2022-08-07 NOTE — PLAN OF CARE
Problem: Safety - Adult  Goal: Free from fall injury  Outcome: Progressing     Problem: Skin/Tissue Integrity  Goal: Absence of new skin breakdown  Description: 1. Monitor for areas of redness and/or skin breakdown  2. Assess vascular access sites hourly  3. Every 4-6 hours minimum:  Change oxygen saturation probe site  4. Every 4-6 hours:  If on nasal continuous positive airway pressure, respiratory therapy assess nares and determine need for appliance change or resting period.   Outcome: Progressing     Problem: Discharge Planning  Goal: Discharge to home or other facility with appropriate resources  Outcome: Progressing     Problem: Pain  Goal: Verbalizes/displays adequate comfort level or baseline comfort level  Outcome: Progressing     Problem: Chronic Conditions and Co-morbidities  Goal: Patient's chronic conditions and co-morbidity symptoms are monitored and maintained or improved  Outcome: Progressing     Problem: Nutrition Deficit:  Goal: Optimize nutritional status  Outcome: Progressing

## 2022-08-07 NOTE — PROGRESS NOTES
V2.0  [facility] [place]      Name:  Emory Diallo /Age/Sex: 1955  (61 y.o. female)   MRN & CSN:  [id number] & [id number] Encounter Date/Time: 2022 7:32 AM EDT    Location:  [facility]0/1110-A PCP: Nitin Guzman MD       Hospital Day: 5    Assessment and Plan:   Emory Diallo is a 61 y.o. female with pmh of DM2, HTN, HLD who presents with Severe sepsis ([address]      Plan:    Severe sepsis 2/2 osteomyelitis of left great toe with surrounding cellulitis  Nonhealing diabetic foot ulcer  Leukocytosis, lactic acidosis, tachycardia. X-ray left foot with soft tissue ulceration involving the great toe, also concern for osteomyelitis. MRI foot with osteomyelitis of the distal phalanx of the left great toe with surrounding soft tissue edema and mild enhancement consistent with cellulitis. No organized drainable fluid collection identified. -Vancomycin and Zosyn  -IVF hydration  -Discussed with podiatry: left foot hallux amputation on Monday at 7:30 AM  -Vascular surgery following: Baseline CTA of abdominal aorta with distal runoff, bilateral lower extremity vein mapping  -Bcx NGTD  -Lactate normalized  -NPO after midnight   -Labs pending for today, will follow    HTN  -Consistently soft BP  -Hold home antihypertensives, will restart as appropriate    Non-insulin-dependent diabetes mellitus type 2  -Hold home p.o. meds  -SSI, glucose checks, hypoglycemia protocol  -Strict glucose control to help with wound healing    Hypomagnesemia, hypokalemia  -Replete as required      Diet ADULT ORAL NUTRITION SUPPLEMENT; Breakfast, Dinner; Low Calorie/High Protein Oral Supplement  ADULT DIET;  Regular; 5 carb choices (75 gm/meal)  Diet NPO   DVT Prophylaxis [x] Lovenox, []  Heparin, [] SCDs, [] Ambulation,  [] Eliquis, [] Xarelto  [] Coumadin   Code Status Full Code   Disposition From: Home  Expected Disposition: Home  Estimated Date of Discharge: 2-3 days  Patient requires continued admission due to severe sepsis   Surrogate Decision Maker/ POA      Subjective:     Chief Complaint: Toe Injury (Injured by pediatrist approx 4 weeks ago. Been getting worse since and states it is now infected ) and Wound Check       Mando Benavides is a 61 y.o. female who presents at request of her podiatrist with nonhealing left toe ulcer for past month associated with some necrosis at the tip of her left toe. Was assessed by podiatry the day prior to presentation, it was suggested that she present to ED for IV antibiotics and amputation of left great toe    She continues to feel well today and only complains of toe pain. Is urinating clear yellow urine with adequate amounts. Tolerating PO intake. Does have soft BP in the setting of home antihypertensives, asymptomatic. Labs pending for today. Review of Systems:    Review of Systems    No fevers, chills, cough, SOB, palpitations, chest pain, diarrhea, nausea, vomiting, abdominal pain. Objective: Intake/Output Summary (Last 24 hours) at 8/7/2022 1056  Last data filed at 8/7/2022 0550  Gross per 24 hour   Intake --   Output 650 ml   Net -650 ml          Vitals:   Vitals:    08/07/22 0825   BP:    Pulse:    Resp:    Temp:    SpO2: 99%       Physical Exam:     General: NAD  Eyes: EOMI  ENT: neck supple  Cardiovascular: Regular rate. Respiratory: Clear to auscultation  Gastrointestinal: Soft, non tender  Genitourinary: no suprapubic tenderness  Musculoskeletal: No edema  Skin: Ulcer of left great toe, tenderness on palpation  Neuro: Alert. Psych: Mood appropriate.      Medications:   Medications:    [START ON 8/8/2022] lisinopril  5 mg Oral Daily    potassium chloride  40 mEq Oral BID     vancomycin  750 mg IntraVENous Q12H    acetaminophen  500 mg Oral Q6H    enoxaparin  40 mg SubCUTAneous Daily    sodium chloride flush  5-40 mL IntraVENous BID    pantoprazole  40 mg Oral Daily    atorvastatin  10 mg Oral Daily    cilostazol  100 mg Oral BID    piperacillin-tazobactam  3,375 mg IntraVENous Q6H    insulin lispro  0-8 Units SubCUTAneous TID WC    insulin lispro  0-4 Units SubCUTAneous Nightly    gabapentin  300 mg Oral Nightly      Infusions:    lactated ringers 75 mL/hr at 08/06/22 1928    sodium chloride Stopped (08/07/22 0315)    dextrose       PRN Meds: HYDROcodone-acetaminophen, 1 tablet, Q4H PRN  ondansetron, 4 mg, Q8H PRN   Or  ondansetron, 4 mg, Q6H PRN  polyethylene glycol, 17 g, Daily PRN  sodium chloride flush, 5-40 mL, PRN  sodium chloride, , PRN  glucose, 4 tablet, PRN  dextrose bolus, 125 mL, PRN   Or  dextrose bolus, 250 mL, PRN  glucagon (rDNA), 1 mg, PRN  dextrose, , Continuous PRN      Labs      Recent Results (from the past 24 hour(s))   POCT Glucose    Collection Time: 08/06/22  4:03 PM   Result Value Ref Range    POC Glucose 226 (H) 70 - 99 MG/DL   POCT Glucose    Collection Time: 08/06/22  7:35 PM   Result Value Ref Range    POC Glucose 198 (H) 70 - 99 MG/DL   Vancomycin Level, Random    Collection Time: 08/07/22  6:06 AM   Result Value Ref Range    Vancomycin Rm 13.2 UG/ML    DOSE AMOUNT DOSE AMT. GIVEN - `     DOSE TIME DOSE TIME GIVEN - `    POCT Glucose    Collection Time: 08/07/22  6:47 AM   Result Value Ref Range    POC Glucose 130 (H) 70 - 99 MG/DL        Imaging/Diagnostics Last 24 Hours   XR TOE LEFT (MIN 2 VIEWS)    Result Date: 8/3/2022  EXAMINATION: 3 XRAY VIEWS OF THE LEFT TOE 8/3/2022 3:13 pm COMPARISON: None. HISTORY: ORDERING SYSTEM PROVIDED HISTORY: left great toe erythema and necrosis TECHNOLOGIST PROVIDED HISTORY: Reason for exam:->left great toe erythema and necrosis Reason for Exam: left great toe erythema and necrosis Additional signs and symptoms: na Relevant Medical/Surgical History: diabetes, hypertension FINDINGS: Soft tissue ulceration of the left great toe noted. Subjacent to that, there is osteolysis involving the great toe distal phalanx. The remaining toes are unremarkable appearance.      Soft tissue ulceration involving the great toe. Osteolysis of the great toe distal phalanx is seen, which is worrisome for osteomyelitis. VL DUP LOWER EXTREMITY ARTERIES LEFT    Result Date: 8/3/2022  EXAMINATION: ARTERIAL DUPLEX ULTRASOUND OF THE LEFT LOWER EXTREMITY  8/3/2022 7:29 pm TECHNIQUE: Duplex ultrasound using B-mode/gray scaled imaging, Doppler spectral analysis and color flow Doppler was obtained of the lower extremity. COMPARISON: None. HISTORY: ORDERING SYSTEM PROVIDED HISTORY: Left great toe necrosis TECHNOLOGIST PROVIDED HISTORY: Reason for exam:->Left great toe necrosis FINDINGS: Monophasic waveforms with increased spectral broadening noted throughout the left lower extremity suggestive of severe iliac inflow disease as well as severe lower extremity popliteal artery disease. Flow velocities were measured as follows: Com. Fem.  8.4 cm/sec SFA Prox. 12 cm/sec SFA Mid.     12 cm/sec SFA Dist.     22 cm/sec Pop. 19 cm/sec PTA            16, 0, 8 cm/sec Peron. 19, 0, 0 cm/sec HANNA            20, 0, 0 cm/sec     1. Diffuse monophasic waveform seen throughout the left lower extremity suggestive of severe iliac as well as left lower extremity peripheral arterial disease. 2. Occluded mid posterior tibial artery, as well as the mid and distal anterior tibial and peroneal arteries. No continuous runoff. 3. The obtain velocities are very low throughout the left lower extremity consistent with severe inflow disease.        Electronically signed by Mignon Iverson MD on 8/7/2022 at 10:56 AM

## 2022-08-07 NOTE — PROGRESS NOTES
[address] [facility]  consulted by KENNETH Aarngo for monitoring and adjustment. Indication for treatment: Severe Sepsis secondary to cellulitis with suspected osteomylitis  Goal trough: [] 10-15 mcg/mL or [x] 15-20 mcg/ml  AUC/TORRI: [] <500 or [x] 400-600    Pertinent Laboratory Values:   Temp Readings from Last 3 Encounters:   08/07/22 97.9 °F (36.6 °C)   08/02/22 97.8 °F (36.6 °C) (Temporal)   07/26/22 97.4 °F (36.3 °C) (Temporal)     Recent Labs     08/04/22  1341 08/04/22  1718 08/04/22  2245 08/05/22  0237 08/06/22  0453   WBC 12.2*  --   --  10.8* 9.1   LACTATE 3.6* 4.4* 3.3* 1.9  --        Recent Labs     08/04/22  1341 08/05/22  0237 08/06/22  0453   BUN 4* 5* 3*   CREATININE 0.5* 0.5* 0.4*       Estimated Creatinine Clearance: 123 mL/min (A) (based on SCr of 0.4 mg/dL (L)).     Intake/Output Summary (Last 24 hours) at 8/7/2022 0957  Last data filed at 8/7/2022 0550  Gross per 24 hour   Intake --   Output 650 ml   Net -650 ml         Pertinent Cultures:  Date    Source    Results  08/03   Blood    NGTD           Assessment:  Renal trends: stable, WNL  Day(s) of therapy: 4 of 7  Vancomycin concentration:   08/05: 14.9, collected 9h post-dose,   8/7:  13.2, 9 hours post-dose    Plan:  Renal labs stable, WNL  Random level therapeutic @ 13.2  Continue on Vancomycin 750mg IV every 12 hours  Predicted  and Trough 13.0 at steady state  Repeat level in 3 days  Pharmacy will continue to monitor patient and adjust therapy as indicated    VANCOMYCIN CONCENTRATION SCHEDULED FOR 8/10 @ 0500    Thank you for the consult,  Gia Velasquez [facility]  8/7/2022 9:57 AM

## 2022-08-08 ENCOUNTER — APPOINTMENT (OUTPATIENT)
Dept: GENERAL RADIOLOGY | Age: 67
DRG: 854 | End: 2022-08-08
Payer: MEDICARE

## 2022-08-08 ENCOUNTER — APPOINTMENT (OUTPATIENT)
Dept: ULTRASOUND IMAGING | Age: 67
DRG: 854 | End: 2022-08-08
Payer: MEDICARE

## 2022-08-08 ENCOUNTER — ANESTHESIA (OUTPATIENT)
Dept: OPERATING ROOM | Age: 67
DRG: 854 | End: 2022-08-08
Payer: MEDICARE

## 2022-08-08 LAB
ANION GAP SERPL CALCULATED.3IONS-SCNC: 12 MMOL/L (ref 4–16)
BUN BLDV-MCNC: <2 MG/DL (ref 6–23)
CALCIUM SERPL-MCNC: 9.5 MG/DL (ref 8.3–10.6)
CHLORIDE BLD-SCNC: 106 MMOL/L (ref 99–110)
CO2: 24 MMOL/L (ref 21–32)
CREAT SERPL-MCNC: 0.5 MG/DL (ref 0.6–1.1)
CULTURE: NORMAL
CULTURE: NORMAL
GFR AFRICAN AMERICAN: >60 ML/MIN/1.73M2
GFR NON-AFRICAN AMERICAN: >60 ML/MIN/1.73M2
GLUCOSE BLD-MCNC: 136 MG/DL (ref 70–99)
GLUCOSE BLD-MCNC: 144 MG/DL (ref 70–99)
GLUCOSE BLD-MCNC: 152 MG/DL (ref 70–99)
GLUCOSE BLD-MCNC: 155 MG/DL (ref 70–99)
GLUCOSE BLD-MCNC: 177 MG/DL (ref 70–99)
GLUCOSE BLD-MCNC: 208 MG/DL (ref 70–99)
HCT VFR BLD CALC: 31.5 % (ref 37–47)
HEMOGLOBIN: 10.3 GM/DL (ref 12.5–16)
Lab: NORMAL
Lab: NORMAL
MCH RBC QN AUTO: 31.5 PG (ref 27–31)
MCHC RBC AUTO-ENTMCNC: 32.7 % (ref 32–36)
MCV RBC AUTO: 96.3 FL (ref 78–100)
PDW BLD-RTO: 12.5 % (ref 11.7–14.9)
PLATELET # BLD: 315 K/CU MM (ref 140–440)
PMV BLD AUTO: 8.6 FL (ref 7.5–11.1)
POTASSIUM SERPL-SCNC: 4.1 MMOL/L (ref 3.5–5.1)
RBC # BLD: 3.27 M/CU MM (ref 4.2–5.4)
SODIUM BLD-SCNC: 142 MMOL/L (ref 135–145)
SPECIMEN: NORMAL
SPECIMEN: NORMAL
WBC # BLD: 10.4 K/CU MM (ref 4–10.5)

## 2022-08-08 PROCEDURE — 82962 GLUCOSE BLOOD TEST: CPT

## 2022-08-08 PROCEDURE — 6370000000 HC RX 637 (ALT 250 FOR IP): Performed by: STUDENT IN AN ORGANIZED HEALTH CARE EDUCATION/TRAINING PROGRAM

## 2022-08-08 PROCEDURE — 2580000003 HC RX 258: Performed by: NURSE PRACTITIONER

## 2022-08-08 PROCEDURE — 2709999900 HC NON-CHARGEABLE SUPPLY: Performed by: STUDENT IN AN ORGANIZED HEALTH CARE EDUCATION/TRAINING PROGRAM

## 2022-08-08 PROCEDURE — 36415 COLL VENOUS BLD VENIPUNCTURE: CPT

## 2022-08-08 PROCEDURE — 2580000003 HC RX 258: Performed by: STUDENT IN AN ORGANIZED HEALTH CARE EDUCATION/TRAINING PROGRAM

## 2022-08-08 PROCEDURE — 93930 UPPER EXTREMITY STUDY: CPT

## 2022-08-08 PROCEDURE — 99233 SBSQ HOSP IP/OBS HIGH 50: CPT

## 2022-08-08 PROCEDURE — 2500000003 HC RX 250 WO HCPCS: Performed by: STUDENT IN AN ORGANIZED HEALTH CARE EDUCATION/TRAINING PROGRAM

## 2022-08-08 PROCEDURE — 87186 SC STD MICRODIL/AGAR DIL: CPT

## 2022-08-08 PROCEDURE — 6360000002 HC RX W HCPCS: Performed by: NURSE PRACTITIONER

## 2022-08-08 PROCEDURE — 87075 CULTR BACTERIA EXCEPT BLOOD: CPT

## 2022-08-08 PROCEDURE — 3700000000 HC ANESTHESIA ATTENDED CARE: Performed by: STUDENT IN AN ORGANIZED HEALTH CARE EDUCATION/TRAINING PROGRAM

## 2022-08-08 PROCEDURE — 80048 BASIC METABOLIC PNL TOTAL CA: CPT

## 2022-08-08 PROCEDURE — 99254 IP/OBS CNSLTJ NEW/EST MOD 60: CPT | Performed by: INTERNAL MEDICINE

## 2022-08-08 PROCEDURE — 87070 CULTURE OTHR SPECIMN AEROBIC: CPT

## 2022-08-08 PROCEDURE — 3600000012 HC SURGERY LEVEL 2 ADDTL 15MIN: Performed by: STUDENT IN AN ORGANIZED HEALTH CARE EDUCATION/TRAINING PROGRAM

## 2022-08-08 PROCEDURE — 2500000003 HC RX 250 WO HCPCS

## 2022-08-08 PROCEDURE — 87205 SMEAR GRAM STAIN: CPT

## 2022-08-08 PROCEDURE — 6370000000 HC RX 637 (ALT 250 FOR IP)

## 2022-08-08 PROCEDURE — 6360000002 HC RX W HCPCS: Performed by: STUDENT IN AN ORGANIZED HEALTH CARE EDUCATION/TRAINING PROGRAM

## 2022-08-08 PROCEDURE — 94761 N-INVAS EAR/PLS OXIMETRY MLT: CPT

## 2022-08-08 PROCEDURE — 6360000002 HC RX W HCPCS

## 2022-08-08 PROCEDURE — 0QBP0ZZ EXCISION OF LEFT METATARSAL, OPEN APPROACH: ICD-10-PCS | Performed by: STUDENT IN AN ORGANIZED HEALTH CARE EDUCATION/TRAINING PROGRAM

## 2022-08-08 PROCEDURE — 2060000000 HC ICU INTERMEDIATE R&B

## 2022-08-08 PROCEDURE — 3600000002 HC SURGERY LEVEL 2 BASE: Performed by: STUDENT IN AN ORGANIZED HEALTH CARE EDUCATION/TRAINING PROGRAM

## 2022-08-08 PROCEDURE — 85027 COMPLETE CBC AUTOMATED: CPT

## 2022-08-08 PROCEDURE — 73630 X-RAY EXAM OF FOOT: CPT

## 2022-08-08 PROCEDURE — 87077 CULTURE AEROBIC IDENTIFY: CPT

## 2022-08-08 PROCEDURE — 3700000001 HC ADD 15 MINUTES (ANESTHESIA): Performed by: STUDENT IN AN ORGANIZED HEALTH CARE EDUCATION/TRAINING PROGRAM

## 2022-08-08 PROCEDURE — 0Y6Q0Z0 DETACHMENT AT LEFT 1ST TOE, COMPLETE, OPEN APPROACH: ICD-10-PCS | Performed by: STUDENT IN AN ORGANIZED HEALTH CARE EDUCATION/TRAINING PROGRAM

## 2022-08-08 RX ORDER — CLONIDINE HYDROCHLORIDE 0.1 MG/1
0.1 TABLET ORAL 2 TIMES DAILY
Status: DISCONTINUED | OUTPATIENT
Start: 2022-08-08 | End: 2022-08-11 | Stop reason: HOSPADM

## 2022-08-08 RX ORDER — ASPIRIN 81 MG/1
81 TABLET, CHEWABLE ORAL DAILY
Status: DISCONTINUED | OUTPATIENT
Start: 2022-08-08 | End: 2022-08-11 | Stop reason: HOSPADM

## 2022-08-08 RX ORDER — ATORVASTATIN CALCIUM 40 MG/1
40 TABLET, FILM COATED ORAL DAILY
Status: DISCONTINUED | OUTPATIENT
Start: 2022-08-09 | End: 2022-08-11 | Stop reason: HOSPADM

## 2022-08-08 RX ORDER — KETAMINE HCL 50MG/ML(1)
SYRINGE (ML) INTRAVENOUS PRN
Status: DISCONTINUED | OUTPATIENT
Start: 2022-08-08 | End: 2022-08-09 | Stop reason: SDUPTHER

## 2022-08-08 RX ORDER — PROPOFOL 10 MG/ML
INJECTION, EMULSION INTRAVENOUS CONTINUOUS PRN
Status: DISCONTINUED | OUTPATIENT
Start: 2022-08-08 | End: 2022-08-09 | Stop reason: SDUPTHER

## 2022-08-08 RX ORDER — BUPIVACAINE HYDROCHLORIDE 2.5 MG/ML
INJECTION, SOLUTION EPIDURAL; INFILTRATION; INTRACAUDAL
Status: COMPLETED | OUTPATIENT
Start: 2022-08-08 | End: 2022-08-08

## 2022-08-08 RX ORDER — ESMOLOL HYDROCHLORIDE 10 MG/ML
INJECTION INTRAVENOUS PRN
Status: DISCONTINUED | OUTPATIENT
Start: 2022-08-08 | End: 2022-08-09 | Stop reason: SDUPTHER

## 2022-08-08 RX ORDER — ENOXAPARIN SODIUM 100 MG/ML
40 INJECTION SUBCUTANEOUS DAILY
Status: DISCONTINUED | OUTPATIENT
Start: 2022-08-08 | End: 2022-08-11 | Stop reason: HOSPADM

## 2022-08-08 RX ADMIN — SODIUM CHLORIDE, POTASSIUM CHLORIDE, SODIUM LACTATE AND CALCIUM CHLORIDE: 600; 310; 30; 20 INJECTION, SOLUTION INTRAVENOUS at 06:10

## 2022-08-08 RX ADMIN — SODIUM CHLORIDE, PRESERVATIVE FREE 10 ML: 5 INJECTION INTRAVENOUS at 20:24

## 2022-08-08 RX ADMIN — SODIUM CHLORIDE, PRESERVATIVE FREE 10 ML: 5 INJECTION INTRAVENOUS at 10:17

## 2022-08-08 RX ADMIN — SODIUM CHLORIDE, POTASSIUM CHLORIDE, SODIUM LACTATE AND CALCIUM CHLORIDE: 600; 310; 30; 20 INJECTION, SOLUTION INTRAVENOUS at 22:36

## 2022-08-08 RX ADMIN — CLONIDINE HYDROCHLORIDE 0.1 MG: 0.1 TABLET ORAL at 12:59

## 2022-08-08 RX ADMIN — ATORVASTATIN CALCIUM 10 MG: 10 TABLET, FILM COATED ORAL at 10:16

## 2022-08-08 RX ADMIN — PIPERACILLIN AND TAZOBACTAM 3375 MG: 3; .375 INJECTION, POWDER, LYOPHILIZED, FOR SOLUTION INTRAVENOUS at 14:43

## 2022-08-08 RX ADMIN — PIPERACILLIN AND TAZOBACTAM 3375 MG: 3; .375 INJECTION, POWDER, LYOPHILIZED, FOR SOLUTION INTRAVENOUS at 20:13

## 2022-08-08 RX ADMIN — PIPERACILLIN AND TAZOBACTAM 3375 MG: 3; .375 INJECTION, POWDER, LYOPHILIZED, FOR SOLUTION INTRAVENOUS at 01:25

## 2022-08-08 RX ADMIN — ACETAMINOPHEN 500 MG: 500 TABLET ORAL at 10:17

## 2022-08-08 RX ADMIN — ESMOLOL HYDROCHLORIDE 50 MG: 10 INJECTION, SOLUTION INTRAVENOUS at 07:58

## 2022-08-08 RX ADMIN — HYDROCODONE BITARTRATE AND ACETAMINOPHEN 1 TABLET: 5; 325 TABLET ORAL at 01:33

## 2022-08-08 RX ADMIN — Medication 5 DROP: at 20:28

## 2022-08-08 RX ADMIN — VANCOMYCIN HYDROCHLORIDE 750 MG: 750 INJECTION, POWDER, LYOPHILIZED, FOR SOLUTION INTRAVENOUS at 10:32

## 2022-08-08 RX ADMIN — PROPOFOL 150 MCG/KG/MIN: 10 INJECTION, EMULSION INTRAVENOUS at 07:54

## 2022-08-08 RX ADMIN — HYDROCODONE BITARTRATE AND ACETAMINOPHEN 1 TABLET: 5; 325 TABLET ORAL at 20:26

## 2022-08-08 RX ADMIN — CLONIDINE HYDROCHLORIDE 0.1 MG: 0.1 TABLET ORAL at 20:23

## 2022-08-08 RX ADMIN — ACETAMINOPHEN 500 MG: 500 TABLET ORAL at 14:44

## 2022-08-08 RX ADMIN — ASPIRIN 81 MG CHEWABLE TABLET 81 MG: 81 TABLET CHEWABLE at 14:44

## 2022-08-08 RX ADMIN — PIPERACILLIN AND TAZOBACTAM 3375 MG: 3; .375 INJECTION, POWDER, LYOPHILIZED, FOR SOLUTION INTRAVENOUS at 07:40

## 2022-08-08 RX ADMIN — CILOSTAZOL 100 MG: 100 TABLET ORAL at 20:23

## 2022-08-08 RX ADMIN — ENOXAPARIN SODIUM 40 MG: 100 INJECTION SUBCUTANEOUS at 12:30

## 2022-08-08 RX ADMIN — Medication 25 MG: at 07:54

## 2022-08-08 RX ADMIN — SODIUM CHLORIDE, POTASSIUM CHLORIDE, SODIUM LACTATE AND CALCIUM CHLORIDE: 600; 310; 30; 20 INJECTION, SOLUTION INTRAVENOUS at 17:32

## 2022-08-08 RX ADMIN — VANCOMYCIN HYDROCHLORIDE 750 MG: 750 INJECTION, POWDER, LYOPHILIZED, FOR SOLUTION INTRAVENOUS at 20:31

## 2022-08-08 RX ADMIN — CILOSTAZOL 100 MG: 100 TABLET ORAL at 10:16

## 2022-08-08 RX ADMIN — ACETAMINOPHEN 500 MG: 500 TABLET ORAL at 20:26

## 2022-08-08 RX ADMIN — HYDROCODONE BITARTRATE AND ACETAMINOPHEN 1 TABLET: 5; 325 TABLET ORAL at 14:44

## 2022-08-08 RX ADMIN — Medication 25 MG: at 08:05

## 2022-08-08 RX ADMIN — HYDROCODONE BITARTRATE AND ACETAMINOPHEN 1 TABLET: 5; 325 TABLET ORAL at 10:16

## 2022-08-08 RX ADMIN — GABAPENTIN 300 MG: 300 CAPSULE ORAL at 20:23

## 2022-08-08 ASSESSMENT — PAIN DESCRIPTION - ORIENTATION
ORIENTATION: LEFT

## 2022-08-08 ASSESSMENT — ENCOUNTER SYMPTOMS
ABDOMINAL PAIN: 0
CHEST TIGHTNESS: 0
SHORTNESS OF BREATH: 0
DIARRHEA: 0
VOMITING: 0

## 2022-08-08 ASSESSMENT — PAIN SCALES - GENERAL
PAINLEVEL_OUTOF10: 10
PAINLEVEL_OUTOF10: 8
PAINLEVEL_OUTOF10: 5
PAINLEVEL_OUTOF10: 0
PAINLEVEL_OUTOF10: 7
PAINLEVEL_OUTOF10: 8
PAINLEVEL_OUTOF10: 10
PAINLEVEL_OUTOF10: 8

## 2022-08-08 ASSESSMENT — PAIN DESCRIPTION - LOCATION
LOCATION: FOOT;GENERALIZED
LOCATION: FOOT
LOCATION: FOOT
LOCATION: LEG
LOCATION: FOOT

## 2022-08-08 ASSESSMENT — PAIN DESCRIPTION - DESCRIPTORS
DESCRIPTORS: ACHING;THROBBING
DESCRIPTORS: ACHING;THROBBING
DESCRIPTORS: ACHING
DESCRIPTORS: ACHING;THROBBING

## 2022-08-08 ASSESSMENT — PAIN DESCRIPTION - ONSET
ONSET: ON-GOING
ONSET: ON-GOING

## 2022-08-08 ASSESSMENT — PAIN DESCRIPTION - FREQUENCY: FREQUENCY: CONTINUOUS

## 2022-08-08 ASSESSMENT — PAIN SCALES - WONG BAKER
WONGBAKER_NUMERICALRESPONSE: 0
WONGBAKER_NUMERICALRESPONSE: 0

## 2022-08-08 ASSESSMENT — PAIN DESCRIPTION - PAIN TYPE
TYPE: SURGICAL PAIN
TYPE: SURGICAL PAIN;CHRONIC PAIN

## 2022-08-08 ASSESSMENT — COPD QUESTIONNAIRES: CAT_SEVERITY: MILD

## 2022-08-08 NOTE — PROGRESS NOTES
Cardiothoracic Surgery Note      Name:  Carina Ramsey /Age/Sex: 1955  (59 y.o. female)   MRN & CSN:  [id number] & [id number] Admission Date/Time: 8/3/2022  5:04 PM   Location:  [facility] PCP: Del Durán MD       Hospital Day: 6    Subjective:  Susu Sanchez is a 61 y. o.year old who and presents with had concerns including Toe Injury (Injured by pediatrist approx 4 weeks ago. Been getting worse since and states it is now infected ) and Wound Check. Chief Complaint   Patient presents with    Toe Injury     Injured by pediatrist approx 4 weeks ago. Been getting worse since and states it is now infected     Wound Check     Patient had recently returned from the OR. She is complaining of a moderate amount of lower extremity pain. Patient states that she has claudication of her left leg but not of her right. She is denying any chest pain, shortness of breath, lightheadedness, dizziness, syncopal episode. Discussed with patient and family that she will need a more extensive work-up of her peripheral vascular disease. Objective: Temperature:  Current - Temp: 98 °F (36.7 °C); Max - Temp  Av.1 °F (36.7 °C)  Min: 98 °F (36.7 °C)  Max: 98.1 °F (36.7 °C)    Respiratory Rate : Resp  Av.5  Min: 12  Max: 20    Pulse Range: Pulse  Av.8  Min: 109  Max: 124    Blood Presuure Range:  Systolic (70AHD), YCH:659 , Min:131 , ATS:518   ; Diastolic (36HLM), QOV:21, Min:67, Max:87      Pulse ox Range: SpO2  Av.5 %  Min: 96 %  Max: 100 %    24hr I & O:    Intake/Output Summary (Last 24 hours) at 2022 1138  Last data filed at 2022 3007  Gross per 24 hour   Intake --   Output 2250 ml   Net -2250 ml       Review of Systems   Constitutional:  Negative for diaphoresis, fatigue and fever. Eyes:  Negative for visual disturbance. Respiratory:  Negative for chest tightness and shortness of breath.     Cardiovascular:  Negative for chest pain, palpitations and leg swelling. Claudication of left lower extremity but not of right   Gastrointestinal:  Negative for abdominal pain, diarrhea and vomiting. Endocrine: Negative for cold intolerance and heat intolerance. Musculoskeletal:         Pain of left foot following surgery   Skin:  Negative for pallor and rash. Neurological:  Negative for dizziness, syncope, light-headedness and headaches. Psychiatric/Behavioral:  Negative for dysphoric mood. The patient is not nervous/anxious. has a past medical history of Cancer ([facility] [address] Diabetes mellitus ([address] Hyperlipidemia, and Hypertension. has a past surgical history that includes Lung cancer surgery (Left, 10/2019) and Upper gastrointestinal endoscopy (N/A, 10/3/2020). Physical Exam  Vitals reviewed. Constitutional:       General: She is not in acute distress. Appearance: She is not diaphoretic. HENT:      Head: Normocephalic and atraumatic. Right Ear: External ear normal.      Left Ear: External ear normal.      Nose: Nose normal.      Mouth/Throat:      Mouth: Mucous membranes are moist.   Eyes:      Extraocular Movements: Extraocular movements intact. Comments: Pupils equal and round   Neck:      Vascular: No carotid bruit. Cardiovascular:      Rate and Rhythm: Regular rhythm. Tachycardia present. Pulses: Normal pulses. Heart sounds: S1 normal and S2 normal. No murmur heard. No friction rub. No gallop. Pulmonary:      Effort: Pulmonary effort is normal. No respiratory distress. Breath sounds: Normal breath sounds. No rales. Chest:      Chest wall: No tenderness. Abdominal:      Palpations: Abdomen is soft. Tenderness: There is no abdominal tenderness. Musculoskeletal:      Right lower leg: No edema. Left lower leg: No edema. Skin:     General: Skin is warm and dry. Capillary Refill: Capillary refill takes less than 2 seconds. Findings: No rash.       Comments: Skin turgor of right lower extremity brisk. Left lower extremity dressing in place. Clean dry   Neurological:      Mental Status: She is alert and oriented to person, place, and time. Mental status is at baseline. Psychiatric:         Behavior: Behavior is cooperative. Thought Content: Thought content normal.         Judgment: Judgment normal.        Medications:    enoxaparin  40 mg SubCUTAneous Daily    carbamide peroxide  5 drop Both Ears Daily    [Held by provider] lisinopril  5 mg Oral Daily    hydrocortisone   Topical BID    potassium chloride  40 mEq Oral BID WC    vancomycin  750 mg IntraVENous Q12H    acetaminophen  500 mg Oral Q6H    sodium chloride flush  5-40 mL IntraVENous BID    pantoprazole  40 mg Oral Daily    atorvastatin  10 mg Oral Daily    cilostazol  100 mg Oral BID    piperacillin-tazobactam  3,375 mg IntraVENous Q6H    insulin lispro  0-8 Units SubCUTAneous TID WC    insulin lispro  0-4 Units SubCUTAneous Nightly    gabapentin  300 mg Oral Nightly      lactated ringers 75 mL/hr at 08/08/22 0748    sodium chloride Stopped (08/07/22 0315)    dextrose       HYDROcodone-acetaminophen, ondansetron **OR** ondansetron, polyethylene glycol, sodium chloride flush, sodium chloride, glucose, dextrose bolus **OR** dextrose bolus, glucagon (rDNA), dextrose    Lab Data:  CBC:   Recent Labs     08/06/22  0453 08/08/22  0450   WBC 9.1 10.4   HGB 9.2* 10.3*   HCT 27.4* 31.5*   MCV 94.5 96.3    315     BMP:   Recent Labs     08/06/22  0453 08/08/22  0450    142   K 3.3* 4.1    106   CO2 24 24   BUN 3* <2*   CREATININE 0.4* 0.5*     LIVER PROFILE:   Recent Labs     08/06/22  0453   AST 13*   ALT 6*   BILITOT 0.5   ALKPHOS 64     PT/INR: No results for input(s): PROTIME, INR in the last 72 hours. APTT: No results for input(s): APTT in the last 72 hours. BNP:  No results for input(s): BNP in the last 72 hours. TROPONIN: No results for input(s): TROPONINT in the last 72 hours.     Bilateral lower extremity vein mappin2022    Right:       Sapheno-femoral Junction: 7mm. Proximal Thigh:  5.1mm. Mid Thigh:  3.1mm. Distal Thigh:  2.6mm. Knee:  3.9mm. Proximal Calf: 2.8mm. Mid Calf:  1.8mm. Ankle: 1.5mm. Left:       Sapheno-femoral Junction: 7.7mm. Proximal Thigh:  5.6mm. Mid Thigh:  4.6mm. Distal Thigh:  4.8mm. Knee:  5.3mm. Proximal Calf: 4.1mm. Mid Calf:  3.6mm. Ankle: 4.3mm. CTA abdominal aorta with bilateral runoff with contrast: 2022    1. Aortoiliac atherosclerotic plaque. Previous stenting of the iliac   circulation bilaterally which is patent. 2. 4.5 cm occlusion of the right SFA proximally. Diffuse plaque with serial   stenoses in the reconstituted SFA. The popliteal artery assumes a more   normal caliber with no focal stenosis. Three-vessel runoff proximally with   primary runoff via the peroneal.  The peroneal provides the posterior tibial   and dorsalis pedis circulation into the foot. 3. Occlusion of the left common femoral artery distal to the iliac stent. Reconstitution of the distal 3rd of the SFA. Diffuse plaque within the   reconstituted SFA and popliteal artery with serial 50% stenoses. 2-vessel   runoff with occlusion of the anterior tibial artery proximally. 4. Probable bilateral renal artery stenoses. No significant mesenteric   stenosis. Patent JUAN. 5. No acute findings within the abdomen or pelvis. Gastrostomy tube in   place. Mild retained stool throughout the colon suggesting constipation. 6. Stable enlarged fibroid uterus. 7. Trace left pleural effusion. No acute infiltrate or significant edema at   the lung bases. Labs, consult, tests reviewed          ASSESSMENT/PLAN:  Severe peripheral vascular disease  -Patient does complain of claudication of left leg but not of right.   Cardiology consulted in order to perform aortogram.  CTA and vein mapping per above1. We will also check for subclavian artery stenosis with ultrasound. On Pletal. Start aspirin. Osteomyelitis of left great toe  -Patient underwent partial hallux amputation with excisional debridement of all nonviable tissue and bone of left foot with podiatry today. Surgical dressing in place. Patient is on Zosyn and vancomycin  Non-insulin-dependent type 2 diabetes mellitus  -On sliding scale insulin  Hypertension  -BP of 131/87. Restarted her clonidine, patient is tachy, likely secondary to pain and abrupt dc of clonidine  Chronic normocytic anemia  -Hemoglobin of 10.3.  Continue to monitor  DVT prophylaxis  -On Lovenox        Octaviano Herrera PA-C, 8/8/2022 11:38 AM

## 2022-08-08 NOTE — CONSULTS
INPATIENT CARDIOLOGY CONSULT NOTE         Reason for consultation:  PAD / Request for peripheral angiogram     Referring physician:  Gemma Pleitez MD     Primary care physician: Dwight Cabot, MD      Dear Gemma Pleitez MD Thank you for the consult    Chief Complaint   Patient presents with    Toe Injury     Injured by pediatrist approx 4 weeks ago. Been getting worse since and states it is now infected     Wound Check       History of present illness:Erendira is a 61 y. o.year old who  presents with   Chief Complaint   Patient presents with    Toe Injury     Injured by pediatrist approx 4 weeks ago. Been getting worse since and states it is now infected     Wound Check       Patient is a 71-year-old female who was admitted for left toe infection status post amputation. Cardiology is consulted, by CVT surgery, to evaluate patient for PAD and to perform angiogram    Patient mentions that she follows up at [facility], she underwent peripheral angiogram and ? Surgery ? PTA bilateral lower extremities 6 to 9 months ago. Patient complains of intermittent claudication symptoms left more than right. CTA abdominal aorta with bilateral runoff with contrast: 8/7/2022         1. Aortoiliac atherosclerotic plaque. Previous stenting of the iliac   circulation bilaterally which is patent. 2. 4.5 cm occlusion of the right SFA proximally. Diffuse plaque with serial   stenoses in the reconstituted SFA. The popliteal artery assumes a more   normal caliber with no focal stenosis. Three-vessel runoff proximally with   primary runoff via the peroneal.  The peroneal provides the posterior tibial   and dorsalis pedis circulation into the foot. 3. Occlusion of the left common femoral artery distal to the iliac stent. Reconstitution of the distal 3rd of the SFA. Diffuse plaque within the   reconstituted SFA and popliteal artery with serial 50% stenoses.   2-vessel   runoff with occlusion of the anterior tibial artery proximally. 4. Probable bilateral renal artery stenoses. No significant mesenteric   stenosis. Patent JUAN. 5. No acute findings within the abdomen or pelvis. Gastrostomy tube in   place. Mild retained stool throughout the colon suggesting constipation. 6. Stable enlarged fibroid uterus. 7. Trace left pleural effusion. No acute infiltrate or significant edema at   the lung bases. Past medical history:    has a past medical history of Cancer ([facility] [address] Diabetes mellitus ([facility] [address] Hyperlipidemia, and Hypertension. Past surgical history:   has a past surgical history that includes Lung cancer surgery (Left, 10/2019) and Upper gastrointestinal endoscopy (N/A, 10/3/2020). Social History:   reports that she has quit smoking. Her smoking use included cigarettes. She smoked an average of 1 pack per day. She has never used smokeless tobacco. She reports current alcohol use of about 1.0 standard drink per week. She reports current drug use. Drug: Marijuana Dolanolivia Gage).   Family history:   no family history of CAD, STROKE of DM    No Known Allergies    enoxaparin (LOVENOX) injection 40 mg, Daily  carbamide peroxide (DEBROX) 6.5 % otic solution 5 drop, Daily  cloNIDine (CATAPRES) tablet 0.1 mg, BID  aspirin chewable tablet 81 mg, Daily  [Held by provider] lisinopril (PRINIVIL;ZESTRIL) tablet 5 mg, Daily  hydrocortisone 1 % ointment, BID  potassium chloride (KLOR-CON M) extended release tablet 40 mEq, BID WC  lactated ringers infusion, Continuous  vancomycin (VANCOCIN) 750 mg in dextrose 5 % 250 mL IVPB ([id number]), Q12H  acetaminophen (TYLENOL) tablet 500 mg, Q6H  HYDROcodone-acetaminophen (NORCO) 5-325 MG per tablet 1 tablet, Q4H PRN  ondansetron (ZOFRAN-ODT) disintegrating tablet 4 mg, Q8H PRN   Or  ondansetron (ZOFRAN) injection 4 mg, Q6H PRN  polyethylene glycol (GLYCOLAX) packet 17 g, Daily PRN  sodium chloride flush 0.9 % injection 5-40 mL, BID  sodium chloride flush 0.9 % injection 5-40 mL, PRN  0.9 % sodium chloride infusion, PRN  pantoprazole (PROTONIX) tablet 40 mg, Daily  atorvastatin (LIPITOR) tablet 10 mg, Daily  cilostazol (PLETAL) tablet 100 mg, BID  glucose chewable tablet 16 g, PRN  dextrose bolus 10% 125 mL, PRN   Or  dextrose bolus 10% 250 mL, PRN  glucagon (rDNA) injection 1 mg, PRN  dextrose 10 % infusion, Continuous PRN  piperacillin-tazobactam (ZOSYN) 3,375 mg in dextrose 5 % 50 mL IVPB extended infusion (mini-bag), Q6H  insulin lispro (HUMALOG) injection vial 0-8 Units, TID   insulin lispro (HUMALOG) injection vial 0-4 Units, Nightly  gabapentin (NEURONTIN) capsule 300 mg, Nightly    propofol injection, Continuous PRN  Ketamine (KETALAR) injection syringe, PRN  esmolol (BREVIBLOC) injection, PRN      Current Facility-Administered Medications   Medication Dose Route Frequency Provider Last Rate Last Admin    enoxaparin (LOVENOX) injection 40 mg  40 mg SubCUTAneous Daily CHANTELLE BelcherM   40 mg at 08/08/22 1230    carbamide peroxide (DEBROX) 6.5 % otic solution 5 drop  5 drop Both Ears Daily Enriqueta Rod MD        cloNIDine (CATAPRES) tablet 0.1 mg  0.1 mg Oral BID Enriqueta Rod MD   0.1 mg at 08/08/22 1259    aspirin chewable tablet 81 mg  81 mg Oral Daily La Witt PA-C        [Held by provider] lisinopril (PRINIVIL;ZESTRIL) tablet 5 mg  5 mg Oral Daily Rodriguez Salguero DPM        hydrocortisone 1 % ointment   Topical BID Rodriguez Salguero DPM   Given at 08/07/22 2313    potassium chloride (KLOR-CON M) extended release tablet 40 mEq  40 mEq Oral BID  CHANTELLE BelcherM   40 mEq at 08/07/22 1656    lactated ringers infusion   IntraVENous Continuous CHANTELLE BelcherM 75 mL/hr at 08/08/22 0748 NoRateChange at 08/08/22 0748    vancomycin (VANCOCIN) 750 mg in dextrose 5 % 250 mL IVPB ([id number])  750 mg IntraVENous Q12H Rodriguez Salguero DPM   Stopped at 08/08/22 1132    acetaminophen (TYLENOL) tablet 500 mg  500 mg Oral Q6H Rodriguez Salguero DPM   500 mg at 08/08/22 1017 HYDROcodone-acetaminophen (NORCO) 5-325 MG per tablet 1 tablet  1 tablet Oral Q4H PRN CHANTELLE EnriquezM   1 tablet at 08/08/22 1016    ondansetron (ZOFRAN-ODT) disintegrating tablet 4 mg  4 mg Oral Q8H PRN Vicki Dumont DPM        Or    ondansetron [facility]) injection 4 mg  4 mg IntraVENous Q6H PRN Vicki Dumont, CHANTELLEM        polyethylene glycol (GLYCOLAX) packet 17 g  17 g Oral Daily PRN Vicki Dumont, CHANTELLEM        sodium chloride flush 0.9 % injection 5-40 mL  5-40 mL IntraVENous BID CHANTELLE EnriquezM   10 mL at 08/08/22 1017    sodium chloride flush 0.9 % injection 5-40 mL  5-40 mL IntraVENous PRN Vicki Dumont, CHANTELLEM        0.9 % sodium chloride infusion   IntraVENous PRN Vicki Dumont DPM   Stopped at 08/07/22 0315    pantoprazole (PROTONIX) tablet 40 mg  40 mg Oral Daily CHANTELLE EnriquezM   40 mg at 08/07/22 0540    atorvastatin (LIPITOR) tablet 10 mg  10 mg Oral Daily CHANTELLE EnriquezM   10 mg at 08/08/22 1016    cilostazol (PLETAL) tablet 100 mg  100 mg Oral BID Vicki Dumont, DPM   100 mg at 08/08/22 1016    glucose chewable tablet 16 g  4 tablet Oral PRN Vicki Dumont, DPM        dextrose bolus 10% 125 mL  125 mL IntraVENous PRN Vicki Dumont DPM        Or    dextrose bolus 10% 250 mL  250 mL IntraVENous PRN Vicki Dumont, GIL        glucagon (rDNA) injection 1 mg  1 mg SubCUTAneous PRN Vicki Dumont, GIL        dextrose 10 % infusion   IntraVENous Continuous PRN Vicki Dumont DPM        piperacillin-tazobactam (ZOSYN) 3,375 mg in dextrose 5 % 50 mL IVPB extended infusion (mini-bag)  3,375 mg IntraVENous Q6H Vicki Dumont DPM   Stopped at 08/08/22 0810    insulin lispro (HUMALOG) injection vial 0-8 Units  0-8 Units SubCUTAneous TID WC Vicki Dumont DPM   2 Units at 08/06/22 1656    insulin lispro (HUMALOG) injection vial 0-4 Units  0-4 Units SubCUTAneous Nightly Vicki Dumont, DPM        gabapentin (NEURONTIN) capsule 300 mg  300 mg Oral Nightly iVcki Dumont, DPM   300 mg at 08/07/22 2022     Facility-Administered Medications Ordered in Other Encounters   Medication Dose Route Frequency Provider Last Rate Last Admin    propofol injection   IntraVENous Continuous PRN CAYLA Levi CRNA 67.83 mL/hr at 08/08/22 0817 190 mcg/kg/min at 08/08/22 0817    Ketamine (KETALAR) injection syringe   IntraVENous PRN Lamin Lambert APRN Diomedes CRNA   25 mg at 08/08/22 0805    esmolol (BREVIBLOC) injection   IntraVENous PRN Lamin Lambert APRN - CRNA   50 mg at 08/08/22 8232         Review of Systems:      Constitutional: No Fever or Weight Loss   Eyes: No Decreased Vision  ENT: No Headaches, Hearing Loss or Vertigo  Cardiovascular:   no chest pain,  no dyspnea on exertion,  no palpitations or loss of consciousness  Respiratory: No cough or wheezing    Gastrointestinal: No abdominal pain, appetite loss, blood in stools, constipation, diarrhea or heartburn  Genitourinary: No dysuria, trouble voiding, or hematuria  Musculoskeletal:  +++ Intermittent claudication symptoms  Integumentary: No rash or pruritis  Neurological: No TIA or stroke symptoms  Psychiatric: No anxiety or depression  Endocrine: No malaise, fatigue or temperature intolerance  Hematologic/Lymphatic: No bleeding problems, blood clots or swollen lymph nodes  Allergic/Immunologic: No nasal congestion or hives    All other systems were reviewed and were negative otherwise. Physical Examination:      Vitals:    08/08/22 1046   BP:    Pulse:    Resp: 17   Temp:    SpO2:       Wt Readings from Last 3 Encounters:   08/08/22 131 lb 2.8 oz (59.5 kg)   08/04/22 137 lb 7 oz (62.3 kg)   10/04/20 154 lb 9 oz (70.1 kg)     Body mass index is 22.52 kg/m². General Appearance:  No distress, conversant  Constitutional:  Well developed, Well nourished  HEENT:  Normocephalic, Atraumatic, Oropharynx moist   Nose normal. Neck Supple Carotid: no carotid bruit  Eyes:  Conjunctiva normal, No discharge.    Respiratory:    Normal breath sounds, No respiratory distress, No wheezing, no use of accessory muscles, diaphragm movement is normal  No chest Tenderness  Cardiovascular: S1-S2 No murmurs auscultated. No rubs, thrills or gallops. Normal  rhythm. No pedal edema. Left foot in surgical dressing precipitation. No pulses palpable in AT or PT left side. Patient is tender to touch left groin and popliteal area  GI:  Soft Non tender, non distended. Musculoskeletal:   No tenderness, No cyanosis, No clubbing. Integument:  Warm, Dry, No erythema, No rash. Lymphatic:  No lymphadenopathy noted. Neurologic:  Alert & oriented x 3  No focal deficits noted. Psychiatric:  Affect normal, Judgment normal, Mood normal.       Lab Review     Recent Labs     08/08/22 0450   WBC 10.4   HGB 10.3*   HCT 31.5*         Recent Labs     08/08/22 0450      K 4.1      CO2 24   BUN <2*   CREATININE 0.5*     Recent Labs     08/06/22 0453   AST 13*   ALT 6*   BILITOT 0.5   ALKPHOS 64     No results for input(s): TROPONINI in the last 72 hours. No results found for: BNP  Lab Results   Component Value Date    INR 0.99 08/04/2022    PROTIME 12.8 08/04/2022         All labs, images, EKGs were personally reviewed      Assessment: 61 y. o.year old with PMH of  has a past medical history of Cancer ([address] Diabetes mellitus ([address] Hyperlipidemia, and Hypertension.        Medical Decision Making :       Severe PAD     History of PTA at [facility]  CT scan suggestive of occluded left common femoral artery, distal to the iliac stent with reconstituted flow in distal SFA, popliteal artery stenosis, two-vessel runoff    Likely renal artery stenosis    We will plan for peripheral angiogram in the morning  Keep n.p.o. after midnight  Continue with aspirin 81 mg  Increase Lipitor to 40 mg daily    Osteomyelitis of the foot s/p amputation: On antibiotics per surgery/podiatry  Essential hypertension  Hyperlipidemia: Increase Lipitor to 40  Former smoker: Quit smoking a month ago         Thank you for the consult    Dr. Simona Acosta  8/8/2022 2:08 PM

## 2022-08-08 NOTE — PROGRESS NOTES
Progress note    Subjective: No changes to the left foot today from previous. Doing okay overall. Still getting some pain in the left foot. Nothing to drink since midnight anticipation for surgery today    Objective:  Neurovascular status unchanged from previous  Dermatological: No worsening signs of infection to the great toe of the left foot. Assessment:   -Osteomyelitis left foot  -Type 2 diabetes mellitus with foot ulceration and necrosis of bone left foot  -Type 2 diabetes mellitus with peripheral neuropathy  -Peripheral vascular disease  -Cellulitis left foot        Plan:    -Patient was seen, evaluated, and treated today.   -Chronic wound to left great toe with x-ray and MRI confirmed osteomyelitis of distal phalanx  -Again discussed conservative and surgical treatment options in detail with the patient today. She elects to proceed with hallux amputation left foot with excisional debridement of all nonviable tissue and bone  -Did discuss with her that she is at high risk for nonhealing wounds and underlying bone infection given her history of peripheral vascular disease as well as her recent arterial study that showed stenosis and blockages in her left lower extremity with only two-vessel runoff distally. Cardiothoracic surgery was consulted for this  -Also discussed with patient that the surgery may not completely relieve her pain as a lot of her pain could be ischemic pain as well. She did mention understanding  -All risks benefits and complications of the procedure were explained to the patient with her full understanding. No guarantees were given or implied.  -Has been n.p.o. since midnight in anticipation for surgery today.  -Continue IV antibiotics per primary care team  -Further recommendations as needed pending intraoperative findings  -Please contact any questions.       Bala Romo DPM    [facility] [address] [place] [address] and Ankle Surgery        Surgery was discussed at length today with the patient. This included the potential risks, conditions as well as the postoperative course. The risks include but are not limited to: Pain, infection, swelling, worsening or no better. We rediscussed the risk for skin complications or wound complication/nerve related complications and/or bone related complications, hardware complications, bone healing complications, failure of procedure, recurrence, future surgery required, death, anesthesia risks. Blood clots as well as other numerous risks were discussed at length today. Patient understands all this and is agreeable. The postoperative course was explained in detail today as well as the weightbearing status, the need to keep the bandage clean, dry, and intact, and other post procedural specific care was all discussed at length. Postoperative instruction was given and reviewed with the patient. Surgical consents were reviewed today and signed appropriately. We discussed anticoagulation therapy and DVT prophylaxis. This was discussed at length. Proper prescription to be given to patient based on our discussion today. We discussed the signs and symptoms of DVT and PE at length. (0) independent

## 2022-08-08 NOTE — DISCHARGE INSTRUCTIONS
Podiatry Post-operative instructions    -Weight-bearing to be weight bearing as tolerated to left foot in surgical shoe. Can use walker, crutches, or knee scooter to assist with transfers and ambulation.  -Schedule an appointment for with Dr. Sara Thacker in wound care center 5 days upon discharge from hospital.  Please call make an appointment if you have not already.  -Keep dressing clean, dry, and intact until first postoperative visit.  -If you notice any signs of postoperative infection, redness, excessive swelling, malodor, purulent drainage, pain out of proportion to surgical procedure, please contact surgeon's office.  -If you have bleeding through the dressing please contact your surgeon's office.  -Keep leg elevated above the level of the heart at all times while in bed or in chair.  -You may ice behind the knee 15 minutes out of every hour.  -Resume your regular diet and stay hydrated. -Take your pain medication as prescribed. Wean yourself off opiate pain medication as soon as possible. You may not drive while taking opioid pain medication.  -If you have been given a blood thinner medication, it is intended to prevent DVT or deep blood clots from forming. Please take as prescribed. -If pain is uncontrolled please contact your surgeon's office.  -If you experience calf or thigh pain, redness, swelling, shortness of breath or chest pain, please notify your surgeon's office and go to the emergency department immediately.

## 2022-08-08 NOTE — PROGRESS NOTES
V2.0  [facility] Hospitalist Progress Note      Name:  Cosme Daniels /Age/Sex: 1955  (61 y.o. female)   MRN & CSN:  [id number] & [id number] Encounter Date/Time: 2022 7:32 AM EDT    Location:  1110/1110-A PCP: Leonie Alvarez MD       Hospital Day: 6    Assessment and Plan:   Cosme Daniels is a 61 y.o. female with pmh of DM2, HTN, HLD who presents with Severe sepsis ([address]      Plan:    Severe sepsis 2/2 osteomyelitis of left great toe with surrounding cellulitis  Nonhealing diabetic foot ulcer  S/p left foot partial hallux amputation with excisional debridement of all nonviable tissue and bone on   Leukocytosis, lactic acidosis, tachycardia. X-ray left foot with soft tissue ulceration involving the great toe, also concern for osteomyelitis. MRI foot with osteomyelitis of the distal phalanx of the left great toe with surrounding soft tissue edema and mild enhancement consistent with cellulitis. No organized drainable fluid collection identified. -Vancomycin and Zosyn  -IVF hydration, lactate normalized  -Podiatry following  -Vascular surgery following  -Bcx NGTD  -F/u tissue cultures    Severe PVD  Claudication of left leg. Underwent CTA and vein mapping.  -Continue Pletal  -Started aspirin  -Upper extremity Dopplers to check subclavian arteries  -Cardiology consulted for aortogram, plan for   -NPO after midnight    HTN  -Consistently soft BP  -Hold home antihypertensives, will restart as appropriate    Sinus tachycardia -likely postsurgical and 2/2 home clonidine held in setting of soft BP  -Restart home clonidine as pressures now tolerating  -Analgesics as needed for pain control  -Telemetric monitoring    Non-insulin-dependent diabetes mellitus type 2  -Hold home p.o. meds  -SSI, glucose checks, hypoglycemia protocol  -Strict glucose control to help with wound healing    Hypomagnesemia, hypokalemia  -Replete as required      Diet ADULT ORAL NUTRITION SUPPLEMENT; Breakfast, Dinner;  Low Calorie/High Protein Oral Supplement  ADULT DIET; Regular; 3 carb choices (45 gm/meal); Low Fat/Low Chol/High Fiber/LINDSEY; Low Sodium (2 gm)  Diet NPO   DVT Prophylaxis [x] Lovenox, []  Heparin, [] SCDs, [] Ambulation,  [] Eliquis, [] Xarelto  [] Coumadin   Code Status Full Code   Disposition From: Home  Expected Disposition: Home  Estimated Date of Discharge: 2-3 days  Patient requires continued admission due to severe sepsis   Surrogate Decision Maker/ POA      Subjective:     Chief Complaint: Toe Injury (Injured by pediatrist approx 4 weeks ago. Been getting worse since and states it is now infected ) and Wound Check       Shannan Soliman is a 61 y.o. female who presents at request of her podiatrist with nonhealing left toe ulcer for past month associated with some necrosis at the tip of her left toe. Was assessed by podiatry the day prior to presentation, it was suggested that she present to ED for IV antibiotics and amputation of left great toe    Patient seen and examined after surgery. Is experiencing some left foot pain but feels good otherwise. Denies palpitations, chest pain, shortness of breath, lightheadedness/dizziness. Blood pressure not soft today however she is tachycardic after clonidine held yesterday. Review of Systems:    Review of Systems    No fevers, chills, cough, SOB, palpitations, chest pain, diarrhea, nausea, vomiting, abdominal pain. Objective: Intake/Output Summary (Last 24 hours) at 8/8/2022 1256  Last data filed at 8/8/2022 5459  Gross per 24 hour   Intake --   Output 2250 ml   Net -2250 ml          Vitals:   Vitals:    08/08/22 1046   BP:    Pulse:    Resp: 17   Temp:    SpO2:        Physical Exam:     General: NAD  Eyes: EOMI  ENT: neck supple  Cardiovascular: Regular rate.   Respiratory: Clear to auscultation  Gastrointestinal: Soft, non tender  Genitourinary: no suprapubic tenderness  Musculoskeletal: No edema  Skin: Left great toe amputated, tenderness on palpation  Neuro: Alert. Psych: Mood appropriate.      Medications:   Medications:    enoxaparin  40 mg SubCUTAneous Daily    carbamide peroxide  5 drop Both Ears Daily    cloNIDine  0.1 mg Oral BID    aspirin  81 mg Oral Daily    [Held by provider] lisinopril  5 mg Oral Daily    hydrocortisone   Topical BID    potassium chloride  40 mEq Oral BID WC    vancomycin  750 mg IntraVENous Q12H    acetaminophen  500 mg Oral Q6H    sodium chloride flush  5-40 mL IntraVENous BID    pantoprazole  40 mg Oral Daily    atorvastatin  10 mg Oral Daily    cilostazol  100 mg Oral BID    piperacillin-tazobactam  3,375 mg IntraVENous Q6H    insulin lispro  0-8 Units SubCUTAneous TID WC    insulin lispro  0-4 Units SubCUTAneous Nightly    gabapentin  300 mg Oral Nightly      Infusions:    lactated ringers 75 mL/hr at 08/08/22 0748    sodium chloride Stopped (08/07/22 0315)    dextrose       PRN Meds: HYDROcodone-acetaminophen, 1 tablet, Q4H PRN  ondansetron, 4 mg, Q8H PRN   Or  ondansetron, 4 mg, Q6H PRN  polyethylene glycol, 17 g, Daily PRN  sodium chloride flush, 5-40 mL, PRN  sodium chloride, , PRN  glucose, 4 tablet, PRN  dextrose bolus, 125 mL, PRN   Or  dextrose bolus, 250 mL, PRN  glucagon (rDNA), 1 mg, PRN  dextrose, , Continuous PRN      Labs      Recent Results (from the past 24 hour(s))   POCT Glucose    Collection Time: 08/07/22  4:09 PM   Result Value Ref Range    POC Glucose 179 (H) 70 - 99 MG/DL   POCT Glucose    Collection Time: 08/07/22  7:56 PM   Result Value Ref Range    POC Glucose 170 (H) 70 - 99 MG/DL   CBC    Collection Time: 08/08/22  4:50 AM   Result Value Ref Range    WBC 10.4 4.0 - 10.5 K/CU MM    RBC 3.27 (L) 4.2 - 5.4 M/CU MM    Hemoglobin 10.3 (L) 12.5 - 16.0 GM/DL    Hematocrit 31.5 (L) 37 - 47 %    MCV 96.3 78 - 100 FL    MCH 31.5 (H) 27 - 31 PG    MCHC 32.7 32.0 - 36.0 %    RDW 12.5 11.7 - 14.9 %    Platelets 953 301 - 396 K/CU MM    MPV 8.6 7.5 - 11.1 FL   Basic Metabolic Panel w/ Reflex to MG    Collection Time: 08/08/22  4:50 AM   Result Value Ref Range    Sodium 142 135 - 145 MMOL/L    Potassium 4.1 3.5 - 5.1 MMOL/L    Chloride 106 99 - 110 mMol/L    CO2 24 21 - 32 MMOL/L    Anion Gap 12 4 - 16    BUN <2 (L) 6 - 23 MG/DL    Creatinine 0.5 (L) 0.6 - 1.1 MG/DL    Glucose 152 (H) 70 - 99 MG/DL    Calcium 9.5 8.3 - 10.6 MG/DL    GFR Non-African American >60 >60 mL/min/1.73m2    GFR African American >60 >60 mL/min/1.73m2   POCT Glucose    Collection Time: 08/08/22  6:40 AM   Result Value Ref Range    POC Glucose 136 (H) 70 - 99 MG/DL   POCT Glucose    Collection Time: 08/08/22 10:39 AM   Result Value Ref Range    POC Glucose 144 (H) 70 - 99 MG/DL        Imaging/Diagnostics Last 24 Hours   XR TOE LEFT (MIN 2 VIEWS)    Result Date: 8/3/2022  EXAMINATION: 3 XRAY VIEWS OF THE LEFT TOE 8/3/2022 3:13 pm COMPARISON: None. HISTORY: ORDERING SYSTEM PROVIDED HISTORY: left great toe erythema and necrosis TECHNOLOGIST PROVIDED HISTORY: Reason for exam:->left great toe erythema and necrosis Reason for Exam: left great toe erythema and necrosis Additional signs and symptoms: na Relevant Medical/Surgical History: diabetes, hypertension FINDINGS: Soft tissue ulceration of the left great toe noted. Subjacent to that, there is osteolysis involving the great toe distal phalanx. The remaining toes are unremarkable appearance. Soft tissue ulceration involving the great toe. Osteolysis of the great toe distal phalanx is seen, which is worrisome for osteomyelitis. VL DUP LOWER EXTREMITY ARTERIES LEFT    Result Date: 8/3/2022  EXAMINATION: ARTERIAL DUPLEX ULTRASOUND OF THE LEFT LOWER EXTREMITY  8/3/2022 7:29 pm TECHNIQUE: Duplex ultrasound using B-mode/gray scaled imaging, Doppler spectral analysis and color flow Doppler was obtained of the lower extremity. COMPARISON: None.  HISTORY: ORDERING SYSTEM PROVIDED HISTORY: Left great toe necrosis TECHNOLOGIST PROVIDED HISTORY: Reason for exam:->Left great toe necrosis FINDINGS: Monophasic waveforms with increased spectral broadening noted throughout the left lower extremity suggestive of severe iliac inflow disease as well as severe lower extremity popliteal artery disease. Flow velocities were measured as follows: Com. Fem.  8.4 cm/sec SFA Prox. 12 cm/sec SFA Mid.     12 cm/sec SFA Dist.     22 cm/sec Pop. 19 cm/sec PTA            16, 0, 8 cm/sec Peron. 19, 0, 0 cm/sec HANNA            20, 0, 0 cm/sec     1. Diffuse monophasic waveform seen throughout the left lower extremity suggestive of severe iliac as well as left lower extremity peripheral arterial disease. 2. Occluded mid posterior tibial artery, as well as the mid and distal anterior tibial and peroneal arteries. No continuous runoff. 3. The obtain velocities are very low throughout the left lower extremity consistent with severe inflow disease.        Electronically signed by Chris Garcia MD on 8/8/2022 at 12:56 PM

## 2022-08-08 NOTE — PROGRESS NOTES
[address] [facility]  consulted by KENNETH Field for monitoring and adjustment. Indication for treatment: Severe Sepsis secondary to cellulitis with suspected osteomylitis  Goal trough: [] 10-15 mcg/mL or [x] 15-20 mcg/ml  AUC/TORRI: [] <500 or [x] 400-600    Pertinent Laboratory Values:   Temp Readings from Last 3 Encounters:   08/08/22 97.9 °F (36.6 °C) (Oral)   08/02/22 97.8 °F (36.6 °C) (Temporal)   07/26/22 97.4 °F (36.3 °C) (Temporal)     Recent Labs     08/06/22  0453 08/08/22  0450   WBC 9.1 10.4       Recent Labs     08/06/22  0453 08/08/22  0450   BUN 3* <2*   CREATININE 0.4* 0.5*       Estimated Creatinine Clearance: 98 mL/min (A) (based on SCr of 0.5 mg/dL (L)).     Intake/Output Summary (Last 24 hours) at 8/8/2022 1648  Last data filed at 8/8/2022 8455  Gross per 24 hour   Intake --   Output 2000 ml   Net -2000 ml         Pertinent Cultures:  Date    Source    Results  8/03    Blood    NGTD  8/08    Surgical   Pending  8/08    Tissue    Pending           Assessment:  Renal trends: stable, WNL  Day(s) of therapy: 5 of 7  Vancomycin concentration:   08/05: 14.9, collected 9h post-dose,   8/7:  13.2, 9 hours post-dose    Plan:  Continue on Vancomycin 750mg IV every 12 hours  Predicted  and Trough 13.0 at steady state  Repeat level in 2 days  Pharmacy will continue to monitor patient and adjust therapy as indicated    VANCOMYCIN CONCENTRATION SCHEDULED FOR 8/10 @ 0500    Thank you for the consult,  Lindy Garcia, [address] [facility]  8/8/2022 4:48 PM

## 2022-08-08 NOTE — OP NOTE
Operative Note      Patient: Louise Petersen  YOB: 1955  MRN: [id number]    Date of Procedure: 8/8/2022    Pre-Op Diagnosis: osteomyelitis of left foot    Post-Op Diagnosis: Same       Procedure:  Partial hallux amputation with excisional debridement of all nonviable tissue and bone left foot    Surgeon(s):  Dinh Turpin DPM    Assistant:   * No surgical staff found *    Anesthesia: Monitor Anesthesia Care    Estimated Blood Loss (mL): Minimal    Complications: None    Specimens:   ID Type Source Tests Collected by Time Destination   1 : DEEP SOFT TISSUE CULTURE LEFT FOOT Tissue Tissue CULTURE, TISSUE Dinh Turpin DPM 8/8/2022 0822    2 : LEFT GREAT TOE Specimen Toe CULTURE, SURGICAL Dinh Turpin DPM 8/8/2022 0823        Implants:  * No implants in log *      Drains:   Gastrostomy/Enterostomy/Jejunostomy Tube Percutaneous Endoscopic Gastrostomy (PEG) LUQ (Active)   Site Description Clean, dry & intact 08/07/22 2000   G Port Status Clamped 08/07/22 2000   Surrounding Skin Clean, dry & intact 08/07/22 2000   Dressing Status Clean, dry & intact 08/07/22 2000   Dressing Type Split gauze 08/07/22 2000   Free Water/Flush (mL) 60 mL 08/05/22 1337       [REMOVED] External Urinary Catheter (Removed)   Site Assessment Other (Comment) 08/05/22 2000   Securement Method Other (Comment) 08/05/22 2000   Suction Other 08/05/22 2000   Urine Color Other (Comment) 08/05/22 2000   Urine Appearance Other (Comment) 08/05/22 2000   Urine Odor Other (Comment) 08/05/22 2000       [REMOVED] External Urinary Catheter (Removed)       Findings: Do believe that all residual infection was removed. Minimal bleeding noted to the surgical site. Intraoperative microbiology culture sent. Detailed Description of Procedure: This is a patient I have been seeing in my regular office and wound care center for a chronic wound left great toe.   On examination this past week increasing and worsening redness and pain to the left great toe was noted. She had a recent bone biopsy that did show positive for osteomyelitis acute in nature as well. Given these findings I sent her to the emergency room for further work-up. MRI in the hospital positive for osteomyelitis of the distal phalanx of the left hallux. Arterial studies also show significant decrease in flow to the left lower extremity with only two-vessel runoff. Conservative and surgical treatment options discussed in detail with the patient. She elects to proceed with hallux amputation with excisional debridement of all nonviable tissue and bone left foot. All questions were answered to the patient's satisfaction this morning. All risk benefits and complication of the procedure were explained to the patient with her full understanding. No guarantees were given or implied. Patient was taken from the preoperative holding area to the operating room placed on the operating table in supine position. Patient receiving Zosyn on the hospital floor and her last dose was at 0800 hrs. After administration of MAC anesthesia by the anesthesia service the left lower extremities marked prepped and draped in usual sterile aseptic fashion. A well-padded pneumatic ankle tourniquet was applied to the left lower extremity even though it was not needed or inflated throughout the entirety of the case. A timeout was then performed and the appropriate surgical extremity to be operated on in the procedure to be performed with patient identification information was confirmed by myself and the operating room team.  Everyone agreed. At this time 20 cc of quarter percent Marcaine plain was injected as a Thomson block about the first metatarsal of the left foot. A fishmouth incision was then drawn out over the hallux interphalangeal joint just proximal to the area of ulceration. A #15 blade was utilized to make a full-thickness incision over the drawn out incision down to level of bone.   All soft tissue attachments were freed up in a full-thickness fashion from the proximal phalanx central shaft and sagittal saw was utilized to transect the proximal phalanx centrally at an area of a clean margin. This was then disarticulated and passed to the back table. A deep soft tissue microbiology culture was then obtained at this time. No residual infection was noted. The residual bone was noted to be of good bone quality in stock. Minimal bleeding was noted to the surgical site. No gross abscess or deep undermining or tunneling was noted. The incision was then flushed copious months of sterile saline. Skin edges were then reapproximated in a tensionless fashion with 3-0 nylon suture. A dry sterile dressing was then applied to the left foot. Patient tolerated the procedure and anesthesia well. She was taken the operating room to the PACU with vital signs stable neurovascular status intact. Patient to keep dressing clean dry and intact until change by the hospital staff on the floor or by myself. She can be weightbearing as tolerated to the left foot in a surgical shoe. She is a very high risk for wound healing complications and residual infection given her history of diabetes and chronic wound but also given her history of peripheral vascular disease with history of recent stenting and current blockages given the recent vascular studies she just had done. Surgery today with source control for osteomyelitis of the left foot but she is also currently getting worked up by cardiothoracic surgery given her decreased blood flow supply. These changes may require return to the operating room in the near future for further infection management. Patient does understand this. We will follow-up with her on the hospital floor we will follow up on recommendations of any vascular intervention that may be required. Please contact with any questions.       Dontae Maki DPM    [facility]    Electronically signed by Anny Lee DPM on 8/8/2022 at 8:32 AM

## 2022-08-09 ENCOUNTER — APPOINTMENT (OUTPATIENT)
Dept: CT IMAGING | Age: 67
DRG: 854 | End: 2022-08-09
Payer: MEDICARE

## 2022-08-09 LAB
ANION GAP SERPL CALCULATED.3IONS-SCNC: 10 MMOL/L (ref 4–16)
BUN BLDV-MCNC: 1 MG/DL (ref 6–23)
CALCIUM SERPL-MCNC: 9 MG/DL (ref 8.3–10.6)
CHLORIDE BLD-SCNC: 108 MMOL/L (ref 99–110)
CO2: 25 MMOL/L (ref 21–32)
CREAT SERPL-MCNC: 0.5 MG/DL (ref 0.6–1.1)
GFR AFRICAN AMERICAN: >60 ML/MIN/1.73M2
GFR NON-AFRICAN AMERICAN: >60 ML/MIN/1.73M2
GLUCOSE BLD-MCNC: 128 MG/DL (ref 70–99)
GLUCOSE BLD-MCNC: 143 MG/DL (ref 70–99)
GLUCOSE BLD-MCNC: 152 MG/DL (ref 70–99)
GLUCOSE BLD-MCNC: 192 MG/DL (ref 70–99)
GLUCOSE BLD-MCNC: 223 MG/DL (ref 70–99)
HCT VFR BLD CALC: 28 % (ref 37–47)
HEMOGLOBIN: 9.2 GM/DL (ref 12.5–16)
MAGNESIUM: 1.1 MG/DL (ref 1.8–2.4)
MCH RBC QN AUTO: 31.5 PG (ref 27–31)
MCHC RBC AUTO-ENTMCNC: 32.9 % (ref 32–36)
MCV RBC AUTO: 95.9 FL (ref 78–100)
PDW BLD-RTO: 12.6 % (ref 11.7–14.9)
PLATELET # BLD: 291 K/CU MM (ref 140–440)
PMV BLD AUTO: 8.9 FL (ref 7.5–11.1)
POTASSIUM SERPL-SCNC: 3.5 MMOL/L (ref 3.5–5.1)
RBC # BLD: 2.92 M/CU MM (ref 4.2–5.4)
SODIUM BLD-SCNC: 143 MMOL/L (ref 135–145)
WBC # BLD: 9 K/CU MM (ref 4–10.5)

## 2022-08-09 PROCEDURE — 2580000003 HC RX 258: Performed by: STUDENT IN AN ORGANIZED HEALTH CARE EDUCATION/TRAINING PROGRAM

## 2022-08-09 PROCEDURE — 6370000000 HC RX 637 (ALT 250 FOR IP): Performed by: STUDENT IN AN ORGANIZED HEALTH CARE EDUCATION/TRAINING PROGRAM

## 2022-08-09 PROCEDURE — 2060000000 HC ICU INTERMEDIATE R&B

## 2022-08-09 PROCEDURE — 2580000003 HC RX 258: Performed by: NURSE PRACTITIONER

## 2022-08-09 PROCEDURE — 82962 GLUCOSE BLOOD TEST: CPT

## 2022-08-09 PROCEDURE — 99233 SBSQ HOSP IP/OBS HIGH 50: CPT | Performed by: INTERNAL MEDICINE

## 2022-08-09 PROCEDURE — 99255 IP/OBS CONSLTJ NEW/EST HI 80: CPT | Performed by: INTERNAL MEDICINE

## 2022-08-09 PROCEDURE — 6360000002 HC RX W HCPCS: Performed by: NURSE PRACTITIONER

## 2022-08-09 PROCEDURE — 85027 COMPLETE CBC AUTOMATED: CPT

## 2022-08-09 PROCEDURE — APPSS60 APP SPLIT SHARED TIME 46-60 MINUTES: Performed by: NURSE PRACTITIONER

## 2022-08-09 PROCEDURE — 80048 BASIC METABOLIC PNL TOTAL CA: CPT

## 2022-08-09 PROCEDURE — 94761 N-INVAS EAR/PLS OXIMETRY MLT: CPT

## 2022-08-09 PROCEDURE — 36415 COLL VENOUS BLD VENIPUNCTURE: CPT

## 2022-08-09 PROCEDURE — 6360000002 HC RX W HCPCS: Performed by: STUDENT IN AN ORGANIZED HEALTH CARE EDUCATION/TRAINING PROGRAM

## 2022-08-09 PROCEDURE — 70450 CT HEAD/BRAIN W/O DYE: CPT

## 2022-08-09 PROCEDURE — 83735 ASSAY OF MAGNESIUM: CPT

## 2022-08-09 RX ADMIN — CILOSTAZOL 100 MG: 100 TABLET ORAL at 20:43

## 2022-08-09 RX ADMIN — CEFEPIME HYDROCHLORIDE 2000 MG: 2 INJECTION, POWDER, FOR SOLUTION INTRAVENOUS at 17:26

## 2022-08-09 RX ADMIN — SODIUM CHLORIDE, POTASSIUM CHLORIDE, SODIUM LACTATE AND CALCIUM CHLORIDE: 600; 310; 30; 20 INJECTION, SOLUTION INTRAVENOUS at 17:23

## 2022-08-09 RX ADMIN — SODIUM CHLORIDE, PRESERVATIVE FREE 10 ML: 5 INJECTION INTRAVENOUS at 08:47

## 2022-08-09 RX ADMIN — PIPERACILLIN AND TAZOBACTAM 3375 MG: 3; .375 INJECTION, POWDER, LYOPHILIZED, FOR SOLUTION INTRAVENOUS at 08:57

## 2022-08-09 RX ADMIN — VANCOMYCIN HYDROCHLORIDE 750 MG: 750 INJECTION, POWDER, LYOPHILIZED, FOR SOLUTION INTRAVENOUS at 20:52

## 2022-08-09 RX ADMIN — PIPERACILLIN AND TAZOBACTAM 3375 MG: 3; .375 INJECTION, POWDER, LYOPHILIZED, FOR SOLUTION INTRAVENOUS at 13:31

## 2022-08-09 RX ADMIN — VANCOMYCIN HYDROCHLORIDE 750 MG: 750 INJECTION, POWDER, LYOPHILIZED, FOR SOLUTION INTRAVENOUS at 10:04

## 2022-08-09 RX ADMIN — Medication 5 DROP: at 09:54

## 2022-08-09 RX ADMIN — ACETAMINOPHEN 500 MG: 500 TABLET ORAL at 23:03

## 2022-08-09 RX ADMIN — GABAPENTIN 300 MG: 300 CAPSULE ORAL at 20:43

## 2022-08-09 RX ADMIN — POTASSIUM CHLORIDE 40 MEQ: 1500 TABLET, EXTENDED RELEASE ORAL at 17:09

## 2022-08-09 RX ADMIN — CLONIDINE HYDROCHLORIDE 0.1 MG: 0.1 TABLET ORAL at 20:43

## 2022-08-09 RX ADMIN — ACETAMINOPHEN 500 MG: 500 TABLET ORAL at 17:09

## 2022-08-09 RX ADMIN — CLONIDINE HYDROCHLORIDE 0.1 MG: 0.1 TABLET ORAL at 08:50

## 2022-08-09 RX ADMIN — HYDROCODONE BITARTRATE AND ACETAMINOPHEN 1 TABLET: 5; 325 TABLET ORAL at 20:43

## 2022-08-09 RX ADMIN — PIPERACILLIN AND TAZOBACTAM 3375 MG: 3; .375 INJECTION, POWDER, LYOPHILIZED, FOR SOLUTION INTRAVENOUS at 02:20

## 2022-08-09 ASSESSMENT — PAIN DESCRIPTION - DESCRIPTORS
DESCRIPTORS: ACHING
DESCRIPTORS: ACHING
DESCRIPTORS: THROBBING

## 2022-08-09 ASSESSMENT — ENCOUNTER SYMPTOMS
SHORTNESS OF BREATH: 0
CHEST TIGHTNESS: 0
ABDOMINAL PAIN: 0
VOMITING: 0
DIARRHEA: 0

## 2022-08-09 ASSESSMENT — PAIN DESCRIPTION - FREQUENCY: FREQUENCY: INTERMITTENT

## 2022-08-09 ASSESSMENT — PAIN SCALES - GENERAL
PAINLEVEL_OUTOF10: 5
PAINLEVEL_OUTOF10: 8
PAINLEVEL_OUTOF10: 9

## 2022-08-09 ASSESSMENT — PAIN DESCRIPTION - LOCATION
LOCATION: TOE (COMMENT WHICH ONE)
LOCATION: FOOT
LOCATION: TOE (COMMENT WHICH ONE)

## 2022-08-09 ASSESSMENT — PAIN DESCRIPTION - ORIENTATION
ORIENTATION: LEFT
ORIENTATION: LEFT

## 2022-08-09 ASSESSMENT — PAIN - FUNCTIONAL ASSESSMENT
PAIN_FUNCTIONAL_ASSESSMENT: PREVENTS OR INTERFERES SOME ACTIVE ACTIVITIES AND ADLS
PAIN_FUNCTIONAL_ASSESSMENT: PREVENTS OR INTERFERES SOME ACTIVE ACTIVITIES AND ADLS

## 2022-08-09 NOTE — CONSULTS
Infectious Disease Consult Note  2022   Patient Name: Jet Toro : 1955   Impression  Left Great DFU with OM:  Severe PAD:  Afebrile  Leukocytosis max 12.2 trended down to normal  8/3-BC 0/2-NGTD  -left foot deep soft tissue culture: Prelim: NGTD  -s/p per Dr. Nguyen Every: Partial hallux amputation with excisional debridement of all nonviable tissue and bone left foot. DX: OM of left foot. cultures: Serratia marsescens, Staphylococcus epidermidis. No pathology available. Dr. Jordan Raza, cardiology, onboard, CT suggestive of occluded Left common femoral artery, likely renal stenosis  CTS onboard  DMII  HTN  Left Lung Cancer s/p Left Lobectomy 2019  Metastatic Adenocarcinoma s/p Right Frontal Craniotomy 22 by Dr. Ritchie Mejia at [facility]: Residual Deficits post surgical  Dr. Sheppard onboard, has given ok to proceed with peripheral angiogram if CT of the brain is non-acute   Reports has completed radiation   HLD  Former Tobacco Abuse  Multi-morbidity: per PMHx:  left lung cancer, HTN, HLD, DMII  Plan:  DC IV Zosyn   Start IV cefepime 2 gm q8h x 7-14 days, will determine after sensi returns (no more than 2 weeks due to appears preop foot appears as dry gangrene)  Continue IV vancomcyin per pharmacy dosing  Await sensi of surgical cultures  Ordered to trend CRP, x1 pct  Await CT brain , following, if non-acute will have peripheral angiogram per cardiology    Thank you for allowing me to consult in the care of this patient.  ------------------------  REASON FOR CONSULT: Infective syndrome \"Infected diabetic foot ulcer\"  Requested by: Dr. Clinton Haider is a 61 y.o.  Afrianc-American female with a history of left lung cancer, severe PVD, and s/p right craniotomy 22 by Dr. Ritchie Mejia at [facility] for metastatic adenocarcinoma, DMII, HLD, and HTN who was admitted 8/3/2022 for further evaluation and management of a non-healing left great toe ulceration who was referred to the ED per Dr. Patrick Richardson, podiatry for family history. Infectious disease related family history - not contibutory. SOCIAL HISTORY  Social History     Tobacco Use    Smoking status: Former     Packs/day: 1.00     Types: Cigarettes    Smokeless tobacco: Never    Tobacco comments:     10/2019   Substance Use Topics    Alcohol use: Yes     Alcohol/week: 1.0 standard drink     Types: 1 Cans of beer per week      Born:[place], [place]  Lives:[place] with   Occupation:Retired manager from [employer]  No recent travel of significance. No recent unusual exposures. Pets: 2 dogs  ? ALLERGIES  No Known Allergies   MEDICATIONS  Reviewed and are per the chart/EMR. ? Antibiotics:   Present:  Cefepime 8/9-  Vancomycin 8/3-  Past:  Zosyn 8/3-9?  -------------------------------------------------------------------------------------------------------------------    Vital Signs:  Vitals:    08/09/22 0943   BP:    Pulse: (!) 102   Resp: 14   Temp:    SpO2:          Exam:    VS: noted; wt 134 lb (61 kg) Height 5'4\"   Gen: alert and oriented X3, no distress  Skin: no stigmata of endocarditis  Wounds: C/D/I left foot with no surrounding erythema or edema  HEMT: AT/NC Oropharynx pink, moist, and without lesions or exudates; dentition in good state of repair  Eyes: PERRLA, EOMI, conjunctiva pink, sclera anicteric. Neck: Supple. Trachea midline. No LAD. Chest: no distress and CTA. Good air movement. Room air. Heart: NSR and no MRG. Abd: soft, non-distended, no tenderness, no hepatomegaly. Normoactive bowel sounds. Ext: no clubbing, cyanosis, or edema  Neuro: Mental status intact. CN 2-12 intact and no focal sensory or motor deficits    ? Diagnostic Studies: reviewed  8/3/22 XR Toe Left:  Impression   Soft tissue ulceration involving the great toe. Osteolysis of the great toe   distal phalanx is seen, which is worrisome for osteomyelitis. 8/3/22 VL Dup Lower Extremity Arteries Left:  Impression   1.  Diffuse monophasic waveform seen throughout the left lower extremity   suggestive of severe iliac as well as left lower extremity peripheral   arterial disease. 2. Occluded mid posterior tibial artery, as well as the mid and distal   anterior tibial and peroneal arteries. No continuous runoff. 3. The obtain velocities are very low throughout the left lower extremity   consistent with severe inflow disease. 8/3/22 MRI Foot Left W WO Contrast:  Impression   1. Osteomyelitis of the distal phalanx of the left great toe with surrounding   soft tissue edema and mild enhancement consistent with cellulitis. No   organized drainable fluid collection is identified. 8/7/22 US Dup Lower Extremity Mapping Bilat Venous:  Impression   Right greater saphenous vein is patent with measurements ranging from 1.5-7mm   as discussed above. Left greater saphenous vein is patent with measurements ranging from   3.6-7.7mm as discussed above. 8/7/22 CTA Abdominal Aorta W Bilat Runoff W Contrast:  Impression   1. Aortoiliac atherosclerotic plaque. Previous stenting of the iliac   circulation bilaterally which is patent. 2. 4.5 cm occlusion of the right SFA proximally. Diffuse plaque with serial   stenoses in the reconstituted SFA. The popliteal artery assumes a more   normal caliber with no focal stenosis. Three-vessel runoff proximally with   primary runoff via the peroneal.  The peroneal provides the posterior tibial   and dorsalis pedis circulation into the foot. 3. Occlusion of the left common femoral artery distal to the iliac stent. Reconstitution of the distal 3rd of the SFA. Diffuse plaque within the   reconstituted SFA and popliteal artery with serial 50% stenoses. 2-vessel   runoff with occlusion of the anterior tibial artery proximally. 4. Probable bilateral renal artery stenoses. No significant mesenteric   stenosis. Patent JUAN. 5. No acute findings within the abdomen or pelvis. Gastrostomy tube in   place.   Mild retained stool throughout the colon suggesting constipation. 6. Stable enlarged fibroid uterus. 7. Trace left pleural effusion. No acute infiltrate or significant edema at   the lung bases. 8/8/22 XR Foot Left:  Impression   1. Amputation along the mid diaphysis of the 1st proximal phalanx without   complication. 8/8/22 VL Dup Upper Extremity Arteries Bilateral:  Impression   No sonographic evidence of subclavian artery stenosis. ?  I have examined this patient and available medical records on this date and have made the above observations, conclusions and recommendations. Electronically signed by: Electronically signed by Randall Dozier.  CAYLA Raygoza CNP on 8/9/2022 at 11:38 AM

## 2022-08-09 NOTE — PROGRESS NOTES
[address] [facility]  consulted by KENNETH Taylor for monitoring and adjustment. Indication for treatment: Severe Sepsis secondary to cellulitis with OM, Nonhealing DFU  Goal trough: [] 10-15 mcg/mL or [x] 15-20 mcg/ml  AUC/TORRI: [] <500 or [x] 400-600    Pertinent Laboratory Values:   Temp Readings from Last 3 Encounters:   08/09/22 98.1 °F (36.7 °C) (Oral)   08/02/22 97.8 °F (36.6 °C) (Temporal)   07/26/22 97.4 °F (36.3 °C) (Temporal)     Recent Labs     08/08/22  0450 08/09/22  0444   WBC 10.4 9.0       Recent Labs     08/08/22  0450 08/09/22  0444   BUN <2* 1*   CREATININE 0.5* 0.5*       Estimated Creatinine Clearance: 98 mL/min (A) (based on SCr of 0.5 mg/dL (L)).     Intake/Output Summary (Last 24 hours) at 8/9/2022 1521  Last data filed at 8/9/2022 3177  Gross per 24 hour   Intake 0 ml   Output 0 ml   Net 0 ml         Pertinent Cultures:  Date    Source    Results  8/03    Blood    NGTD  8/08    Surgical   Serratia, Staph Epi  8/08    Tissue    Pending           Assessment:  Renal trends: stable, WNL  Day(s) of therapy: 6  Vancomycin concentration:   8/5: 14.9, 9h post-dose,   8/7: 13.2, 9h post-dose,     Plan:  Continue on Vancomycin 750mg IV every 12 hours  Predicted  and Trough 13 at steady state  Repeat level tomorrow AM  Initial consult x7d duration; MRI confirmed osteomyelitis and duration will likely need extended (ID consulted)  Pharmacy will continue to monitor patient and adjust therapy as indicated    VANCOMYCIN CONCENTRATION SCHEDULED FOR 8/10 @ 0500    Thank you for the consult,  Mk Amaya, [facility], PharmD  8/9/2022 3:21 PM

## 2022-08-09 NOTE — CONSULTS
Neurosurgery   Consult Note      Reason for Consult: Recent craniotomy, need for peripheral angiogram  Consulting Physician: CAYLA Chamberlain-CNP  Attending Physician: Jamia Campo MD  Date of Admission: 8/3/2022  Subjective:   CHIEF COMPLAINT: Left toe wound, non-healing    HPI:  61 y.o. 1955  Who presented to the ED 8/3/22 under the advisement of her podiatrist who saw her in the outpatient setting. She had concerns for a left toe wound that was nonhealing and appeared gangrenous. Her ulcer per chart review had been present for roughly a month. She was found to be septic. Infectious disease has been consulted. She did undergo amputation of her left great toe 8/8/2022. Her history is significant for metastatic adenocarcinoma. She was diagnosed with 3 brain lesions in June of this year and underwent a right frontal craniotomy by Dr. Heidi Kendall at [facility] who removed the largest of the 3 lesions. Her  states that she has completed radiation therapy for the other 2 metastatic lesions in her brain. Our service has been consulted to obtain clearance to undergo peripheral angiogram that will require anticoagulation use. Patient is currently on prophylactic Lovenox and received 1 dose of 81 mg ASA yesterday for her severe peripheral vascular disease. Patient is alert and oriented to self, situation. She feels that she has been doing very well since her brain surgery, has been ambulating around with a walker. She denies any changes in her vision. She denies having any residual deficits from her brain surgery. She does not have any numbness/tingling. Her  is requesting that she eat if she can, she has been n.p.o. in preparation for her angiogram. Patient is on 81mg ASA. PMHx positive for metastatic adenocarcinoma, diabetes, hyperlipidemia, hypertension. Past surgical history positive for lung cancer surgery, EGD, right frontal craniotomy 6/20/2022.                    Past Medical and Surgical History:       Diagnosis Date    Cancer ([facility] [address]     Diabetes mellitus ([facility] [address]     Hyperlipidemia     Hypertension          Procedure Laterality Date    LUNG CANCER SURGERY Left 10/2019    UPPER GASTROINTESTINAL ENDOSCOPY N/A 10/3/2020    EGD BIOPSY performed by Cande Richardson MD at [facility] ENDOSCOPY       Social History:    TOBACCO:   reports that she has quit smoking. Her smoking use included cigarettes. She smoked an average of 1 pack per day. She has never used smokeless tobacco.  ETOH:   reports current alcohol use of about 1.0 standard drink per week. Family History:   History reviewed. No pertinent family history.     Current Medications:    Current Facility-Administered Medications: enoxaparin (LOVENOX) injection 40 mg, 40 mg, SubCUTAneous, Daily  carbamide peroxide (DEBROX) 6.5 % otic solution 5 drop, 5 drop, Both Ears, Daily  cloNIDine (CATAPRES) tablet 0.1 mg, 0.1 mg, Oral, BID  aspirin chewable tablet 81 mg, 81 mg, Oral, Daily  atorvastatin (LIPITOR) tablet 40 mg, 40 mg, Oral, Daily  [Held by provider] lisinopril (PRINIVIL;ZESTRIL) tablet 5 mg, 5 mg, Oral, Daily  hydrocortisone 1 % ointment, , Topical, BID  potassium chloride (KLOR-CON M) extended release tablet 40 mEq, 40 mEq, Oral, BID WC  lactated ringers infusion, , IntraVENous, Continuous  vancomycin (VANCOCIN) 750 mg in dextrose 5 % 250 mL IVPB (Gaau5Mns), 750 mg, IntraVENous, Q12H  acetaminophen (TYLENOL) tablet 500 mg, 500 mg, Oral, Q6H **OR** [DISCONTINUED] acetaminophen (TYLENOL) suppository 650 mg, 650 mg, Rectal, Q6H PRN  HYDROcodone-acetaminophen (NORCO) 5-325 MG per tablet 1 tablet, 1 tablet, Oral, Q4H PRN  ondansetron (ZOFRAN-ODT) disintegrating tablet 4 mg, 4 mg, Oral, Q8H PRN **OR** ondansetron (ZOFRAN) injection 4 mg, 4 mg, IntraVENous, Q6H PRN  polyethylene glycol (GLYCOLAX) packet 17 g, 17 g, Oral, Daily PRN  sodium chloride flush 0.9 % injection 5-40 mL, 5-40 mL, IntraVENous, BID  sodium chloride flush 0.9 % injection 5-40 mL, 5-40 mL, IntraVENous, PRN  0.9 % sodium chloride infusion, , IntraVENous, PRN  pantoprazole (PROTONIX) tablet 40 mg, 40 mg, Oral, Daily  cilostazol (PLETAL) tablet 100 mg, 100 mg, Oral, BID  glucose chewable tablet 16 g, 4 tablet, Oral, PRN  dextrose bolus 10% 125 mL, 125 mL, IntraVENous, PRN **OR** dextrose bolus 10% 250 mL, 250 mL, IntraVENous, PRN  glucagon (rDNA) injection 1 mg, 1 mg, SubCUTAneous, PRN  dextrose 10 % infusion, , IntraVENous, Continuous PRN  piperacillin-tazobactam (ZOSYN) 3,375 mg in dextrose 5 % 50 mL IVPB extended infusion (mini-bag), 3,375 mg, IntraVENous, Q6H  insulin lispro (HUMALOG) injection vial 0-8 Units, 0-8 Units, SubCUTAneous, TID WC  insulin lispro (HUMALOG) injection vial 0-4 Units, 0-4 Units, SubCUTAneous, Nightly  gabapentin (NEURONTIN) capsule 300 mg, 300 mg, Oral, Nightly    No Known Allergies     REVIEW OF SYSTEMS:    CONSTITUTIONAL:  negative for fevers, chills, diaphoresis, activity change, appetite change, fatigue  EYES:  negative for blurred vision, eye discharge, visual disturbance and icterus  HEENT:  negative for hearing loss, tinnitus, ear drainage, sinus pressure, nasal congestion, epistaxis and snoring  RESPIRATORY:  No cough, shortness of breath, hemoptysis  GASTROINTESTINAL:  negative for nausea, vomiting, diarrhea, constipation  GENITOURINARY:  negative for frequency, dysuria, urinary incontinence, decreased urine volume, and hematuria  HEMATOLOGIC/LYMPHATIC:  negative for easy bruising, bleeding and lymphadenopathy  MUSCULOSKELETAL:  negative for  pain, joint swelling, decreased range of motion and muscle weakness  NEUROLOGICAL:  negative for headaches, slurred speech, unilateral weakness  PSYCHIATRIC/BEHAVIORAL: negative for hallucinations, behavioral problems, confusion and agitation.        Objective:   PHYSICAL EXAM:      VITALS:  /65   Pulse 96   Temp 98.4 °F (36.9 °C) (Oral)   Resp 16   Ht 5' 4\" (1.626 m)   Wt 134 lb 7.7 oz (61 kg)   SpO2 97%   BMI 23.08 podiatrist with concerns of a gangrenous left great toe. She did ultimately undergo amputation of this toe on 8/8/2022. Cardiology and CT surgery have been following her due to concerns of severe peripheral vascular disease. They would like to proceed with a peripheral angiogram which would require anticoagulation. She has history of a right craniotomy performed 6/20/2022 by Dr. Vale Roberts at [facility] for metastatic adenocarcinoma.  states that she has completed radiation therapy for this. They state they have not seen Dr. Vale Roberts since the surgery but do have an upcoming appointment. Her last head CT was 7/13, found in care everywhere. No acute abnormalities noted. I have ordered a head CT today. If nonacute she can proceed with her peripheral angiogram.  She has received 1 dose of ASA 81 mg yesterday. Electronically signed by Barry Wolf PA-C on 8/9/2022 at 2:27 PM    Supervising physician: Meeta Jacob. Zenaida Peña MD.  Dr. Zenaida Peña was readily and continuously available by phone for direct consultation regarding the care of this patient. Time spent with patient in consultation, education, and collaboration with medical time is >50% of total time spent on case, including time spent in chart review and dictation. Total time spent: 40 minutes    Thank you for the opportunity to participate in the care of your patient.

## 2022-08-09 NOTE — ANESTHESIA POSTPROCEDURE EVALUATION
Department of Anesthesiology  Postprocedure Note    Patient: Bony Mcclain  MRN: [id number]  YOB: 1955  Date of evaluation: 8/9/2022      Procedure Summary     Date: 08/08/22 Room / Location: [address]    Anesthesia Start: 5893 Anesthesia Stop: 0840    Procedure: LEFT FOOT HALLUX AMPUTATION EXCISIONAL DEBRIDEMENT ALL NON VIABLE   TISSUE AND BONE (Left) Diagnosis:       Other osteomyelitis of left foot ([address]      (osteomyelitis of left foot)    Surgeons: Davian Bee DPM Responsible Provider: Peterson Taylor MD    Anesthesia Type: MAC ASA Status: 3          Anesthesia Type: No value filed.     Kavya Phase I:      Kavya Phase II:        Anesthesia Post Evaluation    Patient location during evaluation: floor  Patient participation: complete - patient participated  Level of consciousness: awake  Pain score: 0  Airway patency: patent  Nausea & Vomiting: no vomiting and no nausea  Complications: no  Cardiovascular status: hemodynamically stable  Respiratory status: acceptable, airway suctioned, spontaneous ventilation and room air  Hydration status: stable

## 2022-08-09 NOTE — PROGRESS NOTES
Pharmacy Note - Extended Infusion Beta-Lactam Adjustment    Cefepime 2000mg Q8h for treatment of Bone and Joint Infection. Per [facility] Extended Infusion Beta-Lactam Policy, cefepime will be changed to 2000mg load followed by 2000mg Q8h extended infusion    Estimated Creatinine Clearance: Estimated Creatinine Clearance: 98 mL/min (A) (based on SCr of 0.5 mg/dL (L)). BMI: Body mass index is 23.08 kg/m². Please call with any questions.     Thank you,    Yaw Leal, [facility] 5 yo boy with AML on protocol AAML 1031 in Intensification I. Had episode of fever 3/31 to 38.8C but afebrile since. Cultures (-) final.  On high risk for CLABSI antibiotic bundle (Cefepime, Vanco). Vancomycin dose adjusted to 20 mg/kg/dose for low trough level 6.5 (10-20). Repeat Vanco trough level after dose increase now 10.1 (10-20) Continue Ethanol locks to port  ANC lower to 410 today, Neupogen initiated  Improving rash to trunk/head likely d/t JOSE ANGEL-C.

## 2022-08-09 NOTE — PROGRESS NOTES
Cardiology Progress Note     Today's Plan: consult neurosurgery     Admit Date:  8/3/2022    Consult reason/ Seen today for: PAD     Subjective and  Overnight Events: Patient tearful, recent craniotomy and radiation for metastatic adenocarcinoma. Recent partial foot amputation from nonhealing wound. History of Presenting Illness:    Chief complain on admission : 61 y. o.year old who is admitted for  Chief Complaint   Patient presents with    Toe Injury     Injured by pediatrist approx 4 weeks ago. Been getting worse since and states it is now infected     Wound Check        Past medical history:    has a past medical history of Cancer ([facility] [address] Diabetes mellitus ([facility] [address] Hyperlipidemia, and Hypertension. Past surgical history:   has a past surgical history that includes Lung cancer surgery (Left, 10/2019) and Upper gastrointestinal endoscopy (N/A, 10/3/2020). Social History:   reports that she has quit smoking. Her smoking use included cigarettes. She smoked an average of 1 pack per day. She has never used smokeless tobacco. She reports current alcohol use of about 1.0 standard drink per week. She reports current drug use. Drug: Marijuana Champ Cue). Family history:  family history is not on file. No Known Allergies    Review of Systems:  Review of Systems   Cardiovascular:  Negative for chest pain, palpitations and leg swelling. Musculoskeletal:  Positive for gait problem. Skin: Negative. Neurological:  Negative for dizziness and weakness. All other systems reviewed and are negative.      /65   Pulse (!) 104   Temp 98.4 °F (36.9 °C) (Oral)   Resp 16   Ht 5' 4\" (1.626 m)   Wt 134 lb 7.7 oz (61 kg)   SpO2 97%   BMI 23.08 kg/m²     Intake/Output Summary (Last 24 hours) at 8/9/2022 0839  Last data filed at 8/9/2022 0826  Gross per 24 hour   Intake 0 ml   Output 0 ml   Net 0 ml       Physical Exam:  Physical 28.0*   MCV 96.3 95.9    291     BMP:   Recent Labs     08/08/22  0450 08/09/22  0444    143   K 4.1 3.5    108   CO2 24 25   BUN <2* 1*   CREATININE 0.5* 0.5*     PT/INR: No results for input(s): PROTIME, INR in the last 72 hours. BNP:  No results for input(s): PROBNP in the last 72 hours. TROPONIN: No results for input(s): TROPONINT in the last 72 hours. Impression:  Principal Problem:    Sepsis ([address]  Resolved Problems:    * No resolved hospital problems. *       All labs, medications and tests reviewed by myself, continue all other medications of all above medical condition listed as is except for changes mentioned above. Thank you   Please call with questions. Electronically signed by Mine Washington. CAYLA Sierra - CNP on 8/9/2022 at 8:39 AM           CARDIOLOGY ATTENDING ADDENDUM    I have seen, spoken to and examined this patient personally, independent of the NP/PAC. I have reviewed the hospital care given to date and reviewed all pertinent labs and imaging. I have spoken with patient, nursing staff and provided written and verbal instructions . The above note has been reviewed. I have spent substantive amount of time in formulating patient care. Physical Exam:    General:   Awake, alert  Head:normal  Eye:normal  Chest:   Clear to auscultation  0 Basilar crackles   Cardiovascular:  S1S2   Abdomen: soft   Extremities:   0 edema  Poor pulses bilateral lower extremity      MEDICAL DECISION MAKING :          Severe PAD :  History of PTA at [address] [place] [address]  CT scan suggestive of occluded left common femoral artery, distal to the iliac stent with reconstituted flow in distal SFA, popliteal artery stenosis, two-vessel runoff. Peripheral angiogram today. Likely renal artery stenosis  Consult neurosurgery for clearance of anticoagulation use in angiogram, given resent craniotomy. Hold off on peripheral angiogram.  Discussed with Dr. Serrano Session     Osteomyelitis of the foot s/p amputation:  On antibiotics per surgery/podiatry    Essential hypertension : Stable    Hyperlipidemia: Increase Lipitor to 40    Metastatic adenocarcinoma: right frontal craniotomy 6/20/22, currently receiving radiation.      Former smoker: Quit smoking a month ago          Dr. Tonda Mcburney, MD

## 2022-08-09 NOTE — PROGRESS NOTES
Cardiothoracic Surgery Note      Name:  Shannan Soliman /Age/Sex: 1955  (32 y.o. female)   MRN & CSN:  [id number] & [id number] Admission Date/Time: 8/3/2022  5:04 PM   [facility] PCP: Marcia Cruz MD       Hospital Day: 7    Subjective:  Alicia Rosas is a 61 y. o.year old who and presents with had concerns including Toe Injury (Injured by pediatrist approx 4 weeks ago. Been getting worse since and states it is now infected ) and Wound Check. Chief Complaint   Patient presents with    Toe Injury     Injured by pediatrist approx 4 weeks ago. Been getting worse since and states it is now infected     Wound Check     Patient is feeling alright this afternoon. She is complaining of some pain in her left foot, but no significant claudication symptoms. Patient informed us that she underwent a craniotomy for brain cancer in July. Patient has a elevated heart rate at this time but is not complaining of palpitations. Informed patient that we will determine if she will undergo angiogram following CT head and safe from neurosurgery perspective. Objective: Temperature:  Current - Temp: 98.1 °F (36.7 °C); Max - Temp  Av °F (36.7 °C)  Min: 97.5 °F (36.4 °C)  Max: 98.4 °F (36.9 °C)    Respiratory Rate : Resp  Avg: 15.8  Min: 14  Max: 19    Pulse Range: Pulse  Av.4  Min: 95  Max: 116    Blood Presuure Range:  Systolic (67AZO), NJT:833 , Min:115 , FADIA:337   ; Diastolic (32RTZ), VSL:86, Min:65, Max:74      Pulse ox Range: SpO2  Av.3 %  Min: 95 %  Max: 99 %    24hr I & O:    Intake/Output Summary (Last 24 hours) at 2022 1701  Last data filed at 2022 0826  Gross per 24 hour   Intake 0 ml   Output 0 ml   Net 0 ml         Review of Systems   Constitutional:  Negative for diaphoresis, fatigue and fever. Eyes:  Negative for visual disturbance. Respiratory:  Negative for chest tightness and shortness of breath.     Cardiovascular:  Negative for chest pain, palpitations and leg swelling. Claudication of left lower extremity but not of right   Gastrointestinal:  Negative for abdominal pain, diarrhea and vomiting. Endocrine: Negative for cold intolerance and heat intolerance. Musculoskeletal:         Pain of left foot following surgery   Skin:  Negative for pallor and rash. Neurological:  Negative for dizziness, syncope, light-headedness and headaches. Psychiatric/Behavioral:  Negative for dysphoric mood. The patient is not nervous/anxious. has a past medical history of Cancer ([facility] [address] Diabetes mellitus ([facility] [address] Hyperlipidemia, and Hypertension. has a past surgical history that includes Lung cancer surgery (Left, 10/2019) and Upper gastrointestinal endoscopy (N/A, 10/3/2020). Physical Exam  Vitals reviewed. Constitutional:       General: She is not in acute distress. Appearance: She is not diaphoretic. HENT:      Head: Normocephalic and atraumatic. Right Ear: External ear normal.      Left Ear: External ear normal.      Nose: Nose normal.      Mouth/Throat:      Mouth: Mucous membranes are moist.   Eyes:      Extraocular Movements: Extraocular movements intact. Comments: Pupils equal and round   Neck:      Vascular: No carotid bruit. Cardiovascular:      Rate and Rhythm: Regular rhythm. Tachycardia present. Pulses: Normal pulses. Heart sounds: S1 normal and S2 normal. No murmur heard. No friction rub. No gallop. Pulmonary:      Effort: Pulmonary effort is normal. No respiratory distress. Breath sounds: Normal breath sounds. No rales. Chest:      Chest wall: No tenderness. Abdominal:      Palpations: Abdomen is soft. Tenderness: There is no abdominal tenderness. Musculoskeletal:      Right lower leg: No edema. Left lower leg: No edema. Skin:     General: Skin is warm and dry. Capillary Refill: Capillary refill takes less than 2 seconds. Findings: No rash. Comments: Skin turgor of right lower extremity brisk. Left lower extremity dressing in place. Clean dry   Neurological:      Mental Status: She is alert and oriented to person, place, and time. Mental status is at baseline. Psychiatric:         Behavior: Behavior is cooperative. Thought Content: Thought content normal.         Judgment: Judgment normal.        Medications:    enoxaparin  40 mg SubCUTAneous Daily    carbamide peroxide  5 drop Both Ears Daily    cloNIDine  0.1 mg Oral BID    aspirin  81 mg Oral Daily    atorvastatin  40 mg Oral Daily    [Held by provider] lisinopril  5 mg Oral Daily    hydrocortisone   Topical BID    potassium chloride  40 mEq Oral BID WC    vancomycin  750 mg IntraVENous Q12H    acetaminophen  500 mg Oral Q6H    sodium chloride flush  5-40 mL IntraVENous BID    pantoprazole  40 mg Oral Daily    cilostazol  100 mg Oral BID    piperacillin-tazobactam  3,375 mg IntraVENous Q6H    insulin lispro  0-8 Units SubCUTAneous TID WC    insulin lispro  0-4 Units SubCUTAneous Nightly    gabapentin  300 mg Oral Nightly      lactated ringers 75 mL/hr at 08/08/22 2236    sodium chloride Stopped (08/07/22 0315)    dextrose       HYDROcodone-acetaminophen, ondansetron **OR** ondansetron, polyethylene glycol, sodium chloride flush, sodium chloride, glucose, dextrose bolus **OR** dextrose bolus, glucagon (rDNA), dextrose    Lab Data:  CBC:   Recent Labs     08/08/22  0450 08/09/22  0444   WBC 10.4 9.0   HGB 10.3* 9.2*   HCT 31.5* 28.0*   MCV 96.3 95.9    291       BMP:   Recent Labs     08/08/22  0450 08/09/22  0444    143   K 4.1 3.5    108   CO2 24 25   BUN <2* 1*   CREATININE 0.5* 0.5*       LIVER PROFILE:   No results for input(s): AST, ALT, LIPASE, BILIDIR, BILITOT, ALKPHOS in the last 72 hours. Invalid input(s): AMYLASE,  ALB    PT/INR: No results for input(s): PROTIME, INR in the last 72 hours. APTT: No results for input(s): APTT in the last 72 hours.   BNP:  No results for input(s): BNP in the last 72 hours. TROPONIN: No results for input(s): TROPONINT in the last 72 hours. Bilateral lower extremity vein mappin2022    Right:       Sapheno-femoral Junction: 7mm. Proximal Thigh:  5.1mm. Mid Thigh:  3.1mm. Distal Thigh:  2.6mm. Knee:  3.9mm. Proximal Calf: 2.8mm. Mid Calf:  1.8mm. Ankle: 1.5mm. Left:       Sapheno-femoral Junction: 7.7mm. Proximal Thigh:  5.6mm. Mid Thigh:  4.6mm. Distal Thigh:  4.8mm. Knee:  5.3mm. Proximal Calf: 4.1mm. Mid Calf:  3.6mm. Ankle: 4.3mm. CTA abdominal aorta with bilateral runoff with contrast: 2022    1. Aortoiliac atherosclerotic plaque. Previous stenting of the iliac   circulation bilaterally which is patent. 2. 4.5 cm occlusion of the right SFA proximally. Diffuse plaque with serial   stenoses in the reconstituted SFA. The popliteal artery assumes a more   normal caliber with no focal stenosis. Three-vessel runoff proximally with   primary runoff via the peroneal.  The peroneal provides the posterior tibial   and dorsalis pedis circulation into the foot. 3. Occlusion of the left common femoral artery distal to the iliac stent. Reconstitution of the distal 3rd of the SFA. Diffuse plaque within the   reconstituted SFA and popliteal artery with serial 50% stenoses. 2-vessel   runoff with occlusion of the anterior tibial artery proximally. 4. Probable bilateral renal artery stenoses. No significant mesenteric   stenosis. Patent JUAN. 5. No acute findings within the abdomen or pelvis. Gastrostomy tube in   place. Mild retained stool throughout the colon suggesting constipation. 6. Stable enlarged fibroid uterus. 7. Trace left pleural effusion. No acute infiltrate or significant edema at   the lung bases.      Bilateral Upper Extremity Arterial Ultrasound: 2022  Right subclavian artery peak systolic velocities ranged from 130-160 cm/sec. Left subclavian artery peak systolic velocities range from 105-165 cm/sec. Appropriate high resistance wave forms are visualized. There is   atherosclerotic plaque at the mid portions of both arteries which does not   cause severe stenosis. Labs, consult, tests reviewed          ASSESSMENT/PLAN:  Severe peripheral vascular disease  -Patient does complain of claudication of left leg but not of right. Held off on angiogram today. No evidence of subclavian stenosis on ultrasound. Patient currently on aspirin and pletal. Will add plavix if we hold off on surgery and no obvious sign of brain bleed. Osteomyelitis of left great toe  -Patient underwent partial hallux amputation with excisional debridement of all nonviable tissue and bone of left foot with podiatry today. Surgical dressing in place. Patient is on Zosyn and vancomycin  Recent Right Craniotomy  -Neurosurgery on board. CT head wo contrast pending. Non-insulin-dependent type 2 diabetes mellitus  -On sliding scale insulin  Hypertension  -BP of 131/87. Restarted her clonidine, patient is tachy, likely secondary to pain and abrupt dc of clonidine  Chronic normocytic anemia  -Hemoglobin of 10.3.  Continue to monitor  DVT prophylaxis  -On Lovenox        Maddie Austin PA-C, 8/9/2022 5:01 PM

## 2022-08-10 ENCOUNTER — APPOINTMENT (OUTPATIENT)
Dept: CT IMAGING | Age: 67
DRG: 854 | End: 2022-08-10
Payer: MEDICARE

## 2022-08-10 PROBLEM — C79.31 BRAIN METASTASES (HCC): Status: ACTIVE | Noted: 2022-01-01

## 2022-08-10 LAB
ANION GAP SERPL CALCULATED.3IONS-SCNC: 12 MMOL/L (ref 4–16)
BUN BLDV-MCNC: 2 MG/DL (ref 6–23)
CALCIUM SERPL-MCNC: 9.1 MG/DL (ref 8.3–10.6)
CHLORIDE BLD-SCNC: 109 MMOL/L (ref 99–110)
CO2: 17 MMOL/L (ref 21–32)
CREAT SERPL-MCNC: 0.6 MG/DL (ref 0.6–1.1)
DOSE AMOUNT: NORMAL
DOSE TIME: NORMAL
GFR AFRICAN AMERICAN: >60 ML/MIN/1.73M2
GFR NON-AFRICAN AMERICAN: >60 ML/MIN/1.73M2
GLUCOSE BLD-MCNC: 148 MG/DL (ref 70–99)
GLUCOSE BLD-MCNC: 153 MG/DL (ref 70–99)
GLUCOSE BLD-MCNC: 175 MG/DL (ref 70–99)
GLUCOSE BLD-MCNC: 211 MG/DL (ref 70–99)
GLUCOSE BLD-MCNC: 216 MG/DL (ref 70–99)
GLUCOSE BLD-MCNC: 277 MG/DL (ref 70–99)
HCT VFR BLD CALC: 32.6 % (ref 37–47)
HEMOGLOBIN: 10.6 GM/DL (ref 12.5–16)
HIGH SENSITIVE C-REACTIVE PROTEIN: 17 MG/L
MCH RBC QN AUTO: 32 PG (ref 27–31)
MCHC RBC AUTO-ENTMCNC: 32.5 % (ref 32–36)
MCV RBC AUTO: 98.5 FL (ref 78–100)
PDW BLD-RTO: 12.9 % (ref 11.7–14.9)
PLATELET # BLD: 302 K/CU MM (ref 140–440)
PMV BLD AUTO: 8.9 FL (ref 7.5–11.1)
POTASSIUM SERPL-SCNC: 4.2 MMOL/L (ref 3.5–5.1)
PROCALCITONIN: 0.26
RBC # BLD: 3.31 M/CU MM (ref 4.2–5.4)
SODIUM BLD-SCNC: 138 MMOL/L (ref 135–145)
VANCOMYCIN RANDOM: 18.6 UG/ML
WBC # BLD: 10.9 K/CU MM (ref 4–10.5)

## 2022-08-10 PROCEDURE — 82962 GLUCOSE BLOOD TEST: CPT

## 2022-08-10 PROCEDURE — 36415 COLL VENOUS BLD VENIPUNCTURE: CPT

## 2022-08-10 PROCEDURE — 97166 OT EVAL MOD COMPLEX 45 MIN: CPT

## 2022-08-10 PROCEDURE — 6370000000 HC RX 637 (ALT 250 FOR IP): Performed by: STUDENT IN AN ORGANIZED HEALTH CARE EDUCATION/TRAINING PROGRAM

## 2022-08-10 PROCEDURE — 86141 C-REACTIVE PROTEIN HS: CPT

## 2022-08-10 PROCEDURE — 2580000003 HC RX 258: Performed by: STUDENT IN AN ORGANIZED HEALTH CARE EDUCATION/TRAINING PROGRAM

## 2022-08-10 PROCEDURE — 85027 COMPLETE CBC AUTOMATED: CPT

## 2022-08-10 PROCEDURE — 6370000000 HC RX 637 (ALT 250 FOR IP): Performed by: NURSE PRACTITIONER

## 2022-08-10 PROCEDURE — 84145 PROCALCITONIN (PCT): CPT

## 2022-08-10 PROCEDURE — 97116 GAIT TRAINING THERAPY: CPT

## 2022-08-10 PROCEDURE — 99233 SBSQ HOSP IP/OBS HIGH 50: CPT | Performed by: INTERNAL MEDICINE

## 2022-08-10 PROCEDURE — 94761 N-INVAS EAR/PLS OXIMETRY MLT: CPT

## 2022-08-10 PROCEDURE — 70450 CT HEAD/BRAIN W/O DYE: CPT

## 2022-08-10 PROCEDURE — 97162 PT EVAL MOD COMPLEX 30 MIN: CPT

## 2022-08-10 PROCEDURE — 2060000000 HC ICU INTERMEDIATE R&B

## 2022-08-10 PROCEDURE — 6370000000 HC RX 637 (ALT 250 FOR IP): Performed by: INTERNAL MEDICINE

## 2022-08-10 PROCEDURE — 97530 THERAPEUTIC ACTIVITIES: CPT

## 2022-08-10 PROCEDURE — 99232 SBSQ HOSP IP/OBS MODERATE 35: CPT | Performed by: NURSE PRACTITIONER

## 2022-08-10 PROCEDURE — 80202 ASSAY OF VANCOMYCIN: CPT

## 2022-08-10 PROCEDURE — 6360000002 HC RX W HCPCS: Performed by: STUDENT IN AN ORGANIZED HEALTH CARE EDUCATION/TRAINING PROGRAM

## 2022-08-10 PROCEDURE — 2580000003 HC RX 258: Performed by: NURSE PRACTITIONER

## 2022-08-10 PROCEDURE — 80048 BASIC METABOLIC PNL TOTAL CA: CPT

## 2022-08-10 PROCEDURE — 6360000002 HC RX W HCPCS: Performed by: NURSE PRACTITIONER

## 2022-08-10 RX ORDER — LEVOFLOXACIN 500 MG/1
750 TABLET, FILM COATED ORAL DAILY
Status: DISCONTINUED | OUTPATIENT
Start: 2022-08-10 | End: 2022-08-11 | Stop reason: HOSPADM

## 2022-08-10 RX ORDER — LINEZOLID 600 MG/1
600 TABLET, FILM COATED ORAL EVERY 12 HOURS SCHEDULED
Status: DISCONTINUED | OUTPATIENT
Start: 2022-08-10 | End: 2022-08-11 | Stop reason: HOSPADM

## 2022-08-10 RX ADMIN — POTASSIUM CHLORIDE 40 MEQ: 1500 TABLET, EXTENDED RELEASE ORAL at 11:16

## 2022-08-10 RX ADMIN — GABAPENTIN 300 MG: 300 CAPSULE ORAL at 21:05

## 2022-08-10 RX ADMIN — POTASSIUM CHLORIDE 40 MEQ: 1500 TABLET, EXTENDED RELEASE ORAL at 16:29

## 2022-08-10 RX ADMIN — CLONIDINE HYDROCHLORIDE 0.1 MG: 0.1 TABLET ORAL at 21:05

## 2022-08-10 RX ADMIN — LINEZOLID 600 MG: 600 TABLET, FILM COATED ORAL at 21:12

## 2022-08-10 RX ADMIN — Medication 5 DROP: at 11:20

## 2022-08-10 RX ADMIN — LEVOFLOXACIN 750 MG: 500 TABLET, FILM COATED ORAL at 13:07

## 2022-08-10 RX ADMIN — CEFEPIME HYDROCHLORIDE 2000 MG: 2 INJECTION, POWDER, FOR SOLUTION INTRAVENOUS at 02:22

## 2022-08-10 RX ADMIN — SODIUM CHLORIDE, PRESERVATIVE FREE 10 ML: 5 INJECTION INTRAVENOUS at 21:05

## 2022-08-10 RX ADMIN — ATORVASTATIN CALCIUM 40 MG: 40 TABLET, FILM COATED ORAL at 11:16

## 2022-08-10 RX ADMIN — INSULIN LISPRO 4 UNITS: 100 INJECTION, SOLUTION INTRAVENOUS; SUBCUTANEOUS at 13:04

## 2022-08-10 RX ADMIN — HYDROCODONE BITARTRATE AND ACETAMINOPHEN 1 TABLET: 5; 325 TABLET ORAL at 23:13

## 2022-08-10 RX ADMIN — HYDROCODONE BITARTRATE AND ACETAMINOPHEN 1 TABLET: 5; 325 TABLET ORAL at 16:30

## 2022-08-10 RX ADMIN — HYDROCORTISONE: 1 OINTMENT TOPICAL at 21:10

## 2022-08-10 RX ADMIN — ACETAMINOPHEN 500 MG: 500 TABLET ORAL at 16:30

## 2022-08-10 RX ADMIN — CLONIDINE HYDROCHLORIDE 0.1 MG: 0.1 TABLET ORAL at 11:16

## 2022-08-10 RX ADMIN — VANCOMYCIN HYDROCHLORIDE 750 MG: 750 INJECTION, POWDER, LYOPHILIZED, FOR SOLUTION INTRAVENOUS at 11:19

## 2022-08-10 RX ADMIN — ACETAMINOPHEN 500 MG: 500 TABLET ORAL at 11:16

## 2022-08-10 RX ADMIN — ACETAMINOPHEN 500 MG: 500 TABLET ORAL at 21:04

## 2022-08-10 ASSESSMENT — PAIN SCALES - GENERAL
PAINLEVEL_OUTOF10: 9
PAINLEVEL_OUTOF10: 3
PAINLEVEL_OUTOF10: 4

## 2022-08-10 ASSESSMENT — ENCOUNTER SYMPTOMS
CHEST TIGHTNESS: 0
ABDOMINAL PAIN: 0
DIARRHEA: 0
VOMITING: 0
SHORTNESS OF BREATH: 0

## 2022-08-10 ASSESSMENT — PAIN - FUNCTIONAL ASSESSMENT
PAIN_FUNCTIONAL_ASSESSMENT: ACTIVITIES ARE NOT PREVENTED
PAIN_FUNCTIONAL_ASSESSMENT: PREVENTS OR INTERFERES SOME ACTIVE ACTIVITIES AND ADLS

## 2022-08-10 ASSESSMENT — PAIN DESCRIPTION - ORIENTATION
ORIENTATION: LEFT
ORIENTATION: LEFT

## 2022-08-10 ASSESSMENT — PAIN DESCRIPTION - DESCRIPTORS
DESCRIPTORS: BURNING
DESCRIPTORS: BURNING

## 2022-08-10 ASSESSMENT — PAIN DESCRIPTION - LOCATION
LOCATION: FOOT
LOCATION: FOOT

## 2022-08-10 NOTE — CARE COORDINATION
CM attempt to speak with patient regarding discharge planning and patient receiving patient care at this time .  CM to follow

## 2022-08-10 NOTE — PROGRESS NOTES
[facility] THERAPY EVALUATION    Shawna Pardo, 1955, 1110/1110-A, 8/10/2022    Discharge Recommendation: home with HHOT+24 hr sup    History:  [place]:  The primary encounter diagnosis was Sepsis, due to unspecified organism, unspecified whether acute organ dysfunction present ([address] Diagnoses of Diabetic foot infection ([address] and Other osteomyelitis of left foot ([address] were also pertinent to this visit. Subjective:  Patient states: \"You said I'm cantankerous? \"  Pain: denies at rest, acknowledges some phantom pain in L great toe with ambulation  Communication with other providers: coeval with PT Carlos  Restrictions: general precautions, fall risk, WBAT LLE with post-op shoe  spouse at bedside. Very kind and supportive of pt    Home Setup/Prior level of function:  Social/Functional History  Lives With: Spouse  Type of Home: House  Home Layout: Multi-level, Bed/Bath upstairs  Home Access: Stairs to enter without rails  Entrance Stairs - Number of Steps: 1  Bathroom Shower/Tub: Tub/Shower unit, Shower chair with back  Bathroom Toilet: Standard  Bathroom Equipment: Shower chair  Home Equipment: R[place], rolling, Rollator, Cane  Has the patient had two or more falls in the past year or any fall with injury in the past year?: Yes (pt state she passes out and falls)  ADL Assistance: Independent  Homemaking Assistance: Needs assistance (spouse performs IADLs)  Ambulation Assistance: Independent (mod I with walker)  Transfer Assistance: Independent  Additional Comments: pt receives HHC 5x/week, PT/OT services in home PTA    Pt states she can be independent at home if given inc time and effort, however pt spouse does like to step in to help pt with LB tasks from time to time. Examination:  Observation: Pt was supine in bed upon arrival, agreeable to session.  IV only  Vision: WFL  Hearing: WFL  Objective Measures: stable    Body Systems and functions:  ROM: WFL in BUE  Strength: 4/5 in RUE, 4-/5 LUE specifically in shoulder  Sensation: WFL  Tone: normotonic in BUE  Coordination: movements fluid and coordinated  Activity Tolerance: good    Activities of Daily Living (ADLs):  Feeding: ind  Grooming: CGA standing at sink  Toileting: CGA  UB dressing/bathing: SBA  LB dressing/bathing: min A    *pt ADL function inferred from gross functional assessment of mobility, balance, posture, safety awareness, activity tolerance (unless otherwise indicated)    Cognitive and Psychosocial Functioning:  Overall cognitive status: WNL, A/Ox4  Affect: very pleasant and kind    Functional Mobility:  Bed Mobility: SBA  Sit <> Stand: CGA    Ambulation: CGA using FWW      AM-PAC 6 click short form for inpatient daily activity:   How much help from another person does the patient currently need. .. Unable  Dep A Lot  Max A A Lot   Mod A A Little  Min A A Little   CGA  SBA None   Mod I  Indep  Sup   1. Putting on and taking off regular lower body clothing? [] 1    [] 2   [] 2   [x] 3   [] 3   [] 4      2. Bathing (including washing, rinsing, drying)? [] 1   [] 2   [] 2 [x] 3 [] 3 [] 4   3. Toileting, which includes using toilet, bedpan, or urinal? [] 1    [] 2   [] 2   [] 3   [x] 3   [] 4     4. Putting on and taking off regular upper body clothing? [] 1   [] 2   [] 2   [] 3   [x] 3    [] 4      5. Taking care of personal grooming such as brushing teeth? [] 1   [] 2    [] 2 [] 3    [] 3   [x] 4      6. Eating meals? [] 1   [] 2   [] 2   [] 3   [] 3   [x] 4        Raw Score:  20      24/24 = unimpaired  23/24 = 1-20% impaired   20/24-22/24 = 21-40% impaired  15/24-19/24 = 41-59% impaired   10/24-14/24 = 60%-79% impaired  7/24-9/24 = 80%-99% impaired  6/24 = 100% impaired     Treatment:    Therapeutic Activity Training (15 minutes): Therapeutic activity training was instructed today. Cues were given for safety, sequence, UE/LE placement, visual cues, and balance.     Activities performed today included pt demo supine to bathroom.     Goals:  Time frame for goals: 2 weeks  Pt will complete feeding tasks with ind  Pt will complete grooming tasks with mod I standing at sink  Pt will complete toileting tasks with mod I using standard commode  Pt will complete UB dressing and bathing tasks with ind  Pt will complete LB dressing and bathing tasks with mod I  Pt will complete therapeutic exercise/activity to increase independence in ADL/IADL function  Pt will practice functional transfers and mobility with AD for increased safety and independence    Time:   Time in: 1002  Time out: 1029  Treatment Minutes: 15  Evaluation Minutes: 10  Total time: 27    Electronically signed by:      SCARLET Beckett/L  8/10/2022, 10:49 AM

## 2022-08-10 NOTE — PROGRESS NOTES
Cardiology Progress Note     Today's Plan: will sign off      Admit Date:  8/3/2022    Consult reason/ Seen today for: PAD     Subjective and  Overnight Events: Patient eager to be discharged. Denies any cardiac complaints. History of Presenting Illness:    Chief complain on admission : 61 y. o.year old who is admitted for  Chief Complaint   Patient presents with    Toe Injury     Injured by pediatrist approx 4 weeks ago. Been getting worse since and states it is now infected     Wound Check        Past medical history:    has a past medical history of Cancer ([facility] [address] Diabetes mellitus ([facility] [address] Hyperlipidemia, and Hypertension. Past surgical history:   has a past surgical history that includes Lung cancer surgery (Left, 10/2019); Upper gastrointestinal endoscopy (N/A, 10/3/2020); and Toe amputation (Left, 8/8/2022). Social History:   reports that she has quit smoking. Her smoking use included cigarettes. She smoked an average of 1 pack per day. She has never used smokeless tobacco. She reports current alcohol use of about 1.0 standard drink per week. She reports current drug use. Drug: Marijuana Maryam Joshua). Family history:  family history is not on file. No Known Allergies    Review of Systems:  Review of Systems   Cardiovascular:  Negative for chest pain, palpitations and leg swelling. Musculoskeletal:  Positive for gait problem. Skin: Negative. Neurological:  Negative for dizziness and weakness. All other systems reviewed and are negative. /82   Pulse 96   Temp 97.7 °F (36.5 °C) (Oral)   Resp 18   Ht 5' 4\" (1.626 m)   Wt 126 lb 8.7 oz (57.4 kg)   SpO2 98%   BMI 21.72 kg/m²     Intake/Output Summary (Last 24 hours) at 8/10/2022 1122  Last data filed at 8/10/2022 0351  Gross per 24 hour   Intake 240 ml   Output 1 ml   Net 239 ml         Physical Exam:  Physical Exam  Constitutional:       Appearance: She is well-developed.    Cardiovascular:      Rate and Rhythm: Normal rate and regular rhythm. Pulses: Intact distal pulses. Dorsalis pedis pulses are 1+ on the right side and detected w/ Doppler on the left side. Posterior tibial pulses are 1+ on the right side and detected w/ Doppler on the left side. Heart sounds: Normal heart sounds, S1 normal and S2 normal.   Pulmonary:      Effort: Pulmonary effort is normal.      Breath sounds: Normal breath sounds. Musculoskeletal:      Left Lower Extremity: (partial foot amputation)  Feet:      Comments: D/I  Skin:     General: Skin is warm and dry. Neurological:      Mental Status: She is alert and oriented to person, place, and time. Psychiatric:         Mood and Affect: Mood is depressed. Affect is tearful.         Medications:    cefepime  2,000 mg IntraVENous Q8H    [Held by provider] enoxaparin  40 mg SubCUTAneous Daily    carbamide peroxide  5 drop Both Ears Daily    cloNIDine  0.1 mg Oral BID    [Held by provider] aspirin  81 mg Oral Daily    atorvastatin  40 mg Oral Daily    [Held by provider] lisinopril  5 mg Oral Daily    hydrocortisone   Topical BID    potassium chloride  40 mEq Oral BID WC    vancomycin  750 mg IntraVENous Q12H    acetaminophen  500 mg Oral Q6H    sodium chloride flush  5-40 mL IntraVENous BID    pantoprazole  40 mg Oral Daily    [Held by provider] cilostazol  100 mg Oral BID    insulin lispro  0-8 Units SubCUTAneous TID WC    insulin lispro  0-4 Units SubCUTAneous Nightly    gabapentin  300 mg Oral Nightly      lactated ringers 75 mL/hr at 08/09/22 1723    sodium chloride Stopped (08/07/22 0315)    dextrose       HYDROcodone-acetaminophen, ondansetron **OR** ondansetron, polyethylene glycol, sodium chloride flush, sodium chloride, glucose, dextrose bolus **OR** dextrose bolus, glucagon (rDNA), dextrose    Lab Data:  CBC:   Recent Labs     08/08/22  0450 08/09/22  0444   WBC 10.4 9.0   HGB 10.3* 9.2*   HCT 31.5* 28.0*   MCV 96.3 95.9    291       BMP:   Recent Labs 08/08/22  0450 08/09/22  0444 08/10/22  0605    143 138   K 4.1 3.5 4.2    108 109   CO2 24 25 17*   BUN <2* 1* 2*   CREATININE 0.5* 0.5* 0.6       PT/INR: No results for input(s): PROTIME, INR in the last 72 hours. BNP:  No results for input(s): PROBNP in the last 72 hours. TROPONIN: No results for input(s): TROPONINT in the last 72 hours. Impression:  Principal Problem:    Sepsis ([address]  Resolved Problems:    * No resolved hospital problems. *         All labs, medications and tests reviewed by myself, continue all other medications of all above medical condition listed as is except for changes mentioned above. Thank you   Please call with questions. Electronically signed by Mildred Anaya. CAYLA Gatica CNP on 8/10/2022 at 11:22 AM             CARDIOLOGY ATTENDING ADDENDUM    I have seen, spoken to and examined this patient personally, independent of the NP/PAC. I have reviewed the hospital care given to date and reviewed all pertinent labs and imaging. I have spoken with patient, nursing staff and provided written and verbal instructions . The above note has been reviewed. I have spent substantive amount of time in formulating patient care. Physical Exam:    General:    Awake, alert  Head:normal  Eye:normal  Chest:    Clear to auscultation   0 Basilar crackles   Cardiovascular:  S1S2    Abdomen: soft   Extremities:    0 edema  Pulses; palpable      MEDICAL DECISION MAKING :       Assessment/Plan  Severe PAD :  History of PTA at [facility]  CT scan suggestive of occluded left common femoral artery, distal to the iliac stent with reconstituted flow in distal SFA, popliteal artery stenosis, two-vessel runoff. Peripheral angiogram today. Likely renal artery stenosis  Neurosurgery recommend to hold off on angiogram unless emergent/urgent and to follow-up with surgeon at [facility] concerning small fluid collection which is new and questionable for small bleed.   Hold off on peripheral angiogram, patient agrees. Osteomyelitis of the foot s/p amputation: On antibiotics per surgery/podiatry    Essential hypertension : Stable    Hyperlipidemia: Increase Lipitor to 40    Metastatic adenocarcinoma: right frontal craniotomy 6/20/22, currently receiving radiation.      Former smoker: Quit smoking a month ago      Outpatient follow-up    Dr. Mirtha Carney MD

## 2022-08-10 NOTE — PROGRESS NOTES
Small hyperdense subdural collection underneath craniotomy bone flap. Could be concerning for acute bleed. Collection was not noted on CT head 7/13 obtained at [facility]. Hold ASA and prophylatic lovenox for now. Will obtain repeat scan in the morning to ensure stability.

## 2022-08-10 NOTE — PROGRESS NOTES
Comprehensive Nutrition Assessment    Type and Reason for Visit:  Reassess    Nutrition Recommendations/Plan:   Continue carb controlled diet   Encourage consistent meal intake   Will continue to follow up during stay      Malnutrition Assessment:  Malnutrition Status: At risk for malnutrition (Comment) (08/04/22 3712)    Context:  Chronic Illness       Nutrition Assessment:    Remains on carb controlled diet with recent meal intake %. Patient content with meals, gets full fast sometimes. Discussed option for larger meals and oral nutrition supplement, only likes boost brand supplement that is not available here. Will continue to follow at moderate nutrition risk at this time with increased needs. Nutrition Related Findings:    sitting up in chair, partial foot amputation, POCT some voer 200 mg/dL, meds noted -started zyvox Wound Type: Surgical Incision       Current Nutrition Intake & Therapies:    Average Meal Intake: %  Average Supplements Intake: None Ordered  ADULT DIET; Regular; 3 carb choices (45 gm/meal)    Anthropometric Measures:  Height: 5' 4\" (162.6 cm)  Ideal Body Weight (IBW): 120 lbs (55 kg)    Admission Body Weight: 121 lb 14.6 oz (55.3 kg)  Current Body Weight: 126 lb 8.7 oz (57.4 kg), 101.6 % IBW. Weight Source: Bed Scale  Current BMI (kg/m2): 21.7  Usual Body Weight: 125 lb 3.5 oz (56.8 kg) (hx July)  % Weight Change (Calculated): -2.6  Weight Adjustment For: No Adjustment                 BMI Categories: Normal Weight (BMI 18.5-24. 9)    Estimated Daily Nutrient Needs:  Energy Requirements Based On: Kcal/kg  Weight Used for Energy Requirements: Current  Energy (kcal/day): 2489-2970 (30-35 mart/kg)  Weight Used for Protein Requirements: Current  Protein (g/day): 66-77 (1.2-1.4 g/kg0  Method Used for Fluid Requirements: 1 ml/kcal  Fluid (ml/day): 4431-2594    Nutrition Diagnosis:   Predicted inadequate energy intake related to increase demand for energy/nutrients as evidenced by wounds    Nutrition Interventions:   Food and/or Nutrient Delivery: Continue Current Diet  Nutrition Education/Counseling: No recommendation at this time  Coordination of Nutrition Care: Continue to monitor while inpatient       Goals:  Previous Goal Met: Progressing toward Goal(s)  Goals: PO intake 75% or greater, by next RD assessment       Nutrition Monitoring and Evaluation:   Behavioral-Environmental Outcomes: None Identified  Food/Nutrient Intake Outcomes: Diet Advancement/Tolerance, Food and Nutrient Intake  Physical Signs/Symptoms Outcomes: Biochemical Data, Meal Time Behavior, Skin, Weight    Discharge Planning:    Continue current diet     Madison Chen RD, LD  Contact: [phone]

## 2022-08-10 NOTE — PROGRESS NOTES
Progress note    Subjective: No changes to the left foot today from previous. Doing well overall today since surgery. Pain levels have improved to the left foot. Eating and voiding well. Denies any calf pain chest pain shortness of breath difficulty breathing or constitutional symptoms. Objective:  Neurovascular status unchanged from previous  Dermatological: Incision site to left foot partial hallux amputation site well coapted with sutures intact. No dusky changes. No crepitus fluctuance malodor or drainage. Erythema has improved. Musculoskeletal: Bilateral calves are soft and supple upon manual compression. Negative Keenan and Alycia test.    Assessment:   -Osteomyelitis left foot  -Type 2 diabetes mellitus with foot ulceration and necrosis of bone left foot  -Type 2 diabetes mellitus with peripheral neuropathy  -Peripheral vascular disease  -Cellulitis left foot        Plan:    -Patient was seen, evaluated, and treated today.  present for entirety of examination and treatment  -Status post left hallux partial amputation (8/8/2022)  -Patient is doing very well overall. Pain is slightly improved. -Incision site is well coapted and stable with sutures intact.  -Infectious disease managing antibiotics. Plan for oral course of antibiotics for 7 days upon discharge  -Patient stable for discharge from podiatry standpoint  -Keep dressing clean dry and intact until I see her next Tuesday in the wound care center  -Ambulate at all times in surgical shoe  -Vascular recommendations per cardiothoracic surgery  -Please contact any questions.       Julieta Selby DPM    [facility] [address] [place] [address] and Ankle Surgery

## 2022-08-10 NOTE — PROGRESS NOTES
Cardiothoracic Surgery Note      Name:  Shawna Pardo /Age/Sex: 1955  (23 y.o. female)   MRN & CSN:  [id number] & [id number] Admission Date/Time: 8/3/2022  5:04 PM   [facility]  [facility] PCP: Tom Hess MD       Hospital Day: 8    Subjective:  Ela Obrien is a 61 y. o.year old who and presents with had concerns including Toe Injury (Injured by pediatrist approx 4 weeks ago. Been getting worse since and states it is now infected ) and Wound Check. Chief Complaint   Patient presents with    Toe Injury     Injured by pediatrist approx 4 weeks ago. Been getting worse since and states it is now infected     Wound Check     Patient is feeling alright this morning. She is complaining of some pain in her left foot, but no significant claudication symptoms. For patient that we will likely hold off on performing surgery at this time until we are positive that she is safe and stable to receive blood thinners. We will have her follow-up in the clinic in 3 weeks for further discussions. Objective: Temperature:  Current - Temp: 97.7 °F (36.5 °C); Max - Temp  Av.9 °F (36.6 °C)  Min: 97.7 °F (36.5 °C)  Max: 98.1 °F (36.7 °C)    Respiratory Rate : Resp  Av.5  Min: 15  Max: 18    Pulse Range: Pulse  Av.8  Min: 95  Max: 105    Blood Presuure Range:  Systolic (76UXS), DZU:607 , Min:106 , YPM:165   ; Diastolic (01AJF), DOO:04, Min:71, Max:89      Pulse ox Range: SpO2  Av.3 %  Min: 95 %  Max: 99 %    24hr I & O:    Intake/Output Summary (Last 24 hours) at 8/10/2022 1001  Last data filed at 8/10/2022 0351  Gross per 24 hour   Intake 240 ml   Output 1 ml   Net 239 ml         Review of Systems   Constitutional:  Negative for diaphoresis, fatigue and fever. Eyes:  Negative for visual disturbance. Respiratory:  Negative for chest tightness and shortness of breath. Cardiovascular:  Negative for chest pain, palpitations and leg swelling. Claudication of left lower extremity but not of right   Gastrointestinal:  Negative for abdominal pain, diarrhea and vomiting. Endocrine: Negative for cold intolerance and heat intolerance. Musculoskeletal:         Pain of left foot following surgery   Skin:  Negative for pallor and rash. Neurological:  Negative for dizziness, syncope, light-headedness and headaches. Psychiatric/Behavioral:  Negative for dysphoric mood. The patient is not nervous/anxious. has a past medical history of Cancer ([facility] [address] Diabetes mellitus ([facility] [address] Hyperlipidemia, and Hypertension. has a past surgical history that includes Lung cancer surgery (Left, 10/2019); Upper gastrointestinal endoscopy (N/A, 10/3/2020); and Toe amputation (Left, 8/8/2022). Physical Exam  Vitals reviewed. Constitutional:       General: She is not in acute distress. Appearance: She is not diaphoretic. HENT:      Head: Normocephalic and atraumatic. Right Ear: External ear normal.      Left Ear: External ear normal.      Nose: Nose normal.      Mouth/Throat:      Mouth: Mucous membranes are moist.   Eyes:      Extraocular Movements: Extraocular movements intact. Comments: Pupils equal and round   Neck:      Vascular: No carotid bruit. Cardiovascular:      Rate and Rhythm: Regular rhythm. Tachycardia present. Pulses: Normal pulses. Heart sounds: S1 normal and S2 normal. No murmur heard. No friction rub. No gallop. Pulmonary:      Effort: Pulmonary effort is normal. No respiratory distress. Breath sounds: Normal breath sounds. No rales. Chest:      Chest wall: No tenderness. Abdominal:      Palpations: Abdomen is soft. Tenderness: There is no abdominal tenderness. Musculoskeletal:      Right lower leg: No edema. Left lower leg: No edema. Skin:     General: Skin is warm and dry. Capillary Refill: Capillary refill takes less than 2 seconds. Findings: No rash.       Comments: Skin turgor of right lower extremity brisk. Left lower extremity dressing in place. Clean dry   Neurological:      Mental Status: She is alert and oriented to person, place, and time. Mental status is at baseline. Psychiatric:         Behavior: Behavior is cooperative. Thought Content: Thought content normal.         Judgment: Judgment normal.        Medications:    cefepime  2,000 mg IntraVENous Q8H    [Held by provider] enoxaparin  40 mg SubCUTAneous Daily    carbamide peroxide  5 drop Both Ears Daily    cloNIDine  0.1 mg Oral BID    [Held by provider] aspirin  81 mg Oral Daily    atorvastatin  40 mg Oral Daily    [Held by provider] lisinopril  5 mg Oral Daily    hydrocortisone   Topical BID    potassium chloride  40 mEq Oral BID WC    vancomycin  750 mg IntraVENous Q12H    acetaminophen  500 mg Oral Q6H    sodium chloride flush  5-40 mL IntraVENous BID    pantoprazole  40 mg Oral Daily    [Held by provider] cilostazol  100 mg Oral BID    insulin lispro  0-8 Units SubCUTAneous TID WC    insulin lispro  0-4 Units SubCUTAneous Nightly    gabapentin  300 mg Oral Nightly      lactated ringers 75 mL/hr at 08/09/22 1723    sodium chloride Stopped (08/07/22 0315)    dextrose       HYDROcodone-acetaminophen, ondansetron **OR** ondansetron, polyethylene glycol, sodium chloride flush, sodium chloride, glucose, dextrose bolus **OR** dextrose bolus, glucagon (rDNA), dextrose    Lab Data:  CBC:   Recent Labs     08/08/22  0450 08/09/22  0444   WBC 10.4 9.0   HGB 10.3* 9.2*   HCT 31.5* 28.0*   MCV 96.3 95.9    291       BMP:   Recent Labs     08/08/22  0450 08/09/22  0444 08/10/22  0605    143 138   K 4.1 3.5 4.2    108 109   CO2 24 25 17*   BUN <2* 1* 2*   CREATININE 0.5* 0.5* 0.6       LIVER PROFILE:   No results for input(s): AST, ALT, LIPASE, BILIDIR, BILITOT, ALKPHOS in the last 72 hours. Invalid input(s): AMYLASE,  ALB    PT/INR: No results for input(s): PROTIME, INR in the last 72 hours.   APTT: No results for input(s): APTT in the last 72 hours. BNP:  No results for input(s): BNP in the last 72 hours. TROPONIN: No results for input(s): TROPONINT in the last 72 hours. Bilateral lower extremity vein mappin2022    Right:       Sapheno-femoral Junction: 7mm. Proximal Thigh:  5.1mm. Mid Thigh:  3.1mm. Distal Thigh:  2.6mm. Knee:  3.9mm. Proximal Calf: 2.8mm. Mid Calf:  1.8mm. Ankle: 1.5mm. Left:       Sapheno-femoral Junction: 7.7mm. Proximal Thigh:  5.6mm. Mid Thigh:  4.6mm. Distal Thigh:  4.8mm. Knee:  5.3mm. Proximal Calf: 4.1mm. Mid Calf:  3.6mm. Ankle: 4.3mm. CTA abdominal aorta with bilateral runoff with contrast: 2022    1. Aortoiliac atherosclerotic plaque. Previous stenting of the iliac   circulation bilaterally which is patent. 2. 4.5 cm occlusion of the right SFA proximally. Diffuse plaque with serial   stenoses in the reconstituted SFA. The popliteal artery assumes a more   normal caliber with no focal stenosis. Three-vessel runoff proximally with   primary runoff via the peroneal.  The peroneal provides the posterior tibial   and dorsalis pedis circulation into the foot. 3. Occlusion of the left common femoral artery distal to the iliac stent. Reconstitution of the distal 3rd of the SFA. Diffuse plaque within the   reconstituted SFA and popliteal artery with serial 50% stenoses. 2-vessel   runoff with occlusion of the anterior tibial artery proximally. 4. Probable bilateral renal artery stenoses. No significant mesenteric   stenosis. Patent JUAN. 5. No acute findings within the abdomen or pelvis. Gastrostomy tube in   place. Mild retained stool throughout the colon suggesting constipation. 6. Stable enlarged fibroid uterus. 7. Trace left pleural effusion. No acute infiltrate or significant edema at   the lung bases.      Bilateral Upper Extremity Arterial Ultrasound:

## 2022-08-10 NOTE — PROGRESS NOTES
Neurosurgery Progress Note  8/10/2022 9:53 AM      Assessment and Plan:   History of right frontal craniotomy 6/20-performed at [facility] for metastatic adenocarcinoma. Head CT yesterday and this morning showing hyperdense fluid collection adjacent to the craniotomy site. This could represent acute bleeding, was found incidentally. Discussed with Dr. Beverly Cabello, if patient were to undergo urgent/emergent angiogram she would need to be aware of the risks of use of anticoagulation. Collection is minimal at 3 mm and has been stable on subsequent imaging. Left hallux amputation-patient found to have osteomyelitis, ID following. Patient has severe peripheral vascular disease  Severe peripheral vascular disease-cardiology and CT surgery are following. Patient was scheduled to undergo a peripheral angiogram but wanted our opinion before proceeding due to history of craniotomy. At this time we recommend that unless the angiogram is emergent/urgent, she should follow-up with her surgeon at [facility], Dr. Whitney Fischer, for his opinion on this hyperdense fluid collection. She should try to follow-up with him semiurgently. Her  did say that they have an upcoming appointment with him. Would not recommend restarting aspirin unless she has close follow-up with their service. Supervising physician: Deborah Yap. Beverly Cabello MD.  Dr. Beverly Cabello was readily and continuously available by phone for direct consultation regarding the care of this patient. Subjective:   Admit Date: 8/3/2022  PCP: Ryan Wall MD    Patient sitting edge of bed, she is frustrated that she cannot eat once again today. She has no complaints of vision changes or headaches. She is alert and oriented x4.     Data:   Scheduled Meds:   cefepime  2,000 mg IntraVENous Q8H    [Held by provider] enoxaparin  40 mg SubCUTAneous Daily    carbamide peroxide  5 drop Both Ears Daily    cloNIDine  0.1 mg Oral BID    [Held by provider] aspirin  81 mg Oral Daily    atorvastatin  40 mg Oral Daily    [Held by provider] lisinopril  5 mg Oral Daily    hydrocortisone   Topical BID    potassium chloride  40 mEq Oral BID WC    vancomycin  750 mg IntraVENous Q12H    acetaminophen  500 mg Oral Q6H    sodium chloride flush  5-40 mL IntraVENous BID    pantoprazole  40 mg Oral Daily    [Held by provider] cilostazol  100 mg Oral BID    insulin lispro  0-8 Units SubCUTAneous TID WC    insulin lispro  0-4 Units SubCUTAneous Nightly    gabapentin  300 mg Oral Nightly         I/O last 3 completed shifts: In: 240 [P.O.:240]  Out: 1 [Urine:1]  No intake/output data recorded. Intake/Output Summary (Last 24 hours) at 8/10/2022 0953  Last data filed at 8/10/2022 0351  Gross per 24 hour   Intake 240 ml   Output 1 ml   Net 239 ml     CULTURE results: Invalid input(s): BLOOD CULTURE,  URINE CULTURE, SURGICAL CULTURE  CBC:   Recent Labs     08/08/22  0450 08/09/22  0444   WBC 10.4 9.0   HGB 10.3* 9.2*    291     BMP:    Recent Labs     08/08/22  0450 08/09/22  0444 08/10/22  0605    143 138   K 4.1 3.5 4.2    108 109   CO2 24 25 17*   BUN <2* 1* 2*   CREATININE 0.5* 0.5* 0.6   GLUCOSE 152* 152* 153*     Hepatic: No results for input(s): AST, ALT, ALB, BILITOT, ALKPHOS in the last 72 hours. IMAGING: available xray, CT, and MRI results reviewed    Ct head 8/09/22  Impression   1. Prior right craniotomy with small underlying right subdural hematoma. 2. Microangiopathic change with right frontal encephalomalacia. CT head 8/10/22      Impression   *Sequela of right anterior craniotomy with fluid/soft tissue noted beneath   the craniotomy defect measuring 3 mm in thickness, unchanged. *Cerebral parenchymal volume loss with moderate-severe chronic microvascular   white matter ischemic disease, stable.      Objective:   Vitals: /82   Pulse 96   Temp 97.7 °F (36.5 °C) (Oral)   Resp 18   Ht 5' 4\" (1.626 m)   Wt 126 lb 8.7 oz (57.4 kg)   SpO2 98%   BMI 21.72 kg/m²   General appearance: Alert, sitting on edge of bed, no distress  HEENT: Normocephalic, atraumatic, EOM intact, tongue midline  Neurologic: Mental status: Alert and oriented x4, speech clear and coherent, no facial droop, follows commands briskly, no pronator drift  Extremities: Bilateral upper extremities 5/5, bilateral lower extremities 5/5  Incision: none  Drains: none      Electronically signed by Skylar Bonds PA-C on 8/10/2022 at 9:53 AM

## 2022-08-10 NOTE — PROGRESS NOTES
V2.0  [facility] Hospitalist Progress Note      Name:  Louise Petersen /Age/Sex: 1955  (64 y.o. female)   MRN & CSN:  [id number] & [id number] Encounter Date/Time: 2022 7:32 AM EDT    Location:  1110/1110-A PCP: Wayne Vera MD       Hospital Day: 7    Assessment and Plan:   Louise Petersen is a 61 y.o. female with pmh of DM2, HTN, HLD who presents with Sepsis ([address]      Plan:    Severe sepsis 2/2 osteomyelitis of left great toe with surrounding cellulitis  Nonhealing diabetic foot ulcer  S/p left foot partial hallux amputation with excisional debridement of all nonviable tissue and bone on   Leukocytosis, lactic acidosis, tachycardia. X-ray left foot with soft tissue ulceration involving the great toe, also concern for osteomyelitis. MRI foot with osteomyelitis of the distal phalanx of the left great toe with surrounding soft tissue edema and mild enhancement consistent with cellulitis. No organized drainable fluid collection identified. -Vancomycin and Zosyn  -IVF hydration, lactate normalized  -Podiatry following  -Vascular surgery following  -Bcx NGTD  -F/u tissue cultures  -Consult ID    Severe PVD  Claudication of left leg. Underwent CTA and vein mapping.  -Continue Pletal  -Started aspirin  -Upper extremity Dopplers to check subclavian arteries  -Cardiology consulted for aortogram, plan for     HTN  -Consistently soft BP  -Hold home antihypertensives, will restart as appropriate    Sinus tachycardia -likely postsurgical and 2/2 home clonidine held in setting of soft BP  -Restart home clonidine as pressures now tolerating  -Analgesics as needed for pain control  -Telemetric monitoring    Non-insulin-dependent diabetes mellitus type 2  -Hold home p.o. meds  -SSI, glucose checks, hypoglycemia protocol  -Strict glucose control to help with wound healing    Hypomagnesemia, hypokalemia  -Replete as required      Diet ADULT DIET;  Regular; 3 carb choices (45 gm/meal)   DVT Prophylaxis [x] Lovenox, []  Heparin, [] SCDs, [] Ambulation,  [] Eliquis, [] Xarelto  [] Coumadin   Code Status Full Code   Disposition From: Home  Expected Disposition: Home  Estimated Date of Discharge: 2-3 days  Patient requires continued admission due to severe sepsis   Surrogate Decision Maker/ POA      Subjective:     Chief Complaint: Toe Injury (Injured by pediatrist approx 4 weeks ago. Been getting worse since and states it is now infected ) and Wound Check     Denied complaints when I saw her    Prior notes:     Pérez Sun is a 61 y.o. female who presents at request of her podiatrist with nonhealing left toe ulcer for past month associated with some necrosis at the tip of her left toe. Was assessed by podiatry the day prior to presentation, it was suggested that she present to ED for IV antibiotics and amputation of left great toe    Patient seen and examined after surgery. Is experiencing some left foot pain but feels good otherwise. Denies palpitations, chest pain, shortness of breath, lightheadedness/dizziness. Blood pressure not soft today however she is tachycardic after clonidine held yesterday. Review of Systems:    Review of Systems    No fevers, chills, cough, SOB, palpitations, chest pain, diarrhea, nausea, vomiting, abdominal pain. Objective: Intake/Output Summary (Last 24 hours) at 8/9/2022 2319  Last data filed at 8/9/2022 1856  Gross per 24 hour   Intake 240 ml   Output 0 ml   Net 240 ml          Vitals:   Vitals:    08/09/22 2113   BP:    Pulse:    Resp: 18   Temp:    SpO2:        Physical Exam:     General: NAD  Eyes: EOMI  Genitourinary: no suprapubic tenderness  Musculoskeletal: No edema  Skin: Left great toe amputated, tenderness on palpation  Neuro: Alert. Psych: Mood appropriate.      Medications:   Medications:    [START ON 8/10/2022] cefepime  2,000 mg IntraVENous Q8H    [Held by provider] enoxaparin  40 mg SubCUTAneous Daily    carbamide peroxide  5 drop Both Ears Daily    cloNIDine  0.1 mg Oral BID    [Held by provider] aspirin  81 mg Oral Daily    atorvastatin  40 mg Oral Daily    [Held by provider] lisinopril  5 mg Oral Daily    hydrocortisone   Topical BID    potassium chloride  40 mEq Oral BID WC    vancomycin  750 mg IntraVENous Q12H    acetaminophen  500 mg Oral Q6H    sodium chloride flush  5-40 mL IntraVENous BID    pantoprazole  40 mg Oral Daily    cilostazol  100 mg Oral BID    insulin lispro  0-8 Units SubCUTAneous TID WC    insulin lispro  0-4 Units SubCUTAneous Nightly    gabapentin  300 mg Oral Nightly      Infusions:    lactated ringers 75 mL/hr at 08/09/22 1723    sodium chloride Stopped (08/07/22 0315)    dextrose       PRN Meds: HYDROcodone-acetaminophen, 1 tablet, Q4H PRN  ondansetron, 4 mg, Q8H PRN   Or  ondansetron, 4 mg, Q6H PRN  polyethylene glycol, 17 g, Daily PRN  sodium chloride flush, 5-40 mL, PRN  sodium chloride, , PRN  glucose, 4 tablet, PRN  dextrose bolus, 125 mL, PRN   Or  dextrose bolus, 250 mL, PRN  glucagon (rDNA), 1 mg, PRN  dextrose, , Continuous PRN      Labs      Recent Results (from the past 24 hour(s))   CBC    Collection Time: 08/09/22  4:44 AM   Result Value Ref Range    WBC 9.0 4.0 - 10.5 K/CU MM    RBC 2.92 (L) 4.2 - 5.4 M/CU MM    Hemoglobin 9.2 (L) 12.5 - 16.0 GM/DL    Hematocrit 28.0 (L) 37 - 47 %    MCV 95.9 78 - 100 FL    MCH 31.5 (H) 27 - 31 PG    MCHC 32.9 32.0 - 36.0 %    RDW 12.6 11.7 - 14.9 %    Platelets 343 153 - 477 K/CU MM    MPV 8.9 7.5 - 11.1 FL   Basic Metabolic Panel w/ Reflex to MG    Collection Time: 08/09/22  4:44 AM   Result Value Ref Range    Sodium 143 135 - 145 MMOL/L    Potassium 3.5 3.5 - 5.1 MMOL/L    Chloride 108 99 - 110 mMol/L    CO2 25 21 - 32 MMOL/L    Anion Gap 10 4 - 16    BUN 1 (L) 6 - 23 MG/DL    Creatinine 0.5 (L) 0.6 - 1.1 MG/DL    Glucose 152 (H) 70 - 99 MG/DL    Calcium 9.0 8.3 - 10.6 MG/DL    GFR Non-African American >60 >60 mL/min/1.73m2    GFR African American >60 >60 mL/min/1.73m2   Magnesium    Collection Time: 08/09/22  4:44 AM   Result Value Ref Range    Magnesium 1.1 (L) 1.8 - 2.4 mg/dl   POCT Glucose    Collection Time: 08/09/22  6:44 AM   Result Value Ref Range    POC Glucose 143 (H) 70 - 99 MG/DL   POCT Glucose    Collection Time: 08/09/22 10:48 AM   Result Value Ref Range    POC Glucose 192 (H) 70 - 99 MG/DL   POCT Glucose    Collection Time: 08/09/22  3:46 PM   Result Value Ref Range    POC Glucose 128 (H) 70 - 99 MG/DL   POCT Glucose    Collection Time: 08/09/22  8:44 PM   Result Value Ref Range    POC Glucose 223 (H) 70 - 99 MG/DL        Imaging/Diagnostics Last 24 Hours   XR TOE LEFT (MIN 2 VIEWS)    Result Date: 8/3/2022  EXAMINATION: 3 XRAY VIEWS OF THE LEFT TOE 8/3/2022 3:13 pm COMPARISON: None. HISTORY: ORDERING SYSTEM PROVIDED HISTORY: left great toe erythema and necrosis TECHNOLOGIST PROVIDED HISTORY: Reason for exam:->left great toe erythema and necrosis Reason for Exam: left great toe erythema and necrosis Additional signs and symptoms: na Relevant Medical/Surgical History: diabetes, hypertension FINDINGS: Soft tissue ulceration of the left great toe noted. Subjacent to that, there is osteolysis involving the great toe distal phalanx. The remaining toes are unremarkable appearance. Soft tissue ulceration involving the great toe. Osteolysis of the great toe distal phalanx is seen, which is worrisome for osteomyelitis. VL DUP LOWER EXTREMITY ARTERIES LEFT    Result Date: 8/3/2022  EXAMINATION: ARTERIAL DUPLEX ULTRASOUND OF THE LEFT LOWER EXTREMITY  8/3/2022 7:29 pm TECHNIQUE: Duplex ultrasound using B-mode/gray scaled imaging, Doppler spectral analysis and color flow Doppler was obtained of the lower extremity. COMPARISON: None.  HISTORY: ORDERING SYSTEM PROVIDED HISTORY: Left great toe necrosis TECHNOLOGIST PROVIDED HISTORY: Reason for exam:->Left great toe necrosis FINDINGS: Monophasic waveforms with increased spectral broadening noted throughout the left lower extremity suggestive of severe iliac inflow disease as well as severe lower extremity popliteal artery disease. Flow velocities were measured as follows: Com. Fem.  8.4 cm/sec SFA Prox. 12 cm/sec SFA Mid.     12 cm/sec SFA Dist.     22 cm/sec Pop. 19 cm/sec PTA            16, 0, 8 cm/sec Peron. 19, 0, 0 cm/sec HANNA            20, 0, 0 cm/sec     1. Diffuse monophasic waveform seen throughout the left lower extremity suggestive of severe iliac as well as left lower extremity peripheral arterial disease. 2. Occluded mid posterior tibial artery, as well as the mid and distal anterior tibial and peroneal arteries. No continuous runoff. 3. The obtain velocities are very low throughout the left lower extremity consistent with severe inflow disease.        Electronically signed by Huseyin Tavares MD on 8/9/2022 at 11:19 PM

## 2022-08-10 NOTE — PROGRESS NOTES
Physical Therapy  [facility] ACUTE CARE PHYSICAL THERAPY EVALUATION  German Galvan, 1955, 1110/1110-A, 8/10/2022    History  [place]:  The primary encounter diagnosis was Sepsis, due to unspecified organism, unspecified whether acute organ dysfunction present ([address] Diagnoses of Diabetic foot infection ([address] and Other osteomyelitis of left foot ([address] were also pertinent to this visit. Patient  has a past medical history of Cancer ([address] Diabetes mellitus ([address] Hyperlipidemia, and Hypertension. Patient  has a past surgical history that includes Lung cancer surgery (Left, 10/2019); Upper gastrointestinal endoscopy (N/A, 10/3/2020); and Toe amputation (Left, 8/8/2022). Subjective:  Patient states:  \"I'm feeling pretty good\"  Pain:  denies pain at rest ; endorses some discomfort during WB/ambulation    Communication with other providers:  Handoff to RN, co-eval with OT Arina   Restrictions: fall risk; general precautions; L LE WB    Home Setup/Prior level of function  Social/Functional History  Lives With: Spouse  Type of Home: House  Home Layout: Multi-level, Bed/Bath upstairs  Home Access: Stairs to enter without rails  Entrance Stairs - Number of Steps: 1  Bathroom Shower/Tub: Tub/Shower unit, Shower chair with back  Bathroom Toilet: Standard  Bathroom Equipment: Shower chair  Home Equipment: [place] Else, rolling, Rollator, Cane  Has the patient had two or more falls in the past year or any fall with injury in the past year?: Yes (pt state she passes out and falls)  ADL Assistance: Independent  Homemaking Assistance: Needs assistance (spouse performs IADLs)  Ambulation Assistance: Independent (mod I with walker)  Transfer Assistance: Independent  Additional Comments: pt receives C 5x/week, PT/OT services in home PTA    Examination of body systems (includes body structures/functions, activity/participation limitations):  Observation:  Supine, awake, agreeable.    Vision:  [employer] [facility]  Hearing: [place]  Cardiopulmonary:  WFL  Cognition: A&O x 4 ; alert, cues to redirect, memory intact command following w/o difficulty, fair safety awareness    Musculoskeletal  ROM R/L:  WFL. Strength R/L:  grossly 4/5  Neuro: WFL  Gait pattern: reciprocal, slightly antalgic pattern during LLE WB, dec stance time on affected LLE, grossly stable w/ use of FWW    Mobility:  Supine to sit:  SBA  Transfers: SBA  Sitting balance:  good  Standing balance:  fair +  Gait: 125 ft using FWW at [facility] progressing to SBA ; grossly stable, slightly antalgic, standing rest x 1 for attention to pacing/pain, inc UE reliance, poor attention to heel WB    Treatment:  Gait Training:  Cues were given for safety, sequence, device management, balance, posture, awareness, path. Initial gait activities, pacing education, safety education, precaution education, DC planning this session    Positioned for comfort and pressure relief  Safety: patient left in chair, chair alarmed, call light within reach, RN notified, gait belt used, all needs met    Assessment:  Pt presents 7 days s/p admission for redness and swelling in L great toe, has been following w/ wound care, they referred pt to [facility] for possible great toe amputation. Underwent L partial toe amputation on 8/9, now heel WB in post-op shoe. She presents from home w/ spouse, in multi-level home, 1 RODRIGO, spouse assisting w/ performance of home mgmt, home health therapy and aides. She presents w/ weakness affecting efficiency of transfers, inc pain affecting quality of gait, dec endurance requiring standing rest during performance of household distances, dec balance w/ hx of falls recently d/t weakness, operating close to baseline. Recommending return home w/ assist PRN + HH aide + HH PT. Complexity: Moderate  Prognosis: Good, no significant barriers to participation at this time.    Plan 3+/week, 1 week  Equipment: no new equipment; cont use of FWW    Goals:  Eval DC    Treatment plan:  Bed mobility, functional mobility, transfers, balance, gait, TA, TX, strength activities, neuro    Recommendations for NURSING mobility: stand step using FWW     Time:   Time in: 1002  Time out: 1029  Timed treatment minutes: 17  Total time: 27    Electronically signed by:    Grisel Stubbs PT, DPT  8/10/2022, 12:21 PM

## 2022-08-10 NOTE — PROGRESS NOTES
Infectious Disease Progress Note  8/10/2022   Patient Name: Capri Lozada : 1955   Impression  Left Great Hallux DFU with OM:  Severe PAD:  Afebrile, no leukocytosis, CRP 17.0, Pct 0.255  8/3-BC 0/2-NGTD  -left foot deep soft tissue culture: Prelim: NGTD  -s/p per Dr. Medina Erlinda: Partial hallux amputation with excisional debridement of all nonviable tissue and bone left foot. DX: OM of left foot. cultures: Serratia marsescens, Staphylococcus epidermidis. No pathology available. Dr. Katerina Londono, cardiology, onboard, CT suggestive of occluded Left common femoral artery, likely renal stenosis  CTS onboard, Dr. Karoline Apgar, plan to follow-up in clinic in about 3 weeks for plan for surgical intervention for PAD when she is safe to receive [facility]  DMII  HTN  Left Lung Cancer s/p Left Lobectomy 2019  Metastatic Adenocarcinoma s/p Right Frontal Craniotomy 22 by Dr. Betty Avila at [facility]: Residual Deficits post surgical  Dr. Maria Victoria Armas onboard, has given ok to proceed with peripheral angiogram if CT of the brain is non-acute   Reports has completed radiation  Do not recommend restarting aspirin or AC, rec follow-p with [facility], Dr. Betty Avila, for opinion on hyperdense fluid collection seen on CT head  HLD  Former Tobacco Abuse  Multi-morbidity: per PMHx:  left lung cancer, HTN, HLD, DMII  Plan:  DC IV cefepime and vancomycin  Start po levofloxacin 750 mg po x 7 total days of ABX therapy from surgical intervention (end date 8/15/22)  Start po linezolid 600 mg po bid x 7 total days of ABX therapy from surgical intervention (end date 8/15/22)  Follow up with PCP  OK from ID standpoint to DC when ready    Ongoing Antimicrobial Therapy  Linezolid 8/10-  Levofloxacin 8/10-  Completed Antimicrobial Therapy  Zosyn 8/3-?? Cefepime -10  Vancomycin 8/3-10? History:? Interval history noted. Chief complaint: left great toe DFU with OM, severe PAD. Denies n/v/d/f or untoward effects of antibiotics. States is feeling somewhat better all over today. Had 2 CTs of the brain. Physical Exam:  Vital Signs: /82   Pulse 96   Temp 97.7 °F (36.5 °C) (Oral)   Resp 18   Ht 5' 4\" (1.626 m)   Wt 126 lb 8.7 oz (57.4 kg)   SpO2 98%   BMI 21.72 kg/m²     Gen: A&Ox4, up in the chair, no distress  Wounds: C/D/I left foot with no surrounding erythema or edema  HEMT: AT/NC Oropharynx pink, moist, and without lesions or exudates; dentition in good state of repair  Eyes: PERRLA, EOMI, conjunctiva pink, sclera anicteric. Neck: Supple. Trachea midline. No LAD. Chest: no distress and CTA. Good air movement. Room air. Heart: NSR and no MRG. Abd: soft, non-distended, no tenderness, no hepatomegaly. Normoactive bowel sounds. Ext: no clubbing, cyanosis, or edema  Neuro: Mental status intact. CN 2-12 intact and no focal sensory or motor deficits     Radiologic / Imaging / TESTING  8/3/22 XR Toe Left:  Impression   Soft tissue ulceration involving the great toe. Osteolysis of the great toe   distal phalanx is seen, which is worrisome for osteomyelitis. 8/3/22 VL Dup Lower Extremity Arteries Left:  Impression   1. Diffuse monophasic waveform seen throughout the left lower extremity   suggestive of severe iliac as well as left lower extremity peripheral   arterial disease. 2. Occluded mid posterior tibial artery, as well as the mid and distal   anterior tibial and peroneal arteries. No continuous runoff. 3. The obtain velocities are very low throughout the left lower extremity   consistent with severe inflow disease. 8/3/22 MRI Foot Left W WO Contrast:  Impression   1. Osteomyelitis of the distal phalanx of the left great toe with surrounding   soft tissue edema and mild enhancement consistent with cellulitis. No   organized drainable fluid collection is identified. 8/7/22 US Dup Lower Extremity Mapping Bilat Venous:  Impression   Right greater saphenous vein is patent with measurements ranging from 1.5-7mm   as discussed above.        Left greater saphenous vein is patent with measurements ranging from   3.6-7.7mm as discussed above. 8/7/22 CTA Abdominal Aorta W Bilat Runoff W Contrast:  Impression   1. Aortoiliac atherosclerotic plaque. Previous stenting of the iliac   circulation bilaterally which is patent. 2. 4.5 cm occlusion of the right SFA proximally. Diffuse plaque with serial   stenoses in the reconstituted SFA. The popliteal artery assumes a more   normal caliber with no focal stenosis. Three-vessel runoff proximally with   primary runoff via the peroneal.  The peroneal provides the posterior tibial   and dorsalis pedis circulation into the foot. 3. Occlusion of the left common femoral artery distal to the iliac stent. Reconstitution of the distal 3rd of the SFA. Diffuse plaque within the   reconstituted SFA and popliteal artery with serial 50% stenoses. 2-vessel   runoff with occlusion of the anterior tibial artery proximally. 4. Probable bilateral renal artery stenoses. No significant mesenteric   stenosis. Patent JUAN. 5. No acute findings within the abdomen or pelvis. Gastrostomy tube in   place. Mild retained stool throughout the colon suggesting constipation. 6. Stable enlarged fibroid uterus. 7. Trace left pleural effusion. No acute infiltrate or significant edema at   the lung bases. 8/8/22 XR Foot Left:  Impression   1. Amputation along the mid diaphysis of the 1st proximal phalanx without   complication. 8/8/22 VL Dup Upper Extremity Arteries Bilateral:  Impression   No sonographic evidence of subclavian artery stenosis. 8/9/22 CT Head WO Contrast:  Impression   1. Prior right craniotomy with small underlying right subdural hematoma. 2. Microangiopathic change with right frontal encephalomalacia. 8/10/22 CT Head WO Contrast:  Impression   *Sequela of right anterior craniotomy with fluid/soft tissue noted beneath   the craniotomy defect measuring 3 mm in thickness, unchanged.    *Cerebral parenchymal volume loss with moderate-severe chronic microvascular   white matter ischemic disease, stable. Labs:    Recent Results (from the past 24 hour(s))   POCT Glucose    Collection Time: 08/09/22  3:46 PM   Result Value Ref Range    POC Glucose 128 (H) 70 - 99 MG/DL   POCT Glucose    Collection Time: 08/09/22  8:44 PM   Result Value Ref Range    POC Glucose 223 (H) 70 - 99 MG/DL   Basic Metabolic Panel w/ Reflex to MG    Collection Time: 08/10/22  6:05 AM   Result Value Ref Range    Sodium 138 135 - 145 MMOL/L    Potassium 4.2 3.5 - 5.1 MMOL/L    Chloride 109 99 - 110 mMol/L    CO2 17 (L) 21 - 32 MMOL/L    Anion Gap 12 4 - 16    BUN 2 (L) 6 - 23 MG/DL    Creatinine 0.6 0.6 - 1.1 MG/DL    Glucose 153 (H) 70 - 99 MG/DL    Calcium 9.1 8.3 - 10.6 MG/DL    GFR Non-African American >60 >60 mL/min/1.73m2    GFR African American >60 >60 mL/min/1.73m2   C-Reactive Protein    Collection Time: 08/10/22  6:05 AM   Result Value Ref Range    CRP, High Sensitivity 17.0 mg/L   Procalcitonin    Collection Time: 08/10/22  6:05 AM   Result Value Ref Range    Procalcitonin 0.255    Vancomycin Level, Random    Collection Time: 08/10/22  6:05 AM   Result Value Ref Range    Vancomycin Rm 18.6 UG/ML    DOSE AMOUNT DOSE AMT.  GIVEN - UNKNOWN     DOSE TIME DOSE TIME GIVEN - UNKNOWN    POCT Glucose    Collection Time: 08/10/22  6:22 AM   Result Value Ref Range    POC Glucose 211 (H) 70 - 99 MG/DL   POCT Glucose    Collection Time: 08/10/22  9:01 AM   Result Value Ref Range    POC Glucose 175 (H) 70 - 99 MG/DL   POCT Glucose    Collection Time: 08/10/22 11:00 AM   Result Value Ref Range    POC Glucose 277 (H) 70 - 99 MG/DL     CULTURE results: Invalid input(s): BLOOD CULTURE,  URINE CULTURE, SURGICAL CULTURE    Diagnosis:  Patient Active Problem List   Diagnosis    Chest pain    Dyspnea and respiratory abnormalities    Acute gastritis without hemorrhage    Diabetic ulcer of toe of left foot associated with type 2 diabetes mellitus, with fat layer exposed ([facility]    Type 2 diabetes mellitus with peripheral neuropathy (HCC)    Peripheral vascular disease ([facility]    History of nicotine dependence    Sepsis ([facility]    Brain metastases ([facility]       Active Problems  Principal Problem:    Sepsis ([facility]  Active Problems:    Brain metastases ([facility]  Resolved Problems:    * No resolved hospital problems. *    Electronically signed by: Electronically signed by Georgine Severs.  CAYLA Raygoza CNP on 8/10/2022 at 12:07 PM

## 2022-08-11 VITALS
HEART RATE: 77 BPM | HEIGHT: 64 IN | TEMPERATURE: 97.9 F | DIASTOLIC BLOOD PRESSURE: 89 MMHG | BODY MASS INDEX: 21.94 KG/M2 | RESPIRATION RATE: 17 BRPM | OXYGEN SATURATION: 96 % | WEIGHT: 128.53 LBS | SYSTOLIC BLOOD PRESSURE: 134 MMHG

## 2022-08-11 LAB
ANION GAP SERPL CALCULATED.3IONS-SCNC: 12 MMOL/L (ref 4–16)
BASOPHILS ABSOLUTE: 0 K/CU MM
BASOPHILS RELATIVE PERCENT: 0.5 % (ref 0–1)
BUN BLDV-MCNC: 3 MG/DL (ref 6–23)
CALCIUM SERPL-MCNC: 8.7 MG/DL (ref 8.3–10.6)
CHLORIDE BLD-SCNC: 110 MMOL/L (ref 99–110)
CO2: 21 MMOL/L (ref 21–32)
CREAT SERPL-MCNC: 0.6 MG/DL (ref 0.6–1.1)
DIFFERENTIAL TYPE: ABNORMAL
EOSINOPHILS ABSOLUTE: 0.1 K/CU MM
EOSINOPHILS RELATIVE PERCENT: 1.2 % (ref 0–3)
GFR AFRICAN AMERICAN: >60 ML/MIN/1.73M2
GFR NON-AFRICAN AMERICAN: >60 ML/MIN/1.73M2
GLUCOSE BLD-MCNC: 139 MG/DL (ref 70–99)
GLUCOSE BLD-MCNC: 153 MG/DL (ref 70–99)
GLUCOSE BLD-MCNC: 168 MG/DL (ref 70–99)
HCT VFR BLD CALC: 30.1 % (ref 37–47)
HEMOGLOBIN: 10 GM/DL (ref 12.5–16)
IMMATURE NEUTROPHIL %: 0.3 % (ref 0–0.43)
LYMPHOCYTES ABSOLUTE: 2.6 K/CU MM
LYMPHOCYTES RELATIVE PERCENT: 28.7 % (ref 24–44)
MCH RBC QN AUTO: 31.5 PG (ref 27–31)
MCHC RBC AUTO-ENTMCNC: 33.2 % (ref 32–36)
MCV RBC AUTO: 95 FL (ref 78–100)
MONOCYTES ABSOLUTE: 0.7 K/CU MM
MONOCYTES RELATIVE PERCENT: 7.3 % (ref 0–4)
NUCLEATED RBC %: 0 %
PDW BLD-RTO: 12.8 % (ref 11.7–14.9)
PLATELET # BLD: 235 K/CU MM (ref 140–440)
PMV BLD AUTO: 8.4 FL (ref 7.5–11.1)
POTASSIUM SERPL-SCNC: 3.9 MMOL/L (ref 3.5–5.1)
RBC # BLD: 3.17 M/CU MM (ref 4.2–5.4)
SEGMENTED NEUTROPHILS ABSOLUTE COUNT: 5.5 K/CU MM
SEGMENTED NEUTROPHILS RELATIVE PERCENT: 62 % (ref 36–66)
SODIUM BLD-SCNC: 143 MMOL/L (ref 135–145)
TOTAL IMMATURE NEUTOROPHIL: 0.03 K/CU MM
TOTAL NUCLEATED RBC: 0 K/CU MM
WBC # BLD: 8.9 K/CU MM (ref 4–10.5)

## 2022-08-11 PROCEDURE — 80048 BASIC METABOLIC PNL TOTAL CA: CPT

## 2022-08-11 PROCEDURE — 6370000000 HC RX 637 (ALT 250 FOR IP): Performed by: STUDENT IN AN ORGANIZED HEALTH CARE EDUCATION/TRAINING PROGRAM

## 2022-08-11 PROCEDURE — 99211 OFF/OP EST MAY X REQ PHY/QHP: CPT

## 2022-08-11 PROCEDURE — 94761 N-INVAS EAR/PLS OXIMETRY MLT: CPT

## 2022-08-11 PROCEDURE — 85025 COMPLETE CBC W/AUTO DIFF WBC: CPT

## 2022-08-11 PROCEDURE — 2580000003 HC RX 258: Performed by: STUDENT IN AN ORGANIZED HEALTH CARE EDUCATION/TRAINING PROGRAM

## 2022-08-11 PROCEDURE — 6370000000 HC RX 637 (ALT 250 FOR IP): Performed by: NURSE PRACTITIONER

## 2022-08-11 PROCEDURE — 99231 SBSQ HOSP IP/OBS SF/LOW 25: CPT | Performed by: NURSE PRACTITIONER

## 2022-08-11 PROCEDURE — 6370000000 HC RX 637 (ALT 250 FOR IP): Performed by: INTERNAL MEDICINE

## 2022-08-11 PROCEDURE — 36415 COLL VENOUS BLD VENIPUNCTURE: CPT

## 2022-08-11 PROCEDURE — 86141 C-REACTIVE PROTEIN HS: CPT

## 2022-08-11 PROCEDURE — 82962 GLUCOSE BLOOD TEST: CPT

## 2022-08-11 RX ORDER — ATORVASTATIN CALCIUM 40 MG/1
40 TABLET, FILM COATED ORAL DAILY
Qty: 30 TABLET | Refills: 1 | Status: SHIPPED | OUTPATIENT
Start: 2022-08-12

## 2022-08-11 RX ORDER — ALENDRONATE SODIUM 70 MG/1
1 TABLET ORAL WEEKLY
COMMUNITY
Start: 2022-06-12

## 2022-08-11 RX ORDER — LINEZOLID 600 MG/1
600 TABLET, FILM COATED ORAL EVERY 12 HOURS SCHEDULED
Qty: 8 TABLET | Refills: 0 | Status: SHIPPED | OUTPATIENT
Start: 2022-08-11 | End: 2022-08-15

## 2022-08-11 RX ORDER — HYDROCODONE BITARTRATE AND ACETAMINOPHEN 5; 325 MG/1; MG/1
1 TABLET ORAL EVERY 6 HOURS PRN
Qty: 15 TABLET | Refills: 0 | Status: SHIPPED | OUTPATIENT
Start: 2022-08-11 | End: 2022-08-18

## 2022-08-11 RX ORDER — LEVOFLOXACIN 750 MG/1
750 TABLET ORAL DAILY
Qty: 4 TABLET | Refills: 0 | Status: SHIPPED | OUTPATIENT
Start: 2022-08-11 | End: 2022-08-15

## 2022-08-11 RX ADMIN — SODIUM CHLORIDE, PRESERVATIVE FREE 10 ML: 5 INJECTION INTRAVENOUS at 08:53

## 2022-08-11 RX ADMIN — ACETAMINOPHEN 500 MG: 500 TABLET ORAL at 08:51

## 2022-08-11 RX ADMIN — PANTOPRAZOLE SODIUM 40 MG: 40 TABLET, DELAYED RELEASE ORAL at 08:57

## 2022-08-11 RX ADMIN — ACETAMINOPHEN 500 MG: 500 TABLET ORAL at 04:10

## 2022-08-11 RX ADMIN — HYDROCODONE BITARTRATE AND ACETAMINOPHEN 1 TABLET: 5; 325 TABLET ORAL at 09:18

## 2022-08-11 RX ADMIN — ATORVASTATIN CALCIUM 40 MG: 40 TABLET, FILM COATED ORAL at 08:51

## 2022-08-11 RX ADMIN — CLONIDINE HYDROCHLORIDE 0.1 MG: 0.1 TABLET ORAL at 08:51

## 2022-08-11 RX ADMIN — LINEZOLID 600 MG: 600 TABLET, FILM COATED ORAL at 08:51

## 2022-08-11 RX ADMIN — POTASSIUM CHLORIDE 40 MEQ: 1500 TABLET, EXTENDED RELEASE ORAL at 08:50

## 2022-08-11 RX ADMIN — Medication 5 DROP: at 08:52

## 2022-08-11 RX ADMIN — LEVOFLOXACIN 750 MG: 500 TABLET, FILM COATED ORAL at 08:51

## 2022-08-11 ASSESSMENT — PAIN - FUNCTIONAL ASSESSMENT
PAIN_FUNCTIONAL_ASSESSMENT: PREVENTS OR INTERFERES SOME ACTIVE ACTIVITIES AND ADLS
PAIN_FUNCTIONAL_ASSESSMENT: ACTIVITIES ARE NOT PREVENTED

## 2022-08-11 ASSESSMENT — PAIN DESCRIPTION - ONSET: ONSET: GRADUAL

## 2022-08-11 ASSESSMENT — PAIN DESCRIPTION - LOCATION
LOCATION: FOOT

## 2022-08-11 ASSESSMENT — PAIN DESCRIPTION - DESCRIPTORS
DESCRIPTORS: ACHING;DISCOMFORT
DESCRIPTORS: ACHING
DESCRIPTORS: BURNING

## 2022-08-11 ASSESSMENT — PAIN SCALES - GENERAL
PAINLEVEL_OUTOF10: 2
PAINLEVEL_OUTOF10: 3
PAINLEVEL_OUTOF10: 9

## 2022-08-11 ASSESSMENT — PAIN DESCRIPTION - FREQUENCY: FREQUENCY: INTERMITTENT

## 2022-08-11 ASSESSMENT — PAIN DESCRIPTION - ORIENTATION
ORIENTATION: LEFT

## 2022-08-11 ASSESSMENT — PAIN DESCRIPTION - PAIN TYPE: TYPE: NEUROPATHIC PAIN

## 2022-08-11 NOTE — CARE COORDINATION
Notified Sherrill Bhagat that patient is discharging to home today for resumption of care and faxed orders

## 2022-08-11 NOTE — PROGRESS NOTES
Infectious Disease Progress Note  2022   Patient Name: Flex Haywood : 1955   Impression  Left Great [place] DFU with OM:  Severe PAD:  Afebrile, no leukocytosis, CRP 17.0, Pct 0.255  8/3-BC 0/2-NGTD  -left foot deep soft tissue culture: Prelim: NGTD  -s/p per Dr. Shaina Hanson: Partial hallux amputation with excisional debridement of all nonviable tissue and bone left foot. DX: OM of left foot. cultures: Serratia marsescens, Staphylococcus epidermidis. No pathology available. Dr. Miguel Alonso, cardiology, onboard, CT suggestive of occluded Left common femoral artery, likely renal stenosis  CTS onboard, Dr. Cathleen De Leon, plan to follow-up in clinic in about 3 weeks for plan for surgical intervention for PAD when she is safe to receive [facility]  DMII  HTN  Left Lung Cancer s/p Left Lobectomy 2019  Metastatic Adenocarcinoma s/p Right Frontal Craniotomy 22 by Dr. Laith Staley at [facility]: Residual Deficits post surgical  Dr. Mendoza Wise onboard, has given ok to proceed with peripheral angiogram if CT of the brain is non-acute   Reports has completed radiation  Do not recommend restarting aspirin or AC, rec follow-p with [facility], Dr. Laith Staley, for opinion on hyperdense fluid collection seen on CT head  HLD  Former Tobacco Abuse  Multi-morbidity: per PMHx:  left lung cancer, HTN, HLD, DMII  Plan:  Continue po levofloxacin 750 mg po x 7 total days of ABX therapy from surgical intervention (end date 8/15/22)  Continue po linezolid 600 mg po bid x 7 total days of ABX therapy from surgical intervention (end date 8/15/22)  Follow up with PCP  OK from ID standpoint to DC when ready    Ongoing Antimicrobial Therapy  Linezolid 8/10-  Levofloxacin 8/10-  Completed Antimicrobial Therapy  Zosyn 8/3-?? Cefepime -10  Vancomycin 8/3-10? History:? Interval history noted. Chief complaint: left great toe DFU with OM, severe PAD. Denies n/v/d/f or untoward effects of antibiotics. States continues to feel improved, is waiting to go home today.    Physical Exam:  Vital Signs: /81   Pulse 81   Temp 98.1 °F (36.7 °C) (Oral)   Resp 18   Ht 5' 4\" (1.626 m)   Wt 128 lb 8.5 oz (58.3 kg)   SpO2 99%   BMI 22.06 kg/m²     Gen: Alert, resting in bed, no distress  Wounds: C/D/I left foot with no surrounding erythema or edema. Chest: no distress and CTA. Good air movement. Room air. Heart: NSR and no MRG. Abd: soft, non-distended, no tenderness, no hepatomegaly. Normoactive bowel sounds. Ext: no clubbing, cyanosis, or edema  Neuro: Mental status intact. CN 2-12 intact and no focal sensory or motor deficits     Radiologic / Imaging / TESTING  8/3/22 XR Toe Left:  Impression   Soft tissue ulceration involving the great toe. Osteolysis of the great toe   distal phalanx is seen, which is worrisome for osteomyelitis. 8/3/22 VL Dup Lower Extremity Arteries Left:  Impression   1. Diffuse monophasic waveform seen throughout the left lower extremity   suggestive of severe iliac as well as left lower extremity peripheral   arterial disease. 2. Occluded mid posterior tibial artery, as well as the mid and distal   anterior tibial and peroneal arteries. No continuous runoff. 3. The obtain velocities are very low throughout the left lower extremity   consistent with severe inflow disease. 8/3/22 MRI Foot Left W WO Contrast:  Impression   1. Osteomyelitis of the distal phalanx of the left great toe with surrounding   soft tissue edema and mild enhancement consistent with cellulitis. No   organized drainable fluid collection is identified. 8/7/22 US Dup Lower Extremity Mapping Bilat Venous:  Impression   Right greater saphenous vein is patent with measurements ranging from 1.5-7mm   as discussed above. Left greater saphenous vein is patent with measurements ranging from   3.6-7.7mm as discussed above. 8/7/22 CTA Abdominal Aorta W Bilat Runoff W Contrast:  Impression   1. Aortoiliac atherosclerotic plaque.   Previous stenting of the iliac circulation bilaterally which is patent. 2. 4.5 cm occlusion of the right SFA proximally. Diffuse plaque with serial   stenoses in the reconstituted SFA. The popliteal artery assumes a more   normal caliber with no focal stenosis. Three-vessel runoff proximally with   primary runoff via the peroneal.  The peroneal provides the posterior tibial   and dorsalis pedis circulation into the foot. 3. Occlusion of the left common femoral artery distal to the iliac stent. Reconstitution of the distal 3rd of the SFA. Diffuse plaque within the   reconstituted SFA and popliteal artery with serial 50% stenoses. 2-vessel   runoff with occlusion of the anterior tibial artery proximally. 4. Probable bilateral renal artery stenoses. No significant mesenteric   stenosis. Patent JUAN. 5. No acute findings within the abdomen or pelvis. Gastrostomy tube in   place. Mild retained stool throughout the colon suggesting constipation. 6. Stable enlarged fibroid uterus. 7. Trace left pleural effusion. No acute infiltrate or significant edema at   the lung bases. 8/8/22 XR Foot Left:  Impression   1. Amputation along the mid diaphysis of the 1st proximal phalanx without   complication. 8/8/22 VL Dup Upper Extremity Arteries Bilateral:  Impression   No sonographic evidence of subclavian artery stenosis. 8/9/22 CT Head WO Contrast:  Impression   1. Prior right craniotomy with small underlying right subdural hematoma. 2. Microangiopathic change with right frontal encephalomalacia. 8/10/22 CT Head WO Contrast:  Impression   *Sequela of right anterior craniotomy with fluid/soft tissue noted beneath   the craniotomy defect measuring 3 mm in thickness, unchanged. *Cerebral parenchymal volume loss with moderate-severe chronic microvascular   white matter ischemic disease, stable.             Labs:    Recent Results (from the past 24 hour(s))   CBC    Collection Time: 08/10/22  3:13 PM   Result Value Ref Range    WBC 10.9 (H) 4.0 - 10.5 K/CU MM    RBC 3.31 (L) 4.2 - 5.4 M/CU MM    Hemoglobin 10.6 (L) 12.5 - 16.0 GM/DL    Hematocrit 32.6 (L) 37 - 47 %    MCV 98.5 78 - 100 FL    MCH 32.0 (H) 27 - 31 PG    MCHC 32.5 32.0 - 36.0 %    RDW 12.9 11.7 - 14.9 %    Platelets 325 738 - 479 K/CU MM    MPV 8.9 7.5 - 11.1 FL   POCT Glucose    Collection Time: 08/10/22  3:58 PM   Result Value Ref Range    POC Glucose 148 (H) 70 - 99 MG/DL   POCT Glucose    Collection Time: 08/10/22  8:25 PM   Result Value Ref Range    POC Glucose 216 (H) 70 - 99 MG/DL   POCT Glucose    Collection Time: 08/11/22  6:24 AM   Result Value Ref Range    POC Glucose 153 (H) 70 - 99 MG/DL   POCT Glucose    Collection Time: 08/11/22 10:57 AM   Result Value Ref Range    POC Glucose 168 (H) 70 - 99 MG/DL     CULTURE results: Invalid input(s): BLOOD CULTURE,  URINE CULTURE, SURGICAL CULTURE    Diagnosis:  Patient Active Problem List   Diagnosis    Chest pain    Dyspnea and respiratory abnormalities    Acute gastritis without hemorrhage    Diabetic ulcer of toe of left foot associated with type 2 diabetes mellitus, with fat layer exposed ([address]    Type 2 diabetes mellitus with peripheral neuropathy (HCC)    Peripheral vascular disease (HCC)    History of nicotine dependence    Sepsis ([address]    Brain metastases ([address]       Active Problems  Principal Problem:    Sepsis ([address]  Active Problems:    Brain metastases ([address]  Resolved Problems:    * No resolved hospital problems. *    Electronically signed by: Electronically signed by Lasha Romero.  CAYLA Raygoza CNP on 8/11/2022 at 12:27 PM

## 2022-08-11 NOTE — PLAN OF CARE
Problem: Safety - Adult  Goal: Free from fall injury  Outcome: Progressing     Problem: Skin/Tissue Integrity  Goal: Absence of new skin breakdown  Description: 1. Monitor for areas of redness and/or skin breakdown  2. Assess vascular access sites hourly  3. Every 4-6 hours minimum:  Change oxygen saturation probe site  4. Every 4-6 hours:  If on nasal continuous positive airway pressure, respiratory therapy assess nares and determine need for appliance change or resting period.   Outcome: Progressing     Problem: Discharge Planning  Goal: Discharge to home or other facility with appropriate resources  Outcome: Progressing     Problem: Pain  Goal: Verbalizes/displays adequate comfort level or baseline comfort level  Outcome: Progressing     Problem: Chronic Conditions and Co-morbidities  Goal: Patient's chronic conditions and co-morbidity symptoms are monitored and maintained or improved  Outcome: Progressing     Problem: Nutrition Deficit:  Goal: Optimize nutritional status  Outcome: Progressing  Flowsheets (Taken 8/10/2022 1335 by Aleisha Mccarthy, RD, LD)  Nutrient intake appropriate for improving, restoring, or maintaining nutritional needs:   Monitor oral intake, labs, and treatment plans   Recommend appropriate diets, oral nutritional supplements, and vitamin/mineral supplements     Problem: ABCDS Injury Assessment  Goal: Absence of physical injury  Outcome: Progressing

## 2022-08-11 NOTE — PROGRESS NOTES
V2.0  [facility]ist Progress Note      Name:  Mike Hughes /Age/Sex: 1955  (64 y.o. female)   MRN & CSN:  [id number] & [id number] Encounter Date/Time: 8/10/2022 7:32 AM EDT    [facility]  [facility] PCP: Linda Ortiz MD       Hospital Day: 8    Assessment and Plan:   Mike Hughes is a 61 y.o. female with pmh of DM2, HTN, HLD who presents with Sepsis ([address]        Severe sepsis 2/2 osteomyelitis of left great toe with surrounding cellulitis  -Nonhealing diabetic foot ulcer  -S/p left foot partial hallux amputation with excisional debridement of all nonviable tissue and bone on   -Levaquin 750 mg daily until August 15, and Zyvox 600 mg twice daily until August 15 per infectious disease    Brain metastases  -Biopsy on 2022 showed metastatic adenocarcinoma. Being treated at [facility]     Severe PVD  -Outpatient angiogram planned once cleared by neurosurgery    HTN  -Consistently soft BP  -Hold home antihypertensives, will restart as appropriate    Sinus tachycardia -likely postsurgical and 2/2 home clonidine held in setting of soft BP  -Restarted home clonidine as pressures now tolerating  -Analgesics as needed for pain control  -Telemetric monitoring    Non-insulin-dependent diabetes mellitus type 2  -Hold home p.o. meds  -SSI, glucose checks, hypoglycemia protocol  -Strict glucose control to help with wound healing    Hypomagnesemia, hypokalemia  -Replete as required      Diet ADULT DIET; Regular; 3 carb choices (45 gm/meal)   DVT Prophylaxis [x] Lovenox, []  Heparin, [] SCDs, [] Ambulation,  [] Eliquis, [] Xarelto  [] Coumadin   Code Status Full Code   Disposition From: Home  Expected Disposition: Home  Estimated Date of Discharge: Tomorrow  Patient requires continued admission due to severe sepsis   Surrogate Decision Maker/ POA      Subjective:     Chief Complaint: Toe Injury (Injured by pediatrist approx 4 weeks ago.  Been getting worse since and states it is now infected ) and Wound Check     She was concerned that she was going to have breakfast in the morning    Prior notes:     Mike Hughes is a 61 y.o. female who presents at request of her podiatrist with nonhealing left toe ulcer for past month associated with some necrosis at the tip of her left toe. Was assessed by podiatry the day prior to presentation, it was suggested that she present to ED for IV antibiotics and amputation of left great toe    Patient seen and examined after surgery. Is experiencing some left foot pain but feels good otherwise. Denies palpitations, chest pain, shortness of breath, lightheadedness/dizziness. Blood pressure not soft today however she is tachycardic after clonidine held yesterday. Review of Systems:    Review of Systems    No chest pain or dyspnea    Objective: Intake/Output Summary (Last 24 hours) at 8/10/2022 4134  Last data filed at 8/10/2022 0351  Gross per 24 hour   Intake --   Output 1 ml   Net -1 ml          Vitals:   Vitals:    08/10/22 2105   BP: 125/73   Pulse:    Resp:    Temp:    SpO2:        Physical Exam:     Physical Exam  Constitutional:       Appearance: She is not ill-appearing. Pulmonary:      Effort: Pulmonary effort is normal.   Neurological:      Cranial Nerves: No cranial nerve deficit.         Medications:   Medications:    linezolid  600 mg Oral 2 times per day    levoFLOXacin  750 mg Oral Daily    [Held by provider] enoxaparin  40 mg SubCUTAneous Daily    carbamide peroxide  5 drop Both Ears Daily    cloNIDine  0.1 mg Oral BID    [Held by provider] aspirin  81 mg Oral Daily    atorvastatin  40 mg Oral Daily    [Held by provider] lisinopril  5 mg Oral Daily    hydrocortisone   Topical BID    potassium chloride  40 mEq Oral BID WC    acetaminophen  500 mg Oral Q6H    sodium chloride flush  5-40 mL IntraVENous BID    pantoprazole  40 mg Oral Daily    [Held by provider] cilostazol  100 mg Oral BID    insulin lispro  0-8 Units SubCUTAneous TID WC    insulin lispro  0-4 Units SubCUTAneous Nightly    gabapentin  300 mg Oral Nightly      Infusions:    sodium chloride Stopped (08/07/22 0315)    dextrose       PRN Meds: HYDROcodone-acetaminophen, 1 tablet, Q4H PRN  ondansetron, 4 mg, Q8H PRN   Or  ondansetron, 4 mg, Q6H PRN  polyethylene glycol, 17 g, Daily PRN  sodium chloride flush, 5-40 mL, PRN  sodium chloride, , PRN  glucose, 4 tablet, PRN  dextrose bolus, 125 mL, PRN   Or  dextrose bolus, 250 mL, PRN  glucagon (rDNA), 1 mg, PRN  dextrose, , Continuous PRN      Labs      Recent Results (from the past 24 hour(s))   Basic Metabolic Panel w/ Reflex to MG    Collection Time: 08/10/22  6:05 AM   Result Value Ref Range    Sodium 138 135 - 145 MMOL/L    Potassium 4.2 3.5 - 5.1 MMOL/L    Chloride 109 99 - 110 mMol/L    CO2 17 (L) 21 - 32 MMOL/L    Anion Gap 12 4 - 16    BUN 2 (L) 6 - 23 MG/DL    Creatinine 0.6 0.6 - 1.1 MG/DL    Glucose 153 (H) 70 - 99 MG/DL    Calcium 9.1 8.3 - 10.6 MG/DL    GFR Non-African American >60 >60 mL/min/1.73m2    GFR African American >60 >60 mL/min/1.73m2   C-Reactive Protein    Collection Time: 08/10/22  6:05 AM   Result Value Ref Range    CRP, High Sensitivity 17.0 mg/L   Procalcitonin    Collection Time: 08/10/22  6:05 AM   Result Value Ref Range    Procalcitonin 0.255    Vancomycin Level, Random    Collection Time: 08/10/22  6:05 AM   Result Value Ref Range    Vancomycin Rm 18.6 UG/ML    DOSE AMOUNT DOSE AMT.  GIVEN - UNKNOWN     DOSE TIME DOSE TIME GIVEN - UNKNOWN    POCT Glucose    Collection Time: 08/10/22  6:22 AM   Result Value Ref Range    POC Glucose 211 (H) 70 - 99 MG/DL   POCT Glucose    Collection Time: 08/10/22  9:01 AM   Result Value Ref Range    POC Glucose 175 (H) 70 - 99 MG/DL   POCT Glucose    Collection Time: 08/10/22 11:00 AM   Result Value Ref Range    POC Glucose 277 (H) 70 - 99 MG/DL   CBC    Collection Time: 08/10/22  3:13 PM   Result Value Ref Range    WBC 10.9 (H) 4.0 - 10.5 K/CU MM    RBC 3.31 (L) 4.2 - 5.4 M/CU MM    Hemoglobin 10.6 (L) 12.5 - 16.0 GM/DL    Hematocrit 32.6 (L) 37 - 47 %    MCV 98.5 78 - 100 FL    MCH 32.0 (H) 27 - 31 PG    MCHC 32.5 32.0 - 36.0 %    RDW 12.9 11.7 - 14.9 %    Platelets 065 490 - 764 K/CU MM    MPV 8.9 7.5 - 11.1 FL   POCT Glucose    Collection Time: 08/10/22  3:58 PM   Result Value Ref Range    POC Glucose 148 (H) 70 - 99 MG/DL   POCT Glucose    Collection Time: 08/10/22  8:25 PM   Result Value Ref Range    POC Glucose 216 (H) 70 - 99 MG/DL        Imaging/Diagnostics Last 24 Hours   XR TOE LEFT (MIN 2 VIEWS)    Result Date: 8/3/2022  EXAMINATION: 3 XRAY VIEWS OF THE LEFT TOE 8/3/2022 3:13 pm COMPARISON: None. HISTORY: ORDERING SYSTEM PROVIDED HISTORY: left great toe erythema and necrosis TECHNOLOGIST PROVIDED HISTORY: Reason for exam:->left great toe erythema and necrosis Reason for Exam: left great toe erythema and necrosis Additional signs and symptoms: na Relevant Medical/Surgical History: diabetes, hypertension FINDINGS: Soft tissue ulceration of the left great toe noted. Subjacent to that, there is osteolysis involving the great toe distal phalanx. The remaining toes are unremarkable appearance. Soft tissue ulceration involving the great toe. Osteolysis of the great toe distal phalanx is seen, which is worrisome for osteomyelitis. VL DUP LOWER EXTREMITY ARTERIES LEFT    Result Date: 8/3/2022  EXAMINATION: ARTERIAL DUPLEX ULTRASOUND OF THE LEFT LOWER EXTREMITY  8/3/2022 7:29 pm TECHNIQUE: Duplex ultrasound using B-mode/gray scaled imaging, Doppler spectral analysis and color flow Doppler was obtained of the lower extremity. COMPARISON: None.  HISTORY: ORDERING SYSTEM PROVIDED HISTORY: Left great toe necrosis TECHNOLOGIST PROVIDED HISTORY: Reason for exam:->Left great toe necrosis FINDINGS: Monophasic waveforms with increased spectral broadening noted throughout the left lower extremity suggestive of severe iliac inflow disease as well as severe lower extremity popliteal artery disease. Flow velocities were measured as follows: Com. Fem.  8.4 cm/sec SFA Prox. 12 cm/sec SFA Mid.     12 cm/sec SFA Dist.     22 cm/sec Pop. 19 cm/sec PTA            16, 0, 8 cm/sec Peron. 19, 0, 0 cm/sec HANNA            20, 0, 0 cm/sec     1. Diffuse monophasic waveform seen throughout the left lower extremity suggestive of severe iliac as well as left lower extremity peripheral arterial disease. 2. Occluded mid posterior tibial artery, as well as the mid and distal anterior tibial and peroneal arteries. No continuous runoff. 3. The obtain velocities are very low throughout the left lower extremity consistent with severe inflow disease.        Electronically signed by Ofelia Grant MD on 8/10/2022 at 10:49 PM

## 2022-08-11 NOTE — CONSULTS
[facility] Continence Nurse  Consult Note       [facility] Record  AGE: 61 y.o. GENDER: female  : 1955  TODAY'S DATE:  2022    Subjective:     Reason for CWOCN Evaluation and Assessment: incision assessment      [facility] Record is a 61 y.o. female referred by:   [x] Physician  [] Nursing  [] Other:     Wound Identification:  Wound Type:  surgical  Contributing Factors: diabetes, chronic pressure, and arterial insufficiency        PAST MEDICAL HISTORY        Diagnosis Date    Cancer ([facility]     Diabetes mellitus ([facility]     Hyperlipidemia     Hypertension        PAST SURGICAL HISTORY    Past Surgical History:   Procedure Laterality Date    LUNG CANCER SURGERY Left 10/2019    TOE AMPUTATION Left 2022    LEFT FOOT HALLUX AMPUTATION EXCISIONAL DEBRIDEMENT ALL NON VIABLE   TISSUE AND BONE performed by Toni Bertrand DPM at [address] [place] [address] N/A 10/3/2020    EGD BIOPSY performed by Erik Carson MD at [facility]    History reviewed. No pertinent family history. SOCIAL HISTORY    Social History     Tobacco Use    Smoking status: Former     Packs/day: 1.00     Types: Cigarettes    Smokeless tobacco: Never    Tobacco comments:     10/2019   Substance Use Topics    Alcohol use: Yes     Alcohol/week: 1.0 standard drink     Types: 1 Cans of beer per week    Drug use: Yes     Types: Marijuana (Weed)       ALLERGIES    No Known Allergies    MEDICATIONS    No current facility-administered medications on file prior to encounter. Current Outpatient Medications on File Prior to Encounter   Medication Sig Dispense Refill    gabapentin (NEURONTIN) 300 MG capsule 300 mg.      glimepiride (AMARYL) 2 MG tablet Take 1 tablet by mouth in the morning. metFORMIN (GLUCOPHAGE) 1000 MG tablet Take 1 tablet by mouth in the morning and 1 tablet before bedtime.       cilostazol (PLETAL) 100 MG tablet Take 100 mg by mouth 2 times daily      lisinopril (PRINIVIL;ZESTRIL) 5 MG tablet Take 15 mg by mouth daily. cloNIDine (CATAPRES) 0.1 MG tablet Take 0.1 mg by mouth 2 times daily.       simvastatin (ZOCOR) 20 MG tablet Take 40 mg by mouth nightly       pantoprazole (PROTONIX) 40 MG tablet Take 1 tablet by mouth daily 30 tablet 0         Objective:      /81   Pulse 81   Temp 98.1 °F (36.7 °C) (Oral)   Resp 18   Ht 5' 4\" (1.626 m)   Wt 128 lb 8.5 oz (58.3 kg)   SpO2 99%   BMI 22.06 kg/m²   Jose Angel Risk Score: Jose Angel Scale Score: 19    LABS    CBC:   Lab Results   Component Value Date/Time    WBC 10.9 08/10/2022 03:13 PM    RBC 3.31 08/10/2022 03:13 PM    HGB 10.6 08/10/2022 03:13 PM    HCT 32.6 08/10/2022 03:13 PM    MCV 98.5 08/10/2022 03:13 PM    MCH 32.0 08/10/2022 03:13 PM    MCHC 32.5 08/10/2022 03:13 PM    RDW 12.9 08/10/2022 03:13 PM     08/10/2022 03:13 PM    MPV 8.9 08/10/2022 03:13 PM     CMP:    Lab Results   Component Value Date/Time     08/10/2022 06:05 AM    K 4.2 08/10/2022 06:05 AM     08/10/2022 06:05 AM    CO2 17 08/10/2022 06:05 AM    BUN 2 08/10/2022 06:05 AM    CREATININE 0.6 08/10/2022 06:05 AM    GFRAA >60 08/10/2022 06:05 AM    LABGLOM >60 08/10/2022 06:05 AM    GLUCOSE 153 08/10/2022 06:05 AM    PROT 4.8 08/06/2022 04:53 AM    LABALBU 2.7 08/06/2022 04:53 AM    CALCIUM 9.1 08/10/2022 06:05 AM    BILITOT 0.5 08/06/2022 04:53 AM    ALKPHOS 64 08/06/2022 04:53 AM    AST 13 08/06/2022 04:53 AM    ALT 6 08/06/2022 04:53 AM     Albumin:    Lab Results   Component Value Date/Time    LABALBU 2.7 08/06/2022 04:53 AM     PT/INR:    Lab Results   Component Value Date/Time    PROTIME 12.8 08/04/2022 03:35 AM    INR 0.99 08/04/2022 03:35 AM     HgBA1c:    Lab Results   Component Value Date/Time    LABA1C 6.8 08/03/2022 11:28 PM         Assessment:     Patient Active Problem List   Diagnosis    Chest pain    Dyspnea and respiratory abnormalities    Acute gastritis without hemorrhage    Diabetic ulcer of toe of left foot associated with type 2 diabetes mellitus, with fat layer exposed ([facility]    Type 2 diabetes mellitus with peripheral neuropathy ([facility]    Peripheral vascular disease ([facility]    History of nicotine dependence    Sepsis ([facility]    Brain metastases ([facility]       Measurements:       Response to treatment:  Well tolerated by patient. Pain Assessment:  Severity:  none  Quality of pain: na  Wound Pain Timing/Severity: na  Premedicated: no    Plan:     Plan of Care: [REMOVED] Wound 07/26/22 Toe (Comment  which one) Anterior; Left #1 great toe-Dressing/Treatment: Open to air    Pt in bed. Agreeable to incision reassessment. Left partial hallox amputation per Dr. Nita Riley on 8/8/22. Old dressing removed. Scant drainage. Well approximated with sutures. Painted with betadine, xeroform, abd, kerlix applied per Dr. Fran Javier orders. Tolerated well. Specialty Bed Required : no  [] Low Air Loss   [] Pressure Redistribution  [] Fluid Immersion  [] Bariatric  [] Total Pressure Relief  [] Other:     Discharge Plan:  Placement for patient upon discharge: tbd  Hospice Care: no  Patient appropriate for Outpatient [address] [facility] [address]: yes follow up with Dr. Nita Riley at clinic    Patient/Caregiver Teaching:  Level of patient/caregiver understanding able to:   Voiced understanding.         Electronically signed by Josefina Cameron RN, CWOCN on 8/11/2022 at 10:33 AM

## 2022-08-12 LAB — HIGH SENSITIVE C-REACTIVE PROTEIN: 12.3 MG/L

## 2022-08-14 PROBLEM — L08.9 DIABETIC FOOT INFECTION (HCC): Status: ACTIVE | Noted: 2022-01-01

## 2022-08-14 PROBLEM — E11.628 DIABETIC FOOT INFECTION (HCC): Status: ACTIVE | Noted: 2022-01-01

## 2022-08-15 PROBLEM — Z87.891 FORMER SMOKER: Status: ACTIVE | Noted: 2022-01-01

## 2022-08-15 PROBLEM — C34.92 CANCER OF LEFT LUNG (HCC): Status: ACTIVE | Noted: 2022-01-01

## 2022-08-15 PROBLEM — E11.9 DM (DIABETES MELLITUS), TYPE 2 (HCC): Status: ACTIVE | Noted: 2022-01-01

## 2022-08-15 LAB
CULTURE: ABNORMAL
Lab: ABNORMAL
Lab: ABNORMAL
SPECIMEN: ABNORMAL
SPECIMEN: ABNORMAL

## 2022-08-16 ENCOUNTER — HOSPITAL ENCOUNTER (OUTPATIENT)
Dept: WOUND CARE | Age: 67
Discharge: HOME | End: 2022-08-16
Payer: COMMERCIAL

## 2022-08-16 VITALS
HEART RATE: 94 BPM | DIASTOLIC BLOOD PRESSURE: 82 MMHG | SYSTOLIC BLOOD PRESSURE: 143 MMHG | RESPIRATION RATE: 20 BRPM | TEMPERATURE: 97.8 F

## 2022-08-16 DIAGNOSIS — E11.621 DIABETIC ULCER OF TOE OF LEFT FOOT ASSOCIATED WITH TYPE 2 DIABETES MELLITUS, WITH FAT LAYER EXPOSED (HCC): ICD-10-CM

## 2022-08-16 DIAGNOSIS — I73.9 PERIPHERAL VASCULAR DISEASE (HCC): ICD-10-CM

## 2022-08-16 DIAGNOSIS — L97.522 DIABETIC ULCER OF TOE OF LEFT FOOT ASSOCIATED WITH TYPE 2 DIABETES MELLITUS, WITH FAT LAYER EXPOSED (HCC): ICD-10-CM

## 2022-08-16 DIAGNOSIS — E11.42 TYPE 2 DIABETES MELLITUS WITH PERIPHERAL NEUROPATHY (HCC): ICD-10-CM

## 2022-08-16 DIAGNOSIS — G89.18 POST-OP PAIN: Primary | ICD-10-CM

## 2022-08-16 DIAGNOSIS — Z87.891 HISTORY OF NICOTINE DEPENDENCE: ICD-10-CM

## 2022-08-16 PROCEDURE — 99213 OFFICE O/P EST LOW 20 MIN: CPT

## 2022-08-16 RX ORDER — HYDROCODONE BITARTRATE AND ACETAMINOPHEN 5; 325 MG/1; MG/1
1 TABLET ORAL EVERY 4 HOURS PRN
Qty: 18 TABLET | Refills: 0 | Status: SHIPPED | OUTPATIENT
Start: 2022-08-16 | End: 2022-08-19

## 2022-08-16 RX ORDER — LIDOCAINE HYDROCHLORIDE 20 MG/ML
JELLY TOPICAL ONCE
Status: CANCELLED | OUTPATIENT
Start: 2022-08-16 | End: 2022-08-16

## 2022-08-16 RX ORDER — BACITRACIN ZINC AND POLYMYXIN B SULFATE 500; 1000 [USP'U]/G; [USP'U]/G
OINTMENT TOPICAL ONCE
Status: CANCELLED | OUTPATIENT
Start: 2022-08-16 | End: 2022-08-16

## 2022-08-16 RX ORDER — LIDOCAINE HYDROCHLORIDE 40 MG/ML
SOLUTION TOPICAL ONCE
Status: CANCELLED | OUTPATIENT
Start: 2022-08-16 | End: 2022-08-16

## 2022-08-16 RX ORDER — GINSENG 100 MG
CAPSULE ORAL ONCE
Status: CANCELLED | OUTPATIENT
Start: 2022-08-16 | End: 2022-08-16

## 2022-08-16 RX ORDER — CLOBETASOL PROPIONATE 0.5 MG/G
OINTMENT TOPICAL ONCE
Status: CANCELLED | OUTPATIENT
Start: 2022-08-16 | End: 2022-08-16

## 2022-08-16 RX ORDER — GENTAMICIN SULFATE 1 MG/G
OINTMENT TOPICAL ONCE
Status: CANCELLED | OUTPATIENT
Start: 2022-08-16 | End: 2022-08-16

## 2022-08-16 RX ORDER — BETAMETHASONE DIPROPIONATE 0.05 %
OINTMENT (GRAM) TOPICAL ONCE
Status: CANCELLED | OUTPATIENT
Start: 2022-08-16 | End: 2022-08-16

## 2022-08-16 RX ORDER — LIDOCAINE 50 MG/G
OINTMENT TOPICAL ONCE
Status: CANCELLED | OUTPATIENT
Start: 2022-08-16 | End: 2022-08-16

## 2022-08-16 RX ORDER — LIDOCAINE 40 MG/G
CREAM TOPICAL ONCE
Status: CANCELLED | OUTPATIENT
Start: 2022-08-16 | End: 2022-08-16

## 2022-08-16 RX ORDER — BACITRACIN, NEOMYCIN, POLYMYXIN B 400; 3.5; 5 [USP'U]/G; MG/G; [USP'U]/G
OINTMENT TOPICAL ONCE
Status: CANCELLED | OUTPATIENT
Start: 2022-08-16 | End: 2022-08-16

## 2022-08-16 ASSESSMENT — PAIN DESCRIPTION - ORIENTATION: ORIENTATION: LEFT

## 2022-08-16 ASSESSMENT — PAIN DESCRIPTION - PAIN TYPE: TYPE: SURGICAL PAIN

## 2022-08-16 ASSESSMENT — PAIN - FUNCTIONAL ASSESSMENT: PAIN_FUNCTIONAL_ASSESSMENT: PREVENTS OR INTERFERES SOME ACTIVE ACTIVITIES AND ADLS

## 2022-08-16 ASSESSMENT — PAIN DESCRIPTION - ONSET: ONSET: ON-GOING

## 2022-08-16 ASSESSMENT — PAIN DESCRIPTION - LOCATION: LOCATION: FOOT

## 2022-08-16 ASSESSMENT — PAIN DESCRIPTION - DESCRIPTORS: DESCRIPTORS: ACHING;DISCOMFORT

## 2022-08-16 ASSESSMENT — PAIN DESCRIPTION - FREQUENCY: FREQUENCY: INTERMITTENT

## 2022-08-16 ASSESSMENT — PAIN SCALES - GENERAL: PAINLEVEL_OUTOF10: 9

## 2022-08-16 NOTE — PROGRESS NOTES
Wound Care Center Progress Note With Procedure    Roberto Wilson  AGE: 61 y.o. GENDER: female  : 1955  EPISODE DATE:  2022     Subjective:     No chief complaint on file. HISTORY of PRESENT ILLNESS      Roberto Wilson is a 61 y.o. female who presents today for follow-up of wound to the great toe the left foot. S/p left great toe partial amputation (2022). Patient doing okay overall. Says that her foot pain is still about the same as it was when she went into the hospital.  Pain medication is only helping a little bit. No worsening drainage to the area. They have been changing it daily. PAST MEDICAL HISTORY        Diagnosis Date    Cancer ([facility]     Diabetes mellitus ([facility]     Hyperlipidemia     Hypertension        PAST SURGICAL HISTORY    Past Surgical History:   Procedure Laterality Date    LUNG CANCER SURGERY Left 10/2019    TOE AMPUTATION Left 2022    LEFT FOOT HALLUX AMPUTATION EXCISIONAL DEBRIDEMENT ALL NON VIABLE   TISSUE AND BONE performed by Dillon Isbell DPM at [address] N/A 10/3/2020    EGD BIOPSY performed by Tony Perez MD at [facility]    History reviewed. No pertinent family history. SOCIAL HISTORY    Social History     Tobacco Use    Smoking status: Former     Packs/day: 1.00     Types: Cigarettes     Quit date: 2019     Years since quitting: 3.6    Smokeless tobacco: Never    Tobacco comments:     10/2019   Substance Use Topics    Alcohol use:  Yes     Alcohol/week: 1.0 standard drink     Types: 1 Cans of beer per week    Drug use: Yes     Types: Marijuana (Weed)       ALLERGIES    No Known Allergies    MEDICATIONS    Current Outpatient Medications on File Prior to Encounter   Medication Sig Dispense Refill    alendronate (FOSAMAX) 70 MG tablet Take 1 tablet by mouth once a week      HYDROcodone-acetaminophen (NORCO) 5-325 MG per tablet Take 1 tablet by mouth every 6 hours as needed for Pain for up to 7 days. Intended supply: 3 days. Take lowest dose possible to manage pain 15 tablet 0    atorvastatin (LIPITOR) 40 MG tablet Take 1 tablet by mouth in the morning. 30 tablet 1    gabapentin (NEURONTIN) 100 MG capsule 100 mg at bedtime. glimepiride (AMARYL) 2 MG tablet Take 1 tablet by mouth in the morning. metFORMIN (GLUCOPHAGE) 1000 MG tablet Take 1 tablet by mouth in the morning and 1 tablet before bedtime. pantoprazole (PROTONIX) 40 MG tablet Take 1 tablet by mouth daily 30 tablet 0    cilostazol (PLETAL) 100 MG tablet Take 100 mg by mouth 2 times daily      lisinopril (PRINIVIL;ZESTRIL) 5 MG tablet Take 15 mg by mouth daily. cloNIDine (CATAPRES) 0.1 MG tablet Take 0.1 mg by mouth 2 times daily. [DISCONTINUED] simvastatin (ZOCOR) 20 MG tablet Take 40 mg by mouth nightly        No current facility-administered medications on file prior to encounter. REVIEW OF SYSTEMS    Pertinent items are noted in HPI. Constitutional: Negative for systemic symptoms including fever, chills and malaise. Objective:      BP (!) 143/82   Pulse 94   Temp 97.8 °F (36.6 °C) (Oral)   Resp 20     PHYSICAL EXAM    General: The patient is in no acute distress. Mental status:  Patient is appropriate, is  oriented to place and plan of care. Dermatologic exam: Visual inspection of the periwound reveals the skin to be moist  Wound exam: see wound description below in procedure note  Musculoskeletal: Bilateral calves are soft and supple upon manual compression.   Negative Linda Fleischer and Homans test      Assessment:     Problem List Items Addressed This Visit          Circulatory    Peripheral vascular disease ([address]    Relevant Orders    Initiate Outpatient Wound Care Protocol       Endocrine    Diabetic ulcer of toe of left foot associated with type 2 diabetes mellitus, with fat layer exposed ([address]    Relevant Orders    Initiate Outpatient Wound Care Protocol    Type 2 diabetes mellitus with peripheral neuropathy ([facility] [address]    Relevant Orders    Initiate Outpatient Wound Care Protocol       Other    History of nicotine dependence    Relevant Orders    Initiate Outpatient Wound Care Protocol     Other Visit Diagnoses       Post-op pain    -  Primary    Relevant Medications    HYDROcodone-acetaminophen (NORCO) 5-325 MG per tablet          Procedure Note    Indications:  Based on my examination of this patient's wound(s) today, sharp excision into necrotic subcutaneous tissue is required to promote healing and evaluate the extent of previous healing. Performed by: Johana Cuevas DPM    Consent obtained: Yes    Time out taken:  Yes    Pain Control: lidocaine      Debridement:Excisional Debridement    Using #15 blade scalpel the wound(s) was/were sharply debrided down through and including the removal of bone        Devitalized Tissue Debrided:  necrotic/eschar    Pre Debridement Measurements:  Are located in the Wound Documentation Flow Sheet    All active wounds listed below with today's date are evaluated  Wound(s)    debrided this date include # : 1     Wound 07/26/22 Toe (Comment  which one) Anterior; Left #1 great toe (Active)   Wound Image   08/02/22 1506   Wound Etiology Diabetic 08/02/22 1506   Dressing Status New dressing applied 07/26/22 1550   Wound Cleansed Wound cleanser 08/02/22 1506   Offloading for Diabetic Foot Ulcers Offloading not required 08/02/22 1506   Wound Length (cm) 2 cm 08/02/22 1506   Wound Width (cm) 2.5 cm 08/02/22 1506   Wound Depth (cm) 0.1 cm 08/02/22 1506   Wound Surface Area (cm^2) 5 cm^2 08/02/22 1506   Change in Wound Size % (l*w) -8.23 08/02/22 1506   Wound Volume (cm^3) 0.5 cm^3 08/02/22 1506   Wound Healing % -8 08/02/22 1506   Post-Procedure Length (cm) 2 cm 08/02/22 1528   Post-Procedure Width (cm) 2.5 cm 08/02/22 1528   Post-Procedure Depth (cm) 0.1 cm 08/02/22 1528   Post-Procedure Surface Area (cm^2) 5 cm^2 08/02/22 1528   Post-Procedure Volume (cm^3) 0.5 cm^3 08/02/22 1528 Distance Tunneling (cm) 0 cm 08/02/22 1506   Tunneling Position ___ O'Clock 0 08/02/22 1506   Undermining Starts ___ O'Clock 0 08/02/22 1506   Undermining Ends___ O'Clock 0 08/02/22 1506   Undermining Maxium Distance (cm) 0 08/02/22 1506   Wound Assessment Slough 08/02/22 1506   Drainage Amount Moderate 08/02/22 1506   Drainage Description Yellow;Serous 08/02/22 1506   Odor None 08/02/22 1506   Lauren-wound Assessment Hyperpigmented 08/02/22 1506   Margins Defined edges 08/02/22 1506   Wound Thickness Description not for Pressure Injury Full thickness 08/02/22 1506   Number of days: 7           Percent of Wound(s) Debrided: approximately 80%    Total  Area  Debrided:  5 sq cm     Bleeding:  None    Hemostasis Achieved:  not needed    Procedural Pain:  0  / 10     Post Procedural Pain:  0 / 10     Response to treatment:  Well tolerated by patient. Status of wound progress and description from last visit:   stable. Plan:       Discharge Instructions         PHYSICIAN ORDERS AND DISCHARGE INSTRUCTIONS     NOTE: Upon discharge from the [address], you will receive a patient experience survey. We would be grateful if you would take the time to fill this survey out. Wound care order history:                 MILAGRO's   Right       Left               Date:              Cultures:  Bone Fragment sent to pathology 7/26/22              Grafts:                Antibiotics:           Continuing wound care orders and information:                 Residence:                Continue [facility] health care with: [facility]              Your wound-care supplies will be provided by: Wound cleansing:              Do not scrub or use excessive force. Wash hands with soap and water before and after dressing changes. Prior to applying a clean dressing, cleanse wound with normal saline, wound cleanser, or mild soap and water.               Ask the physician or nurse before getting the wound(s) wet in a shower     Daily Wound management:              Keep weight off wounds and reposition every 2 hours. Avoid standing for long periods of time. Apply wraps/stockings in AM and remove at bedtime. If swelling is present, elevate legs to the level of the heart or above for 30 minutes 4-5 times a day and/or when sitting. When taking antibiotics take entire prescription as ordered by physician do not stop taking until medicine is all gone. Orders for this week: 8/16/22                  FAX ORDERS TO [address] [place] [address]! Rx:     Left Great Toe - Wash with soap and water, pat dry. Apply Xeroform over incision line. Cover with dry dressing (4x4). Wrap with conform and tape. 3\" or 4\" ACE. Leave in place for 1 week. Gave surgical shoe in clinic 7/26/22        Follow up with Dr Justyna Merrill in 1 week in the wound care center. Call [phone] for any questions or concerns. Date__________   Time____________        Treatment Note Wound 08/16/22 Toe (Comment  which one) Anterior; Left Left Great Toe Incision-Dressing/Treatment:  (xeroform conform tape ace)    Written Patient Dismissal Instructions Given     -Patient was seen, evaluated, and treated today.  present for entirety of examination.  -This post left hallux partial amputation (8/8/2022)  -Patient is actually doing very well overall. Incision site is very well coapted with no signs of dehiscence. Some early ischemic changes do appear to be noted to the area. On manual palpation the area does appear to be decrease in temperature.  -No signs of infection to the area. Monitor for antibiotics. -Vascular studies in the hospital showed iliac disease to the left lower extremity.   Patient says that she does not yet have an appointment with cardiothoracic surgery who saw her in the hospital.  They recommend follow-up on an outpatient basis. -I told the patient that I want her to be seen sooner as I think her pain could be contributory from underlying vascular disease.  -I did reach out to Dr. Gloria Emmanuel and he said that he will see the patient as soon as possible. -Dry sterile dressing reapplied today and patient can keep this on until her next visit.   -Prescription for Norco 5/325 1 tab as needed every 4-6 hours for postoperative pain 18 tabs called into the patient's pharmacy today.  -Any concerns before next visit go to the emergency room  -Follow-up 1 week for recheck         Electronically signed by Leela Johnston DPM on 8/16/2022 at 4:38 PM

## 2022-08-16 NOTE — DISCHARGE INSTRUCTIONS
PHYSICIAN ORDERS AND DISCHARGE INSTRUCTIONS     NOTE: Upon discharge from the [address], you will receive a patient experience survey. We would be grateful if you would take the time to fill this survey out. Wound care order history:                 MILAGRO's   Right       Left               Date:              Cultures:  Bone Fragment sent to pathology 7/26/22              Grafts:                Antibiotics:           Continuing wound care orders and information:                 Residence:                Continue home health care with: [facility]              Your wound-care supplies will be provided by: Wound cleansing:              Do not scrub or use excessive force. Wash hands with soap and water before and after dressing changes. Prior to applying a clean dressing, cleanse wound with normal saline, wound cleanser, or mild soap and water. Ask the physician or nurse before getting the wound(s) wet in a shower     Daily Wound management:              Keep weight off wounds and reposition every 2 hours. Avoid standing for long periods of time. Apply wraps/stockings in AM and remove at bedtime. If swelling is present, elevate legs to the level of the heart or above for 30 minutes 4-5 times a day and/or when sitting. When taking antibiotics take entire prescription as ordered by physician do not stop taking until medicine is all gone. Orders for this week: 8/16/22                  FAX ORDERS TO [address] [place] [address]! Rx:     Left Great Toe - Wash with soap and water, pat dry. Apply Xeroform over incision line. Cover with dry dressing (4x4). Wrap with conform and tape. 3\" or 4\" ACE. Leave in place for 1 week. Gave surgical shoe in clinic 7/26/22        Follow up with Dr Earl Lord in 1 week in the wound care center.   Call [phone] for any questions or concerns.   Date__________   Time____________

## 2022-08-18 ENCOUNTER — TELEPHONE (OUTPATIENT)
Dept: CARDIOLOGY CLINIC | Age: 67
End: 2022-08-18

## 2022-08-23 ENCOUNTER — APPOINTMENT (OUTPATIENT)
Dept: CT IMAGING | Age: 67
End: 2022-08-23
Payer: MEDICARE

## 2022-08-23 ENCOUNTER — HOSPITAL ENCOUNTER (EMERGENCY)
Age: 67
Discharge: HOME | End: 2022-08-23
Attending: STUDENT IN AN ORGANIZED HEALTH CARE EDUCATION/TRAINING PROGRAM
Payer: MEDICARE

## 2022-08-23 VITALS
HEIGHT: 64 IN | BODY MASS INDEX: 21.85 KG/M2 | OXYGEN SATURATION: 100 % | HEART RATE: 110 BPM | WEIGHT: 128 LBS | TEMPERATURE: 98.3 F | SYSTOLIC BLOOD PRESSURE: 164 MMHG | RESPIRATION RATE: 16 BRPM | DIASTOLIC BLOOD PRESSURE: 88 MMHG

## 2022-08-23 DIAGNOSIS — E87.6 HYPOKALEMIA: ICD-10-CM

## 2022-08-23 DIAGNOSIS — E83.42 HYPOMAGNESEMIA: ICD-10-CM

## 2022-08-23 DIAGNOSIS — R56.9 SEIZURE (HCC): Primary | ICD-10-CM

## 2022-08-23 DIAGNOSIS — Z85.118 HISTORY OF LUNG CANCER: ICD-10-CM

## 2022-08-23 DIAGNOSIS — Z85.89 HISTORY OF CANCER METASTATIC TO BRAIN: ICD-10-CM

## 2022-08-23 LAB
ANION GAP SERPL CALCULATED.3IONS-SCNC: 21 MMOL/L (ref 4–16)
BASOPHILS ABSOLUTE: 0.1 K/CU MM
BASOPHILS RELATIVE PERCENT: 0.6 % (ref 0–1)
BUN BLDV-MCNC: 8 MG/DL (ref 6–23)
CALCIUM SERPL-MCNC: 9.2 MG/DL (ref 8.3–10.6)
CHLORIDE BLD-SCNC: 99 MMOL/L (ref 99–110)
CO2: 20 MMOL/L (ref 21–32)
CREAT SERPL-MCNC: 0.5 MG/DL (ref 0.6–1.1)
DIFFERENTIAL TYPE: ABNORMAL
EOSINOPHILS ABSOLUTE: 0.1 K/CU MM
EOSINOPHILS RELATIVE PERCENT: 0.9 % (ref 0–3)
GFR AFRICAN AMERICAN: >60 ML/MIN/1.73M2
GFR NON-AFRICAN AMERICAN: >60 ML/MIN/1.73M2
GLUCOSE BLD-MCNC: 125 MG/DL (ref 70–99)
GLUCOSE BLD-MCNC: 130 MG/DL (ref 70–99)
HCT VFR BLD CALC: 35.9 % (ref 37–47)
HEMOGLOBIN: 12.2 GM/DL (ref 12.5–16)
IMMATURE NEUTROPHIL %: 0.3 % (ref 0–0.43)
LYMPHOCYTES ABSOLUTE: 2.6 K/CU MM
LYMPHOCYTES RELATIVE PERCENT: 18.2 % (ref 24–44)
MAGNESIUM: 1.4 MG/DL (ref 1.8–2.4)
MCH RBC QN AUTO: 31.3 PG (ref 27–31)
MCHC RBC AUTO-ENTMCNC: 34 % (ref 32–36)
MCV RBC AUTO: 92.1 FL (ref 78–100)
MONOCYTES ABSOLUTE: 0.8 K/CU MM
MONOCYTES RELATIVE PERCENT: 5.5 % (ref 0–4)
NUCLEATED RBC %: 0 %
PDW BLD-RTO: 13.2 % (ref 11.7–14.9)
PLATELET # BLD: 182 K/CU MM (ref 140–440)
PMV BLD AUTO: 10.1 FL (ref 7.5–11.1)
POTASSIUM SERPL-SCNC: 3.3 MMOL/L (ref 3.5–5.1)
RBC # BLD: 3.9 M/CU MM (ref 4.2–5.4)
SEGMENTED NEUTROPHILS ABSOLUTE COUNT: 10.7 K/CU MM
SEGMENTED NEUTROPHILS RELATIVE PERCENT: 74.5 % (ref 36–66)
SODIUM BLD-SCNC: 140 MMOL/L (ref 135–145)
TOTAL IMMATURE NEUTOROPHIL: 0.04 K/CU MM
TOTAL NUCLEATED RBC: 0 K/CU MM
WBC # BLD: 14.4 K/CU MM (ref 4–10.5)

## 2022-08-23 PROCEDURE — 99284 EMERGENCY DEPT VISIT MOD MDM: CPT

## 2022-08-23 PROCEDURE — 80048 BASIC METABOLIC PNL TOTAL CA: CPT

## 2022-08-23 PROCEDURE — 96367 TX/PROPH/DG ADDL SEQ IV INF: CPT

## 2022-08-23 PROCEDURE — 2580000003 HC RX 258: Performed by: STUDENT IN AN ORGANIZED HEALTH CARE EDUCATION/TRAINING PROGRAM

## 2022-08-23 PROCEDURE — 6370000000 HC RX 637 (ALT 250 FOR IP): Performed by: STUDENT IN AN ORGANIZED HEALTH CARE EDUCATION/TRAINING PROGRAM

## 2022-08-23 PROCEDURE — 83735 ASSAY OF MAGNESIUM: CPT

## 2022-08-23 PROCEDURE — 70450 CT HEAD/BRAIN W/O DYE: CPT

## 2022-08-23 PROCEDURE — 96365 THER/PROPH/DIAG IV INF INIT: CPT

## 2022-08-23 PROCEDURE — 6360000002 HC RX W HCPCS: Performed by: STUDENT IN AN ORGANIZED HEALTH CARE EDUCATION/TRAINING PROGRAM

## 2022-08-23 PROCEDURE — 82962 GLUCOSE BLOOD TEST: CPT

## 2022-08-23 PROCEDURE — 85025 COMPLETE CBC W/AUTO DIFF WBC: CPT

## 2022-08-23 PROCEDURE — 96366 THER/PROPH/DIAG IV INF ADDON: CPT

## 2022-08-23 PROCEDURE — 96361 HYDRATE IV INFUSION ADD-ON: CPT

## 2022-08-23 PROCEDURE — 93005 ELECTROCARDIOGRAM TRACING: CPT | Performed by: STUDENT IN AN ORGANIZED HEALTH CARE EDUCATION/TRAINING PROGRAM

## 2022-08-23 RX ORDER — CLONIDINE HYDROCHLORIDE 0.1 MG/1
0.1 TABLET ORAL ONCE
Status: COMPLETED | OUTPATIENT
Start: 2022-08-23 | End: 2022-08-23

## 2022-08-23 RX ORDER — ACETAMINOPHEN 325 MG/1
650 TABLET ORAL ONCE
Status: COMPLETED | OUTPATIENT
Start: 2022-08-23 | End: 2022-08-23

## 2022-08-23 RX ORDER — LEVETIRACETAM 500 MG/1
500 TABLET ORAL 2 TIMES DAILY
Qty: 60 TABLET | Refills: 0 | Status: SHIPPED | OUTPATIENT
Start: 2022-08-23

## 2022-08-23 RX ORDER — MAGNESIUM SULFATE IN WATER 40 MG/ML
2000 INJECTION, SOLUTION INTRAVENOUS ONCE
Status: COMPLETED | OUTPATIENT
Start: 2022-08-23 | End: 2022-08-23

## 2022-08-23 RX ORDER — 0.9 % SODIUM CHLORIDE 0.9 %
500 INTRAVENOUS SOLUTION INTRAVENOUS ONCE
Status: COMPLETED | OUTPATIENT
Start: 2022-08-23 | End: 2022-08-23

## 2022-08-23 RX ORDER — POTASSIUM CHLORIDE 20 MEQ/1
20 TABLET, EXTENDED RELEASE ORAL ONCE
Status: COMPLETED | OUTPATIENT
Start: 2022-08-23 | End: 2022-08-23

## 2022-08-23 RX ADMIN — ACETAMINOPHEN 650 MG: 325 TABLET ORAL at 17:51

## 2022-08-23 RX ADMIN — POTASSIUM CHLORIDE 20 MEQ: 1500 TABLET, EXTENDED RELEASE ORAL at 14:01

## 2022-08-23 RX ADMIN — LEVETIRACETAM 1000 MG: 100 INJECTION, SOLUTION INTRAVENOUS at 17:51

## 2022-08-23 RX ADMIN — MAGNESIUM SULFATE HEPTAHYDRATE 2000 MG: 2 INJECTION, SOLUTION INTRAVENOUS at 15:41

## 2022-08-23 RX ADMIN — SODIUM CHLORIDE 500 ML: 9 INJECTION, SOLUTION INTRAVENOUS at 12:45

## 2022-08-23 RX ADMIN — CLONIDINE HYDROCHLORIDE 0.1 MG: 0.1 TABLET ORAL at 18:22

## 2022-08-23 ASSESSMENT — PAIN - FUNCTIONAL ASSESSMENT: PAIN_FUNCTIONAL_ASSESSMENT: NONE - DENIES PAIN

## 2022-08-23 ASSESSMENT — PAIN DESCRIPTION - ORIENTATION: ORIENTATION: LEFT

## 2022-08-23 ASSESSMENT — PAIN SCALES - GENERAL: PAINLEVEL_OUTOF10: 7

## 2022-08-23 ASSESSMENT — PAIN DESCRIPTION - LOCATION: LOCATION: TOE (COMMENT WHICH ONE)

## 2022-08-23 NOTE — ED TRIAGE NOTES
Pt from home. Seizure with convulsions lasting about 1min per EMS per . Has had a seizure in the past prior to brain surgery removal of cancer in June. Postictal on ems arrival. Was on Keppra and removed. Recent toe amputation.

## 2022-08-23 NOTE — DISCHARGE INSTRUCTIONS
Follow-up with primary care in the next 1 to 2 days. Follow-up with neurology per referral  Take medication as prescribed  Return to the ED if you have a repeat of a seizure.

## 2022-08-23 NOTE — ED PROVIDER NOTES
Emergency Department Encounter    Patient: Sha Doll  MRN: [id number]  : 1955  Date of Evaluation: 2022  ED Provider:  Melani Blackburn DO    Triage Chief Complaint:   Seizures (Seizure with convulsions witnessed by spouse. New onset. postictal on ems arrival. Recent brain sx removal of cancer. )    [place]:  Sha Doll is a 61 y.o. 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 Her  is by the bedside and assist with a history. Patient said to have had an episode of tonic-clonic seizure that lasted about 2 minutes. No associated falls or head injury. Per , patient was recently at [facility] where she had a procedure for her brain lesion, about a month ago. At the time of the encounter, patient has no complaints. No associated nausea/vomiting, dizziness, fever, chest pain, abdominal pain, fever or back pain.     ROS - see HPI, below listed is current ROS at time of my eval:  General:  No fevers, no chills, no weakness  Eyes:  No recent vison changes, no discharge  ENT:  No sore throat, no nasal congestion, no hearing changes  Cardiovascular:  No chest pain, no palpitations  Respiratory:  No shortness of breath, no cough, no wheezing  Gastrointestinal:  No pain, no nausea, no vomiting, no diarrhea  Musculoskeletal:  No muscle pain, no joint pain  Skin:  No rash, no pruritis, no easy bruising  Neurologic: Seizures  Psychiatric:  No anxiety  Genitourinary:  No dysuria, no hematuria  Endocrine:  No unexpected weight gain, no unexpected weight loss  Extremities:  no edema, no pain    Past Medical History:   Diagnosis Date    Cancer ([address]     Diabetes mellitus ([facility]     Hyperlipidemia     Hypertension      Past Surgical History:   Procedure Laterality Date    LUNG CANCER SURGERY Left 10/2019    TOE AMPUTATION Left 2022    LEFT FOOT HALLUX AMPUTATION EXCISIONAL DEBRIDEMENT ALL NON VIABLE   TISSUE AND BONE performed by Shanel Hampton DPM at [address] [place] [address] N/A 10/3/2020    EGD BIOPSY performed by Cande Richardson MD at [facility] ENDOSCOPY     History reviewed. No pertinent family history. Social History     Socioeconomic History    Marital status:      Spouse name: Not on file    Number of children: Not on file    Years of education: Not on file    Highest education level: Not on file   Occupational History    Not on file   Tobacco Use    Smoking status: Former     Packs/day: 1.00     Types: Cigarettes     Quit date: 2019     Years since quitting: 3.6    Smokeless tobacco: Never    Tobacco comments:     10/2019   Substance and Sexual Activity    Alcohol use: Yes     Alcohol/week: 1.0 standard drink     Types: 1 Cans of beer per week    Drug use: Yes     Types: Marijuana Valdene Rossi)    Sexual activity: Yes     Partners: Male   Other Topics Concern    Not on file   Social History Narrative    Not on file     Social Determinants of Health     Financial Resource Strain: Not on file   Food Insecurity: Not on file   Transportation Needs: Not on file   Physical Activity: Not on file   Stress: Not on file   Social Connections: Not on file   Intimate Partner Violence: Not on file   Housing Stability: Not on file     Current Facility-Administered Medications   Medication Dose Route Frequency Provider Last Rate Last Admin    cloNIDine (CATAPRES) tablet 0.1 mg  0.1 mg Oral Once Jory Coombs, DO         Current Outpatient Medications   Medication Sig Dispense Refill    alendronate (FOSAMAX) 70 MG tablet Take 1 tablet by mouth once a week      atorvastatin (LIPITOR) 40 MG tablet Take 1 tablet by mouth in the morning. 30 tablet 1    gabapentin (NEURONTIN) 100 MG capsule 100 mg at bedtime. glimepiride (AMARYL) 2 MG tablet Take 1 tablet by mouth in the morning.       metFORMIN (GLUCOPHAGE) 1000 MG tablet Take 1 tablet by mouth in the morning and 1 tablet before bedtime. pantoprazole (PROTONIX) 40 MG tablet Take 1 tablet by mouth daily 30 tablet 0    cilostazol (PLETAL) 100 MG tablet Take 100 mg by mouth 2 times daily      lisinopril (PRINIVIL;ZESTRIL) 5 MG tablet Take 15 mg by mouth daily. cloNIDine (CATAPRES) 0.1 MG tablet Take 0.1 mg by mouth 2 times daily. No Known Allergies    Nursing Notes Reviewed    Physical Exam:  Triage VS:    ED Triage Vitals [08/23/22 1141]   Enc Vitals Group      BP (!) 158/95      Heart Rate (!) 115      Resp 16      Temp 97.9 °F (36.6 °C)      Temp Source Oral      SpO2 99 %      Weight 128 lb (58.1 kg)      Height 5' 4\" (1.626 m)      Head Circumference       Peak Flow       Pain Score       Pain Loc       Pain Edu? Excl. in [address]? My pulse ox interpretation is - normal    General appearance:  No acute distress. Skin:  Warm. Dry. Eye:  Extraocular movements intact. Ears, nose, mouth and throat:  Oral mucosa moist   Neck:  Trachea midline. Extremity:  No swelling. Normal ROM     Heart:  Regular rate and rhythm, normal S1 & S2, no extra heart sounds. Perfusion:  intact  Respiratory:  Lungs clear to auscultation bilaterally. Respirations nonlabored. Abdominal:  Normal bowel sounds. Soft. Nontender. Non distended. Back:  No CVA tenderness to palpation     Neurological:  Alert and oriented times 3. No focal neuro deficits.              Psychiatric:  Appropriate    I have reviewed and interpreted all of the currently available lab results from this visit (if applicable):  Results for orders placed or performed during the hospital encounter of 08/23/22   CBC with Auto Differential   Result Value Ref Range    WBC 14.4 (H) 4.0 - 10.5 K/CU MM    RBC 3.90 (L) 4.2 - 5.4 M/CU MM    Hemoglobin 12.2 (L) 12.5 - 16.0 GM/DL    Hematocrit 35.9 (L) 37 - 47 %    MCV 92.1 78 - 100 FL    MCH 31.3 (H) 27 - 31 PG    MCHC 34.0 32.0 - 36.0 %    RDW 13.2 11.7 - 14.9 %    Platelets 034 120 - 440 K/CU MM    MPV 10.1 7.5 - 11.1 FL    Differential Type AUTOMATED DIFFERENTIAL     Segs Relative 74.5 (H) 36 - 66 %    Lymphocytes % 18.2 (L) 24 - 44 %    Monocytes % 5.5 (H) 0 - 4 %    Eosinophils % 0.9 0 - 3 %    Basophils % 0.6 0 - 1 %    Segs Absolute 10.7 K/CU MM    Lymphocytes Absolute 2.6 K/CU MM    Monocytes Absolute 0.8 K/CU MM    Eosinophils Absolute 0.1 K/CU MM    Basophils Absolute 0.1 K/CU MM    Nucleated RBC % 0.0 %    Total Nucleated RBC 0.0 K/CU MM    Total Immature Neutrophil 0.04 K/CU MM    Immature Neutrophil % 0.3 0 - 0.43 %   Basic Metabolic Panel w/ Reflex to MG   Result Value Ref Range    Sodium 140 135 - 145 MMOL/L    Potassium 3.3 (L) 3.5 - 5.1 MMOL/L    Chloride 99 99 - 110 mMol/L    CO2 20 (L) 21 - 32 MMOL/L    Anion Gap 21 (H) 4 - 16    BUN 8 6 - 23 MG/DL    Creatinine 0.5 (L) 0.6 - 1.1 MG/DL    Glucose 130 (H) 70 - 99 MG/DL    Calcium 9.2 8.3 - 10.6 MG/DL    GFR Non-African American >60 >60 mL/min/1.73m2    GFR African American >60 >60 mL/min/1.73m2   Magnesium   Result Value Ref Range    Magnesium 1.4 (L) 1.8 - 2.4 mg/dl   POCT Glucose   Result Value Ref Range    POC Glucose 125 (H) 70 - 99 MG/DL   EKG 12 Lead   Result Value Ref Range    Ventricular Rate 120 BPM    Atrial Rate 120 BPM    P-R Interval 150 ms    QRS Duration 72 ms    Q-T Interval 342 ms    QTc Calculation (Bazett) 483 ms    P Axis 79 degrees    R Axis -1 degrees    T Axis 74 degrees    Diagnosis       Sinus tachycardia  Nonspecific ST and T wave abnormality  Abnormal ECG  When compared with ECG of 04-OCT-2020 06:40,  IN interval has decreased  Vent. rate has increased BY  53 BPM  ST now depressed in Inferior leads  Nonspecific T wave abnormality now evident in Lateral leads        Radiographs (if obtained):  Radiologist's Report Reviewed:  CT Head WO Contrast   Final Result   Mild central and cortical cerebral atrophy.       Mild chronic deep white matter ischemic changes      No acute intracranial abnormalities are noted.               EKG (if obtained): (All EKG's are interpreted by myself in the absence of a cardiologist)    Sinus tachycardia  - Ventricular rate 120  - OK interval 150  - QRS duration 72  - QT/Qtc 342/483  - P-R-T axes 79 -1 74    - ST changes: No STEMI        MDM:  72-year-old female with a past medical history of diabetes, pretension, upper lipidemia, lung cancer stage 4 with metastasis to the brain,  presenting to the ED with a history of a seizure episode. On physical examination patient does not have any neurologic deficits. Heart sounds are unremarkable lung sounds are negative/clear to auscultation bilaterally. Vital signs are significant for tachycardia with a heart rate of 110. Patient given IV fluid and labs are drawn and patient scheduled for CT scan of the head. EKG was significant for sinus tachycardia. Laboratory evaluation is significant for a white count of 14.4, electrolytes are significant for potassium 3.3, for which patient got some potassium replacements. Patient also had low magnesium and received magnesium replacements. CT scan of the head is unremarkable for any acute intracranial abnormalities    Case was discussed with neurology who recommended patient be given Keppra and discharged with neurology outpatient referral.  Patient also received a dose of her blood pressure medication. Patient given opportunity to ask questions and all questions answered in reasonable detail without the use of medical jargon    Patient expressed her understanding and agreement with the plan. Clinical Impression:  1. Seizure ([address]    2. Hypokalemia    3. Hypomagnesemia    4. History of lung cancer    5. History of cancer metastatic to brain      Disposition referral (if applicable):  No follow-up provider specified.   Disposition medications (if applicable):  New Prescriptions    No medications on file     ED Provider Disposition Time  DISPOSITION        Comment: Please note this report has been produced using speech recognition software and may contain errors related to that system including errors in grammar, punctuation, and spelling, as well as words and phrases that may be inappropriate. Efforts were made to edit the dictations.         [address] [place] [address],[address],   08/23/22 3254

## 2022-08-23 NOTE — CARE COORDINATION
CM - Room 14 --here in ER Pt in today after seizure-like activity . She has a significant medical history including stage iv Cancer --metastasis to the brain . She has had some recent  surgical activity at [facility] last week . Up to this point , there has been no definite decision on an inpatient stay or to release pt home. 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

## 2022-08-24 LAB
EKG ATRIAL RATE: 120 BPM
EKG DIAGNOSIS: NORMAL
EKG P AXIS: 79 DEGREES
EKG P-R INTERVAL: 150 MS
EKG Q-T INTERVAL: 342 MS
EKG QRS DURATION: 72 MS
EKG QTC CALCULATION (BAZETT): 483 MS
EKG R AXIS: -1 DEGREES
EKG T AXIS: 74 DEGREES
EKG VENTRICULAR RATE: 120 BPM

## 2022-08-24 PROCEDURE — 93010 ELECTROCARDIOGRAM REPORT: CPT | Performed by: INTERNAL MEDICINE

## 2022-08-25 ENCOUNTER — HOSPITAL ENCOUNTER (OUTPATIENT)
Dept: GENERAL RADIOLOGY | Age: 67
Discharge: HOME | End: 2022-08-25
Payer: MEDICARE

## 2022-08-25 ENCOUNTER — HOSPITAL ENCOUNTER (OUTPATIENT)
Age: 67
Discharge: HOME | End: 2022-08-25
Payer: MEDICARE

## 2022-08-25 ENCOUNTER — NURSE ONLY (OUTPATIENT)
Dept: CARDIOLOGY CLINIC | Age: 67
End: 2022-08-25
Payer: MEDICARE

## 2022-08-25 DIAGNOSIS — Z01.810 PRE-OPERATIVE CARDIOVASCULAR EXAMINATION: ICD-10-CM

## 2022-08-25 LAB
ABO/RH: NORMAL
ANTIBODY SCREEN: NEGATIVE

## 2022-08-25 PROCEDURE — 99211 OFF/OP EST MAY X REQ PHY/QHP: CPT | Performed by: INTERNAL MEDICINE

## 2022-08-25 PROCEDURE — 71046 X-RAY EXAM CHEST 2 VIEWS: CPT

## 2022-08-25 PROCEDURE — 86901 BLOOD TYPING SEROLOGIC RH(D): CPT

## 2022-08-25 PROCEDURE — 86900 BLOOD TYPING SEROLOGIC ABO: CPT

## 2022-08-25 PROCEDURE — 36415 COLL VENOUS BLD VENIPUNCTURE: CPT

## 2022-08-25 PROCEDURE — 86850 RBC ANTIBODY SCREEN: CPT

## 2022-08-25 NOTE — PROGRESS NOTES
Patient was here in office & educated on Lauren angio for Dx: PAD. Procedure is scheduled for 8/29/22 @ 8:00, w/arrival @ 6:00, @ [facility]. Pre-admission orders were given to patient for labs & CXR, which are due 8/25/22 @ [facility]. Procedure and risks were explained to patient. Consent forms were signed. Instructions were given to patient to remain NPO after midnight the night before procedure. Patient may take morning meds the morning of procedure with small amount of water. Patient is asked to call hospital @ [phone], 1 to 2 days before procedure to pre-register. Patient was notified that procedure could be delayed due to an emergency. Patient voiced understanding.  Copies of consent, pre-testing orders, & instructions scanned into media

## 2022-08-29 ENCOUNTER — HOSPITAL ENCOUNTER (OUTPATIENT)
Dept: CARDIAC CATH/INVASIVE PROCEDURES | Age: 67
Discharge: HOME | End: 2022-08-29
Attending: INTERNAL MEDICINE | Admitting: INTERNAL MEDICINE
Payer: MEDICARE

## 2022-08-29 ENCOUNTER — TELEPHONE (OUTPATIENT)
Dept: CARDIOLOGY CLINIC | Age: 67
End: 2022-08-29

## 2022-08-29 VITALS
TEMPERATURE: 96.2 F | DIASTOLIC BLOOD PRESSURE: 91 MMHG | HEIGHT: 64 IN | WEIGHT: 118 LBS | BODY MASS INDEX: 20.14 KG/M2 | RESPIRATION RATE: 20 BRPM | OXYGEN SATURATION: 100 % | SYSTOLIC BLOOD PRESSURE: 119 MMHG | HEART RATE: 88 BPM

## 2022-08-29 LAB
GLUCOSE BLD-MCNC: 148 MG/DL (ref 70–99)
REASON FOR REJECTION: NORMAL
REJECTED TEST: NORMAL

## 2022-08-29 PROCEDURE — 6360000002 HC RX W HCPCS

## 2022-08-29 PROCEDURE — 2580000003 HC RX 258: Performed by: INTERNAL MEDICINE

## 2022-08-29 PROCEDURE — 82962 GLUCOSE BLOOD TEST: CPT

## 2022-08-29 PROCEDURE — 6370000000 HC RX 637 (ALT 250 FOR IP): Performed by: INTERNAL MEDICINE

## 2022-08-29 PROCEDURE — 85027 COMPLETE CBC AUTOMATED: CPT

## 2022-08-29 PROCEDURE — 2500000003 HC RX 250 WO HCPCS

## 2022-08-29 PROCEDURE — 6360000004 HC RX CONTRAST MEDICATION

## 2022-08-29 RX ORDER — DIPHENHYDRAMINE HCL 25 MG
25 TABLET ORAL ONCE
Status: COMPLETED | OUTPATIENT
Start: 2022-08-29 | End: 2022-08-29

## 2022-08-29 RX ORDER — DIAZEPAM 5 MG/1
5 TABLET ORAL ONCE
Status: COMPLETED | OUTPATIENT
Start: 2022-08-29 | End: 2022-08-29

## 2022-08-29 RX ORDER — SODIUM CHLORIDE 9 MG/ML
INJECTION, SOLUTION INTRAVENOUS CONTINUOUS
Status: DISCONTINUED | OUTPATIENT
Start: 2022-08-29 | End: 2022-08-29 | Stop reason: HOSPADM

## 2022-08-29 RX ADMIN — DIPHENHYDRAMINE HCL 25 MG: 25 TABLET ORAL at 08:47

## 2022-08-29 RX ADMIN — SODIUM CHLORIDE: 9 INJECTION, SOLUTION INTRAVENOUS at 08:47

## 2022-08-29 RX ADMIN — DIAZEPAM 5 MG: 5 TABLET ORAL at 08:47

## 2022-08-29 ASSESSMENT — PAIN DESCRIPTION - LOCATION: LOCATION: FOOT

## 2022-08-29 ASSESSMENT — PAIN DESCRIPTION - ORIENTATION: ORIENTATION: LEFT

## 2022-08-29 ASSESSMENT — PAIN SCALES - GENERAL: PAINLEVEL_OUTOF10: 10

## 2022-08-29 NOTE — FLOWSHEET NOTE
Pt  at bedside. Aware that Dr Rubina Montero currently scrubbed in case and unaware of wait time to speak with physician re: cancellation of todays procedure. Explained to  that procedure had been cancelled as neurology had not cleared pt for procedure.  states wants to leave and states physician may call him if further questions/concerns.  Pt taken to pt  in wheelchair per tech for d/c home in private vehicle with spouse

## 2022-08-29 NOTE — TELEPHONE ENCOUNTER
Call received from Cath Lab at hospital.  Patient is scheduled to have peripheral angiogram.  Dr. Cristela Villar unable to perform at this time due to medical reasons. Procedure cancelled.

## 2022-08-29 NOTE — PROGRESS NOTES
To holding area. Assessment completed and questions answered. Call light in reach.  Blood glucose 148

## 2022-08-30 ENCOUNTER — HOSPITAL ENCOUNTER (OUTPATIENT)
Dept: WOUND CARE | Age: 67
Discharge: HOME | End: 2022-08-30
Payer: MEDICARE

## 2022-08-30 VITALS
SYSTOLIC BLOOD PRESSURE: 126 MMHG | RESPIRATION RATE: 20 BRPM | DIASTOLIC BLOOD PRESSURE: 86 MMHG | HEART RATE: 99 BPM | TEMPERATURE: 98 F

## 2022-08-30 DIAGNOSIS — Z87.891 HISTORY OF NICOTINE DEPENDENCE: ICD-10-CM

## 2022-08-30 DIAGNOSIS — E11.42 TYPE 2 DIABETES MELLITUS WITH PERIPHERAL NEUROPATHY (HCC): ICD-10-CM

## 2022-08-30 DIAGNOSIS — L97.522 DIABETIC ULCER OF TOE OF LEFT FOOT ASSOCIATED WITH TYPE 2 DIABETES MELLITUS, WITH FAT LAYER EXPOSED (HCC): Primary | ICD-10-CM

## 2022-08-30 DIAGNOSIS — I73.9 PERIPHERAL VASCULAR DISEASE (HCC): ICD-10-CM

## 2022-08-30 DIAGNOSIS — E11.621 DIABETIC ULCER OF TOE OF LEFT FOOT ASSOCIATED WITH TYPE 2 DIABETES MELLITUS, WITH FAT LAYER EXPOSED (HCC): Primary | ICD-10-CM

## 2022-08-30 PROCEDURE — 11042 DBRDMT SUBQ TIS 1ST 20SQCM/<: CPT

## 2022-08-30 RX ORDER — GINSENG 100 MG
CAPSULE ORAL ONCE
Status: CANCELLED | OUTPATIENT
Start: 2022-08-30 | End: 2022-08-30

## 2022-08-30 RX ORDER — CLOBETASOL PROPIONATE 0.5 MG/G
OINTMENT TOPICAL ONCE
Status: CANCELLED | OUTPATIENT
Start: 2022-08-30 | End: 2022-08-30

## 2022-08-30 RX ORDER — LIDOCAINE HYDROCHLORIDE 40 MG/ML
SOLUTION TOPICAL ONCE
Status: CANCELLED | OUTPATIENT
Start: 2022-08-30 | End: 2022-08-30

## 2022-08-30 RX ORDER — LIDOCAINE HYDROCHLORIDE 20 MG/ML
JELLY TOPICAL ONCE
Status: CANCELLED | OUTPATIENT
Start: 2022-08-30 | End: 2022-08-30

## 2022-08-30 RX ORDER — BETAMETHASONE DIPROPIONATE 0.05 %
OINTMENT (GRAM) TOPICAL ONCE
Status: CANCELLED | OUTPATIENT
Start: 2022-08-30 | End: 2022-08-30

## 2022-08-30 RX ORDER — LIDOCAINE 50 MG/G
OINTMENT TOPICAL ONCE
Status: CANCELLED | OUTPATIENT
Start: 2022-08-30 | End: 2022-08-30

## 2022-08-30 RX ORDER — LIDOCAINE 40 MG/G
CREAM TOPICAL ONCE
Status: CANCELLED | OUTPATIENT
Start: 2022-08-30 | End: 2022-08-30

## 2022-08-30 RX ORDER — BACITRACIN, NEOMYCIN, POLYMYXIN B 400; 3.5; 5 [USP'U]/G; MG/G; [USP'U]/G
OINTMENT TOPICAL ONCE
Status: CANCELLED | OUTPATIENT
Start: 2022-08-30 | End: 2022-08-30

## 2022-08-30 RX ORDER — GENTAMICIN SULFATE 1 MG/G
OINTMENT TOPICAL ONCE
Status: CANCELLED | OUTPATIENT
Start: 2022-08-30 | End: 2022-08-30

## 2022-08-30 RX ORDER — BACITRACIN ZINC AND POLYMYXIN B SULFATE 500; 1000 [USP'U]/G; [USP'U]/G
OINTMENT TOPICAL ONCE
Status: CANCELLED | OUTPATIENT
Start: 2022-08-30 | End: 2022-08-30

## 2022-08-30 NOTE — DISCHARGE INSTRUCTIONS
PHYSICIAN ORDERS AND DISCHARGE INSTRUCTIONS     NOTE: Upon discharge from the [address], you will receive a patient experience survey. We would be grateful if you would take the time to fill this survey out. Wound care order history:                 MILAGRO's   Right       Left               Date:              Cultures:  Bone Fragment sent to pathology 7/26/22              Grafts:                Antibiotics:           Continuing wound care orders and information:                 Residence:                Continue home health care with: [facility]              Your wound-care supplies will be provided by: Wound cleansing:              Do not scrub or use excessive force. Wash hands with soap and water before and after dressing changes. Prior to applying a clean dressing, cleanse wound with normal saline, wound cleanser, or mild soap and water. Ask the physician or nurse before getting the wound(s) wet in a shower     Daily Wound management:              Keep weight off wounds and reposition every 2 hours. Avoid standing for long periods of time. Apply wraps/stockings in AM and remove at bedtime. If swelling is present, elevate legs to the level of the heart or above for 30 minutes 4-5 times a day and/or when sitting. When taking antibiotics take entire prescription as ordered by physician do not stop taking until medicine is all gone. Orders for this week: 8/30/22                  FAX ORDERS TO [address] [place] [address]! Rx:     Left Great Toe - Wash with soap and water, pat dry. Apply Xeroform over incision line. Cover with dry dressing (4x4). Wrap with conform and tape. Leave in place for 1 week. Gave surgical shoe in clinic 7/26/22        Follow up with Dr Pennie Medina in 1 week in the wound care center.   Call [phone] for any questions or concerns.   Date__________   Time___________

## 2022-08-30 NOTE — PROGRESS NOTES
Wound Care Center Progress Note With Procedure    Emory Diallo  AGE: 61 y.o. GENDER: female  : 1955  EPISODE DATE:  2022     Subjective:     Chief Complaint   Patient presents with    Wound Check     Left foot           HISTORY of PRESENT ILLNESS      Emory Diallo is a 61 y.o. female who presents today for follow-up of wound to the great toe the left foot. S/p left great toe partial amputation (2022). Patient was in the emergency room on 2022 secondary to a seizure. She was also supposed to have some sort of surgical intervention done yesterday. Difficult to tell from patient and family exactly what was supposed to be done. From the charts on chart review it looks like she may be was supposed to have a vascular procedure but at all it says that the patient was not able to have the procedure done. Patient's  says that patient was not cleared by neurology to have what ever the surgery was supposed to be done so it was not followed through. They did not see Dr. Amanda Rizzo as directed. Denies any calf pain chest pain difficulty breathing shortness of breath or constitutional symptoms. PAST MEDICAL HISTORY        Diagnosis Date    Cancer ([address]     Diabetes mellitus ([address]     Hyperlipidemia     Hypertension     Seizure ([facility]        PAST SURGICAL HISTORY    Past Surgical History:   Procedure Laterality Date    LUNG CANCER SURGERY Left 10/2019    TOE AMPUTATION Left 2022    LEFT FOOT HALLUX AMPUTATION EXCISIONAL DEBRIDEMENT ALL NON VIABLE   TISSUE AND BONE performed by Chris Duff DPM at [address] [place] [address] N/A 10/3/2020    EGD BIOPSY performed by Dane Keller MD at [facility]    History reviewed. No pertinent family history.     SOCIAL HISTORY    Social History     Tobacco Use    Smoking status: Former     Packs/day: 1.00     Types: Cigarettes     Quit date: 2019     Years since quitting: 3.6    Smokeless tobacco: Never Tobacco comments:     10/2019   Substance Use Topics    Alcohol use: Yes     Alcohol/week: 1.0 standard drink     Types: 1 Cans of beer per week    Drug use: Yes     Types: Marijuana (Weed)       ALLERGIES    No Known Allergies    MEDICATIONS    Current Outpatient Medications on File Prior to Encounter   Medication Sig Dispense Refill    levETIRAcetam (KEPPRA) 500 MG tablet Take 1 tablet by mouth 2 times daily 60 tablet 0    alendronate (FOSAMAX) 70 MG tablet Take 1 tablet by mouth once a week      atorvastatin (LIPITOR) 40 MG tablet Take 1 tablet by mouth in the morning. 30 tablet 1    gabapentin (NEURONTIN) 100 MG capsule 100 mg at bedtime. glimepiride (AMARYL) 2 MG tablet Take 1 tablet by mouth in the morning. metFORMIN (GLUCOPHAGE) 1000 MG tablet Take 1 tablet by mouth in the morning and 1 tablet before bedtime. pantoprazole (PROTONIX) 40 MG tablet Take 1 tablet by mouth daily 30 tablet 0    cilostazol (PLETAL) 100 MG tablet Take 100 mg by mouth 2 times daily      lisinopril (PRINIVIL;ZESTRIL) 5 MG tablet Take 15 mg by mouth daily. cloNIDine (CATAPRES) 0.1 MG tablet Take 0.1 mg by mouth 2 times daily. [DISCONTINUED] simvastatin (ZOCOR) 20 MG tablet Take 40 mg by mouth nightly        No current facility-administered medications on file prior to encounter. REVIEW OF SYSTEMS    Pertinent items are noted in HPI. Constitutional: Negative for systemic symptoms including fever, chills and malaise. Objective:      /86   Pulse 99   Temp 98 °F (36.7 °C) (Temporal)   Resp 20     PHYSICAL EXAM    General: The patient is in no acute distress. Mental status:  Patient is appropriate, is  oriented to place and plan of care. Dermatologic exam: Visual inspection of the periwound reveals the skin to be moist  Wound exam: see wound description below in procedure note  Musculoskeletal: Bilateral calves are soft and supple upon manual compression.   Negative Doree Lie and [employer] test      Assessment:     Problem List Items Addressed This Visit          Circulatory    Peripheral vascular disease ([facility]    Relevant Orders    Initiate Outpatient Wound Care Protocol       Endocrine    Diabetic ulcer of toe of left foot associated with type 2 diabetes mellitus, with fat layer exposed ([facility] - Primary    Relevant Orders    Initiate Outpatient Wound Care Protocol    Type 2 diabetes mellitus with peripheral neuropathy ([facility]    Relevant Orders    Initiate Outpatient Wound Care Protocol       Other    History of nicotine dependence    Relevant Orders    Initiate Outpatient Wound Care Protocol     Procedure Note    Indications:  Based on my examination of this patient's wound(s) today, sharp excision into necrotic subcutaneous tissue is required to promote healing and evaluate the extent of previous healing. Performed by: Rachna Adames DPM    Consent obtained: Yes    Time out taken:  Yes    Pain Control: lidocaine      Debridement:Excisional Debridement    Using #15 blade scalpel the wound(s) was/were sharply debrided down through and including the removal of bone        Devitalized Tissue Debrided:  necrotic/eschar    Pre Debridement Measurements:  Are located in the Wound Documentation Flow Sheet    All active wounds listed below with today's date are evaluated  Wound(s)    debrided this date include # : 1   Wound 08/16/22 Toe (Comment  which one) Anterior; Left Left Great Toe Incision (Active)   Wound Etiology Surgical 08/30/22 1556   Dressing Status New dressing applied 08/30/22 1606   Wound Cleansed Wound cleanser 08/30/22 1556   Offloading for Diabetic Foot Ulcers Forefoot offloading shoe 08/30/22 1606   Wound Length (cm) 0 cm 08/30/22 1541   Wound Width (cm) 0 cm 08/30/22 1541   Wound Depth (cm) 0 cm 08/30/22 1541   Wound Surface Area (cm^2) 0 cm^2 08/30/22 1541   Change in Wound Size % (l*w) 100 08/30/22 1541   Wound Volume (cm^3) 0 cm^3 08/30/22 1541   Wound Healing % 100 08/30/22 1541 Post-Procedure Length (cm) 0.2 cm 08/30/22 1556   Post-Procedure Width (cm) 3 cm 08/30/22 1556   Post-Procedure Depth (cm) 0.2 cm 08/30/22 1556   Post-Procedure Surface Area (cm^2) 0.6 cm^2 08/30/22 1556   Post-Procedure Volume (cm^3) 0.12 cm^3 08/30/22 1556   Distance Tunneling (cm) 0 cm 08/30/22 1556   Tunneling Position ___ O'Clock 0 08/30/22 1556   Undermining Starts ___ O'Clock 0 08/30/22 1556   Undermining Ends___ O'Clock 0 08/30/22 1556   Undermining Maxium Distance (cm) 0 08/30/22 1556   Wound Assessment Other (Comment) 08/30/22 1556   Drainage Amount Moderate 08/30/22 1556   Drainage Description Serosanguinous 08/30/22 1556   Odor None 08/30/22 1556   Lauren-wound Assessment Intact 08/30/22 1556   Margins Attached edges 08/30/22 1556   Wound Thickness Description not for Pressure Injury Full thickness 08/30/22 1556   Number of days: 14             Percent of Wound(s) Debrided: approximately 80%    Total  Area  Debrided:  0.6 sq cm     Bleeding:  None    Hemostasis Achieved:  not needed    Procedural Pain:  0  / 10     Post Procedural Pain:  0 / 10     Response to treatment:  Well tolerated by patient. Status of wound progress and description from last visit:   stable. Plan:       Discharge Instructions         PHYSICIAN ORDERS AND DISCHARGE INSTRUCTIONS     NOTE: Upon discharge from the [address], you will receive a patient experience survey. We would be grateful if you would take the time to fill this survey out. Wound care order history:                 MILAGRO's   Right       Left               Date:              Cultures:  Bone Fragment sent to pathology 7/26/22              Grafts:                Antibiotics:           Continuing wound care orders and information:                 Residence:                Continue home health care with: [facility]              Your wound-care supplies will be provided by: Wound cleansing:              Do not scrub or use excessive force. Wash hands with soap and water before and after dressing changes. Prior to applying a clean dressing, cleanse wound with normal saline, wound cleanser, or mild soap and water. Ask the physician or nurse before getting the wound(s) wet in a shower     Daily Wound management:              Keep weight off wounds and reposition every 2 hours. Avoid standing for long periods of time. Apply wraps/stockings in AM and remove at bedtime. If swelling is present, elevate legs to the level of the heart or above for 30 minutes 4-5 times a day and/or when sitting. When taking antibiotics take entire prescription as ordered by physician do not stop taking until medicine is all gone. Orders for this week: 8/30/22                  FAX ORDERS TO [address] [place] [address]! Rx:     Left Great Toe - Wash with soap and water, pat dry. Apply Xeroform over incision line. Cover with dry dressing (4x4). Wrap with conform and tape. Leave in place for 1 week. Gave surgical shoe in clinic 7/26/22        Follow up with Dr Tristan Reinoso in 1 week in the wound care center. Call [phone] for any questions or concerns. Date__________   Time___________        Treatment Note Wound 08/16/22 Toe (Comment  which one) Anterior; Left Left Great Toe Incision-Dressing/Treatment:  (xeroform conform tape ace)    Written Patient Dismissal Instructions Given     -Patient was seen, evaluated, and treated today.  present for entirety of examination.  -Status post left hallux partial amputation (8/8/2022)  -Sutures were removed today. Some area of dehiscence to the lateral 50% of the wound.  -No signs of infection to the area today.   Monitor off antibiotics.  -Continue to apply dressing and change daily  -I did reach out to Dr. Odilon Suarez again and he said he will reach back out to the patient as they initially did not  when his office try to schedule initially. He will try to get her in this Thursday to be seen. -Given her significant vascular disease this is most likely the reason why her incision is not healing in its entirety.   Vascular optimization is soon as possible will help with wound healing to prevent any further infection and hopefully any amputation  -Any concerns before next visit go to the emergency room  -Follow-up 1 week for recheck         Electronically signed by Monika Yanes DPM on 8/30/2022 at 4:30 PM

## 2022-09-13 ENCOUNTER — HOSPITAL ENCOUNTER (OUTPATIENT)
Dept: WOUND CARE | Age: 67
Discharge: HOME | End: 2022-09-13
Payer: MEDICARE

## 2022-09-13 VITALS
HEART RATE: 65 BPM | DIASTOLIC BLOOD PRESSURE: 60 MMHG | TEMPERATURE: 98 F | SYSTOLIC BLOOD PRESSURE: 122 MMHG | RESPIRATION RATE: 20 BRPM

## 2022-09-13 DIAGNOSIS — E11.621 DIABETIC ULCER OF TOE OF LEFT FOOT ASSOCIATED WITH TYPE 2 DIABETES MELLITUS, WITH FAT LAYER EXPOSED (HCC): Primary | ICD-10-CM

## 2022-09-13 DIAGNOSIS — L97.522 DIABETIC ULCER OF TOE OF LEFT FOOT ASSOCIATED WITH TYPE 2 DIABETES MELLITUS, WITH FAT LAYER EXPOSED (HCC): Primary | ICD-10-CM

## 2022-09-13 DIAGNOSIS — Z87.891 HISTORY OF NICOTINE DEPENDENCE: ICD-10-CM

## 2022-09-13 DIAGNOSIS — I73.9 PERIPHERAL VASCULAR DISEASE (HCC): ICD-10-CM

## 2022-09-13 DIAGNOSIS — E11.42 TYPE 2 DIABETES MELLITUS WITH PERIPHERAL NEUROPATHY (HCC): ICD-10-CM

## 2022-09-13 PROCEDURE — 11042 DBRDMT SUBQ TIS 1ST 20SQCM/<: CPT

## 2022-09-13 RX ORDER — BACITRACIN, NEOMYCIN, POLYMYXIN B 400; 3.5; 5 [USP'U]/G; MG/G; [USP'U]/G
OINTMENT TOPICAL ONCE
Status: CANCELLED | OUTPATIENT
Start: 2022-09-13 | End: 2022-09-13

## 2022-09-13 RX ORDER — GENTAMICIN SULFATE 1 MG/G
OINTMENT TOPICAL ONCE
Status: CANCELLED | OUTPATIENT
Start: 2022-09-13 | End: 2022-09-13

## 2022-09-13 RX ORDER — BETAMETHASONE DIPROPIONATE 0.05 %
OINTMENT (GRAM) TOPICAL ONCE
Status: CANCELLED | OUTPATIENT
Start: 2022-09-13 | End: 2022-09-13

## 2022-09-13 RX ORDER — LIDOCAINE HYDROCHLORIDE 20 MG/ML
JELLY TOPICAL ONCE
Status: CANCELLED | OUTPATIENT
Start: 2022-09-13 | End: 2022-09-13

## 2022-09-13 RX ORDER — LIDOCAINE 50 MG/G
OINTMENT TOPICAL ONCE
Status: CANCELLED | OUTPATIENT
Start: 2022-09-13 | End: 2022-09-13

## 2022-09-13 RX ORDER — GINSENG 100 MG
CAPSULE ORAL ONCE
Status: CANCELLED | OUTPATIENT
Start: 2022-09-13 | End: 2022-09-13

## 2022-09-13 RX ORDER — LIDOCAINE HYDROCHLORIDE 40 MG/ML
SOLUTION TOPICAL ONCE
Status: CANCELLED | OUTPATIENT
Start: 2022-09-13 | End: 2022-09-13

## 2022-09-13 RX ORDER — BACITRACIN ZINC AND POLYMYXIN B SULFATE 500; 1000 [USP'U]/G; [USP'U]/G
OINTMENT TOPICAL ONCE
Status: CANCELLED | OUTPATIENT
Start: 2022-09-13 | End: 2022-09-13

## 2022-09-13 RX ORDER — LIDOCAINE 40 MG/G
CREAM TOPICAL ONCE
Status: CANCELLED | OUTPATIENT
Start: 2022-09-13 | End: 2022-09-13

## 2022-09-13 RX ORDER — CLOBETASOL PROPIONATE 0.5 MG/G
OINTMENT TOPICAL ONCE
Status: CANCELLED | OUTPATIENT
Start: 2022-09-13 | End: 2022-09-13

## 2022-09-13 ASSESSMENT — PAIN DESCRIPTION - FREQUENCY: FREQUENCY: CONTINUOUS

## 2022-09-13 ASSESSMENT — PAIN DESCRIPTION - PAIN TYPE: TYPE: SURGICAL PAIN

## 2022-09-13 ASSESSMENT — PAIN DESCRIPTION - LOCATION: LOCATION: FOOT

## 2022-09-13 ASSESSMENT — PAIN DESCRIPTION - DESCRIPTORS: DESCRIPTORS: SHOOTING;THROBBING

## 2022-09-13 ASSESSMENT — PAIN DESCRIPTION - ONSET: ONSET: ON-GOING

## 2022-09-13 ASSESSMENT — PAIN DESCRIPTION - ORIENTATION: ORIENTATION: LEFT

## 2022-09-13 ASSESSMENT — PAIN SCALES - GENERAL: PAINLEVEL_OUTOF10: 10

## 2022-09-13 ASSESSMENT — PAIN - FUNCTIONAL ASSESSMENT: PAIN_FUNCTIONAL_ASSESSMENT: PREVENTS OR INTERFERES SOME ACTIVE ACTIVITIES AND ADLS

## 2022-09-13 NOTE — DISCHARGE INSTRUCTIONS
PHYSICIAN ORDERS AND DISCHARGE INSTRUCTIONS     NOTE: Upon discharge from the [address], you will receive a patient experience survey. We would be grateful if you would take the time to fill this survey out. Wound care order history:                 MILAGRO's   Right       Left               Date:              Cultures:  Bone Fragment sent to pathology 7/26/22              Grafts:                Antibiotics:           Continuing wound care orders and information:                 Residence:                Continue home health care with: [facility]              Your wound-care supplies will be provided by: Wound cleansing:              Do not scrub or use excessive force. Wash hands with soap and water before and after dressing changes. Prior to applying a clean dressing, cleanse wound with normal saline, wound cleanser, or mild soap and water. Ask the physician or nurse before getting the wound(s) wet in a shower     Daily Wound management:              Keep weight off wounds and reposition every 2 hours. Avoid standing for long periods of time. Apply wraps/stockings in AM and remove at bedtime. If swelling is present, elevate legs to the level of the heart or above for 30 minutes 4-5 times a day and/or when sitting. When taking antibiotics take entire prescription as ordered by physician do not stop taking until medicine is all gone. Orders for this week: 9/13/22                  FAX ORDERS TO [address] [place] [address]! Rx:     Left Great Toe - Wash with soap and water, pat dry. Apply Xeroform over incision line. Cover with dry dressing (4x4). Wrap with conform and tape. Leave in place for 1 week. Gave surgical shoe in clinic 7/26/22        Follow up with Dr Marisol Groves in 1 week in the wound care center.   Call [phone] for any questions or concerns.   Date__________   Time___________

## 2022-09-13 NOTE — PROGRESS NOTES
Wound Care Center Progress Note With Procedure    Angie De La Torre  AGE: 61 y.o. GENDER: female  : 1955  EPISODE DATE:  2022     Subjective:     Chief Complaint   Patient presents with    Wound Check     Left foot           HISTORY of PRESENT ILLNESS      Angie De La Torre is a 61 y.o. female who presents today for follow-up of wound to the great toe the left foot. S/p left great toe partial amputation (2022). Did not follow-up for the last appointment. Patient did not follow through with appointment with Dr. Madison Mcclain. They said that he is not available for the next few weeks so his office is trying to schedule him with someone else to see her. She is complaining of pain to the left foot and leg that has been present on all past visits. No worse from previous. Denies any calf pain chest pain difficulty breathing shortness of breath or constitutional symptoms. PAST MEDICAL HISTORY        Diagnosis Date    Cancer ([facility]     Diabetes mellitus ([facility]     Hyperlipidemia     Hypertension     Seizure ([facility]        PAST SURGICAL HISTORY    Past Surgical History:   Procedure Laterality Date    LUNG CANCER SURGERY Left 10/2019    TOE AMPUTATION Left 2022    LEFT FOOT HALLUX AMPUTATION EXCISIONAL DEBRIDEMENT ALL NON VIABLE   TISSUE AND BONE performed by Viky Sheldon DPM at [facility]. [facility] N/A 10/3/2020    EGD BIOPSY performed by Drake Pop MD at [facility]    History reviewed. No pertinent family history. SOCIAL HISTORY    Social History     Tobacco Use    Smoking status: Former     Packs/day: 1.00     Types: Cigarettes     Quit date: 2019     Years since quitting: 3.7    Smokeless tobacco: Never    Tobacco comments:     10/2019   Substance Use Topics    Alcohol use:  Yes     Alcohol/week: 1.0 standard drink     Types: 1 Cans of beer per week    Drug use: Yes     Types: Marijuana (Weed)       ALLERGIES    No Known Allergies    MEDICATIONS    Current Outpatient Medications on File Prior to Encounter   Medication Sig Dispense Refill    levETIRAcetam (KEPPRA) 500 MG tablet Take 1 tablet by mouth 2 times daily 60 tablet 0    alendronate (FOSAMAX) 70 MG tablet Take 1 tablet by mouth once a week      atorvastatin (LIPITOR) 40 MG tablet Take 1 tablet by mouth in the morning. 30 tablet 1    gabapentin (NEURONTIN) 100 MG capsule 100 mg at bedtime. glimepiride (AMARYL) 2 MG tablet Take 1 tablet by mouth in the morning. metFORMIN (GLUCOPHAGE) 1000 MG tablet Take 1 tablet by mouth in the morning and 1 tablet before bedtime. pantoprazole (PROTONIX) 40 MG tablet Take 1 tablet by mouth daily 30 tablet 0    cilostazol (PLETAL) 100 MG tablet Take 100 mg by mouth 2 times daily      lisinopril (PRINIVIL;ZESTRIL) 5 MG tablet Take 15 mg by mouth daily. cloNIDine (CATAPRES) 0.1 MG tablet Take 0.1 mg by mouth 2 times daily. [DISCONTINUED] simvastatin (ZOCOR) 20 MG tablet Take 40 mg by mouth nightly        No current facility-administered medications on file prior to encounter. REVIEW OF SYSTEMS    Pertinent items are noted in HPI. Constitutional: Negative for systemic symptoms including fever, chills and malaise. Objective:      /60   Pulse 65   Temp 98 °F (36.7 °C) (Temporal)   Resp 20     PHYSICAL EXAM    General: The patient is in no acute distress. Mental status:  Patient is appropriate, is  oriented to place and plan of care. Dermatologic exam: Visual inspection of the periwound reveals the skin to be moist  Wound exam: see wound description below in procedure note  Musculoskeletal: Bilateral calves are soft and supple upon manual compression.   Negative Katerina Claire and Homans test      Assessment:     Problem List Items Addressed This Visit          Circulatory    Peripheral vascular disease [facility])    Relevant Orders    Initiate Outpatient Wound Care Protocol       Endocrine    Diabetic ulcer of toe of left foot associated with type 2 diabetes mellitus, with fat layer exposed ([address] - Primary    Relevant Orders    Initiate Outpatient Wound Care Protocol    Type 2 diabetes mellitus with peripheral neuropathy (HCC)    Relevant Orders    Initiate Outpatient Wound Care Protocol       Other    History of nicotine dependence    Relevant Orders    Initiate Outpatient Wound Care Protocol     Procedure Note    Indications:  Based on my examination of this patient's wound(s) today, sharp excision into necrotic subcutaneous tissue is required to promote healing and evaluate the extent of previous healing. Performed by: Wendy Escobar DPM    Consent obtained: Yes    Time out taken:  Yes    Pain Control: lidocaine      Debridement:Excisional Debridement    Using #15 blade scalpel the wound(s) was/were sharply debrided down through and including the removal of bone        Devitalized Tissue Debrided:  necrotic/eschar    Pre Debridement Measurements:  Are located in the Wound Documentation Flow Sheet    All active wounds listed below with today's date are evaluated  Wound(s)    debrided this date include # : 1     Wound 08/16/22 Toe (Comment  which one) Anterior; Left Left Great Toe Incision (Active)   Wound Image   09/13/22 1543   Wound Etiology Surgical 09/13/22 1543   Dressing Status New dressing applied 08/30/22 1606   Wound Cleansed Wound cleanser 09/13/22 1543   Offloading for Diabetic Foot Ulcers Forefoot offloading shoe 08/30/22 1606   Wound Length (cm) 0.1 cm 09/13/22 1543   Wound Width (cm) 3 cm 09/13/22 1543   Wound Depth (cm) 0.1 cm 09/13/22 1543   Wound Surface Area (cm^2) 0.3 cm^2 09/13/22 1543   Change in Wound Size % (l*w) 50 09/13/22 1543   Wound Volume (cm^3) 0.03 cm^3 09/13/22 1543   Wound Healing % 50 09/13/22 1543   Post-Procedure Length (cm) 0.1 cm 09/13/22 1608   Post-Procedure Width (cm) 3 cm 09/13/22 1608   Post-Procedure Depth (cm) 0.1 cm 09/13/22 1608   Post-Procedure Surface Area (cm^2) 0.3 cm^2 09/13/22 1608   Post-Procedure Volume (cm^3) 0.03 cm^3 09/13/22 1608   Distance Tunneling (cm) 0 cm 09/13/22 1543   Tunneling Position ___ O'Clock 0 09/13/22 1543   Undermining Starts ___ O'Clock 0 09/13/22 1543   Undermining Ends___ O'Clock 0 09/13/22 1543   Undermining Maxium Distance (cm) 0 09/13/22 1543   Wound Assessment Slough 09/13/22 1543   Drainage Amount Moderate 09/13/22 1543   Drainage Description Serosanguinous 09/13/22 1543   Odor None 09/13/22 1543   Lauren-wound Assessment Intact 09/13/22 1543   Margins Attached edges 09/13/22 1543   Wound Thickness Description not for Pressure Injury Full thickness 09/13/22 1543   Number of days: 28                 Percent of Wound(s) Debrided: approximately 80%    Total  Area  Debrided:  0.03 cm3    Bleeding:  None    Hemostasis Achieved:  not needed    Procedural Pain:  0  / 10     Post Procedural Pain:  0 / 10     Response to treatment:  Well tolerated by patient. Status of wound progress and description from last visit:   stable. Plan:       Discharge Instructions         PHYSICIAN ORDERS AND DISCHARGE INSTRUCTIONS     NOTE: Upon discharge from the [address], you will receive a patient experience survey. We would be grateful if you would take the time to fill this survey out. Wound care order history:                 MILAGRO's   Right       Left               Date:              Cultures:  Bone Fragment sent to pathology 7/26/22              Grafts:                Antibiotics:           Continuing wound care orders and information:                 Residence:                Continue home health care with: [facility]              Your wound-care supplies will be provided by: Wound cleansing:              Do not scrub or use excessive force. Wash hands with soap and water before and after dressing changes. Prior to applying a clean dressing, cleanse wound with normal saline, wound cleanser, or mild soap and water. Ask the physician or nurse before getting the wound(s) wet in a shower     Daily Wound management:              Keep weight off wounds and reposition every 2 hours. Avoid standing for long periods of time. Apply wraps/stockings in AM and remove at bedtime. If swelling is present, elevate legs to the level of the heart or above for 30 minutes 4-5 times a day and/or when sitting. When taking antibiotics take entire prescription as ordered by physician do not stop taking until medicine is all gone. Orders for this week: 9/13/22                  FAX ORDERS TO [address] [place] [address]! Rx:     Left Great Toe - Wash with soap and water, pat dry. Apply Xeroform over incision line. Cover with dry dressing (4x4). Wrap with conform and tape. Leave in place for 1 week. Gave surgical shoe in clinic 7/26/22        Follow up with Dr Sheila Dent in 1 week in the wound care center. Call [phone] for any questions or concerns. Date__________   Time___________        Treatment Note      Written Patient Dismissal Instructions Given     -Patient was seen, evaluated, and treated today.  present for entirety of examination.  -Status post left hallux partial amputation (8/8/2022)  -Further wound dehiscence is still noted today. No signs of infection. Monitor off antibiotics. -Stressed importance of getting into see Dr. Franky Murillo or someone in his practice as soon as possible. Wound healing as well as ischemic pain that she is getting is contributing secondary to underlying peripheral vascular disease.  -Patient asking about more pain pills today given the pain she is having but I told her we are too far out from surgical intervention for me to give her any of these.   Discussed that pain is vascular and ischemic in nature and that she needs to see Dr. Franky Murillo for further management of vasculature to help with pain levels  -I did give her the phone number to Dr. Susan Yost office today so that they can call them as soon as possible  -Given her significant vascular disease this is most likely the reason why her incision is not healing in its entirety.   Vascular optimization is soon as possible will help with wound healing to prevent any further infection and hopefully any amputation  -Any concerns before next visit go to the emergency room  -Follow-up 1 week for recheck         Electronically signed by Sancho Hills DPM on 9/13/2022 at 4:17 PM

## 2022-09-20 ENCOUNTER — HOSPITAL ENCOUNTER (OUTPATIENT)
Dept: WOUND CARE | Age: 67
Discharge: HOME | End: 2022-09-20
Payer: MEDICARE

## 2022-09-20 VITALS
RESPIRATION RATE: 20 BRPM | HEART RATE: 111 BPM | TEMPERATURE: 97.3 F | DIASTOLIC BLOOD PRESSURE: 87 MMHG | SYSTOLIC BLOOD PRESSURE: 147 MMHG

## 2022-09-20 DIAGNOSIS — L97.522 DIABETIC ULCER OF TOE OF LEFT FOOT ASSOCIATED WITH TYPE 2 DIABETES MELLITUS, WITH FAT LAYER EXPOSED (HCC): Primary | ICD-10-CM

## 2022-09-20 DIAGNOSIS — E11.42 TYPE 2 DIABETES MELLITUS WITH PERIPHERAL NEUROPATHY (HCC): ICD-10-CM

## 2022-09-20 DIAGNOSIS — Z87.891 HISTORY OF NICOTINE DEPENDENCE: ICD-10-CM

## 2022-09-20 DIAGNOSIS — E11.621 DIABETIC ULCER OF TOE OF LEFT FOOT ASSOCIATED WITH TYPE 2 DIABETES MELLITUS, WITH FAT LAYER EXPOSED (HCC): Primary | ICD-10-CM

## 2022-09-20 DIAGNOSIS — I73.9 PERIPHERAL VASCULAR DISEASE (HCC): ICD-10-CM

## 2022-09-20 PROCEDURE — 11042 DBRDMT SUBQ TIS 1ST 20SQCM/<: CPT

## 2022-09-20 RX ORDER — LIDOCAINE 40 MG/G
CREAM TOPICAL ONCE
OUTPATIENT
Start: 2022-09-20 | End: 2022-09-20

## 2022-09-20 RX ORDER — BACITRACIN, NEOMYCIN, POLYMYXIN B 400; 3.5; 5 [USP'U]/G; MG/G; [USP'U]/G
OINTMENT TOPICAL ONCE
OUTPATIENT
Start: 2022-09-20 | End: 2022-09-20

## 2022-09-20 RX ORDER — LIDOCAINE HYDROCHLORIDE 20 MG/ML
JELLY TOPICAL ONCE
OUTPATIENT
Start: 2022-09-20 | End: 2022-09-20

## 2022-09-20 RX ORDER — GINSENG 100 MG
CAPSULE ORAL ONCE
OUTPATIENT
Start: 2022-09-20 | End: 2022-09-20

## 2022-09-20 RX ORDER — LIDOCAINE 50 MG/G
OINTMENT TOPICAL ONCE
OUTPATIENT
Start: 2022-09-20 | End: 2022-09-20

## 2022-09-20 RX ORDER — CLOBETASOL PROPIONATE 0.5 MG/G
OINTMENT TOPICAL ONCE
OUTPATIENT
Start: 2022-09-20 | End: 2022-09-20

## 2022-09-20 RX ORDER — BETAMETHASONE DIPROPIONATE 0.05 %
OINTMENT (GRAM) TOPICAL ONCE
OUTPATIENT
Start: 2022-09-20 | End: 2022-09-20

## 2022-09-20 RX ORDER — BACITRACIN ZINC AND POLYMYXIN B SULFATE 500; 1000 [USP'U]/G; [USP'U]/G
OINTMENT TOPICAL ONCE
OUTPATIENT
Start: 2022-09-20 | End: 2022-09-20

## 2022-09-20 RX ORDER — GENTAMICIN SULFATE 1 MG/G
OINTMENT TOPICAL ONCE
OUTPATIENT
Start: 2022-09-20 | End: 2022-09-20

## 2022-09-20 RX ORDER — LIDOCAINE HYDROCHLORIDE 40 MG/ML
SOLUTION TOPICAL ONCE
OUTPATIENT
Start: 2022-09-20 | End: 2022-09-20

## 2022-09-20 NOTE — DISCHARGE INSTRUCTIONS
PHYSICIAN ORDERS AND DISCHARGE INSTRUCTIONS     NOTE: Upon discharge from the [address], you will receive a patient experience survey. We would be grateful if you would take the time to fill this survey out. Wound care order history:                 MILAGRO's   Right       Left               Date:              Cultures:  Bone Fragment sent to pathology 7/26/22              Grafts:                Antibiotics:           Continuing wound care orders and information:                 Residence:                Continue home health care with: [facility]              Your wound-care supplies will be provided by: Wound cleansing:              Do not scrub or use excessive force. Wash hands with soap and water before and after dressing changes. Prior to applying a clean dressing, cleanse wound with normal saline, wound cleanser, or mild soap and water. Ask the physician or nurse before getting the wound(s) wet in a shower     Daily Wound management:              Keep weight off wounds and reposition every 2 hours. Avoid standing for long periods of time. Apply wraps/stockings in AM and remove at bedtime. If swelling is present, elevate legs to the level of the heart or above for 30 minutes 4-5 times a day and/or when sitting. When taking antibiotics take entire prescription as ordered by physician do not stop taking until medicine is all gone. Orders for this week: 9/20/22                  FAX ORDERS TO [address] [place] [address]! Rx:     Left Great Toe - Wash with soap and water, pat dry. Apply Xeroform over incision line. Cover with dry dressing (2x2). Wrap with conform and tape. Leave in place for 1 week. Gave surgical shoe in clinic 7/26/22        Follow up with Dr Lindy Lo in 1 week in the wound care center.   Call [phone] for any questions or concerns.   Date__________   Time___________

## 2022-09-20 NOTE — PROGRESS NOTES
Wound Care Center Progress Note With Procedure    German Galvan  AGE: 61 y.o. GENDER: female  : 1955  EPISODE DATE:  2022     Subjective:     Chief Complaint   Patient presents with    Wound Check     Right great toe           HISTORY of PRESENT ILLNESS      German Galvan is a 61 y.o. female who presents today for follow-up of wound to the great toe the left foot. S/p left great toe partial amputation (2022). No changes from previous. She was able to see Dr. Lalo Sidhu today and he is setting up for intervention to the left lower extremity at [facility]      Denies any calf pain chest pain difficulty breathing shortness of breath or constitutional symptoms. PAST MEDICAL HISTORY        Diagnosis Date    Cancer ([facility] [address]     Diabetes mellitus ([facility] [address]     Hyperlipidemia     Hypertension     Seizure ([facility] [address]        PAST SURGICAL HISTORY    Past Surgical History:   Procedure Laterality Date    LUNG CANCER SURGERY Left 10/2019    TOE AMPUTATION Left 2022    LEFT FOOT HALLUX AMPUTATION EXCISIONAL DEBRIDEMENT ALL NON VIABLE   TISSUE AND BONE performed by Arpita Parada DPM at [address] [place] [address] N/A 10/3/2020    EGD BIOPSY performed by Meredith Chavez MD at [facility]    History reviewed. No pertinent family history. SOCIAL HISTORY    Social History     Tobacco Use    Smoking status: Former     Packs/day: 1.00     Types: Cigarettes     Quit date:      Years since quitting: 3.7    Smokeless tobacco: Never    Tobacco comments:     10/2019   Substance Use Topics    Alcohol use:  Yes     Alcohol/week: 1.0 standard drink     Types: 1 Cans of beer per week    Drug use: Yes     Types: Marijuana (Weed)       ALLERGIES    No Known Allergies    MEDICATIONS    Current Outpatient Medications on File Prior to Encounter   Medication Sig Dispense Refill    levETIRAcetam (KEPPRA) 500 MG tablet Take 1 tablet by mouth 2 times daily 60 tablet 0    alendronate (FOSAMAX) 70 MG tablet Take 1 tablet by mouth once a week      atorvastatin (LIPITOR) 40 MG tablet Take 1 tablet by mouth in the morning. 30 tablet 1    gabapentin (NEURONTIN) 100 MG capsule 100 mg at bedtime. glimepiride (AMARYL) 2 MG tablet Take 1 tablet by mouth in the morning. metFORMIN (GLUCOPHAGE) 1000 MG tablet Take 1 tablet by mouth in the morning and 1 tablet before bedtime. pantoprazole (PROTONIX) 40 MG tablet Take 1 tablet by mouth daily 30 tablet 0    cilostazol (PLETAL) 100 MG tablet Take 100 mg by mouth 2 times daily      lisinopril (PRINIVIL;ZESTRIL) 5 MG tablet Take 15 mg by mouth daily. cloNIDine (CATAPRES) 0.1 MG tablet Take 0.1 mg by mouth 2 times daily. [DISCONTINUED] simvastatin (ZOCOR) 20 MG tablet Take 40 mg by mouth nightly        No current facility-administered medications on file prior to encounter. REVIEW OF SYSTEMS    Pertinent items are noted in HPI. Constitutional: Negative for systemic symptoms including fever, chills and malaise. Objective:      BP (!) 147/87   Pulse (!) 111   Temp 97.3 °F (36.3 °C)   Resp 20     PHYSICAL EXAM    General: The patient is in no acute distress. Mental status:  Patient is appropriate, is  oriented to place and plan of care. Dermatologic exam: Visual inspection of the periwound reveals the skin to be moist  Wound exam: see wound description below in procedure note  Musculoskeletal: Bilateral calves are soft and supple upon manual compression.   Negative Priti Cadet and Homans test      Assessment:     Problem List Items Addressed This Visit          Circulatory    Peripheral vascular disease ([address]    Relevant Orders    Initiate Outpatient Wound Care Protocol       Endocrine    Diabetic ulcer of toe of left foot associated with type 2 diabetes mellitus, with fat layer exposed ([address] - Primary    Relevant Orders    Initiate Outpatient Wound Care Protocol    Type 2 diabetes mellitus with peripheral neuropathy ([address]    Relevant Orders Initiate Outpatient Wound Care Protocol       Other    History of nicotine dependence    Relevant Orders    Initiate Outpatient Wound Care Protocol     Procedure Note    Indications:  Based on my examination of this patient's wound(s) today, sharp excision into necrotic subcutaneous tissue is required to promote healing and evaluate the extent of previous healing. Performed by: Toni Bertrand DPM    Consent obtained: Yes    Time out taken:  Yes    Pain Control: lidocaine      Debridement:Excisional Debridement    Using #15 blade scalpel the wound(s) was/were sharply debrided down through and including the removal of bone        Devitalized Tissue Debrided:  necrotic/eschar    Pre Debridement Measurements:  Are located in the Wound Documentation Flow Sheet    All active wounds listed below with today's date are evaluated  Wound(s)    debrided this date include # : 1     Wound 08/16/22 Toe (Comment  which one) Anterior; Left Left Great Toe Incision (Active)   Wound Image   09/13/22 1543   Wound Etiology Surgical 09/20/22 1620   Dressing Status New dressing applied 08/30/22 1606   Wound Cleansed Wound cleanser 09/20/22 1620   Offloading for Diabetic Foot Ulcers Forefoot offloading shoe 09/20/22 1620   Wound Length (cm) 1 cm 09/20/22 1620   Wound Width (cm) 2.5 cm 09/20/22 1620   Wound Depth (cm) 0.1 cm 09/20/22 1620   Wound Surface Area (cm^2) 2.5 cm^2 09/20/22 1620   Change in Wound Size % (l*w) -316.67 09/20/22 1620   Wound Volume (cm^3) 0.25 cm^3 09/20/22 1620   Wound Healing % -317 09/20/22 1620   Post-Procedure Length (cm) 1 cm 09/20/22 1630   Post-Procedure Width (cm) 2.5 cm 09/20/22 1630   Post-Procedure Depth (cm) 0.1 cm 09/20/22 1630   Post-Procedure Surface Area (cm^2) 2.5 cm^2 09/20/22 1630   Post-Procedure Volume (cm^3) 0.25 cm^3 09/20/22 1630   Distance Tunneling (cm) 0 cm 09/20/22 1620   Tunneling Position ___ O'Clock 0 09/20/22 1620   Undermining Starts ___ O'Clock 0 09/20/22 1620   Undermining Ends___ O'Clock 0 09/20/22 1620   Undermining Maxium Distance (cm) 0 09/20/22 1620   Wound Assessment [facility] 09/20/22 1620   Drainage Amount Moderate 09/20/22 1620   Drainage Description Serosanguinous;Brown 09/20/22 1620   Odor None 09/20/22 1620   Lauren-wound Assessment Intact 09/20/22 1620   Margins Defined edges 09/20/22 1620   Wound Thickness Description not for Pressure Injury Full thickness 09/20/22 1620   Number of days: 35                 Percent of Wound(s) Debrided: approximately 80%    Total  Area  Debrided:  0.25 cm3    Bleeding:  None    Hemostasis Achieved:  not needed    Procedural Pain:  0  / 10     Post Procedural Pain:  0 / 10     Response to treatment:  Well tolerated by patient. Status of wound progress and description from last visit:   stable. Plan:       Discharge Instructions         PHYSICIAN ORDERS AND DISCHARGE INSTRUCTIONS     NOTE: Upon discharge from the [address], you will receive a patient experience survey. We would be grateful if you would take the time to fill this survey out. Wound care order history:                 MILAGRO's   Right       Left               Date:              Cultures:  Bone Fragment sent to pathology 7/26/22              Grafts:                Antibiotics:           Continuing wound care orders and information:                 Residence:                Continue home health care with: [facility]              Your wound-care supplies will be provided by: Wound cleansing:              Do not scrub or use excessive force. Wash hands with soap and water before and after dressing changes. Prior to applying a clean dressing, cleanse wound with normal saline, wound cleanser, or mild soap and water. Ask the physician or nurse before getting the wound(s) wet in a shower     Daily Wound management:              Keep weight off wounds and reposition every 2 hours.               Avoid standing for long periods of time.              Apply wraps/stockings in AM and remove at bedtime. If swelling is present, elevate legs to the level of the heart or above for 30 minutes 4-5 times a day and/or when sitting. When taking antibiotics take entire prescription as ordered by physician do not stop taking until medicine is all gone. Orders for this week: 9/20/22                  FAX ORDERS TO [address] [place] [address]! Rx:     Left Great Toe - Wash with soap and water, pat dry. Apply Xeroform over incision line. Cover with dry dressing (2x2). Wrap with conform and tape. Leave in place for 1 week. Gave surgical shoe in clinic 7/26/22        Follow up with Dr Sara Thacker in 1 week in the wound care center. Call [phone] for any questions or concerns. Date__________   Time___________        Treatment Note      Written Patient Dismissal Instructions Given     -Patient was seen, evaluated, and treated today.  present for entirety of examination.  -Status post left hallux partial amputation (8/8/2022)  -Further wound dehiscence is still noted today. No signs of infection. Monitor off antibiotics.  -Dr. Jim Kapadia today. He is going to schedule her for left lower extremity intervention at [facility].  -For now continue daily local wound care as directed. -Given her significant vascular disease this is most likely the reason why her incision is not healing in its entirety.   Vascular optimization is soon as possible will help with wound healing to prevent any further infection and hopefully any amputation  -Any concerns before next visit go to the emergency room  -Follow-up 1 week for recheck         Electronically signed by Rachna Adames DPM on 9/20/2022 at 4:30 PM

## 2022-09-27 ENCOUNTER — HOSPITAL ENCOUNTER (OUTPATIENT)
Dept: WOUND CARE | Age: 67
Discharge: HOME | DRG: 981 | End: 2022-09-27
Payer: MEDICARE

## 2022-09-27 VITALS
SYSTOLIC BLOOD PRESSURE: 105 MMHG | HEART RATE: 85 BPM | DIASTOLIC BLOOD PRESSURE: 70 MMHG | TEMPERATURE: 98 F | RESPIRATION RATE: 20 BRPM

## 2022-09-27 DIAGNOSIS — L97.522 DIABETIC ULCER OF TOE OF LEFT FOOT ASSOCIATED WITH TYPE 2 DIABETES MELLITUS, WITH FAT LAYER EXPOSED (HCC): Primary | ICD-10-CM

## 2022-09-27 DIAGNOSIS — E11.42 TYPE 2 DIABETES MELLITUS WITH PERIPHERAL NEUROPATHY (HCC): ICD-10-CM

## 2022-09-27 DIAGNOSIS — I73.9 PERIPHERAL VASCULAR DISEASE (HCC): ICD-10-CM

## 2022-09-27 DIAGNOSIS — E11.621 DIABETIC ULCER OF TOE OF LEFT FOOT ASSOCIATED WITH TYPE 2 DIABETES MELLITUS, WITH FAT LAYER EXPOSED (HCC): Primary | ICD-10-CM

## 2022-09-27 DIAGNOSIS — Z87.891 HISTORY OF NICOTINE DEPENDENCE: ICD-10-CM

## 2022-09-27 PROCEDURE — 11044 DBRDMT BONE 1ST 20 SQ CM/<: CPT

## 2022-09-27 PROCEDURE — 0QBR0ZZ EXCISION OF LEFT TOE PHALANX, OPEN APPROACH: ICD-10-PCS | Performed by: STUDENT IN AN ORGANIZED HEALTH CARE EDUCATION/TRAINING PROGRAM

## 2022-09-27 RX ORDER — BACITRACIN, NEOMYCIN, POLYMYXIN B 400; 3.5; 5 [USP'U]/G; MG/G; [USP'U]/G
OINTMENT TOPICAL ONCE
OUTPATIENT
Start: 2022-09-27 | End: 2022-09-27

## 2022-09-27 RX ORDER — LIDOCAINE HYDROCHLORIDE 20 MG/ML
JELLY TOPICAL ONCE
OUTPATIENT
Start: 2022-09-27 | End: 2022-09-27

## 2022-09-27 RX ORDER — LIDOCAINE 40 MG/G
CREAM TOPICAL ONCE
OUTPATIENT
Start: 2022-09-27 | End: 2022-09-27

## 2022-09-27 RX ORDER — LIDOCAINE HYDROCHLORIDE 40 MG/ML
SOLUTION TOPICAL ONCE
OUTPATIENT
Start: 2022-09-27 | End: 2022-09-27

## 2022-09-27 RX ORDER — LIDOCAINE 50 MG/G
OINTMENT TOPICAL ONCE
OUTPATIENT
Start: 2022-09-27 | End: 2022-09-27

## 2022-09-27 RX ORDER — GINSENG 100 MG
CAPSULE ORAL ONCE
OUTPATIENT
Start: 2022-09-27 | End: 2022-09-27

## 2022-09-27 RX ORDER — GENTAMICIN SULFATE 1 MG/G
OINTMENT TOPICAL ONCE
OUTPATIENT
Start: 2022-09-27 | End: 2022-09-27

## 2022-09-27 RX ORDER — BACITRACIN ZINC AND POLYMYXIN B SULFATE 500; 1000 [USP'U]/G; [USP'U]/G
OINTMENT TOPICAL ONCE
OUTPATIENT
Start: 2022-09-27 | End: 2022-09-27

## 2022-09-27 RX ORDER — CLOBETASOL PROPIONATE 0.5 MG/G
OINTMENT TOPICAL ONCE
OUTPATIENT
Start: 2022-09-27 | End: 2022-09-27

## 2022-09-27 RX ORDER — BETAMETHASONE DIPROPIONATE 0.05 %
OINTMENT (GRAM) TOPICAL ONCE
OUTPATIENT
Start: 2022-09-27 | End: 2022-09-27

## 2022-09-27 NOTE — PROGRESS NOTES
Take 1 tablet by mouth once a week      atorvastatin (LIPITOR) 40 MG tablet Take 1 tablet by mouth in the morning. 30 tablet 1    gabapentin (NEURONTIN) 100 MG capsule 100 mg at bedtime. glimepiride (AMARYL) 2 MG tablet Take 1 tablet by mouth in the morning. metFORMIN (GLUCOPHAGE) 1000 MG tablet Take 1 tablet by mouth in the morning and 1 tablet before bedtime. pantoprazole (PROTONIX) 40 MG tablet Take 1 tablet by mouth daily 30 tablet 0    cilostazol (PLETAL) 100 MG tablet Take 100 mg by mouth 2 times daily      lisinopril (PRINIVIL;ZESTRIL) 5 MG tablet Take 15 mg by mouth daily. cloNIDine (CATAPRES) 0.1 MG tablet Take 0.1 mg by mouth 2 times daily. [DISCONTINUED] simvastatin (ZOCOR) 20 MG tablet Take 40 mg by mouth nightly        No current facility-administered medications on file prior to encounter. REVIEW OF SYSTEMS    Pertinent items are noted in HPI. Constitutional: Negative for systemic symptoms including fever, chills and malaise. Objective:      /70   Pulse 85   Temp 98 °F (36.7 °C) (Temporal)   Resp 20     PHYSICAL EXAM    General: The patient is in no acute distress. Mental status:  Patient is appropriate, is  oriented to place and plan of care. Dermatologic exam: Visual inspection of the periwound reveals the skin to be moist  Wound exam: see wound description below in procedure note  Musculoskeletal: Bilateral calves are soft and supple upon manual compression.   Negative Charma Maya and Homans test      Assessment:     Problem List Items Addressed This Visit          Circulatory    Peripheral vascular disease ([address]    Relevant Orders    Initiate Outpatient Wound Care Protocol       Endocrine    Diabetic ulcer of toe of left foot associated with type 2 diabetes mellitus, with fat layer exposed ([address] - Primary    Relevant Orders    Initiate Outpatient Wound Care Protocol    Type 2 diabetes mellitus with peripheral neuropathy ([address]    Relevant Orders Initiate Outpatient Wound Care Protocol       Other    History of nicotine dependence    Relevant Orders    Initiate Outpatient Wound Care Protocol     Procedure Note    Indications:  Based on my examination of this patient's wound(s) today, sharp excision into necrotic bone is required to promote healing and evaluate the extent of previous healing. Performed by: Monika Yanes DPM    Consent obtained: Yes    Time out taken:  Yes    Pain Control: lidocaine      Debridement:Excisional Debridement    Using #15 blade scalpel the wound(s) was/were sharply debrided down through and including the removal of bone        Devitalized Tissue Debrided:  necrotic/eschar    Pre Debridement Measurements:  Are located in the Wound Documentation Flow Sheet    All active wounds listed below with today's date are evaluated  Wound(s)    debrided this date include # : 1     Wound 08/16/22 Toe (Comment  which one) Anterior; Left Left Great Toe Incision (Active)   Wound Image   09/27/22 1552   Wound Etiology Surgical 09/20/22 1639   Dressing Status New dressing applied 09/20/22 1639   Wound Cleansed Wound cleanser 09/27/22 1552   Offloading for Diabetic Foot Ulcers Orthowedge/inserts 09/27/22 1552   Wound Length (cm) 1 cm 09/27/22 1552   Wound Width (cm) 2.3 cm 09/27/22 1552   Wound Depth (cm) 0.5 cm 09/27/22 1552   Wound Surface Area (cm^2) 2.3 cm^2 09/27/22 1552   Change in Wound Size % (l*w) -283.33 09/27/22 1552   Wound Volume (cm^3) 1.15 cm^3 09/27/22 1552   Wound Healing % -1817 09/27/22 1552   Post-Procedure Length (cm) 1 cm 09/27/22 1609   Post-Procedure Width (cm) 2.3 cm 09/27/22 1609   Post-Procedure Depth (cm) 0.5 cm 09/27/22 1609   Post-Procedure Surface Area (cm^2) 2.3 cm^2 09/27/22 1609   Post-Procedure Volume (cm^3) 1.15 cm^3 09/27/22 1609   Distance Tunneling (cm) 0 cm 09/27/22 1552   Tunneling Position ___ O'Clock 0 09/27/22 1552   Undermining Starts ___ O'Clock 900 09/27/22 1552   Undermining Ends___ O'Clock 500 09/27/22 1552   Undermining Maxium Distance (cm) 0.5 09/27/22 1552   Wound Assessment Exposed structure bone;Slough 09/27/22 1552   Drainage Amount Moderate 09/27/22 1552   Drainage Description Yellow;Serosanguinous 09/27/22 1552   Odor None 09/27/22 1552   Lauren-wound Assessment Fragile; Maceration 09/27/22 1552   Margins Undefined edges 09/27/22 1552   Wound Thickness Description not for Pressure Injury Full thickness 09/27/22 1552   Number of days: 42                 Percent of Wound(s) Debrided: approximately 80%    Total  Area  Debrided:  1.15 cm3    Bleeding:  None    Hemostasis Achieved:  not needed    Procedural Pain:  0  / 10     Post Procedural Pain:  0 / 10     Response to treatment:  Well tolerated by patient. Status of wound progress and description from last visit:   stable. Plan:       Discharge Instructions         PHYSICIAN ORDERS AND DISCHARGE INSTRUCTIONS     NOTE: Upon discharge from the [address], you will receive a patient experience survey. We would be grateful if you would take the time to fill this survey out. Wound care order history:                 MILAGRO's   Right       Left               Date:              Cultures:  Bone Fragment sent to pathology 7/26/22              Grafts:                Antibiotics:           Continuing wound care orders and information:                 Residence:                Continue home health care with: [facility]              Your wound-care supplies will be provided by: Wound cleansing:              Do not scrub or use excessive force. Wash hands with soap and water before and after dressing changes. Prior to applying a clean dressing, cleanse wound with normal saline, wound cleanser, or mild soap and water. Ask the physician or nurse before getting the wound(s) wet in a shower     Daily Wound management:              Keep weight off wounds and reposition every 2 hours.               Avoid standing for long periods of time. Apply wraps/stockings in AM and remove at bedtime. If swelling is present, elevate legs to the level of the heart or above for 30 minutes 4-5 times a day and/or when sitting. When taking antibiotics take entire prescription as ordered by physician do not stop taking until medicine is all gone. Orders for this week: 9/27/22                  FAX ORDERS TO [address] [place] [address]! Rx:     Left Great Toe - Wash with soap and water, pat dry. Apply betadine damp gauze wet to dry. Cover with 2x2 gauze. Wrap with conform and tape. Change daily until procedure on Friday 9/30/22. Gave surgical shoe in clinic 7/26/22        Follow up with Dr Vivek Castillo in 1 week in the wound care center. Call [phone] for any questions or concerns. Date__________   Time___________        Treatment Note      Written Patient Dismissal Instructions Given     -Patient was seen, evaluated, and treated today.  present for entirety of examination.  -Status post left hallux partial amputation (8/8/2022)  -Further wound dehiscence is still noted today. No signs of infection. Monitor off antibiotics. -Further bone is exposed today.   This was relayed to the patient and her .  -Plan for surgical intervention left femoral endarterectomy by vascular surgery this Friday.  -Betadine wet-to-dry dressings daily at this time.  -Follow-up 1 week for recheck and to follow-up on recommendations status post vascular surgery         Electronically signed by Monika Yanes DPM on 9/27/2022 at 4:11 PM

## 2022-09-27 NOTE — DISCHARGE INSTRUCTIONS
PHYSICIAN ORDERS AND DISCHARGE INSTRUCTIONS     NOTE: Upon discharge from the [address], you will receive a patient experience survey. We would be grateful if you would take the time to fill this survey out. Wound care order history:                 MILAGRO's   Right       Left               Date:              Cultures:  Bone Fragment sent to pathology 7/26/22              Grafts:                Antibiotics:           Continuing wound care orders and information:                 Residence:                Continue home health care with: [facility]              Your wound-care supplies will be provided by: Wound cleansing:              Do not scrub or use excessive force. Wash hands with soap and water before and after dressing changes. Prior to applying a clean dressing, cleanse wound with normal saline, wound cleanser, or mild soap and water. Ask the physician or nurse before getting the wound(s) wet in a shower     Daily Wound management:              Keep weight off wounds and reposition every 2 hours. Avoid standing for long periods of time. Apply wraps/stockings in AM and remove at bedtime. If swelling is present, elevate legs to the level of the heart or above for 30 minutes 4-5 times a day and/or when sitting. When taking antibiotics take entire prescription as ordered by physician do not stop taking until medicine is all gone. Orders for this week: 9/27/22                  FAX ORDERS TO [address] [place] [address]! Rx:     Left Great Toe - Wash with soap and water, pat dry. Apply betadine damp gauze wet to dry. Cover with 2x2 gauze. Wrap with conform and tape. Change daily until procedure on Friday 9/30/22. Gave surgical shoe in clinic 7/26/22        Follow up with Dr Olivia Harrell in 1 week in the wound care center.   Call [phone] for any questions or concerns.   Date__________   Time___________

## 2022-09-29 ENCOUNTER — HOSPITAL ENCOUNTER (INPATIENT)
Age: 67
LOS: 1 days | Discharge: ANOTHER HOSPITAL | DRG: 981 | End: 2022-09-29
Attending: EMERGENCY MEDICINE | Admitting: SPECIALIST
Payer: MEDICARE

## 2022-09-29 ENCOUNTER — APPOINTMENT (OUTPATIENT)
Dept: GENERAL RADIOLOGY | Age: 67
DRG: 981 | End: 2022-09-29
Payer: MEDICARE

## 2022-09-29 ENCOUNTER — APPOINTMENT (OUTPATIENT)
Dept: CT IMAGING | Age: 67
DRG: 981 | End: 2022-09-29
Payer: MEDICARE

## 2022-09-29 VITALS
TEMPERATURE: 103.3 F | SYSTOLIC BLOOD PRESSURE: 115 MMHG | OXYGEN SATURATION: 100 % | DIASTOLIC BLOOD PRESSURE: 77 MMHG | HEIGHT: 66 IN | RESPIRATION RATE: 16 BRPM | BODY MASS INDEX: 20.57 KG/M2 | WEIGHT: 128 LBS | HEART RATE: 125 BPM

## 2022-09-29 DIAGNOSIS — R41.82 ALTERED MENTAL STATUS, UNSPECIFIED ALTERED MENTAL STATUS TYPE: Primary | ICD-10-CM

## 2022-09-29 DIAGNOSIS — A41.9 SEPTIC SHOCK (HCC): ICD-10-CM

## 2022-09-29 DIAGNOSIS — R65.21 SEPTIC SHOCK (HCC): ICD-10-CM

## 2022-09-29 DIAGNOSIS — R56.9 SEIZURES (HCC): ICD-10-CM

## 2022-09-29 DIAGNOSIS — J96.00 ACUTE RESPIRATORY FAILURE, UNSPECIFIED WHETHER WITH HYPOXIA OR HYPERCAPNIA (HCC): ICD-10-CM

## 2022-09-29 LAB
ADENOVIRUS DETECTION BY PCR: NOT DETECTED
ALBUMIN SERPL-MCNC: 3.7 GM/DL (ref 3.4–5)
ALBUMIN SERPL-MCNC: 4 GM/DL (ref 3.4–5)
ALP BLD-CCNC: 132 IU/L (ref 40–129)
ALT SERPL-CCNC: 16 U/L (ref 10–40)
ANION GAP SERPL CALCULATED.3IONS-SCNC: 22 MMOL/L (ref 4–16)
ANION GAP SERPL CALCULATED.3IONS-SCNC: 33 MMOL/L (ref 4–16)
AST SERPL-CCNC: 32 IU/L (ref 15–37)
BASE EXCESS: 19 (ref 0–2.4)
BASOPHILS ABSOLUTE: 0.1 K/CU MM
BASOPHILS ABSOLUTE: 0.1 K/CU MM
BASOPHILS RELATIVE PERCENT: 0.3 % (ref 0–1)
BASOPHILS RELATIVE PERCENT: 0.4 % (ref 0–1)
BILIRUB SERPL-MCNC: 0.5 MG/DL (ref 0–1)
BORDETELLA PARAPERTUSSIS BY PCR: NOT DETECTED
BORDETELLA PERTUSSIS PCR: NOT DETECTED
BUN BLDV-MCNC: 4 MG/DL (ref 6–23)
BUN BLDV-MCNC: 5 MG/DL (ref 6–23)
CALCIUM SERPL-MCNC: 8.7 MG/DL (ref 8.3–10.6)
CALCIUM SERPL-MCNC: 9.8 MG/DL (ref 8.3–10.6)
CHLAMYDOPHILA PNEUMONIA PCR: NOT DETECTED
CHLORIDE BLD-SCNC: 100 MMOL/L (ref 99–110)
CHLORIDE BLD-SCNC: 98 MMOL/L (ref 99–110)
CO2: 13 MMOL/L (ref 21–32)
CO2: 16 MMOL/L (ref 21–32)
COMMENT: ABNORMAL
CORONAVIRUS 229E PCR: NOT DETECTED
CORONAVIRUS HKU1 PCR: NOT DETECTED
CORONAVIRUS NL63 PCR: NOT DETECTED
CORONAVIRUS OC43 PCR: NOT DETECTED
CREAT SERPL-MCNC: 0.6 MG/DL (ref 0.6–1.1)
CREAT SERPL-MCNC: 0.7 MG/DL (ref 0.6–1.1)
DIFFERENTIAL TYPE: ABNORMAL
DIFFERENTIAL TYPE: ABNORMAL
EKG ATRIAL RATE: 163 BPM
EKG DIAGNOSIS: NORMAL
EKG Q-T INTERVAL: 302 MS
EKG QRS DURATION: 76 MS
EKG QTC CALCULATION (BAZETT): 497 MS
EKG R AXIS: 77 DEGREES
EKG T AXIS: 91 DEGREES
EKG VENTRICULAR RATE: 163 BPM
EOSINOPHILS ABSOLUTE: 0 K/CU MM
EOSINOPHILS ABSOLUTE: 0 K/CU MM
EOSINOPHILS RELATIVE PERCENT: 0 % (ref 0–3)
EOSINOPHILS RELATIVE PERCENT: 0.1 % (ref 0–3)
GFR AFRICAN AMERICAN: >60 ML/MIN/1.73M2
GFR AFRICAN AMERICAN: >60 ML/MIN/1.73M2
GFR NON-AFRICAN AMERICAN: >60 ML/MIN/1.73M2
GFR NON-AFRICAN AMERICAN: >60 ML/MIN/1.73M2
GLUCOSE BLD-MCNC: 206 MG/DL (ref 70–99)
GLUCOSE BLD-MCNC: 252 MG/DL (ref 70–99)
GLUCOSE BLD-MCNC: 256 MG/DL (ref 70–99)
GLUCOSE BLD-MCNC: 259 MG/DL (ref 70–99)
GLUCOSE BLD-MCNC: 270 MG/DL (ref 70–99)
HCO3 VENOUS: 9.3 MMOL/L (ref 19–25)
HCT VFR BLD CALC: 44.3 % (ref 37–47)
HCT VFR BLD CALC: 44.7 % (ref 37–47)
HEMOGLOBIN: 14.2 GM/DL (ref 12.5–16)
HEMOGLOBIN: 14.2 GM/DL (ref 12.5–16)
HUMAN METAPNEUMOVIRUS PCR: NOT DETECTED
IMMATURE NEUTROPHIL %: 0.4 % (ref 0–0.43)
IMMATURE NEUTROPHIL %: 1.3 % (ref 0–0.43)
INFLUENZA A BY PCR: NOT DETECTED
INFLUENZA A H1 (2009) PCR: NOT DETECTED
INFLUENZA A H1 PANDEMIC PCR: NOT DETECTED
INFLUENZA A H3 PCR: NOT DETECTED
INFLUENZA B BY PCR: NOT DETECTED
LACTATE: 21.4 MMOL/L (ref 0.4–2)
LACTIC ACID, SEPSIS: 10.9 MMOL/L (ref 0.5–1.9)
LYMPHOCYTES ABSOLUTE: 2.1 K/CU MM
LYMPHOCYTES ABSOLUTE: 4.1 K/CU MM
LYMPHOCYTES RELATIVE PERCENT: 16.6 % (ref 24–44)
LYMPHOCYTES RELATIVE PERCENT: 7.4 % (ref 24–44)
MAGNESIUM: 1.8 MG/DL (ref 1.8–2.4)
MAGNESIUM: 1.8 MG/DL (ref 1.8–2.4)
MAGNESIUM: 2.3 MG/DL (ref 1.8–2.4)
MCH RBC QN AUTO: 30.3 PG (ref 27–31)
MCH RBC QN AUTO: 30.7 PG (ref 27–31)
MCHC RBC AUTO-ENTMCNC: 31.8 % (ref 32–36)
MCHC RBC AUTO-ENTMCNC: 32.1 % (ref 32–36)
MCV RBC AUTO: 95.5 FL (ref 78–100)
MCV RBC AUTO: 95.9 FL (ref 78–100)
MONOCYTES ABSOLUTE: 1.1 K/CU MM
MONOCYTES ABSOLUTE: 1.5 K/CU MM
MONOCYTES RELATIVE PERCENT: 4.5 % (ref 0–4)
MONOCYTES RELATIVE PERCENT: 5.4 % (ref 0–4)
MYCOPLASMA PNEUMONIAE PCR: NOT DETECTED
NUCLEATED RBC %: 0 %
NUCLEATED RBC %: 0 %
O2 SAT, VEN: 91 % (ref 50–70)
PARAINFLUENZA 1 PCR: NOT DETECTED
PARAINFLUENZA 2 PCR: NOT DETECTED
PARAINFLUENZA 3 PCR: NOT DETECTED
PARAINFLUENZA 4 PCR: NOT DETECTED
PCO2, VEN: 28 MMHG (ref 38–52)
PDW BLD-RTO: 13.2 % (ref 11.7–14.9)
PDW BLD-RTO: 13.4 % (ref 11.7–14.9)
PH VENOUS: 7.13 (ref 7.32–7.42)
PHOSPHORUS: 1.4 MG/DL (ref 2.5–4.9)
PHOSPHORUS: 3.7 MG/DL (ref 2.5–4.9)
PLATELET # BLD: 316 K/CU MM (ref 140–440)
PLATELET # BLD: 382 K/CU MM (ref 140–440)
PMV BLD AUTO: 9.2 FL (ref 7.5–11.1)
PMV BLD AUTO: 9.4 FL (ref 7.5–11.1)
PO2, VEN: 77 MMHG (ref 28–48)
POTASSIUM SERPL-SCNC: 2.7 MMOL/L (ref 3.5–5.1)
POTASSIUM SERPL-SCNC: 3.8 MMOL/L (ref 3.5–5.1)
PRO-BNP: 513.2 PG/ML
RBC # BLD: 4.62 M/CU MM (ref 4.2–5.4)
RBC # BLD: 4.68 M/CU MM (ref 4.2–5.4)
RHINOVIRUS ENTEROVIRUS PCR: NOT DETECTED
RSV PCR: NOT DETECTED
SARS-COV-2: NOT DETECTED
SEGMENTED NEUTROPHILS ABSOLUTE COUNT: 19 K/CU MM
SEGMENTED NEUTROPHILS ABSOLUTE COUNT: 24.1 K/CU MM
SEGMENTED NEUTROPHILS RELATIVE PERCENT: 77.1 % (ref 36–66)
SEGMENTED NEUTROPHILS RELATIVE PERCENT: 86.5 % (ref 36–66)
SODIUM BLD-SCNC: 136 MMOL/L (ref 135–145)
SODIUM BLD-SCNC: 146 MMOL/L (ref 135–145)
TOTAL CK: 36 IU/L (ref 26–140)
TOTAL CK: 36 IU/L (ref 26–140)
TOTAL CK: 38 IU/L (ref 26–140)
TOTAL IMMATURE NEUTOROPHIL: 0.1 K/CU MM
TOTAL IMMATURE NEUTOROPHIL: 0.31 K/CU MM
TOTAL NUCLEATED RBC: 0 K/CU MM
TOTAL NUCLEATED RBC: 0 K/CU MM
TOTAL PROTEIN: 7.6 GM/DL (ref 6.4–8.2)
TROPONIN T: <0.01 NG/ML
TROPONIN T: <0.01 NG/ML
WBC # BLD: 24.6 K/CU MM (ref 4–10.5)
WBC # BLD: 27.9 K/CU MM (ref 4–10.5)

## 2022-09-29 PROCEDURE — 96367 TX/PROPH/DG ADDL SEQ IV INF: CPT

## 2022-09-29 PROCEDURE — 2500000003 HC RX 250 WO HCPCS: Performed by: NURSE PRACTITIONER

## 2022-09-29 PROCEDURE — 85610 PROTHROMBIN TIME: CPT

## 2022-09-29 PROCEDURE — 6360000002 HC RX W HCPCS: Performed by: NURSE PRACTITIONER

## 2022-09-29 PROCEDURE — 96376 TX/PRO/DX INJ SAME DRUG ADON: CPT

## 2022-09-29 PROCEDURE — 83880 ASSAY OF NATRIURETIC PEPTIDE: CPT

## 2022-09-29 PROCEDURE — 2500000003 HC RX 250 WO HCPCS: Performed by: EMERGENCY MEDICINE

## 2022-09-29 PROCEDURE — 96375 TX/PRO/DX INJ NEW DRUG ADDON: CPT

## 2022-09-29 PROCEDURE — 87040 BLOOD CULTURE FOR BACTERIA: CPT

## 2022-09-29 PROCEDURE — 83605 ASSAY OF LACTIC ACID: CPT

## 2022-09-29 PROCEDURE — 82550 ASSAY OF CK (CPK): CPT

## 2022-09-29 PROCEDURE — 71045 X-RAY EXAM CHEST 1 VIEW: CPT

## 2022-09-29 PROCEDURE — 93005 ELECTROCARDIOGRAM TRACING: CPT | Performed by: EMERGENCY MEDICINE

## 2022-09-29 PROCEDURE — 95717 EEG PHYS/QHP 2-12 HR W/O VID: CPT | Performed by: STUDENT IN AN ORGANIZED HEALTH CARE EDUCATION/TRAINING PROGRAM

## 2022-09-29 PROCEDURE — 94002 VENT MGMT INPAT INIT DAY: CPT

## 2022-09-29 PROCEDURE — 6360000002 HC RX W HCPCS

## 2022-09-29 PROCEDURE — 70450 CT HEAD/BRAIN W/O DYE: CPT

## 2022-09-29 PROCEDURE — 6370000000 HC RX 637 (ALT 250 FOR IP): Performed by: NURSE PRACTITIONER

## 2022-09-29 PROCEDURE — 80053 COMPREHEN METABOLIC PANEL: CPT

## 2022-09-29 PROCEDURE — 89220 SPUTUM SPECIMEN COLLECTION: CPT

## 2022-09-29 PROCEDURE — 83735 ASSAY OF MAGNESIUM: CPT

## 2022-09-29 PROCEDURE — 6360000002 HC RX W HCPCS: Performed by: EMERGENCY MEDICINE

## 2022-09-29 PROCEDURE — 93010 ELECTROCARDIOGRAM REPORT: CPT | Performed by: INTERNAL MEDICINE

## 2022-09-29 PROCEDURE — 83036 HEMOGLOBIN GLYCOSYLATED A1C: CPT

## 2022-09-29 PROCEDURE — 0202U NFCT DS 22 TRGT SARS-COV-2: CPT

## 2022-09-29 PROCEDURE — A4216 STERILE WATER/SALINE, 10 ML: HCPCS | Performed by: NURSE PRACTITIONER

## 2022-09-29 PROCEDURE — 2500000003 HC RX 250 WO HCPCS

## 2022-09-29 PROCEDURE — 82962 GLUCOSE BLOOD TEST: CPT

## 2022-09-29 PROCEDURE — 85025 COMPLETE CBC W/AUTO DIFF WBC: CPT

## 2022-09-29 PROCEDURE — 84100 ASSAY OF PHOSPHORUS: CPT

## 2022-09-29 PROCEDURE — 5A1935Z RESPIRATORY VENTILATION, LESS THAN 24 CONSECUTIVE HOURS: ICD-10-PCS | Performed by: EMERGENCY MEDICINE

## 2022-09-29 PROCEDURE — 84484 ASSAY OF TROPONIN QUANT: CPT

## 2022-09-29 PROCEDURE — 2000000000 HC ICU R&B

## 2022-09-29 PROCEDURE — 94761 N-INVAS EAR/PLS OXIMETRY MLT: CPT

## 2022-09-29 PROCEDURE — 96365 THER/PROPH/DIAG IV INF INIT: CPT

## 2022-09-29 PROCEDURE — 2580000003 HC RX 258: Performed by: EMERGENCY MEDICINE

## 2022-09-29 PROCEDURE — 72125 CT NECK SPINE W/O DYE: CPT

## 2022-09-29 PROCEDURE — 82805 BLOOD GASES W/O2 SATURATION: CPT

## 2022-09-29 PROCEDURE — 80069 RENAL FUNCTION PANEL: CPT

## 2022-09-29 PROCEDURE — 36415 COLL VENOUS BLD VENIPUNCTURE: CPT

## 2022-09-29 PROCEDURE — 99285 EMERGENCY DEPT VISIT HI MDM: CPT

## 2022-09-29 PROCEDURE — 99222 1ST HOSP IP/OBS MODERATE 55: CPT | Performed by: INTERNAL MEDICINE

## 2022-09-29 PROCEDURE — 2700000000 HC OXYGEN THERAPY PER DAY

## 2022-09-29 PROCEDURE — 95813 EEG EXTND MNTR 61-119 MIN: CPT

## 2022-09-29 PROCEDURE — 2580000003 HC RX 258: Performed by: NURSE PRACTITIONER

## 2022-09-29 PROCEDURE — 2580000003 HC RX 258

## 2022-09-29 PROCEDURE — 51702 INSERT TEMP BLADDER CATH: CPT

## 2022-09-29 RX ORDER — INSULIN LISPRO 100 [IU]/ML
0-4 INJECTION, SOLUTION INTRAVENOUS; SUBCUTANEOUS NIGHTLY
Status: DISCONTINUED | OUTPATIENT
Start: 2022-09-29 | End: 2022-09-29

## 2022-09-29 RX ORDER — ADENOSINE 3 MG/ML
INJECTION, SOLUTION INTRAVENOUS
Status: COMPLETED
Start: 2022-09-29 | End: 2022-09-29

## 2022-09-29 RX ORDER — SODIUM CHLORIDE 9 MG/ML
INJECTION, SOLUTION INTRAVENOUS PRN
Status: DISCONTINUED | OUTPATIENT
Start: 2022-09-29 | End: 2022-09-29 | Stop reason: HOSPADM

## 2022-09-29 RX ORDER — PROPOFOL 10 MG/ML
5-50 INJECTION, EMULSION INTRAVENOUS CONTINUOUS
Status: DISCONTINUED | OUTPATIENT
Start: 2022-09-29 | End: 2022-09-29

## 2022-09-29 RX ORDER — ROCURONIUM BROMIDE 10 MG/ML
70 INJECTION, SOLUTION INTRAVENOUS ONCE
Status: DISCONTINUED | OUTPATIENT
Start: 2022-09-29 | End: 2022-09-29 | Stop reason: HOSPADM

## 2022-09-29 RX ORDER — ROCURONIUM BROMIDE 10 MG/ML
INJECTION, SOLUTION INTRAVENOUS DAILY PRN
Status: COMPLETED | OUTPATIENT
Start: 2022-09-29 | End: 2022-09-29

## 2022-09-29 RX ORDER — POLYETHYLENE GLYCOL 3350 17 G/17G
17 POWDER, FOR SOLUTION ORAL DAILY PRN
Status: DISCONTINUED | OUTPATIENT
Start: 2022-09-29 | End: 2022-09-29 | Stop reason: HOSPADM

## 2022-09-29 RX ORDER — ACETAMINOPHEN 325 MG/1
650 TABLET ORAL EVERY 6 HOURS PRN
Status: DISCONTINUED | OUTPATIENT
Start: 2022-09-29 | End: 2022-09-29 | Stop reason: HOSPADM

## 2022-09-29 RX ORDER — ONDANSETRON 4 MG/1
4 TABLET, ORALLY DISINTEGRATING ORAL EVERY 8 HOURS PRN
Status: DISCONTINUED | OUTPATIENT
Start: 2022-09-29 | End: 2022-09-29 | Stop reason: HOSPADM

## 2022-09-29 RX ORDER — ETOMIDATE 2 MG/ML
INJECTION INTRAVENOUS DAILY PRN
Status: COMPLETED | OUTPATIENT
Start: 2022-09-29 | End: 2022-09-29

## 2022-09-29 RX ORDER — INSULIN LISPRO 100 [IU]/ML
0-8 INJECTION, SOLUTION INTRAVENOUS; SUBCUTANEOUS EVERY 4 HOURS
Status: DISCONTINUED | OUTPATIENT
Start: 2022-09-29 | End: 2022-09-29 | Stop reason: HOSPADM

## 2022-09-29 RX ORDER — FENTANYL CITRATE 0.05 MG/ML
50 INJECTION, SOLUTION INTRAMUSCULAR; INTRAVENOUS ONCE
Status: DISCONTINUED | OUTPATIENT
Start: 2022-09-29 | End: 2022-09-29 | Stop reason: HOSPADM

## 2022-09-29 RX ORDER — MAGNESIUM SULFATE IN WATER 40 MG/ML
2000 INJECTION, SOLUTION INTRAVENOUS ONCE
Status: COMPLETED | OUTPATIENT
Start: 2022-09-29 | End: 2022-09-29

## 2022-09-29 RX ORDER — ADENOSINE 3 MG/ML
12 INJECTION, SOLUTION INTRAVENOUS ONCE
Status: COMPLETED | OUTPATIENT
Start: 2022-09-29 | End: 2022-09-29

## 2022-09-29 RX ORDER — SODIUM CHLORIDE 0.9 % (FLUSH) 0.9 %
5-40 SYRINGE (ML) INJECTION EVERY 12 HOURS SCHEDULED
Status: DISCONTINUED | OUTPATIENT
Start: 2022-09-29 | End: 2022-09-29 | Stop reason: HOSPADM

## 2022-09-29 RX ORDER — ACETAMINOPHEN 650 MG/1
650 SUPPOSITORY RECTAL EVERY 6 HOURS PRN
Status: DISCONTINUED | OUTPATIENT
Start: 2022-09-29 | End: 2022-09-29 | Stop reason: HOSPADM

## 2022-09-29 RX ORDER — SODIUM CHLORIDE 0.9 % (FLUSH) 0.9 %
5-40 SYRINGE (ML) INJECTION PRN
Status: DISCONTINUED | OUTPATIENT
Start: 2022-09-29 | End: 2022-09-29 | Stop reason: HOSPADM

## 2022-09-29 RX ORDER — ONDANSETRON 2 MG/ML
4 INJECTION INTRAMUSCULAR; INTRAVENOUS EVERY 6 HOURS PRN
Status: DISCONTINUED | OUTPATIENT
Start: 2022-09-29 | End: 2022-09-29 | Stop reason: HOSPADM

## 2022-09-29 RX ORDER — POTASSIUM CHLORIDE 29.8 MG/ML
20 INJECTION INTRAVENOUS PRN
Status: DISCONTINUED | OUTPATIENT
Start: 2022-09-29 | End: 2022-09-29 | Stop reason: HOSPADM

## 2022-09-29 RX ORDER — FAMOTIDINE 20 MG/1
20 TABLET, FILM COATED ORAL 2 TIMES DAILY
Status: DISCONTINUED | OUTPATIENT
Start: 2022-09-29 | End: 2022-09-29 | Stop reason: HOSPADM

## 2022-09-29 RX ORDER — ADENOSINE 3 MG/ML
INJECTION, SOLUTION INTRAVENOUS DAILY PRN
Status: COMPLETED | OUTPATIENT
Start: 2022-09-29 | End: 2022-09-29

## 2022-09-29 RX ORDER — INSULIN LISPRO 100 [IU]/ML
0-8 INJECTION, SOLUTION INTRAVENOUS; SUBCUTANEOUS
Status: DISCONTINUED | OUTPATIENT
Start: 2022-09-29 | End: 2022-09-29

## 2022-09-29 RX ORDER — PROPOFOL 10 MG/ML
5-80 INJECTION, EMULSION INTRAVENOUS CONTINUOUS
Status: DISCONTINUED | OUTPATIENT
Start: 2022-09-29 | End: 2022-09-29 | Stop reason: HOSPADM

## 2022-09-29 RX ORDER — LORAZEPAM 2 MG/ML
INJECTION INTRAMUSCULAR DAILY PRN
Status: COMPLETED | OUTPATIENT
Start: 2022-09-29 | End: 2022-09-29

## 2022-09-29 RX ORDER — ENOXAPARIN SODIUM 100 MG/ML
40 INJECTION SUBCUTANEOUS DAILY
Status: DISCONTINUED | OUTPATIENT
Start: 2022-09-29 | End: 2022-09-29 | Stop reason: HOSPADM

## 2022-09-29 RX ORDER — MAGNESIUM SULFATE IN WATER 40 MG/ML
2000 INJECTION, SOLUTION INTRAVENOUS PRN
Status: DISCONTINUED | OUTPATIENT
Start: 2022-09-29 | End: 2022-09-29 | Stop reason: HOSPADM

## 2022-09-29 RX ORDER — PROPOFOL 10 MG/ML
5-80 INJECTION, EMULSION INTRAVENOUS CONTINUOUS
Status: DISCONTINUED | OUTPATIENT
Start: 2022-09-29 | End: 2022-09-29

## 2022-09-29 RX ORDER — PROPOFOL 10 MG/ML
INJECTION, EMULSION INTRAVENOUS
Status: DISCONTINUED
Start: 2022-09-29 | End: 2022-09-29 | Stop reason: HOSPADM

## 2022-09-29 RX ORDER — ETOMIDATE 2 MG/ML
20 INJECTION INTRAVENOUS ONCE
Status: DISCONTINUED | OUTPATIENT
Start: 2022-09-29 | End: 2022-09-29

## 2022-09-29 RX ORDER — POTASSIUM CHLORIDE 7.45 MG/ML
10 INJECTION INTRAVENOUS PRN
Status: DISCONTINUED | OUTPATIENT
Start: 2022-09-29 | End: 2022-09-29 | Stop reason: HOSPADM

## 2022-09-29 RX ORDER — ADENOSINE 3 MG/ML
INJECTION, SOLUTION INTRAVENOUS
Status: DISCONTINUED
Start: 2022-09-29 | End: 2022-09-29 | Stop reason: HOSPADM

## 2022-09-29 RX ORDER — LORAZEPAM 2 MG/ML
INJECTION INTRAMUSCULAR
Status: COMPLETED
Start: 2022-09-29 | End: 2022-09-29

## 2022-09-29 RX ORDER — LORAZEPAM 2 MG/ML
4 INJECTION INTRAMUSCULAR ONCE
Status: COMPLETED | OUTPATIENT
Start: 2022-09-29 | End: 2022-09-29

## 2022-09-29 RX ORDER — 0.9 % SODIUM CHLORIDE 0.9 %
1000 INTRAVENOUS SOLUTION INTRAVENOUS ONCE
Status: DISCONTINUED | OUTPATIENT
Start: 2022-09-29 | End: 2022-09-29 | Stop reason: HOSPADM

## 2022-09-29 RX ORDER — ADENOSINE 3 MG/ML
6 INJECTION, SOLUTION INTRAVENOUS ONCE
Status: COMPLETED | OUTPATIENT
Start: 2022-09-29 | End: 2022-09-29

## 2022-09-29 RX ORDER — FENTANYL CITRATE 0.05 MG/ML
25 INJECTION, SOLUTION INTRAMUSCULAR; INTRAVENOUS
Status: DISCONTINUED | OUTPATIENT
Start: 2022-09-29 | End: 2022-09-29 | Stop reason: HOSPADM

## 2022-09-29 RX ORDER — LORAZEPAM 2 MG/ML
2 INJECTION INTRAMUSCULAR ONCE
Status: COMPLETED | OUTPATIENT
Start: 2022-09-29 | End: 2022-09-29

## 2022-09-29 RX ORDER — DEXTROSE MONOHYDRATE 100 MG/ML
INJECTION, SOLUTION INTRAVENOUS CONTINUOUS PRN
Status: DISCONTINUED | OUTPATIENT
Start: 2022-09-29 | End: 2022-09-29 | Stop reason: HOSPADM

## 2022-09-29 RX ADMIN — INSULIN LISPRO 4 UNITS: 100 INJECTION, SOLUTION INTRAVENOUS; SUBCUTANEOUS at 20:45

## 2022-09-29 RX ADMIN — LEVETIRACETAM 3500 MG: 100 INJECTION, SOLUTION INTRAVENOUS at 13:31

## 2022-09-29 RX ADMIN — ETOMIDATE 20 MG: 2 INJECTION, SOLUTION INTRAVENOUS at 12:54

## 2022-09-29 RX ADMIN — ADENOSINE 12 MG: 3 INJECTION, SOLUTION INTRAVENOUS at 13:04

## 2022-09-29 RX ADMIN — LORAZEPAM 2 MG: 2 INJECTION INTRAMUSCULAR; INTRAVENOUS at 12:56

## 2022-09-29 RX ADMIN — LEVETIRACETAM 500 MG: 100 INJECTION, SOLUTION INTRAVENOUS at 20:15

## 2022-09-29 RX ADMIN — LORAZEPAM 2 MG: 2 INJECTION, SOLUTION INTRAMUSCULAR; INTRAVENOUS at 12:50

## 2022-09-29 RX ADMIN — ADENOSINE 6 MG: 3 INJECTION, SOLUTION INTRAVENOUS at 12:58

## 2022-09-29 RX ADMIN — PROPOFOL 20 MCG/KG/MIN: 10 INJECTION, EMULSION INTRAVENOUS at 13:00

## 2022-09-29 RX ADMIN — MAGNESIUM SULFATE HEPTAHYDRATE 2000 MG: 2 INJECTION, SOLUTION INTRAVENOUS at 14:34

## 2022-09-29 RX ADMIN — LORAZEPAM 2 MG: 2 INJECTION, SOLUTION INTRAMUSCULAR; INTRAVENOUS at 15:26

## 2022-09-29 RX ADMIN — ADENOSINE 12 MG: 3 INJECTION, SOLUTION INTRAVENOUS at 13:00

## 2022-09-29 RX ADMIN — ENOXAPARIN SODIUM 40 MG: 40 INJECTION SUBCUTANEOUS at 18:57

## 2022-09-29 RX ADMIN — DEXTROSE MONOHYDRATE 1000 MG PE: 50 INJECTION, SOLUTION INTRAVENOUS at 20:48

## 2022-09-29 RX ADMIN — ROCURONIUM BROMIDE 70 MG: 10 INJECTION INTRAVENOUS at 12:55

## 2022-09-29 RX ADMIN — ADENOSINE 12 MG: 3 INJECTION, SOLUTION INTRAVENOUS at 15:25

## 2022-09-29 RX ADMIN — SODIUM CHLORIDE, PRESERVATIVE FREE 10 ML: 5 INJECTION INTRAVENOUS at 20:45

## 2022-09-29 RX ADMIN — FAMOTIDINE 20 MG: 10 INJECTION, SOLUTION INTRAVENOUS at 20:40

## 2022-09-29 RX ADMIN — ACETAMINOPHEN 650 MG: 650 SUPPOSITORY RECTAL at 20:20

## 2022-09-29 RX ADMIN — PROPOFOL 80 MCG/KG/MIN: 10 INJECTION, EMULSION INTRAVENOUS at 16:17

## 2022-09-29 RX ADMIN — PROPOFOL 80 MCG/KG/MIN: 10 INJECTION, EMULSION INTRAVENOUS at 18:54

## 2022-09-29 RX ADMIN — Medication: at 14:18

## 2022-09-29 RX ADMIN — LORAZEPAM 2 MG: 2 INJECTION, SOLUTION INTRAMUSCULAR; INTRAVENOUS at 12:56

## 2022-09-29 RX ADMIN — ADENOSINE 6 MG: 3 INJECTION, SOLUTION INTRAVENOUS at 15:23

## 2022-09-29 RX ADMIN — INSULIN LISPRO 4 UNITS: 100 INJECTION, SOLUTION INTRAVENOUS; SUBCUTANEOUS at 18:46

## 2022-09-29 RX ADMIN — LORAZEPAM 4 MG: 2 INJECTION INTRAMUSCULAR; INTRAVENOUS at 18:58

## 2022-09-29 ASSESSMENT — PULMONARY FUNCTION TESTS
PIF_VALUE: 36
PIF_VALUE: 30
PIF_VALUE: 26
PIF_VALUE: 33
PIF_VALUE: 30
PIF_VALUE: 31

## 2022-09-29 ASSESSMENT — PAIN SCALES - GENERAL: PAINLEVEL_OUTOF10: 0

## 2022-09-29 NOTE — CONSULTS
injection 2 mg, Once  adenosine (ADENOCARD) injection 6 mg, Once  adenosine (ADENOCARD) injection 12 mg, Once  adenosine (ADENOCARD) injection 12 mg, Once  propofol injection, Continuous  0.9 % sodium chloride bolus, Once  sodium bicarbonate 150 mEq in dextrose 5 % 1,000 mL infusion, Continuous  magnesium sulfate 2000 mg in 50 mL IVPB premix, Once  cefepime (MAXIPIME) 2000 mg IVPB minibag, Once  vancomycin (VANCOCIN) 1,250 mg in dextrose 5 % 250 mL IVPB ([id number]), Once      Current Facility-Administered Medications   Medication Dose Route Frequency Provider Last Rate Last Admin    propofol 1000 MG/100ML injection             adenosine (ADENOCARD) 6 MG/2ML injection             etomidate (AMIDATE) injection 20 mg  20 mg IntraVENous Once Jensen Magaña MD        rocuronium [facility]) injection 70 mg  70 mg IntraVENous Once Jensen Magaña MD        LORazepam (ATIVAN) injection 2 mg  2 mg IntraVENous Once Jensen Magaña MD        adenosine (ADENOCARD) injection 6 mg  6 mg IntraVENous Once Jensen Magaña MD        adenosine (ADENOCARD) injection 12 mg  12 mg IntraVENous Once Jensen Magaña MD        adenosine (ADENOCARD) injection 12 mg  12 mg IntraVENous Once Jensen Magaña MD        propofol injection  5-50 mcg/kg/min IntraVENous Continuous Jensen Magaña MD        0.9 % sodium chloride bolus  1,000 mL IntraVENous Once Jensen Magaña MD        sodium bicarbonate 150 mEq in dextrose 5 % 1,000 mL infusion   IntraVENous Continuous Jensen Magaña MD        magnesium sulfate 2000 mg in 50 mL IVPB premix  2,000 mg IntraVENous Once Jensen Magaña MD        cefepime (MAXIPIME) 2000 mg IVPB minibag  2,000 mg IntraVENous Once Jensen Magaña MD        vancomycin (VANCOCIN) 1,250 mg in dextrose 5 % 250 mL IVPB ([id number])  25 mg/kg IntraVENous Once Jensen Magaña MD         Current Outpatient Medications   Medication Sig Dispense Refill    levETIRAcetam (KEPPRA) 500 MG tablet Take 1 tablet by mouth 2 times daily 60 tablet 0    alendronate (FOSAMAX) 70 MG tablet Take 1 tablet by mouth once a week      atorvastatin (LIPITOR) 40 MG tablet Take 1 tablet by mouth in the morning. 30 tablet 1    gabapentin (NEURONTIN) 100 MG capsule 100 mg at bedtime. glimepiride (AMARYL) 2 MG tablet Take 1 tablet by mouth in the morning. metFORMIN (GLUCOPHAGE) 1000 MG tablet Take 1 tablet by mouth in the morning and 1 tablet before bedtime. pantoprazole (PROTONIX) 40 MG tablet Take 1 tablet by mouth daily 30 tablet 0    cilostazol (PLETAL) 100 MG tablet Take 100 mg by mouth 2 times daily      lisinopril (PRINIVIL;ZESTRIL) 5 MG tablet Take 15 mg by mouth daily. cloNIDine (CATAPRES) 0.1 MG tablet Take 0.1 mg by mouth 2 times daily. Review of Systems:  All 14 systems reviewed, all negative except for seizures    Physical Examination:    BP (!) 168/97   Pulse (!) 152   Resp 14   Wt 118 lb (53.5 kg)   SpO2 99%   BMI 20.25 kg/m²      Wt Readings from Last 3 Encounters:   09/29/22 118 lb (53.5 kg)   08/29/22 118 lb (53.5 kg)   08/23/22 128 lb (58.1 kg)     Body mass index is 20.25 kg/m². General Appearance: Fair  Head: normocephalic     Eyes: normal, noninjected conjunctiva    ENT: normal mucosa, noninjected throat, normal     NECK: No JVP  No thyromegaly        Cardiovascular: No thrills palpated   Auscultation: Normal S1 and S2, no murmur   carotid bruit no   Abdominal Aorta no bruit    Respiratory:    Breath sounds diminished bilaterally    Extremities: Trace edema clubbing ,   no cyanosis    SKIN: Warm and well perfused, no pallor or cyanosis    Vascular exam:  Pedal Pulses: Diminished bilaterally        Abdomen:  No masses or tenderness. No organomegaly noted.     Neurological: On vent  Lab Review   Recent Results (from the past 24 hour(s))   POCT Glucose    Collection Time: 09/29/22 12:40 PM   Result Value Ref Range    POC Glucose 206 (H) 70 - 99 MG/DL   CBC with Auto Differential    Collection Time: 09/29/22 12:44 PM   Result Value Ref Range    WBC 24.6 (H) 4.0 - 10.5 K/CU MM    RBC 4.68 4.2 - 5.4 M/CU MM    Hemoglobin 14.2 12.5 - 16.0 GM/DL    Hematocrit 44.7 37 - 47 %    MCV 95.5 78 - 100 FL    MCH 30.3 27 - 31 PG    MCHC 31.8 (L) 32.0 - 36.0 %    RDW 13.2 11.7 - 14.9 %    Platelets 199 993 - 856 K/CU MM    MPV 9.4 7.5 - 11.1 FL    Differential Type AUTOMATED DIFFERENTIAL     Segs Relative 77.1 (H) 36 - 66 %    Lymphocytes % 16.6 (L) 24 - 44 %    Monocytes % 4.5 (H) 0 - 4 %    Eosinophils % 0.1 0 - 3 %    Basophils % 0.4 0 - 1 %    Segs Absolute 19.0 K/CU MM    Lymphocytes Absolute 4.1 K/CU MM    Monocytes Absolute 1.1 K/CU MM    Eosinophils Absolute 0.0 K/CU MM    Basophils Absolute 0.1 K/CU MM    Nucleated RBC % 0.0 %    Total Nucleated RBC 0.0 K/CU MM    Total Immature Neutrophil 0.31 K/CU MM    Immature Neutrophil % 1.3 (H) 0 - 0.43 %   CMP    Collection Time: 09/29/22 12:44 PM   Result Value Ref Range    Sodium 146 (H) 135 - 145 MMOL/L    Potassium 3.8 3.5 - 5.1 MMOL/L    Chloride 100 99 - 110 mMol/L    CO2 13 (L) 21 - 32 MMOL/L    BUN 5 (L) 6 - 23 MG/DL    Creatinine 0.7 0.6 - 1.1 MG/DL    Glucose 256 (H) 70 - 99 MG/DL    Calcium 9.8 8.3 - 10.6 MG/DL    Albumin 4.0 3.4 - 5.0 GM/DL    Total Protein 7.6 6.4 - 8.2 GM/DL    Total Bilirubin 0.5 0.0 - 1.0 MG/DL    ALT 16 10 - 40 U/L    AST 32 15 - 37 IU/L    Alkaline Phosphatase 132 (H) 40 - 129 IU/L    GFR Non-African American >60 >60 mL/min/1.73m2    GFR African American >60 >60 mL/min/1.73m2    Anion Gap 33 (H) 4 - 16   Lactic Acid    Collection Time: 09/29/22 12:44 PM   Result Value Ref Range    Lactate 21.4 (HH) 0.4 - 2.0 mMOL/L   EKG 12 Lead    Collection Time: 09/29/22 12:54 PM   Result Value Ref Range    Ventricular Rate 163 BPM    Atrial Rate 163 BPM    QRS Duration 76 ms    Q-T Interval 302 ms    QTc Calculation (Bazett) 497 ms    R Axis 77 degrees    T Axis 91 degrees    Diagnosis       Supraventricular tachycardia  Nonspecific ST abnormality  Abnormal ECG  When compared with ECG of 23-AUG-2022 11:40,  Questionable change in QRS axis     Blood Gas, Venous    Collection Time: 09/29/22  1:00 PM   Result Value Ref Range    pH, Rgeino 7.13 (L) 7.32 - 7.42    pCO2, Regino 28 (L) 38 - 52 mmHG    pO2, Regino 77 (H) 28 - 48 mmHG    Base Excess 19 (H) 0 - 2.4    HCO3, Venous 9.3 (L) 19 - 25 MMOL/L    O2 Sat, Regino 91.0 (H) 50 - 70 %    Comment VBG            Assessment/Recommendations:     -Tachycardia that appears to be sinus tachycardia probably secondary to patient's primary issue is that his respiratory failure I will obtain an echocardiogram for further assessment in the meantime we will try to control the heart rate with negative chronotropic agents that is a beta-blocker IV if needed  -Per records patient is also scheduled for PVD surgery with vascular surgery in future  -Hyperlipidemia patient is on statin Lipitor 40  -Has history of right frontal craniotomy as well for metastatic adeno CA         Tonya Sanchez MD, 9/29/2022 2:03 PM       Please note this report has been partially produced using speech recognition software and may contain errors related to that system including errors in grammar, punctuation, and spelling, as well as words and phrases that may be inappropriate. If there are any questions or concerns please feel free to contact the dictating provider for clarification.

## 2022-09-29 NOTE — ED NOTES
Medication History  [facility]    Patient Name: Ashley Suazo 1955     Medication history has been completed by: Flako Min CPhT    Source(s) of information: insurance claims and retail pharmacy     Primary Care Physician: Maninder Mcconnell MD     Pharmacy: [facility]    Allergies as of 09/29/2022    (No Known Allergies)        Prior to Admission medications    Medication Sig Start Date End Date Taking? Authorizing Provider   levETIRAcetam (KEPPRA) 500 MG tablet Take 1 tablet by mouth 2 times daily 8/23/22   Jory Coombs DO   alendronate (FOSAMAX) 70 MG tablet Take 1 tablet by mouth once a week 6/12/22   Historical Provider, MD   atorvastatin (LIPITOR) 40 MG tablet Take 1 tablet by mouth in the morning. 8/12/22   Gladys Felipe MD   gabapentin (NEURONTIN) 300 MG capsule Take 300 mg by mouth at bedtime. 3/15/22   Historical Provider, MD   glimepiride (AMARYL) 2 MG tablet Take 1 tablet by mouth in the morning. 7/19/22   Historical Provider, MD   metFORMIN (GLUCOPHAGE) 1000 MG tablet Take 1,000 mg by mouth 2 times daily (with meals) 7/19/22   Historical Provider, MD   pantoprazole (PROTONIX) 20 MG tablet Take 1 tablet by mouth daily 10/4/20 8/29/22  Gustavo Dickerson MD   cilostazol (PLETAL) 100 MG tablet Take 100 mg by mouth 2 times daily    Historical Provider, MD   lisinopril (PRINIVIL;ZESTRIL) 5 MG tablet Take 15 mg by mouth daily. Historical Provider, MD   cloNIDine (CATAPRES) 0.1 MG tablet Take 0.1 mg by mouth 2 times daily. Historical Provider, MD     Medications added or changed (ex.  new medication, dosage change, interval change, formulation change):  Gabapentin dosage change from 100 mg to 300 mg    Medications removed from list (include reason, ex. noncompliance, medication cost, therapy complete etc.):   Cilostazol No history of patient being on this med, flagged for review  Clonidine  No history of patient being on this med, flagged for review  Lisinopril  No history of patient being on this med, flagged for review  Simvastatin discontinued 08/11/22 patient on Atorvastatin    Comments:  Unable to assess patient and family are poor historians. Medication list per insurance claims and reached out to retail pharmacy for verification of prescribed medications. Per retail patient has not picked up new dosage of Metformin of 850 mg BID. Retail unsure if this could be on auto fill.     To my knowledge the above medication history is accurate as of 9/29/2022 2:30 PM.   Karina Berry CPhT   9/29/2022 2:30 PM

## 2022-09-29 NOTE — ED NOTES
Patient is seizing at this time and ativan being grabbed by Pharmacy.       Shagufta Powell RN  09/29/22 [id number]

## 2022-09-29 NOTE — PROGRESS NOTES
Patient seen in ICU. We were consulted for recent craniotomy for tumor resection performed at [facility] in June 2022 by Dr. Jc Dangelo. Patient presented to ED today after falling in her home, striking her head and having two witnessed seizures while waiting for EMS. She is on Keppra BID at baseline but her  states she did not take it this morning. Patient continued to seize after receiving 3500mg of keppra in the ER and 4mg of ativan. Critical care is in the process of transferring the patient given concern for continued seizing and lac of continuous EEG at our facility. Recommend she goes to [facility].

## 2022-09-29 NOTE — FLOWSHEET NOTE
Mono Pace started at [phone]. No seizure activity noted at this time. Patient sedated on Propofol at 80 mcg. Family at bedside. Waiting transfer to [facility] for continuous EEG monitoring.

## 2022-09-29 NOTE — ED PROVIDER NOTES
Triage Chief Complaint:   No chief complaint on file. [place]:  Rodolfo Stein is a 61 y.o. female that presents emergency department with fall, seizures and altered mental status. Unknown last known well. Per EMS patient has a history of brain surgery and metastatic cancer to the brain. She does have a history of seizures. .    Past Medical History:   Diagnosis Date    Cancer ([facility] [address]     Diabetes mellitus ([facility] [address]     Hyperlipidemia     Hypertension     Seizure [facility])      Past Surgical History:   Procedure Laterality Date    LUNG CANCER SURGERY Left 10/2019    TOE AMPUTATION Left 8/8/2022    LEFT FOOT HALLUX AMPUTATION EXCISIONAL DEBRIDEMENT ALL NON VIABLE   TISSUE AND BONE performed by Jose Cruz Campos DPM at [address] N/A 10/3/2020    EGD BIOPSY performed by Prabha Bloom MD at [facility] ENDOSCOPY     No family history on file. Social History     Socioeconomic History    Marital status:      Spouse name: Not on file    Number of children: Not on file    Years of education: Not on file    Highest education level: Not on file   Occupational History    Not on file   Tobacco Use    Smoking status: Former     Packs/day: 1.00     Types: Cigarettes     Quit date: 2019     Years since quitting: 3.7    Smokeless tobacco: Never    Tobacco comments:     10/2019   Substance and Sexual Activity    Alcohol use:  Yes     Alcohol/week: 1.0 standard drink     Types: 1 Cans of beer per week    Drug use: Yes     Types: Marijuana Doug Hail)    Sexual activity: Yes     Partners: Male   Other Topics Concern    Not on file   Social History Narrative    Not on file     Social Determinants of Health     Financial Resource Strain: Not on file   Food Insecurity: Not on file   Transportation Needs: Not on file   Physical Activity: Not on file   Stress: Not on file   Social Connections: Not on file   Intimate Partner Violence: Not on file   Housing Stability: Not on file     Current Facility-Administered Medications Medication Dose Route Frequency Provider Last Rate Last Admin    propofol 1000 MG/100ML injection             adenosine (ADENOCARD) 6 MG/2ML injection             etomidate (AMIDATE) injection 20 mg  20 mg IntraVENous Once Zaid Valdivia MD        rocuronium [facility]) injection 70 mg  70 mg IntraVENous Once Zaid Valdivia MD        LORazepam (ATIVAN) injection 2 mg  2 mg IntraVENous Once Zaid Valdivia MD        adenosine (ADENOCARD) injection 6 mg  6 mg IntraVENous Once Zaid Valdivia MD        adenosine (ADENOCARD) injection 12 mg  12 mg IntraVENous Once Zaid Valdivia MD        adenosine (ADENOCARD) injection 12 mg  12 mg IntraVENous Once Zaid Valdivia MD        propofol injection  5-50 mcg/kg/min IntraVENous Continuous Zaid Valdivia MD        0.9 % sodium chloride bolus  1,000 mL IntraVENous Once Zaid Valdivia MD        sodium bicarbonate 150 mEq in dextrose 5 % 1,000 mL infusion   IntraVENous Continuous Zaid Valdivia  mL/hr at 09/29/22 1418 New Bag at 09/29/22 1418    magnesium sulfate 2000 mg in 50 mL IVPB premix  2,000 mg IntraVENous Once Zaid Valdivia MD 25 mL/hr at 09/29/22 1434 2,000 mg at 09/29/22 1434    cefepime (MAXIPIME) 2000 mg IVPB minibag  2,000 mg IntraVENous Once Zaid Valdivia MD        vancomycin (VANCOCIN) 1,250 mg in dextrose 5 % 250 mL IVPB ([id number])  25 mg/kg IntraVENous Once Zaid Valdivia MD         Current Outpatient Medications   Medication Sig Dispense Refill    levETIRAcetam (KEPPRA) 500 MG tablet Take 1 tablet by mouth 2 times daily 60 tablet 0    alendronate (FOSAMAX) 70 MG tablet Take 1 tablet by mouth once a week      atorvastatin (LIPITOR) 40 MG tablet Take 1 tablet by mouth in the morning. 30 tablet 1    gabapentin (NEURONTIN) 300 MG capsule Take 300 mg by mouth at bedtime. glimepiride (AMARYL) 2 MG tablet Take 1 tablet by mouth in the morning.       metFORMIN (GLUCOPHAGE) 1000 MG tablet Take 1,000 mg by mouth 2 times daily (with meals) pantoprazole (PROTONIX) 40 MG tablet Take 1 tablet by mouth daily (Patient taking differently: Take 20 mg by mouth daily) 30 tablet 0    cilostazol (PLETAL) 100 MG tablet Take 100 mg by mouth 2 times daily      lisinopril (PRINIVIL;ZESTRIL) 5 MG tablet Take 15 mg by mouth daily. cloNIDine (CATAPRES) 0.1 MG tablet Take 0.1 mg by mouth 2 times daily. No Known Allergies  Nursing Notes Reviewed    ROS: Unable to review due to patient's mental status    Physical Exam:  ED Triage Vitals   Enc Vitals Group      BP 09/29/22 1244 (!) 179/118      Heart Rate 09/29/22 1241 (!) 163      Resp 09/29/22 1242 26      Temp --       Temp src --       SpO2 09/29/22 1246 100 %      Weight 09/29/22 1246 118 lb (53.5 kg)      Height --       Head Circumference --       Peak Flow --       Pain Score --       Pain Loc --       Pain Edu? --       Excl. in [address]? --      My pulse oximetry interpretation is which is within the normal range    GENERAL APPEARANCE: Eyes are open initially. Pupils 4 mm and reactive. Fixed gaze to the left initially. Responsive to pain on the right arm  HEAD:  Atraumatic. EYES: Pupils equal round reactive to light  ENT: Mucous membranes are moist.  No trismus. NECK:  Trachea midline. C-collar in place  HEART: RRR. Radial pulses 2+. LUNGS: Respirations unlabored. CTAB. ABDOMEN: Soft. Non-tender. No guarding or rebound. EXTREMITIES: No acute deformities. SKIN: Warm and dry.     I have reviewed and interpreted all of the currently available lab results from this visit (if applicable):  Results for orders placed or performed during the hospital encounter of 09/29/22   CBC with Auto Differential   Result Value Ref Range    WBC 24.6 (H) 4.0 - 10.5 K/CU MM    RBC 4.68 4.2 - 5.4 M/CU MM    Hemoglobin 14.2 12.5 - 16.0 GM/DL    Hematocrit 44.7 37 - 47 %    MCV 95.5 78 - 100 FL    MCH 30.3 27 - 31 PG    MCHC 31.8 (L) 32.0 - 36.0 %    RDW 13.2 11.7 - 14.9 %    Platelets 391 837 - 214 K/CU MM    MPV 9.4 7.5 - 11.1 FL    Differential Type AUTOMATED DIFFERENTIAL     Segs Relative 77.1 (H) 36 - 66 %    Lymphocytes % 16.6 (L) 24 - 44 %    Monocytes % 4.5 (H) 0 - 4 %    Eosinophils % 0.1 0 - 3 %    Basophils % 0.4 0 - 1 %    Segs Absolute 19.0 K/CU MM    Lymphocytes Absolute 4.1 K/CU MM    Monocytes Absolute 1.1 K/CU MM    Eosinophils Absolute 0.0 K/CU MM    Basophils Absolute 0.1 K/CU MM    Nucleated RBC % 0.0 %    Total Nucleated RBC 0.0 K/CU MM    Total Immature Neutrophil 0.31 K/CU MM    Immature Neutrophil % 1.3 (H) 0 - 0.43 %   CMP   Result Value Ref Range    Sodium 146 (H) 135 - 145 MMOL/L    Potassium 3.8 3.5 - 5.1 MMOL/L    Chloride 100 99 - 110 mMol/L    CO2 13 (L) 21 - 32 MMOL/L    BUN 5 (L) 6 - 23 MG/DL    Creatinine 0.7 0.6 - 1.1 MG/DL    Glucose 256 (H) 70 - 99 MG/DL    Calcium 9.8 8.3 - 10.6 MG/DL    Albumin 4.0 3.4 - 5.0 GM/DL    Total Protein 7.6 6.4 - 8.2 GM/DL    Total Bilirubin 0.5 0.0 - 1.0 MG/DL    ALT 16 10 - 40 U/L    AST 32 15 - 37 IU/L    Alkaline Phosphatase 132 (H) 40 - 129 IU/L    GFR Non-African American >60 >60 mL/min/1.73m2    GFR African American >60 >60 mL/min/1.73m2    Anion Gap 33 (H) 4 - 16   Lactic Acid   Result Value Ref Range    Lactate 21.4 (HH) 0.4 - 2.0 mMOL/L   Blood Gas, Venous   Result Value Ref Range    pH, Regino 7.13 (L) 7.32 - 7.42    pCO2, Regino 28 (L) 38 - 52 mmHG    pO2, Regino 77 (H) 28 - 48 mmHG    Base Excess 19 (H) 0 - 2.4    HCO3, Venous 9.3 (L) 19 - 25 MMOL/L    O2 Sat, Regino 91.0 (H) 50 - 70 %    Comment VBG    Troponin   Result Value Ref Range    Troponin T <0.010 <0.01 NG/ML   Magnesium   Result Value Ref Range    Magnesium 1.8 1.8 - 2.4 mg/dl   POCT Glucose   Result Value Ref Range    POC Glucose 206 (H) 70 - 99 MG/DL   EKG 12 Lead   Result Value Ref Range    Ventricular Rate 163 BPM    Atrial Rate 163 BPM    QRS Duration 76 ms    Q-T Interval 302 ms    QTc Calculation (Bazett) 497 ms    R Axis 77 degrees    T Axis 91 degrees    Diagnosis       Supraventricular tachycardia  Nonspecific ST abnormality  Abnormal ECG  When compared with ECG of 23-AUG-2022 11:40,  Questionable change in QRS axis            EKG: (All EKG's are interpreted by myself in the absence of a cardiologist)  12 lead EKG:  Supraventricular Tachycardia (SVT) 163  Axis is   Normal  QTc is  497  There is no specific ST-T wave changes appreciated. There is no clear evidence of acute ischemia or infarction. It was compared against an EKG from none    12 lead EKG:  Sinus Tachycardia 141  Axis is   Normal  QTc is   523  There is no specific ST-T wave changes appreciated. There is no clear evidence of acute ischemia or infarction. It was compared against an EKG from earlier. .      MDM:  EMS reports that the patient had 3-4 seizures in front of them. They did not give any medications. No family at bedside. Patient had a seizure while in the emergency department right after being brought back to the trauma bay. She was given 2 mg of IV Ativan. Patient's EKG showed SVT. After the initial seizure patient started gargling and her oxygen level did drop significantly. Given this as well as her diminished GCS I did make the decision to emergently intubate the patient. Per chart review Per nursing staff she is a full code. I did intubate the patient. She has 2 peripheral IVs.  I did give her 6 mg of adenosine followed by 12 mg of adenosine x2. On the third dose of adenosine her heart rate went from the 160s and SVT to a normal sinus rhythm without underlying A. fib or a flutter. However she then went back into what appears to be a sinus tachycardia. I did consult cardiology  evaluated the patient in the EKG. He recommends an echocardiogram.  Patient's head CT does not show any acute abnormalities. Per family who are now at bedside patient has a history of metastatic cancer with mets to the brain. She had a craniotomy about 2 to 3 months ago per family.   She is not on blood thinners but she does take Keppra. Given her seizures we did load the patient with IV Keppra. She also has a prolonged QTC of 535. I have ordered IV magnesium as well as IV bicarb given that her bicarb is 9.3. She is also received IV fluid. She has a significant lactic acid 21.4. Her white blood cell count is 24.6. Her electrolytes are otherwise unremarkable except as above. Her troponin is normal.  She did arrive in a c-collar. CT of the cervical spine shows no acute abnormalities. We have also ordered broad-spectrum antibiotics and IV propofol for the patient. Discussed with the ICU attending. We discussed the possibility of an LP to rule out meningitis. I did attempt an LP however when we rolled the patient onto her side even with her being intubated she dropped to 84% very quickly, possibly within 15 to 20 seconds. Therefore I decided that it was unsafe to continue. We have ordered a more broad-spectrum antibiotic coverage at this time. I did discuss with peer, the ICU PA that we were not able to complete the LP. 55 minutes of critical care time was spent with this patient. This excludes time spent on separate billable procedures. The benefits, risks, and alternatives of intubation were not discussed with the patient and consent was assumed due to the critical nature of the patient's symptoms. Pre-oxygenation was administered and the appropriate equipment and staff were made available at the bedside. Eboni Ibarra was sedated/paralyzed; please see the chart for the drugs and dosages administered. A Erica 3 laryngoscope blade was used for a grade 2 view and a 7.5mm endotracheal tube was viewed to pass through the cords on the first attempt. The tube was secured at 22cm to the lip. Tracheal position was confirmed using a colorimetric end-tidal CO2 detector, tube condensation and chest auscultation/visualization.  Respiratory therapy is at the bedside and is assisting with ventilatory management. Clinical Impression:  1. Altered mental status, unspecified altered mental status type    2. Acute respiratory failure, unspecified whether with hypoxia or hypercapnia ([facility] [address]    3. Septic shock ([facility] [address]    4. Seizures ([facility] [address]        Disposition Vitals:  [unfilled], [unfilled], [unfilled], [unfilled]    Disposition referral (if applicable):  No follow-up provider specified.     Disposition medications (if applicable):  New Prescriptions    No medications on file         (Please note that portions of this note may have been completed with a voice recognition program. Efforts were made to edit the dictations but occasionally words are mis-transcribed.)    MD Wilner Gomez MD  09/29/22 9004

## 2022-09-29 NOTE — ED NOTES
Squeeze on the right and wiggle toes on the right, not on the left. Left gaze with eyes.       Grover Swann RN  09/29/22 0702

## 2022-09-29 NOTE — PROGRESS NOTES
Attending attestation note    Patient critical care due to:  [x] Neurological monitoring and treatment  [x] Respiratory failure -assessment of ventilator support, adjustment, ventilator weaning  [] Hemodynamic and volume assessment with volume resuscitation  [x] Mechanical and/or chemical support of the circulation,  [] Frequent vasoactive agent adjustment,  [] Renal replacement therapy,  [] For rapid decompensation,  [] Electrolyte instability  [] Suctioning every 2 hours  [x] Every hour neuro checks  [] Every hour neurovascular checks  [x] Frequent evaluation of patient's response to treatment and titration of therapies,  [x] Interpretation of laboratory and radiological data,  [x] Application and interpretation of advanced monitoring technologies,  [x] Extensive interpretation of multiple databases,  [x] Development of treatment plan with patient, surrogate, or consultants. [x] Others: continuous EEG    Attending note and attestation:   [x] I saw and evaluated the patient. I discussed the case with the [facility] provider, reviewed the medical record, and agree with the findings and plan as documented in their portion of this note. [] I was present with the [facility] provider during the interview and exam.  I discussed the case with the [facility] provider, reviewed the medical record and agree with the findings and plan as documented in the provider portion of this note. [] I personally interviewed and examined the patient and reviewed the medical record including the radiographs and laboratory data. I agree with the findings and plan as documented in the [facility] provider's portion of this note. I personally spent 38 minutes in the care of this patient including but not limited to reviewing history, examining the patient, reviewing imaging and laboratory findings, discussing care with the other members of the ICU team and consultants, and coordinating the patient's care.     Diagnosis:   Status epilepticus  Acute metabolic encephalopathy  Acute respiratory failure  Hyperglycemia  Acute metabolic acidosis  PVD  HTN  CAD    Attending note, comments:   66yoF with PMH of lung CA with mets to the brain s/p cranie and resection, s/p chemo, and lung lobectomy in 2019 and hx of seizures on AEDs who presented to the ED after falling at home sticking her head and having multiple recurrent witnessed seizure despite appropriate AED. Patient was intubated in the ED for airway protection, started on Keppra load 3.5 g and continued home dose. Patient requires continuous EEG which this facility does not have, Discussion with transfer center for transfer to [facility]. Accepted, awaitng bed assignment and transport. Neuro: status epilepticus on AED adjust with Keppra, Phos phenytoin, propofol. CT brain neg for acute findings. Ct C/spine neg for acute findings. Q1hr Neurochecks. Awaiting transfer  Cardio: no acute issues Hemodynamically stable. Continue to monitor  Resp: Acute resp failure, intubated for airway protection. Requring emchanical ventilation. Adjust vent as needed. Duo Nebs. Daily SAT, SBT. CXR show worsening infiltrates concern for possible aspiration. No indication for Abx at this time. GI: Start TF. GI ppx famotidine, DC once at goal.   : Lactic acidosis and elevated CK likely in setting of seizure, repeat labs for clearance. Slow hydration. Monitor electrolytes and replenish as needed. Heme: no acute issues, DVT ppx: lovenox  ID: Failed attempt at LP in the ED. Started empirically on meningitis (Vanco, Cefepime, Ampicillin, Acyclovir) coverage in light of recent crani. F/u MRI and de-escalate as needed. Endo: no acute issues. Monitor -180 mg/dL. ISS q6hr. Monitor  MSK: elevated CK in light of seizure activity. Trend. Monitor. No other acute issues.

## 2022-09-29 NOTE — ED NOTES
Hx of seizures and brain cancer. Witnessed by family. No IV access at this time. Dr. Jhon Paez is at bedside.       Kaley Glover RN  09/29/22 2913

## 2022-09-29 NOTE — H&P
V2.0  History and Physical      Name:  Landon Montanez /Age/Sex: 1955  (32 y.o. female)   MRN & CSN:  [id number] & [id number] Encounter Date/Time: 2022 3:33 PM EDT   Location:  [place] [address] PCP: Sravanthi Kaplan MD       Hospital Day: 1    Assessment and Plan:   Landon Montanez is a 61 y.o. female with a pmh of lung carcinoma s/p chemotherapy, s/p left lung lobectomy , CAD, HTN, HLD, PVD s/p left toes amputation 2022 seizures, who presents with Seizure [facility]), intubated in the ER, loaded with antiepileptics, admitted to ICU for close monitoring, evaluation, treatments      Hospital Problems             Last Modified POA    * (Principal) Seizure ([facility] [address] 2022 Yes     Seizures, PTA  Acute metabolic encephalopathy  Acute respiratory failure with hypoxia  Anion gap metabolic acidosis  Leukocytosis  Hyperglycemia    Chronic conditions:  Lung carcinoma s/p chemotherapy  S/p left lung lobectomy   Right frontal brain mass/metastasis disease s/p right frontal craniotomy with mass resection on 2022, s/p cryo gamma knife radiosurgery in 2022  CAD   HTN  HLD  PVD s/p left toes amputation 2022   seizures    Plan:  Admit to ICU  Mental mechanical support, chest x-ray and ABG reviewed (repeat as needed and daily) and adjust vent settings as appropriate, RT protocol, breathing treatments.   Continue Keppra  Neurology consult  Continuous EEG  LP attempted in the ER, unsuccessful due to patient decompensation  Continue antibiotics (on vancomycin, cefepime, acyclovir, and ampicillin)  Follow-up on cultures, de-escalate antibiotics as appropriate  Trend and CK  Hydration with IV fluids, daily labs, replace electrolytes needed, monitor I's/O's  Resume home medications when able to  Accu-Cheks with ISS/hypoglycemia protocol  Start tube feeds  DVT prophylaxis with Lovenox  GI prophylaxis with Pepcid  Continue supportive care        Disposition:   Current Living situation: Home   Expected Disposition: Home when ready  Estimated D/C: TBD    Diet Diet NPO   DVT Prophylaxis [x] Lovenox, []  Heparin, [] SCDs, [] Ambulation,  [] Eliquis, [] Xarelto, [] Coumadin   Code Status Full Code   Surrogate Decision Maker/ POA ? History from:     reason patient could not give history:  altered mental status and on ventilator    History of Present Illness:     Chief Complaint: Seizure ([address]  Shreya Hastings is a 61 y.o. female with pmh of lung carcinoma s/p chemotherapy, s/p left lung lobectomy 2019, metastatic brain mass, CAD, HTN, HLD, PVD s/p left toes amputation August 2022 seizures who presents with a change mental status, seizure activity, and a fall. CT scan of the head/brain showed right frontal postsurgical changes with stable encephalomalacia. CT spine cervical showed no acute traumatic injury. Chest x-ray showed ET tube in good position postintubation. Labs are significant for elevated WBC-24.6, high lactate at 21.4, anion gap is 33, CO2 is 13, sodium-146, glucose 256. Patient was unresponsive on arrival to ER, intubated for airway protection, loaded with 3.5 g of Keppra IV, and received lorazepam 2 g IV. Blood have been sent for cultures, CK pending. Due to high suspicion for possible CNS infection, patient empirically started on antimicrobials (acyclovir, vancomycin, ampicillin, and cefepime). LP attempt was unsuccessful due to patient hemodynamic instability. Labs, images, records reviewed, and patient is examined in the ER-4. Review of Systems: Need 10 Elements     ROS not completed patient current clinical status    Objective:      Intake/Output Summary (Last 24 hours) at 9/29/2022 1533  Last data filed at 9/29/2022 1418  Gross per 24 hour   Intake 100 ml   Output --   Net 100 ml      Vitals:   Vitals:    09/29/22 1300 09/29/22 1302 09/29/22 1306 09/29/22 1510   BP: (!) 209/119  (!) 168/97    Pulse: (!) 160 (!) 146 (!) 152 (!) 129   Resp: 18 14  (!) 33   SpO2: 100% 99%  100%   Weight: Medications Prior to Admission     Prior to Admission medications    Medication Sig Start Date End Date Taking? Authorizing Provider   levETIRAcetam (KEPPRA) 500 MG tablet Take 1 tablet by mouth 2 times daily 8/23/22   Jory Coombs DO   alendronate (FOSAMAX) 70 MG tablet Take 1 tablet by mouth once a week 6/12/22   Historical Provider, MD   atorvastatin (LIPITOR) 40 MG tablet Take 1 tablet by mouth in the morning. 8/12/22   Zoila Hamm MD   gabapentin (NEURONTIN) 300 MG capsule Take 300 mg by mouth at bedtime. 3/15/22   Historical Provider, MD   glimepiride (AMARYL) 2 MG tablet Take 1 tablet by mouth in the morning. 7/19/22   Historical Provider, MD   metFORMIN (GLUCOPHAGE) 1000 MG tablet Take 1,000 mg by mouth 2 times daily (with meals) 7/19/22   Historical Provider, MD   pantoprazole (PROTONIX) 40 MG tablet Take 1 tablet by mouth daily  Patient taking differently: Take 20 mg by mouth daily 10/4/20 8/29/22  Jimmy Harvey MD   cilostazol (PLETAL) 100 MG tablet Take 100 mg by mouth 2 times daily    Historical Provider, MD   lisinopril (PRINIVIL;ZESTRIL) 5 MG tablet Take 15 mg by mouth daily. Historical Provider, MD   cloNIDine (CATAPRES) 0.1 MG tablet Take 0.1 mg by mouth 2 times daily. Historical Provider, MD   simvastatin (ZOCOR) 20 MG tablet Take 40 mg by mouth nightly   8/11/22  Historical Provider, MD       Physical Exam: Need 8 Elements     General: Appears malnourished and chronically ill, intubated, ventilated, sedated  Eyes: Pupils equal, round, reactive to light, + corneals  ENT: ET tube in place. Neck supple  Cardiovascular: Regular rate. Respiratory: Clear to auscultation  Gastrointestinal: Soft, non tender  Genitourinary: no suprapubic tenderness  Musculoskeletal: No edema  Skin: warm, dry  Neuro: Localizing with suctioning, intubated, limited exam  Psych: Mood appropriate.        Past Medical History:   PMHx   Past Medical History:   Diagnosis Date    Cancer ([facility] [address]     Diabetes mellitus ([facility]     Hyperlipidemia     Hypertension     Seizure ([facility]      PSHX:  has a past surgical history that includes Lung cancer surgery (Left, 10/2019); Upper gastrointestinal endoscopy (N/A, 10/3/2020); and Toe amputation (Left, 8/8/2022). Allergies: No Known Allergies  Fam HX:  family history is not on file. Soc HX:   Social History     Socioeconomic History    Marital status:    Tobacco Use    Smoking status: Former     Packs/day: 1.00     Types: Cigarettes     Quit date: 2019     Years since quitting: 3.7    Smokeless tobacco: Never    Tobacco comments:     10/2019   Substance and Sexual Activity    Alcohol use:  Yes     Alcohol/week: 1.0 standard drink     Types: 1 Cans of beer per week    Drug use: Yes     Types: Marijuana Elin Mejia)    Sexual activity: Yes     Partners: Male       Medications:   Medications:    propofol        etomidate  20 mg IntraVENous Once    rocuronium  70 mg IntraVENous Once    sodium chloride  1,000 mL IntraVENous Once    magnesium sulfate  2,000 mg IntraVENous Once    cefepime  2,000 mg IntraVENous Once    vancomycin  25 mg/kg IntraVENous Once    ampicillin IV  2,000 mg IntraVENous Once    acyclovir  10 mg/kg IntraVENous Once    sodium chloride flush  5-40 mL IntraVENous 2 times per day    enoxaparin  40 mg SubCUTAneous Daily    levETIRAcetam  500 mg IntraVENous Q12H      Infusions:    propofol 30 mcg/kg/min (09/29/22 1516)    sodium bicarbonate infusion 150 mL/hr at 09/29/22 1418    sodium chloride       PRN Meds: sodium chloride flush, 5-40 mL, PRN  sodium chloride, , PRN  ondansetron, 4 mg, Q8H PRN   Or  ondansetron, 4 mg, Q6H PRN  acetaminophen, 650 mg, Q6H PRN   Or  acetaminophen, 650 mg, Q6H PRN  potassium chloride, 20 mEq, PRN   Or  potassium chloride, 10 mEq, PRN  magnesium sulfate, 2,000 mg, PRN  sodium phosphate IVPB, 10 mmol, PRN   Or  sodium phosphate IVPB, 15 mmol, PRN   Or  sodium phosphate IVPB, 20 mmol, PRN  polyethylene glycol, 17 g, Daily PRN  bisacodyl, 5 mg, Daily PRN        Labs      CBC:   Recent Labs     09/29/22  1244   WBC 24.6*   HGB 14.2        BMP:    Recent Labs     09/29/22  1244   *   K 3.8      CO2 13*   BUN 5*   CREATININE 0.7   GLUCOSE 256*     Hepatic:   Recent Labs     09/29/22  1244   AST 32   ALT 16   BILITOT 0.5   ALKPHOS 132*     Lipids: No results found for: CHOL, HDL, TRIG  Hemoglobin A1C:   Lab Results   Component Value Date/Time    LABA1C 6.8 08/03/2022 11:28 PM     TSH: No results found for: TSH  Troponin:   Lab Results   Component Value Date/Time    TROPONINT <0.010 09/29/2022 12:44 PM    TROPONINT <0.010 10/03/2020 05:54 AM    TROPONINT <0.010 10/02/2020 08:34 PM     Lactic Acid: No results for input(s): LACTA in the last 72 hours. BNP: No results for input(s): PROBNP in the last 72 hours.   UA:  Lab Results   Component Value Date/Time    NITRU NEGATIVE 10/03/2020 12:51 AM    COLORU YELLOW 10/03/2020 12:51 AM    WBCUA 4 10/03/2020 12:51 AM    RBCUA NONE SEEN 10/03/2020 12:51 AM    MUCUS RARE 10/03/2020 12:51 AM    TRICHOMONAS NONE SEEN 10/03/2020 12:51 AM    BACTERIA RARE 10/03/2020 12:51 AM    CLARITYU HAZY 10/03/2020 12:51 AM    SPECGRAV 1.005 10/03/2020 12:51 AM    LEUKOCYTESUR MODERATE 10/03/2020 12:51 AM    UROBILINOGEN NORMAL 10/03/2020 12:51 AM    BILIRUBINUR NEGATIVE 10/03/2020 12:51 AM    BLOODU NEGATIVE 10/03/2020 12:51 AM    KETUA NEGATIVE 10/03/2020 12:51 AM     Urine Cultures: No results found for: Lottie Garza  Blood Cultures: No results found for: BC  No results found for: BLOODCULT2  Organism: No results found for: ORG    Imaging/Diagnostics Last 24 Hours   CT HEAD WO CONTRAST    Result Date: 9/29/2022  EXAMINATION: CT OF THE HEAD WITHOUT CONTRAST  9/29/2022 1:29 pm TECHNIQUE: CT of the head was performed without the administration of intravenous contrast. Automated exposure control, iterative reconstruction, and/or weight based adjustment of the mA/kV was utilized to reduce the radiation dose to as low as reasonably achievable. COMPARISON: 08/23/2022 HISTORY: ORDERING SYSTEM PROVIDED HISTORY: fall, seizure, h/o brain mets, flaccid on left TECHNOLOGIST PROVIDED HISTORY: Reason for exam:->fall, seizure, h/o brain mets, flaccid on left Has a \"code stroke\" or \"stroke alert\" been called? ->No Decision Support Exception - unselect if not a suspected or confirmed emergency medical condition->Emergency Medical Condition (MA) Reason for Exam: fall, seizure, h/o brain mets, flaccid on left Additional signs and symptoms: vented Relevant Medical/Surgical History: none FINDINGS: BRAIN/VENTRICLES: Stable focal area of encephalomalacia in the right frontal lobe in the area of prior surgery. There are nonspecific hypoattenuating foci in the subcortical and periventricular white matter that most likely represent chronic microangiopathic ischemic changes in a patient of this age. There is no acute intracranial hemorrhage, mass effect or midline shift. No abnormal extra-axial fluid collection. The gray-white differentiation is maintained without evidence of an acute infarct. There is no evidence of hydrocephalus. ORBITS: The visualized portion of the orbits demonstrate no acute abnormality. SINUSES: The visualized paranasal sinuses and mastoid air cells demonstrate no acute abnormality. SOFT TISSUES/SKULL:  Evidence of prior right frontal craniotomy. Remote right frontal postsurgical changes with stable encephalomalacia. No acute intracranial abnormality. Stable exam.     CT CERVICAL SPINE WO CONTRAST    Result Date: 9/29/2022  EXAMINATION: CT OF THE CERVICAL SPINE WITHOUT CONTRAST 9/29/2022 1:31 pm TECHNIQUE: CT of the cervical spine was performed without the administration of intravenous contrast. Multiplanar reformatted images are provided for review.  Automated exposure control, iterative reconstruction, and/or weight based adjustment of the mA/kV was utilized to reduce the radiation dose to as low as reasonably achievable. COMPARISON: None. HISTORY: ORDERING SYSTEM PROVIDED HISTORY: fall, unresponsive TECHNOLOGIST PROVIDED HISTORY: Reason for exam:->fall, unresponsive Decision Support Exception - unselect if not a suspected or confirmed emergency medical condition->Emergency Medical Condition (MA) Reason for Exam: Fall witnessed then seizure activity. Hx brain cancer. Additional signs and symptoms: vented Relevant Medical/Surgical History: none FINDINGS: BONES/ALIGNMENT: There is no acute fracture or traumatic malalignment. DEGENERATIVE CHANGES: Severe multilevel disc degenerative changes. Mild-to-moderate multilevel bilateral facet arthritis. No severe spinal canal stenosis. Mild canal narrowing at C5-6 and C6-7. SOFT TISSUES: There is an endotracheal tube in the trachea. There is a right-sided central line partially imaged. No focal prevertebral soft tissue swelling. Lung apices demonstrate no acute abnormality. Atherosclerotic calcifications of the carotid arteries. Severe multilevel disc degenerative changes and mild-to-moderate multilevel bilateral facet arthritis. No acute fracture or traumatic malalignment. XR CHEST PORTABLE    Result Date: 9/29/2022  EXAMINATION: ONE XRAY VIEW OF THE CHEST 9/29/2022 2:04 pm COMPARISON: August 25, 2022 HISTORY: ORDERING SYSTEM PROVIDED HISTORY: tube placement TECHNOLOGIST PROVIDED HISTORY: Reason for exam:->tube placement Reason for Exam: et tube placement Additional signs and symptoms: na Relevant Medical/Surgical History: diabetes, hypertension FINDINGS: Endotracheal tube approximately 3.7 cm above the adina. Access port with the tip in the SVC. Status post left lobectomy. Loss of volume in the left lung following lobectomy. No evidence of focal infiltrates interstitial lung disease or pleural disease. Successful intubation with endotracheal tube 3.7 cm above the adina. Stable access port. No other acute abnormality.

## 2022-09-29 NOTE — PROGRESS NOTES
RT assisted Dr Antoinette Vaughn intubate with 7.5 ETT to 22cm at lips while Corinne bagged. Bilateral breath sounds heard and color change noted on EZCap. Secured with commercial tube hinton and placed on vent with settings of AC/14/500/5/30%.

## 2022-09-30 PROBLEM — R74.8 ELEVATED CK: Status: ACTIVE | Noted: 2022-01-01

## 2022-09-30 PROBLEM — E87.29 METABOLIC ACIDOSIS, INCREASED ANION GAP: Status: ACTIVE | Noted: 2022-09-30

## 2022-09-30 PROBLEM — E87.6 HYPOKALEMIA: Status: ACTIVE | Noted: 2022-01-01

## 2022-09-30 PROBLEM — D72.829 LEUKOCYTOSIS: Status: ACTIVE | Noted: 2022-01-01

## 2022-09-30 PROBLEM — E83.39 HYPOPHOSPHATEMIA: Status: ACTIVE | Noted: 2022-01-01

## 2022-09-30 PROBLEM — J96.90 RESPIRATORY FAILURE (HCC): Status: ACTIVE | Noted: 2022-01-01

## 2022-09-30 LAB
ESTIMATED AVERAGE GLUCOSE: 123 MG/DL
HBA1C MFR BLD: 5.9 % (ref 4.2–6.3)

## 2022-09-30 NOTE — DISCHARGE SUMMARY
Discharge Summary    [place]  :  [birth year][address]  MRN:  [id number]    ADMIT DATE:  2022  DISCHARGE DATE:  2022    PRIMARY CARE PHYSICIAN:  Oz Fernandez MD    VISIT STATUS: Admission    CODE STATUS:  Prior    DISCHARGE DIAGNOSES:  Principal Problem:    Seizure ([address]  Active Problems:    Metabolic acidosis, increased anion gap    Respiratory failure (HCC)    Hypokalemia    Hypophosphatemia    Leukocytosis    Elevated CK  Resolved Problems:    * No resolved hospital problems. *      HOSPITAL COURSE:  61-year-old female with past medical history of lung cancer with mets to the brain status post [place] and resection, status postchemotherapy, status post lung lobectomy in 2019 and a history of seizure on antiepileptics at home who presented to the emergency department after falling at home striking her head and having multiple recurrent witnessed seizures despite appropriate breakthrough through therapy. Upon arrival to the emergency department patient had recurrent seizures was loaded with Keppra 3.5g and continued on her home dose. In light of recurrent seizures patient was intubated for airway protection and use of propofol as an antiepileptic. Patient was noted to have a lactic acidosis upon arrival and an elevated CK which are attributed to her seizures. Repeat labs show improvement in her lactic acidosis from 21.4 to 10.4. Neurosurgery and neurology consulted recommend transfer to facility for continuous EEG placement. Patient's labs and electrolytes were monitored and replenished noted to be hypokalemic and hypophosphatemic for center was contacted patient accepted at [address] [place]:  Status epilepticus  CONSULTANTS:  Neurology   Cardiology   ICU  RECOMMENDED NEXT STEPS:   Transfer to [facility] for cEEG placement and monitoring in light of status epilepticus.       DISCHARGE MEDICATIONS:         Medication List        ASK your doctor about these medications      alendronate 70 MG tablet  Commonly known as: FOSAMAX     atorvastatin 40 MG tablet  Commonly known as: LIPITOR  Take 1 tablet by mouth in the morning. cilostazol 100 MG tablet  Commonly known as: PLETAL     cloNIDine 0.1 MG tablet  Commonly known as: CATAPRES     gabapentin 300 MG capsule  Commonly known as: NEURONTIN     glimepiride 2 MG tablet  Commonly known as: AMARYL     levETIRAcetam 500 MG tablet  Commonly known as: Keppra  Take 1 tablet by mouth 2 times daily     lisinopril 5 MG tablet  Commonly known as: PRINIVIL;ZESTRIL     metFORMIN 1000 MG tablet  Commonly known as: GLUCOPHAGE     pantoprazole 40 MG tablet  Commonly known as: PROTONIX  Take 1 tablet by mouth daily     simvastatin 20 MG tablet  Commonly known as: ZOCOR              DIET: No diet orders on file    ACTIVITY: up with assist  ______________________________________________________________________  COMPLEXITY OF FOLLOW UP:   [] Moderate Complexity: follow up within 7-14 calendar days (14886)   [] Severe Complexity: follow up within 7 calendar days (35733)    FOLLOW UP TESTING, PENDING RESULTS OR REFERRALS AT TRANSITIONAL CARE VISIT:   []  Yes    []  No    PENDING STUDIES:       DISPOSITION: Transfer to [address] [place] [address] NAME: [place]    Follow up with No follow-up provider specified. INSTRUCTIONS TO MA/SW: Please call patient on day after discharge (must document patient  contacted within 2 business days of discharge). FOLLOW UP QUESTIONS FOR MA/SW:  1. Did you get medications filled and taking them as instructed from discharge? 2. Are you following your discharge instructions from your hospital stay? 3. Please confirm patient is scheduled for a follow up appointment within the above time frame.     DISCHARGE TIME: < 30 minutes    SIGNED:  Beryle Formosa, MD   9/30/2022, 5:55 AM

## 2022-09-30 NOTE — PROCEDURES
CONTINUOUS ELECTROENCEPHALOGRAM (cvEEG) REPORT    Identifying Information:  Name: Ruiz Lopez  MRN: [id number]  : 1955  Interpreting Physician: Shoshana Dill DO  Referring Provider: Irwin Crane NP      Clinical History:  Ruiz Lopez is an 61 y.o. female with concerns for seizure like activity. Past Medical History:  Past Medical History:   Diagnosis Date    Cancer ([facility]     Diabetes mellitus ([facility]     Hyperlipidemia     Hypertension     Seizure ([facility]         Current Medications:        cEEG start date & time: 22 at 1702  cEEG end date & time: 22 at      Indication:  cEEG was initiated for monitoring and localization of seizures as the etiology of the encephalopathy/altered mental status. It was available for review at the bedside as well as via remote access for the entire period of monitoring. Technical Summary:  8 channels of continuous EEG over the bilateral frontopolar, temporal and occipital head regions were recorded in a digital format on a patient who is reported to be intubated and minimally responsive during the recording. The background consists of 3-5 Hz activity in the delta/theta frequency range. It is symmetric, normal voltage and continuous. Posterior dominant rhythm (PDR) is absent, but the background appears minimally reactive. No clinical events reported. Of note, excessive amounts of EMG and electrode artifact was seen throughout the recording making interpretation difficult at times. EEG Interpretation: This EEG is abnormal due to the presence of:   Moderate to severe generalized slowing. This is a non-specific finding but is consistent with a generalized disturbance of cerebral function. It may be seen in a variety of conditions, such as toxic, metabolic, post-anoxic, multi-focal or diffuse structural abnormalities. No electrographic seizures or non-convulsive status epilepticus was seen over the entire monitoring period.       Of note, this EEG is limited due to the number of electrodes and lack of parasaggital brain region coverage. If clinical suspicions persist for seizure as an etiology for the patient's symptoms, or if suspicion remains high for an underlying seizure disorder, additional EEG testing utilizing the standard 10-20 system of electrode placement for full cerebral coverage should be considered. Clinical correlation recommended.      Mattie Hurst,    Epileptologist  9/30/2022 3:47 PM

## 2022-10-04 ENCOUNTER — HOSPITAL ENCOUNTER (OUTPATIENT)
Dept: WOUND CARE | Age: 67
Discharge: HOME | End: 2022-10-04

## 2022-10-04 LAB
CULTURE: NORMAL
CULTURE: NORMAL
Lab: NORMAL
Lab: NORMAL
SPECIMEN: NORMAL
SPECIMEN: NORMAL

## 2022-11-15 NOTE — TELEPHONE ENCOUNTER
This SN called patient to follow up ;Left message asking patient to call the [address] for an appointment if  needed.

## 2022-11-26 ENCOUNTER — HOSPITAL ENCOUNTER (INPATIENT)
Age: 67
LOS: 11 days | Discharge: HOSPICE | End: 2022-12-08
Attending: EMERGENCY MEDICINE | Admitting: INTERNAL MEDICINE
Payer: MEDICARE

## 2022-11-26 DIAGNOSIS — K92.2 GASTROINTESTINAL HEMORRHAGE, UNSPECIFIED GASTROINTESTINAL HEMORRHAGE TYPE: Primary | ICD-10-CM

## 2022-11-26 DIAGNOSIS — E87.6 HYPOKALEMIA: ICD-10-CM

## 2022-11-26 DIAGNOSIS — L03.032 CELLULITIS OF TOE OF LEFT FOOT: ICD-10-CM

## 2022-11-26 PROCEDURE — 99285 EMERGENCY DEPT VISIT HI MDM: CPT

## 2022-11-26 PROCEDURE — 96374 THER/PROPH/DIAG INJ IV PUSH: CPT

## 2022-11-27 ENCOUNTER — APPOINTMENT (OUTPATIENT)
Dept: GENERAL RADIOLOGY | Age: 67
End: 2022-11-27
Payer: MEDICARE

## 2022-11-27 PROBLEM — D64.9 ACUTE ON CHRONIC ANEMIA: Status: ACTIVE | Noted: 2022-01-01

## 2022-11-27 LAB
ABO/RH: NORMAL
ALBUMIN SERPL-MCNC: 3.4 GM/DL (ref 3.4–5)
ALP BLD-CCNC: 92 IU/L (ref 40–128)
ALT SERPL-CCNC: 14 U/L (ref 10–40)
ANION GAP SERPL CALCULATED.3IONS-SCNC: 11 MMOL/L (ref 4–16)
ANION GAP SERPL CALCULATED.3IONS-SCNC: 11 MMOL/L (ref 4–16)
ANTIBODY SCREEN: NEGATIVE
AST SERPL-CCNC: 18 IU/L (ref 15–37)
BASOPHILS ABSOLUTE: 0.1 K/CU MM
BASOPHILS ABSOLUTE: 0.1 K/CU MM
BASOPHILS RELATIVE PERCENT: 0.5 % (ref 0–1)
BASOPHILS RELATIVE PERCENT: 0.5 % (ref 0–1)
BILIRUB SERPL-MCNC: 0.5 MG/DL (ref 0–1)
BUN BLDV-MCNC: 28 MG/DL (ref 6–23)
BUN BLDV-MCNC: 31 MG/DL (ref 6–23)
CALCIUM SERPL-MCNC: 9 MG/DL (ref 8.3–10.6)
CALCIUM SERPL-MCNC: 9.4 MG/DL (ref 8.3–10.6)
CHLORIDE BLD-SCNC: 102 MMOL/L (ref 99–110)
CHLORIDE BLD-SCNC: 104 MMOL/L (ref 99–110)
CO2: 24 MMOL/L (ref 21–32)
CO2: 24 MMOL/L (ref 21–32)
CREAT SERPL-MCNC: 0.6 MG/DL (ref 0.6–1.1)
CREAT SERPL-MCNC: 0.7 MG/DL (ref 0.6–1.1)
DIFFERENTIAL TYPE: ABNORMAL
DIFFERENTIAL TYPE: ABNORMAL
EOSINOPHILS ABSOLUTE: 0.1 K/CU MM
EOSINOPHILS ABSOLUTE: 0.1 K/CU MM
EOSINOPHILS RELATIVE PERCENT: 0.8 % (ref 0–3)
EOSINOPHILS RELATIVE PERCENT: 0.9 % (ref 0–3)
GFR SERPL CREATININE-BSD FRML MDRD: >60 ML/MIN/1.73M2
GFR SERPL CREATININE-BSD FRML MDRD: >60 ML/MIN/1.73M2
GLUCOSE BLD-MCNC: 134 MG/DL (ref 70–99)
GLUCOSE BLD-MCNC: 147 MG/DL (ref 70–99)
GLUCOSE BLD-MCNC: 155 MG/DL (ref 70–99)
GLUCOSE BLD-MCNC: 195 MG/DL (ref 70–99)
HCT VFR BLD CALC: 27.7 % (ref 37–47)
HCT VFR BLD CALC: 28.4 % (ref 37–47)
HEMOGLOBIN: 8.7 GM/DL (ref 12.5–16)
HEMOGLOBIN: 9.2 GM/DL (ref 12.5–16)
IMMATURE NEUTROPHIL %: 0.3 % (ref 0–0.43)
IMMATURE NEUTROPHIL %: 0.4 % (ref 0–0.43)
LACTIC ACID, SEPSIS: 1 MMOL/L (ref 0.5–1.9)
LIPASE: 13 IU/L (ref 13–60)
LYMPHOCYTES ABSOLUTE: 1.6 K/CU MM
LYMPHOCYTES ABSOLUTE: 2.4 K/CU MM
LYMPHOCYTES RELATIVE PERCENT: 14.4 % (ref 24–44)
LYMPHOCYTES RELATIVE PERCENT: 18.2 % (ref 24–44)
MAGNESIUM: 1.9 MG/DL (ref 1.8–2.4)
MCH RBC QN AUTO: 28.6 PG (ref 27–31)
MCH RBC QN AUTO: 29.7 PG (ref 27–31)
MCHC RBC AUTO-ENTMCNC: 31.4 % (ref 32–36)
MCHC RBC AUTO-ENTMCNC: 32.4 % (ref 32–36)
MCV RBC AUTO: 91.1 FL (ref 78–100)
MCV RBC AUTO: 91.6 FL (ref 78–100)
MONOCYTES ABSOLUTE: 0.8 K/CU MM
MONOCYTES ABSOLUTE: 1.1 K/CU MM
MONOCYTES RELATIVE PERCENT: 6.9 % (ref 0–4)
MONOCYTES RELATIVE PERCENT: 8.4 % (ref 0–4)
NUCLEATED RBC %: 0 %
NUCLEATED RBC %: 0 %
PDW BLD-RTO: 14.3 % (ref 11.7–14.9)
PDW BLD-RTO: 14.4 % (ref 11.7–14.9)
PLATELET # BLD: 353 K/CU MM (ref 140–440)
PLATELET # BLD: 363 K/CU MM (ref 140–440)
PMV BLD AUTO: 9.1 FL (ref 7.5–11.1)
PMV BLD AUTO: 9.2 FL (ref 7.5–11.1)
POTASSIUM SERPL-SCNC: 3.1 MMOL/L (ref 3.5–5.1)
POTASSIUM SERPL-SCNC: 3.3 MMOL/L (ref 3.5–5.1)
PROCALCITONIN: 0.09
RBC # BLD: 3.04 M/CU MM (ref 4.2–5.4)
RBC # BLD: 3.1 M/CU MM (ref 4.2–5.4)
SEGMENTED NEUTROPHILS ABSOLUTE COUNT: 8.3 K/CU MM
SEGMENTED NEUTROPHILS ABSOLUTE COUNT: 9.3 K/CU MM
SEGMENTED NEUTROPHILS RELATIVE PERCENT: 71.7 % (ref 36–66)
SEGMENTED NEUTROPHILS RELATIVE PERCENT: 77 % (ref 36–66)
SODIUM BLD-SCNC: 137 MMOL/L (ref 135–145)
SODIUM BLD-SCNC: 139 MMOL/L (ref 135–145)
TOTAL IMMATURE NEUTOROPHIL: 0.04 K/CU MM
TOTAL IMMATURE NEUTOROPHIL: 0.04 K/CU MM
TOTAL NUCLEATED RBC: 0 K/CU MM
TOTAL NUCLEATED RBC: 0 K/CU MM
TOTAL PROTEIN: 7.1 GM/DL (ref 6.4–8.2)
WBC # BLD: 10.8 K/CU MM (ref 4–10.5)
WBC # BLD: 12.9 K/CU MM (ref 4–10.5)

## 2022-11-27 PROCEDURE — 84145 PROCALCITONIN (PCT): CPT

## 2022-11-27 PROCEDURE — 6370000000 HC RX 637 (ALT 250 FOR IP): Performed by: INTERNAL MEDICINE

## 2022-11-27 PROCEDURE — 86901 BLOOD TYPING SEROLOGIC RH(D): CPT

## 2022-11-27 PROCEDURE — 6370000000 HC RX 637 (ALT 250 FOR IP): Performed by: EMERGENCY MEDICINE

## 2022-11-27 PROCEDURE — 83735 ASSAY OF MAGNESIUM: CPT

## 2022-11-27 PROCEDURE — 73630 X-RAY EXAM OF FOOT: CPT

## 2022-11-27 PROCEDURE — 94761 N-INVAS EAR/PLS OXIMETRY MLT: CPT

## 2022-11-27 PROCEDURE — 87075 CULTR BACTERIA EXCEPT BLOOD: CPT

## 2022-11-27 PROCEDURE — 87070 CULTURE OTHR SPECIMN AEROBIC: CPT

## 2022-11-27 PROCEDURE — 87186 SC STD MICRODIL/AGAR DIL: CPT

## 2022-11-27 PROCEDURE — 87040 BLOOD CULTURE FOR BACTERIA: CPT

## 2022-11-27 PROCEDURE — 80048 BASIC METABOLIC PNL TOTAL CA: CPT

## 2022-11-27 PROCEDURE — 82962 GLUCOSE BLOOD TEST: CPT

## 2022-11-27 PROCEDURE — 36415 COLL VENOUS BLD VENIPUNCTURE: CPT

## 2022-11-27 PROCEDURE — 2580000003 HC RX 258: Performed by: EMERGENCY MEDICINE

## 2022-11-27 PROCEDURE — 86850 RBC ANTIBODY SCREEN: CPT

## 2022-11-27 PROCEDURE — 6360000002 HC RX W HCPCS: Performed by: EMERGENCY MEDICINE

## 2022-11-27 PROCEDURE — 87077 CULTURE AEROBIC IDENTIFY: CPT

## 2022-11-27 PROCEDURE — 83605 ASSAY OF LACTIC ACID: CPT

## 2022-11-27 PROCEDURE — C9254 INJECTION, LACOSAMIDE: HCPCS | Performed by: INTERNAL MEDICINE

## 2022-11-27 PROCEDURE — 6360000002 HC RX W HCPCS: Performed by: INTERNAL MEDICINE

## 2022-11-27 PROCEDURE — 80053 COMPREHEN METABOLIC PANEL: CPT

## 2022-11-27 PROCEDURE — 6360000002 HC RX W HCPCS: Performed by: STUDENT IN AN ORGANIZED HEALTH CARE EDUCATION/TRAINING PROGRAM

## 2022-11-27 PROCEDURE — 2580000003 HC RX 258: Performed by: INTERNAL MEDICINE

## 2022-11-27 PROCEDURE — 83690 ASSAY OF LIPASE: CPT

## 2022-11-27 PROCEDURE — 86900 BLOOD TYPING SEROLOGIC ABO: CPT

## 2022-11-27 PROCEDURE — 1200000000 HC SEMI PRIVATE

## 2022-11-27 PROCEDURE — 85025 COMPLETE CBC W/AUTO DIFF WBC: CPT

## 2022-11-27 RX ORDER — ASPIRIN 81 MG/1
81 TABLET, CHEWABLE ORAL DAILY
Status: ON HOLD | COMMUNITY

## 2022-11-27 RX ORDER — MORPHINE SULFATE 2 MG/ML
2 INJECTION, SOLUTION INTRAMUSCULAR; INTRAVENOUS ONCE
Status: COMPLETED | OUTPATIENT
Start: 2022-11-27 | End: 2022-11-27

## 2022-11-27 RX ORDER — LANSOPRAZOLE 30 MG/1
30 CAPSULE, DELAYED RELEASE ORAL DAILY
Status: ON HOLD | COMMUNITY

## 2022-11-27 RX ORDER — SODIUM CHLORIDE 0.9 % (FLUSH) 0.9 %
5-40 SYRINGE (ML) INJECTION EVERY 12 HOURS SCHEDULED
Status: DISCONTINUED | OUTPATIENT
Start: 2022-11-27 | End: 2022-12-08 | Stop reason: HOSPADM

## 2022-11-27 RX ORDER — ONDANSETRON 4 MG/1
4 TABLET, ORALLY DISINTEGRATING ORAL EVERY 8 HOURS PRN
Status: DISCONTINUED | OUTPATIENT
Start: 2022-11-27 | End: 2022-12-08 | Stop reason: HOSPADM

## 2022-11-27 RX ORDER — OXYCODONE HYDROCHLORIDE 5 MG/1
5 TABLET ORAL EVERY 4 HOURS PRN
Status: ON HOLD | COMMUNITY

## 2022-11-27 RX ORDER — ACETAMINOPHEN 160 MG
TABLET,DISINTEGRATING ORAL
Status: ON HOLD | COMMUNITY

## 2022-11-27 RX ORDER — ONDANSETRON 2 MG/ML
4 INJECTION INTRAMUSCULAR; INTRAVENOUS EVERY 6 HOURS PRN
Status: DISCONTINUED | OUTPATIENT
Start: 2022-11-27 | End: 2022-12-08 | Stop reason: HOSPADM

## 2022-11-27 RX ORDER — CARVEDILOL 25 MG/1
25 TABLET ORAL 2 TIMES DAILY WITH MEALS
Status: ON HOLD | COMMUNITY

## 2022-11-27 RX ORDER — POTASSIUM CHLORIDE 20 MEQ/1
40 TABLET, EXTENDED RELEASE ORAL ONCE
Status: COMPLETED | OUTPATIENT
Start: 2022-11-27 | End: 2022-11-27

## 2022-11-27 RX ORDER — SCOLOPAMINE TRANSDERMAL SYSTEM 1 MG/1
1 PATCH, EXTENDED RELEASE TRANSDERMAL
Status: DISCONTINUED | OUTPATIENT
Start: 2022-11-27 | End: 2022-12-08 | Stop reason: HOSPADM

## 2022-11-27 RX ORDER — HALOPERIDOL 5 MG/ML
1 INJECTION INTRAMUSCULAR EVERY 6 HOURS PRN
Status: DISCONTINUED | OUTPATIENT
Start: 2022-11-27 | End: 2022-11-28

## 2022-11-27 RX ORDER — INSULIN LISPRO 100 [IU]/ML
0-4 INJECTION, SOLUTION INTRAVENOUS; SUBCUTANEOUS NIGHTLY
Status: DISCONTINUED | OUTPATIENT
Start: 2022-11-27 | End: 2022-12-08 | Stop reason: HOSPADM

## 2022-11-27 RX ORDER — LOSARTAN POTASSIUM 25 MG/1
50 TABLET ORAL DAILY
Status: DISCONTINUED | OUTPATIENT
Start: 2022-11-27 | End: 2022-12-08 | Stop reason: HOSPADM

## 2022-11-27 RX ORDER — LOSARTAN POTASSIUM 50 MG/1
50 TABLET ORAL DAILY
Status: ON HOLD | COMMUNITY

## 2022-11-27 RX ORDER — SODIUM CHLORIDE 0.9 % (FLUSH) 0.9 %
5-40 SYRINGE (ML) INJECTION PRN
Status: DISCONTINUED | OUTPATIENT
Start: 2022-11-27 | End: 2022-12-08 | Stop reason: HOSPADM

## 2022-11-27 RX ORDER — INSULIN GLARGINE 100 [IU]/ML
20 INJECTION, SOLUTION SUBCUTANEOUS
Status: DISCONTINUED | OUTPATIENT
Start: 2022-11-27 | End: 2022-12-08 | Stop reason: HOSPADM

## 2022-11-27 RX ORDER — THIAMINE MONONITRATE (VIT B1) 100 MG
100 TABLET ORAL DAILY
Status: ON HOLD | COMMUNITY

## 2022-11-27 RX ORDER — OXYCODONE HYDROCHLORIDE 5 MG/1
5 TABLET ORAL EVERY 4 HOURS PRN
Status: DISCONTINUED | OUTPATIENT
Start: 2022-11-27 | End: 2022-12-08 | Stop reason: HOSPADM

## 2022-11-27 RX ORDER — ACETAMINOPHEN 325 MG/1
650 TABLET ORAL EVERY 6 HOURS PRN
Status: DISCONTINUED | OUTPATIENT
Start: 2022-11-27 | End: 2022-12-08 | Stop reason: HOSPADM

## 2022-11-27 RX ORDER — INSULIN LISPRO 100 [IU]/ML
0-8 INJECTION, SOLUTION INTRAVENOUS; SUBCUTANEOUS
Status: DISCONTINUED | OUTPATIENT
Start: 2022-11-27 | End: 2022-12-08 | Stop reason: HOSPADM

## 2022-11-27 RX ORDER — GLYCOPYRROLATE 1 MG/1
1 TABLET ORAL 3 TIMES DAILY
Status: ON HOLD | COMMUNITY

## 2022-11-27 RX ORDER — INSULIN GLARGINE 100 [IU]/ML
20 INJECTION, SOLUTION SUBCUTANEOUS
Status: ON HOLD | COMMUNITY

## 2022-11-27 RX ORDER — CARVEDILOL 25 MG/1
25 TABLET ORAL 2 TIMES DAILY WITH MEALS
Status: DISCONTINUED | OUTPATIENT
Start: 2022-11-27 | End: 2022-12-05

## 2022-11-27 RX ORDER — SODIUM CHLORIDE 9 MG/ML
INJECTION, SOLUTION INTRAVENOUS PRN
Status: DISCONTINUED | OUTPATIENT
Start: 2022-11-27 | End: 2022-12-08 | Stop reason: HOSPADM

## 2022-11-27 RX ORDER — FOLIC ACID 1 MG/1
1 TABLET ORAL DAILY
Status: DISCONTINUED | OUTPATIENT
Start: 2022-11-27 | End: 2022-12-08 | Stop reason: HOSPADM

## 2022-11-27 RX ORDER — CIPROFLOXACIN 2 MG/ML
400 INJECTION, SOLUTION INTRAVENOUS EVERY 12 HOURS
Status: DISCONTINUED | OUTPATIENT
Start: 2022-11-27 | End: 2022-11-30

## 2022-11-27 RX ORDER — FOLIC ACID 1 MG/1
1 TABLET ORAL DAILY
Status: ON HOLD | COMMUNITY

## 2022-11-27 RX ORDER — LACOSAMIDE 50 MG/1
200 TABLET ORAL 2 TIMES DAILY
Status: ON HOLD | COMMUNITY

## 2022-11-27 RX ORDER — ACETAMINOPHEN 650 MG/1
650 SUPPOSITORY RECTAL EVERY 6 HOURS PRN
Status: DISCONTINUED | OUTPATIENT
Start: 2022-11-27 | End: 2022-12-08 | Stop reason: HOSPADM

## 2022-11-27 RX ORDER — SCOLOPAMINE TRANSDERMAL SYSTEM 1 MG/1
1 PATCH, EXTENDED RELEASE TRANSDERMAL
Status: ON HOLD | COMMUNITY

## 2022-11-27 RX ORDER — DEXTROSE MONOHYDRATE 100 MG/ML
INJECTION, SOLUTION INTRAVENOUS CONTINUOUS PRN
Status: DISCONTINUED | OUTPATIENT
Start: 2022-11-27 | End: 2022-12-08 | Stop reason: HOSPADM

## 2022-11-27 RX ORDER — LACTOBACILLUS RHAMNOSUS GG 10B CELL
1 CAPSULE ORAL
Status: DISCONTINUED | OUTPATIENT
Start: 2022-11-27 | End: 2022-12-08 | Stop reason: HOSPADM

## 2022-11-27 RX ORDER — LANOLIN ALCOHOL/MO/W.PET/CERES
100 CREAM (GRAM) TOPICAL DAILY
Status: DISCONTINUED | OUTPATIENT
Start: 2022-11-27 | End: 2022-12-08 | Stop reason: HOSPADM

## 2022-11-27 RX ADMIN — HALOPERIDOL LACTATE 1 MG: 5 INJECTION, SOLUTION INTRAMUSCULAR at 20:38

## 2022-11-27 RX ADMIN — MORPHINE SULFATE 2 MG: 2 INJECTION, SOLUTION INTRAMUSCULAR; INTRAVENOUS at 01:51

## 2022-11-27 RX ADMIN — CIPROFLOXACIN 400 MG: 2 INJECTION, SOLUTION INTRAVENOUS at 05:10

## 2022-11-27 RX ADMIN — OXYCODONE HYDROCHLORIDE 5 MG: 5 TABLET ORAL at 18:16

## 2022-11-27 RX ADMIN — SODIUM CHLORIDE 200 MG: 9 INJECTION, SOLUTION INTRAVENOUS at 22:01

## 2022-11-27 RX ADMIN — PIPERACILLIN AND TAZOBACTAM 3375 MG: 3; .375 INJECTION, POWDER, LYOPHILIZED, FOR SOLUTION INTRAVENOUS at 03:33

## 2022-11-27 RX ADMIN — SODIUM CHLORIDE 200 MG: 9 INJECTION, SOLUTION INTRAVENOUS at 08:50

## 2022-11-27 RX ADMIN — CIPROFLOXACIN 400 MG: 2 INJECTION, SOLUTION INTRAVENOUS at 20:50

## 2022-11-27 RX ADMIN — POTASSIUM CHLORIDE 40 MEQ: 20 TABLET, EXTENDED RELEASE ORAL at 04:26

## 2022-11-27 RX ADMIN — SODIUM CHLORIDE, PRESERVATIVE FREE 10 ML: 5 INJECTION INTRAVENOUS at 20:51

## 2022-11-27 RX ADMIN — HALOPERIDOL LACTATE 1 MG: 5 INJECTION, SOLUTION INTRAMUSCULAR at 12:52

## 2022-11-27 RX ADMIN — OXYCODONE HYDROCHLORIDE 5 MG: 5 TABLET ORAL at 22:55

## 2022-11-27 ASSESSMENT — PAIN SCALES - GENERAL
PAINLEVEL_OUTOF10: 1
PAINLEVEL_OUTOF10: 0
PAINLEVEL_OUTOF10: 0
PAINLEVEL_OUTOF10: 2
PAINLEVEL_OUTOF10: 10

## 2022-11-27 ASSESSMENT — PAIN SCALES - WONG BAKER: WONGBAKER_NUMERICALRESPONSE: 0

## 2022-11-27 NOTE — ED PROVIDER NOTES
Emergency Department Encounter    Patient: Marivel Ricci  MRN: [id number]  : 1955  Date of Evaluation: 2022  ED Provider:  Jay Jay Bailey DO    Triage Chief Complaint:   Toe pain     HPI:  Marivel Ricci is a 74 y.o. female with past medical history as listed below, including non-insulin-dependent diabetes, hypertension, hyperlipidemia, seizures who presents with toe pain. Patient has a history of having her left great toe amputated secondary to necrotic ulceration in 2022. Wound has been chronically open since.  states that patient currently resides at [facility], and [facility] when he went to visit patient, patient was agitated, and he was concern for worsening pain in her toe. He states that the toe was unwrapped, and patient had not received any new dressing to the foot, has not been given any pain medication for the toe. Patient does not typically act agitated, however she is otherwise at her baseline mental status. Patient is nonverbal secondary to a trach that was placed approximately 2 months ago.  thinks that patient is currently on antibiotics for her toe but is unsure what these are. Review of systems is not obtained secondary to patient being nonverbal.      ROS:  Review of Systems   Unable to perform ROS: Patient nonverbal       Past Medical History:   Diagnosis Date    Cancer ([facility] [address]     Diabetes mellitus ([facility] [address]     Hyperlipidemia     Hypertension     Seizure [facility])      Past Surgical History:   Procedure Laterality Date    LUNG CANCER SURGERY Left 10/2019    TOE AMPUTATION Left 2022    LEFT FOOT HALLUX AMPUTATION EXCISIONAL DEBRIDEMENT ALL NON VIABLE   TISSUE AND BONE performed by Madelyn Isaacs DPM at [address] [place] [address] N/A 10/3/2020    EGD BIOPSY performed by Stefania Chang MD at [facility] ENDOSCOPY     No family history on file.   Social History     Socioeconomic History    Marital status:      Spouse name: Not on file    Number of children: Not on file    Years of education: Not on file    Highest education level: Not on file   Occupational History    Not on file   Tobacco Use    Smoking status: Former     Packs/day: 1.00     Types: Cigarettes     Quit date: 2019     Years since quitting: 3.9    Smokeless tobacco: Never    Tobacco comments:     10/2019   Substance and Sexual Activity    Alcohol use: Yes     Alcohol/week: 1.0 standard drink     Types: 1 Cans of beer per week    Drug use: Yes     Types: Marijuana [facility])    Sexual activity: Yes     Partners: Male   Other Topics Concern    Not on file   Social History Narrative    Not on file     Social Determinants of Health     Financial Resource Strain: Not on file   Food Insecurity: Not on file   Transportation Needs: Not on file   Physical Activity: Not on file   Stress: Not on file   Social Connections: Not on file   Intimate Partner Violence: Not on file   Housing Stability: Not on file     Current Facility-Administered Medications   Medication Dose Route Frequency Provider Last Rate Last Admin    piperacillin-tazobactam (ZOSYN) 3,375 mg in dextrose 5 % 50 mL IVPB (mini-bag)  3,375 mg IntraVENous Once Aye Orozco, DO         Current Outpatient Medications   Medication Sig Dispense Refill    oxyCODONE (ROXICODONE) 5 MG immediate release tablet Take 5 mg by mouth every 4 hours as needed for Pain.      levETIRAcetam (KEPPRA) 500 MG tablet Take 1 tablet by mouth 2 times daily 60 tablet 0    alendronate (FOSAMAX) 70 MG tablet Take 1 tablet by mouth once a week      atorvastatin (LIPITOR) 40 MG tablet Take 1 tablet by mouth in the morning. 30 tablet 1    gabapentin (NEURONTIN) 300 MG capsule Take 300 mg by mouth at bedtime. glimepiride (AMARYL) 2 MG tablet Take 1 tablet by mouth in the morning.       metFORMIN (GLUCOPHAGE) 1000 MG tablet Take 1,000 mg by mouth 2 times daily (with meals)      pantoprazole (PROTONIX) 40 MG tablet Take 1 tablet by mouth daily (Patient taking differently: Take 20 mg by mouth daily) 30 tablet 0    cilostazol (PLETAL) 100 MG tablet Take 100 mg by mouth 2 times daily      lisinopril (PRINIVIL;ZESTRIL) 5 MG tablet Take 15 mg by mouth daily. cloNIDine (CATAPRES) 0.1 MG tablet Take 0.1 mg by mouth 2 times daily. No Known Allergies    Nursing Notes Reviewed    Physical Exam:  Triage VS:    ED Triage Vitals [11/26/22 2335]   Enc Vitals Group      BP (!) 140/85      Heart Rate 74      Resp 16      Temp 98.5 °F (36.9 °C)      Temp Source Oral      SpO2 95 %      Weight       Height       Head Circumference       Peak Flow       Pain Score       Pain Loc       Pain Edu? Excl. in [address]? Physical Exam  Vitals and nursing note reviewed. Constitutional:       Comments: Chronically ill-appearing   HENT:      Head: Normocephalic and atraumatic. Nose: Nose normal.      Mouth/Throat:      Mouth: Mucous membranes are moist.   Eyes:      Conjunctiva/sclera: Conjunctivae normal.   Cardiovascular:      Rate and Rhythm: Normal rate and regular rhythm. Heart sounds: Normal heart sounds. Pulmonary:      Effort: Pulmonary effort is normal. No respiratory distress. Breath sounds: Normal breath sounds. No wheezing, rhonchi or rales. Abdominal:      General: Abdomen is flat. Bowel sounds are normal. There is no distension. Palpations: Abdomen is soft. Tenderness: There is no abdominal tenderness. There is no guarding or rebound. Genitourinary:     Rectum: Guaiac result positive. Comments: Randy Thomas, RN as chaperone. Musculoskeletal:      Comments: Left foot:  Great toe amputated, on the stump there is small amount of purulent drainage, granulation tissue versus discharge. There is surrounding erythema over the stump. It is tender to palpation. On the lateral aspect of patient's plantar surface, there is a darkened area that appears to be a pressure ulcer, this is stage I and is not open or draining.    Skin: General: Skin is warm. Capillary Refill: Capillary refill takes less than 2 seconds. Neurological:      Mental Status: She is alert and oriented to person, place, and time. Mental status is at baseline.    Psychiatric:         Mood and Affect: Mood normal.            I have reviewed and interpreted all of the currently available lab results from this visit (if applicable):  Results for orders placed or performed during the hospital encounter of 11/26/22   CBC with Auto Differential   Result Value Ref Range    WBC 12.9 (H) 4.0 - 10.5 K/CU MM    RBC 3.10 (L) 4.2 - 5.4 M/CU MM    Hemoglobin 9.2 (L) 12.5 - 16.0 GM/DL    Hematocrit 28.4 (L) 37 - 47 %    MCV 91.6 78 - 100 FL    MCH 29.7 27 - 31 PG    MCHC 32.4 32.0 - 36.0 %    RDW 14.4 11.7 - 14.9 %    Platelets 295 047 - 084 K/CU MM    MPV 9.2 7.5 - 11.1 FL    Differential Type AUTOMATED DIFFERENTIAL     Segs Relative 71.7 (H) 36 - 66 %    Lymphocytes % 18.2 (L) 24 - 44 %    Monocytes % 8.4 (H) 0 - 4 %    Eosinophils % 0.9 0 - 3 %    Basophils % 0.5 0 - 1 %    Segs Absolute 9.3 K/CU MM    Lymphocytes Absolute 2.4 K/CU MM    Monocytes Absolute 1.1 K/CU MM    Eosinophils Absolute 0.1 K/CU MM    Basophils Absolute 0.1 K/CU MM    Nucleated RBC % 0.0 %    Total Nucleated RBC 0.0 K/CU MM    Total Immature Neutrophil 0.04 K/CU MM    Immature Neutrophil % 0.3 0 - 0.43 %   CMP   Result Value Ref Range    Sodium 139 135 - 145 MMOL/L    Potassium 3.3 (L) 3.5 - 5.1 MMOL/L    Chloride 104 99 - 110 mMol/L    CO2 24 21 - 32 MMOL/L    BUN 31 (H) 6 - 23 MG/DL    Creatinine 0.7 0.6 - 1.1 MG/DL    Est, Glom Filt Rate >60 >60 mL/min/1.73m2    Glucose 155 (H) 70 - 99 MG/DL    Calcium 9.4 8.3 - 10.6 MG/DL    Albumin 3.4 3.4 - 5.0 GM/DL    Total Protein 7.1 6.4 - 8.2 GM/DL    Total Bilirubin 0.5 0.0 - 1.0 MG/DL    ALT 14 10 - 40 U/L    AST 18 15 - 37 IU/L    Alkaline Phosphatase 92 40 - 128 IU/L    Anion Gap 11 4 - 16   Lipase   Result Value Ref Range    Lipase 13 13 - 60 IU/L Lactate, Sepsis   Result Value Ref Range    Lactic Acid, Sepsis 1.0 0.5 - 1.9 mMOL/L      Radiographs (if obtained):    [] Radiologist's Report Reviewed:  XR FOOT LEFT (MIN 3 VIEWS)   Preliminary Result   1. Status post amputation of the 1st proximal phalanx. No suspicious osseous   lesion. 2. Soft tissue ulceration at the surgical stump. 3. Bony demineralization. Chart review shows recent radiographs:  No results found. MDM:  Patient seen and examined. Labs and imaging obtained. Patient with mild leukocytosis of 12.9, hemoglobin 9.2, this is approximately 5 g drop from her hemoglobin at the end of September, potassium 3.3, lactic acid 1.0. Lipase 13. X-ray obtained, status post amputation of the first proximal phalanx, no suspicious osseous lesion. Soft tissue ulceration at the surgical stump. As patient is tender to palpation over the stump, and purulent drainage versus granulation tissue at the stoma, and leukocytosis, patient is given Zosyn. Culture pending. Guaiac positive on digital rectal exam, she is on Eliquis. Given patient GI bleed, as well as possible infection of surgical stump, will admit for further evaluation and management. All labs and imaging discussed with patient and family and they are agreement with plan of care. Case management was involved, as  is unhappy with the care patient is receiving at her skilled nursing facility. Case discussed with hospitalist, Dr. Chary Simmons, who agrees with admission. Clinical Impression:  1. Gastrointestinal hemorrhage, unspecified gastrointestinal hemorrhage type      Disposition referral (if applicable):  No follow-up provider specified.   Disposition medications (if applicable):  New Prescriptions    No medications on file       Comment: Please note this report has been produced using speech recognition software and may contain errors related to that system including errors in grammar, punctuation, and spelling, as well as words and phrases that may be inappropriate. Efforts were made to edit the dictations.        [id number] [facility],   11/27/22 3591

## 2022-11-27 NOTE — CARE COORDINATION
CM received patient as consult. Patient currently at [facility] but wanting to leave and go to another facility. CM went to patients' room and introduced self and explained job role. Patients' spouse, Alba Zapata @ [phone] at bedside. Patient non verbal and sleeping most of visit. Spouse reported that in 2019 patient had lung cancer and had part of her left lung removed. Patient then was found to have brain cancer and is at stage 4 according to spouse. Patient also has a toe that has been amputated. Spouse shared that they had patient at [facility] SNF for 6 hours and was not happy with SNF because patient fell out of bed and was found laying on floor. Patient was then moved to [facility]. Spouse is not happy with current SNF either due to patient being bumped on head (where surgery has just been done) and not giving patient her pain medication. Spouse reports that patient used to be able to walk and talk until recent surgeries. Spouse would like to see patient go to [facility] as patient has Trach and port. Spouse hoping to get trach removed and then move patient to [place] after going to [place]. LSW gave patients' spouse -- information on SNFs that take patients' insurance, a listing of all SNFs in area and the [facility] phone number. Justyna Hall as spouse reports that he has asked [facility] to move patient, but [facility] has not done anything yet. CM suggested that spouse speak to [facility] again on Monday and if they do not assist patient with moving to another facility to call [facility] number @ [phone]. Spouse in agreement and stated that he will work on this Monday.

## 2022-11-27 NOTE — CONSULTS
[address] [place] [address], [facility]                                  CONSULTATION    PATIENT NAME: uJan Tay                       :        1955  MED REC NO:   [id number]                          ROOM:       4443  ACCOUNT NO:   [de-identified]                           ADMIT DATE: 2022  PROVIDER:     Burgess David MD    CONSULT DATE:  2022    The patient is an inpatient in room 4119. CHIEF COMPLAINT:  History of anemia with guaiac-positive stool, rule out  GI bleeding. HISTORY OF PRESENT ILLNESS:  The patient is a 28-year-old Afro-American  female, patient known to me from her previous hospitalization, last seen  in consult on 10/03/2020 with past medical history significant for  history of carcinoma of the left lung status post surgery at [facility]  with no radiation and chemotherapy, hypertension, diabetes mellitus,  hyperlipidemia, peripheral vascular disease, on aspirin and Eliquis and  also recently diagnosed with metastatic brain disease status post  craniotomy and resultant seizure disorder, altered mental status with  placement of PEG tube in 2022 due to aspiration and also history of  necrotic left toe. The patient lives at an extended care facility and  was brought to the emergency room on 2022 with pain in the left  toe and agitation. The patient upon workup had a CBC drawn which showed  a hemoglobin of 9.2 gm% after hydration has dropped to 8.7 gm%. The  patient's last hemoglobin on 2022 was 14.2 gm%. The patient does  have a PEG tube in place and the patient is nonverbal.  There is no  history of vomiting, hematemesis, melena or hematochezia. Stool for  occult, however, is positive. The patient was on aspirin and Eliquis  prior to admission, which is on hold at present. The patient is  hemodynamically stable.   The patient did have an EGD done by me for  abnormal CAT scan on 10/03/2020 and was noted to have hiatal hernia  along with mild superficial gastritis, the exam was unremarkable and the  biopsies were negative for H. pylori or malignancy. The patient also  had a couple of colonoscopies done in the past, the last colonoscopy was  in 05/2020 at the [facility] in [facility]. The patient is hemodynamically  stable at present. REVIEW OF SYSTEMS:  Review of systems cannot be assessed since the  patient is nonverbal.    PAST MEDICAL HISTORY:  Significant for history of carcinoma of the left  lung with metastatic disease to the brain status post recent craniotomy  and also the patient had surgery for lung carcinoma at the [facility]  but no radiation and chemotherapy, history of hypertension, diabetes  mellitus, hyperlipidemia, peripheral vascular disease with necrotic left  toe and seizure disorder. FAMILY HISTORY:  The patient's father was diagnosed with carcinoma of  the lung. MEDICATIONS:  Please refer to the chart (the patient was on aspirin and  Eliquis prior to admission). SOCIOECONOMIC HISTORY:  The patient is a former smoker. There is no  history of active EtOH use at present, and the patient has a history of  recreational drug use in the past.  SURGERIES:  The patient has had a surgery done on her left lung in  10/2019 for carcinoma. Also had left toe amputation on 08/08/2022. ALLERGIES:  No known drug allergies. PHYSICAL EXAMINATION:  GENERAL:  Shows a 44-year-old Afro-American female of thin build and  poor nutritional status, who is lying flat in bed in no acute distress. Patient is nonverbal.  VITAL SIGNS:  Please refer to the chart. HEENT:  Examination shows skull to be atraumatic. Conjunctivae are  pale. NECK:  Supple. CHEST:  Clear. HEART:  S1 and S2, is clear. ABDOMEN:  Soft, nondistended, nontender. Liver and spleen are not  palpable. RECTAL:  Exam is deferred. G-tube is identified in the upper abdomen.   CNS:  Exam shows the patient to be nonverbal, and the patient is moving  all four extremities. MUSCULOSKELETAL SYSTEM:  Exam shows wasting of the muscles of the  extremities and degenerative joint disease changes and necrotic left  toe. LABORATORY DATA:  As above mentioned. IMPRESSION:  A 79-year-old Afro-American female with multiple  comorbidities including metastatic carcinoma of the lung with mets to  the brain who was admitted with necrotic left toe and altered mental  status and is noted to have anemia with guaiac-positive stool, rule out  GI bleeding source upper versus lower gastrointestinal tract. RECOMMENDATIONS:  1. Agree with present management with Protonix. 2.  Monitor the patient's serial H and H and transfuse on a p.r.n. basis  to keep hemoglobin above 7 gm%. 3.  We will discuss the case with the patient's family member in view of  the patient's underlying metastatic disease with multiple comorbidities  and no gross GI bleeding. Would like to hold off on repeat EGD and  colonoscopy at this point. 4.  Overall prognosis remains guarded. 5.  May resume tube feedings for now. The case and plan have been discussed in detail with the patient's  bedside RN, Jamie mejia.         Sadie Lomas MD    D: 11/27/2022 11:19:07       T: 11/27/2022 11:22:02     [email or web address]  Job#: [id number]     Doc#: [id number]    CC:

## 2022-11-27 NOTE — PROGRESS NOTES
I have seen and evaluated patient, I agree with my colleagues note from earlier today, I agree with history physical examination assessment and plan. Will await gastroenterology input on potential scoping, continue to monitor hemoglobin, will continue to monitor patient.

## 2022-11-27 NOTE — H&P
History and Physical      Name:  Marivel Ricci /Age/Sex: 1955  (32 y.o. female)   MRN & CSN:  [id number] & [id number] Encounter Date/Time: 2022 3:58 AM EST   Location:  ED15/ED-15 PCP: Eric Caldwell MD       Hospital Day: 2    Assessment and Plan:     #. Acute on chronic anemia-suspected secondary to blood loss  -Hemoglobin 9.2, was 14.2 (2022), was 8.9 (2022)  -FOBT positive as per ED physician. -BUN elevated. -Keep patient n.p.o.  -Consult GI-Dr. Samina Prieto. -EGD-10/2020-HH, gastritis. -Monitor with repeat H&H, type and screen ordered. #.  Necrotic left toe with mild cellulitic changes  -History of osteomyelitis s/p partial hallux amputation with excisional debridement-2022  -X-ray of the left foot-no suspicious osseous lesion  -Patient received Zosyn  -Patient has mild leukocytosis, lactic acid- 1.0  -Wound culture sent from ER.  -Continue ciprofloxacin- as wound cultures in the past grew Serratia, staph epi. -Consult podiatry-Dr. Nathanael Pace    #. Hypokalemia-replaced in ER  -Monitor with repeat BMP. #. history of metastatic lung cancer, status post left lobectomy, no chemo or radiation  -Patient had a right frontal craniotomy on 2022-pathology consistent with poorly differentiated metastatic adenocarcinoma. Subsequently patient had gamma knife radiosurgery    #. Seizure disorder secondary to brain metastasis  -Patient is on Vimpat 200 mg twice daily    #. S/p PEG tube-2022 for severe oropharyngeal dysphagia and subsequently removed as per documentation    #.   Patient had prolonged hospitalization to [facility] -11/10/2022 for status epilepticus  -Patient had aspiration pneumonia, tracheostomy 10/17 and G-tube placement-on Jevity 1.2-55 mL/hour continuous.  -Patient was also diagnosed with left peroneal DVT-was started on Eliquis.  -Patient also developed pressure injuries to the sacrum  -Patient is on thiamine 100 mg daily, scopolamine patch, bisacodyl suppository, folic acid, glycopyrrolate    #. Admission to [facility] 11/11-11/23/2022 after a fall.  -Patient had acute metabolic encephalopathy secondary to UTI secondary to Serratia. Patient was then discharged to [facility]. #.  peripheral vascular disease  -Patient is on aspirin, atorvastatin    #. Hypertension-patient is on carvedilol 25 mg twice daily, losartan 50 mg daily     #. Hyperlipidemia-continue simvastatin     #. diabetes mellitus type 2  -Patient was discharged on Lantus 20 units, sliding scale insulin. #.  Hiatal hernia-patient is on lansoprazole    #. Chronic pain-on oxycodone 5 mg every 4 hours as needed, gabapentin  Disposition:   [facility]    Diet N.p.o. until evaluated by GI   DVT Prophylaxis [] Lovenox, []  Heparin, [x] SCDs, [] Ambulation,  [] Eliquis, [] Xarelto   Code Status  FULL   Surrogate Decision Maker/ POA      History from:   EMR, patient. History of Present Illness:     Chief Complaint: Acute on chronic anemia  Eboni Ibarra is a 74 y.o. female with metastatic lung cancer, brain metastasis s/p right frontal craniotomy, gamma knife radiation, history of seizures, s/p tracheostomy and PEG tube, severe peripheral vascular disease, s/p left hallux amputation, hypertension, hyperlipidemia, diabetes mellitus type 2, chronic pain, currently at [facility] was brought to ED as the patient was complaining of toe pain. Patient has a tracheostomy and currently mouthing words, unable to understand patient.  not at bedside. As per information patient's  also not happy with care at the rehab. Case management was involved in ER. At presentation patient was noted to have /85, HR 74, RR 16, temp 98.4, saturating 95% on room air. Lab work was significant for potassium 3.3, BUN 31, random glucose 155, WBC 12.9, hemoglobin 9.2. Foot x-ray-no suspicious osseous lesion. Blood cultures, wound culture done in ED.   Patient received Zosyn, morphine, potassium chloride in ED. Review of Systems: Need 10 Elements   Unable to do review of systems as patient is nonverbal.    Objective:   No intake or output data in the 24 hours ending 11/27/22 0358   Vitals:   Vitals:    11/26/22 2335   BP: (!) 140/85   Pulse: 74   Resp: 16   Temp: 98.5 °F (36.9 °C)   TempSrc: Oral   SpO2: 95%       Medications Prior to Admission   Reviewed medications as per the list sent from [facility]. Alendronate 70 mg q. weekly, Eliquis 5 mg twice daily, aspirin 81 mg daily, atorvastatin 20 mg daily, bisacodyl suppository as needed, carvedilol 25 mg twice daily, docusate 4 times a day, folic acid 1 mg daily, gabapentin 4 mL daily, glycopyrrolate 1 mg every 6 hours as needed, glargine 20 units daily, Jevity 1.2  54 mL/hour, lactobacillus 1 capsule daily, lansoprazole 30 mg daily, losartan 50 mg daily, oxycodone 5 mg every 4 hours as needed, scopolamine patch every 72 hours, thiamine 100 mg daily, Vimpat 200 mg twice daily, and zinc oxide cream.    Physical Exam: Need 8 Elements   Physical Exam     GEN  -Awake, alert, NAD, nonverbal.   EYES   -PERRL. HENT  -MM are moist.  Tracheostomy in place  RESP  -LS CTA equal bilat, no wheezes, rales or rhonchi. Symmetric chest movement. No respiratory distress noted. C/V  -S1/S2 auscultated. RRR without appreciable M/R/G. No JVD or carotid bruits. Peripheral pulses equal bilaterally and palpable. No peripheral edema. No reproducible chest wall tenderness. GI  -Abdomen is soft, non-distended, no significant tenderness. No masses or guarding. + BS in all quadrants. Rectal exam deferred.   -No CVA tenderness. Rogers catheter is not present. MS  -left toe-necrotic changes at the edges. Feeble pulses. SKIN  -Normal coloration, warm, dry. NEURO  -patient is mouthing words, unable to understand pt, following commands, moving all extremities.       Past Medical History:   Reviewed patient's past medical, surgical, social, family history and allergies. PMHx   Past Medical History:   Diagnosis Date    Cancer ([facility]     Diabetes mellitus ([facility]     Hyperlipidemia     Hypertension     Seizure ([facility]      PSHX:  has a past surgical history that includes Lung cancer surgery (Left, 10/2019); Upper gastrointestinal endoscopy (N/A, 10/3/2020); and Toe amputation (Left, 8/8/2022). Allergies: No Known Allergies  Fam HX: family history is not on file. Soc HX:   Social History     Socioeconomic History    Marital status:    Tobacco Use    Smoking status: Former     Packs/day: 1.00     Types: Cigarettes     Quit date: 2019     Years since quitting: 3.9    Smokeless tobacco: Never    Tobacco comments:     10/2019   Substance and Sexual Activity    Alcohol use: Yes     Alcohol/week: 1.0 standard drink     Types: 1 Cans of beer per week    Drug use: Yes     Types: Marijuana [place])    Sexual activity: Yes     Partners: Male       Medications:   Medications:    piperacillin-tazobactam  3,375 mg IntraVENous Once    potassium chloride  40 mEq Oral Once      Infusions:   PRN Meds:      Labs      CBC:   Recent Labs     11/27/22 0114   WBC 12.9*   HGB 9.2*        BMP:    Recent Labs     11/27/22 0114      K 3.3*      CO2 24   BUN 31*   CREATININE 0.7   GLUCOSE 155*     Hepatic:   Recent Labs     11/27/22 0114   AST 18   ALT 14   BILITOT 0.5   ALKPHOS 92     Lipids: No results found for: CHOL, HDL, TRIG  Hemoglobin A1C:   Lab Results   Component Value Date/Time    LABA1C 5.9 09/29/2022 07:53 PM     TSH: No results found for: TSH  Troponin:   Lab Results   Component Value Date/Time    TROPONINT <0.010 09/29/2022 12:44 PM    TROPONINT <0.010 09/29/2022 12:44 PM    TROPONINT <0.010 10/03/2020 05:54 AM     Lactic Acid: No results for input(s): LACTA in the last 72 hours. BNP: No results for input(s): PROBNP in the last 72 hours.   UA:  Lab Results   Component Value Date/Time    NITRU NEGATIVE 10/03/2020 12:51 AM    COLORU YELLOW 10/03/2020 12:51 AM

## 2022-11-27 NOTE — ED NOTES
ED TO INPATIENT SBAR HANDOFF    Patient Name: Landon Montanez   :    74 y.o. MRN:  [id number]  Preferred Name  Tito Mack  ED Room #:  ED15/ED-15  Family/Caregiver Present no   Restraints no   Sitter no   Sepsis Risk Score Sepsis Risk Score: 1.13    Situation  Code Status: Prior Full Code. Allergies: Patient has no known allergies. Weight: No data found. Arrived from: home  Chief Complaint: No chief complaint on file. Hospital Problem/Diagnosis:  Principal Problem:    Acute on chronic anemia  Resolved Problems:    * No resolved hospital problems. *    Imaging:   XR FOOT LEFT (MIN 3 VIEWS)   Preliminary Result   1. Status post amputation of the 1st proximal phalanx. No suspicious osseous   lesion. 2. Soft tissue ulceration at the surgical stump. 3. Bony demineralization.            Abnormal labs:   Abnormal Labs Reviewed   CBC WITH AUTO DIFFERENTIAL - Abnormal; Notable for the following components:       Result Value    WBC 12.9 (*)     RBC 3.10 (*)     Hemoglobin 9.2 (*)     Hematocrit 28.4 (*)     Segs Relative 71.7 (*)     Lymphocytes % 18.2 (*)     Monocytes % 8.4 (*)     All other components within normal limits   COMPREHENSIVE METABOLIC PANEL - Abnormal; Notable for the following components:    Potassium 3.3 (*)     BUN 31 (*)     Glucose 155 (*)     All other components within normal limits     Critical values: no     Abnormal Assessment Findings: none    Background  History:   Past Medical History:   Diagnosis Date    Cancer ([facility]     Diabetes mellitus ([facility]     Hyperlipidemia     Hypertension     Seizure ([facility]        Assessment    Vitals/MEWS: MEWS Score: 1  Level of Consciousness: Alert (0)   Vitals:    22 2335 22 0415   BP: (!) 140/85 (!) 135/90   Pulse: 74 75   Resp: 16 18   Temp: 98.5 °F (36.9 °C) 98.5 °F (36.9 °C)   TempSrc: Oral Oral   SpO2: 95% 96%     FiO2 (%): Trach  O2 Flow Rate: O2 Device: None (Room air)    Cardiac Rhythm:    Pain Assessment:  [] Verbal [x] Lavena Dot Baker Scale  Pain Scale: Pain Assessment  Pain Level: 10  Last documented pain score (0-10 scale) Pain Level: 10  Last documented pain medication administered: 0151  Mental Status: alert  NIH Score:    C-SSRS:    Bedside swallow:    Irma Coma Scale (GCS): [place] Coma Scale  Eye Opening: Spontaneous  Best Verbal Response: None  Best Motor Response: Obeys commands  [place] Coma Scale Score: 11  Active LDA's:    PO Status: Non-Oral Feeding Method  Pertinent or High Risk Medications/Drips: no   o If Yes, please provide details:   Pending Blood Product Administration: no     You may also review the ED PT Care Timeline found under the Summary Nursing Index tab. Recommendation    Pending orders   Plan for Discharge (if known):    Additional Comments:  Patient is nonverbal at baseline  If any further questions, please call Sending RN at [phone]    Electronically signed by: Electronically signed by Aaron Marx RN on 11/27/2022 at 4:23 AM     Aaron Marx RN  11/27/22 3834       Aaron Marx RN  11/27/22 5150

## 2022-11-28 PROBLEM — E43 SEVERE MALNUTRITION (HCC): Status: ACTIVE | Noted: 2022-01-01

## 2022-11-28 LAB
ALBUMIN SERPL-MCNC: 3.3 GM/DL (ref 3.4–5)
ALP BLD-CCNC: 92 IU/L (ref 40–128)
ALT SERPL-CCNC: 15 U/L (ref 10–40)
AMYLASE: 70 U/L (ref 25–115)
ANION GAP SERPL CALCULATED.3IONS-SCNC: 14 MMOL/L (ref 4–16)
APTT: 37.9 SECONDS (ref 25.1–37.1)
AST SERPL-CCNC: 20 IU/L (ref 15–37)
BASOPHILS ABSOLUTE: 0.1 K/CU MM
BASOPHILS RELATIVE PERCENT: 0.6 % (ref 0–1)
BILIRUB SERPL-MCNC: 0.3 MG/DL (ref 0–1)
BUN BLDV-MCNC: 23 MG/DL (ref 6–23)
CALCIUM SERPL-MCNC: 9.2 MG/DL (ref 8.3–10.6)
CHLORIDE BLD-SCNC: 105 MMOL/L (ref 99–110)
CO2: 22 MMOL/L (ref 21–32)
CREAT SERPL-MCNC: 0.6 MG/DL (ref 0.6–1.1)
DIFFERENTIAL TYPE: ABNORMAL
EOSINOPHILS ABSOLUTE: 0.1 K/CU MM
EOSINOPHILS RELATIVE PERCENT: 1.1 % (ref 0–3)
GFR SERPL CREATININE-BSD FRML MDRD: >60 ML/MIN/1.73M2
GLUCOSE BLD-MCNC: 118 MG/DL (ref 70–99)
GLUCOSE BLD-MCNC: 142 MG/DL (ref 70–99)
GLUCOSE BLD-MCNC: 177 MG/DL (ref 70–99)
GLUCOSE BLD-MCNC: 181 MG/DL (ref 70–99)
GLUCOSE BLD-MCNC: 190 MG/DL (ref 70–99)
HCT VFR BLD CALC: 28.7 % (ref 37–47)
HEMOGLOBIN: 9 GM/DL (ref 12.5–16)
IMMATURE NEUTROPHIL %: 0.3 % (ref 0–0.43)
INR BLD: 1.06 INDEX
LIPASE: 16 IU/L (ref 13–60)
LYMPHOCYTES ABSOLUTE: 1.9 K/CU MM
LYMPHOCYTES RELATIVE PERCENT: 19.6 % (ref 24–44)
MCH RBC QN AUTO: 28.5 PG (ref 27–31)
MCHC RBC AUTO-ENTMCNC: 31.4 % (ref 32–36)
MCV RBC AUTO: 90.8 FL (ref 78–100)
MONOCYTES ABSOLUTE: 0.8 K/CU MM
MONOCYTES RELATIVE PERCENT: 7.9 % (ref 0–4)
NUCLEATED RBC %: 0 %
PDW BLD-RTO: 14.1 % (ref 11.7–14.9)
PLATELET # BLD: 391 K/CU MM (ref 140–440)
PMV BLD AUTO: 9.1 FL (ref 7.5–11.1)
POTASSIUM SERPL-SCNC: 3.4 MMOL/L (ref 3.5–5.1)
PROTHROMBIN TIME: 13.7 SECONDS (ref 11.7–14.5)
RBC # BLD: 3.16 M/CU MM (ref 4.2–5.4)
SEGMENTED NEUTROPHILS ABSOLUTE COUNT: 6.7 K/CU MM
SEGMENTED NEUTROPHILS RELATIVE PERCENT: 70.5 % (ref 36–66)
SODIUM BLD-SCNC: 141 MMOL/L (ref 135–145)
TOTAL IMMATURE NEUTOROPHIL: 0.03 K/CU MM
TOTAL NUCLEATED RBC: 0 K/CU MM
TOTAL PROTEIN: 6.4 GM/DL (ref 6.4–8.2)
WBC # BLD: 9.5 K/CU MM (ref 4–10.5)

## 2022-11-28 PROCEDURE — 2580000003 HC RX 258: Performed by: INTERNAL MEDICINE

## 2022-11-28 PROCEDURE — 87181 SC STD AGAR DILUTION PER AGT: CPT

## 2022-11-28 PROCEDURE — C9113 INJ PANTOPRAZOLE SODIUM, VIA: HCPCS | Performed by: INTERNAL MEDICINE

## 2022-11-28 PROCEDURE — 87186 SC STD MICRODIL/AGAR DIL: CPT

## 2022-11-28 PROCEDURE — 94761 N-INVAS EAR/PLS OXIMETRY MLT: CPT

## 2022-11-28 PROCEDURE — 85610 PROTHROMBIN TIME: CPT

## 2022-11-28 PROCEDURE — 6370000000 HC RX 637 (ALT 250 FOR IP): Performed by: INTERNAL MEDICINE

## 2022-11-28 PROCEDURE — 85025 COMPLETE CBC W/AUTO DIFF WBC: CPT

## 2022-11-28 PROCEDURE — 85730 THROMBOPLASTIN TIME PARTIAL: CPT

## 2022-11-28 PROCEDURE — 83690 ASSAY OF LIPASE: CPT

## 2022-11-28 PROCEDURE — 6360000002 HC RX W HCPCS: Performed by: INTERNAL MEDICINE

## 2022-11-28 PROCEDURE — C9254 INJECTION, LACOSAMIDE: HCPCS | Performed by: INTERNAL MEDICINE

## 2022-11-28 PROCEDURE — 89220 SPUTUM SPECIMEN COLLECTION: CPT

## 2022-11-28 PROCEDURE — 31720 CLEARANCE OF AIRWAYS: CPT

## 2022-11-28 PROCEDURE — 82150 ASSAY OF AMYLASE: CPT

## 2022-11-28 PROCEDURE — 80053 COMPREHEN METABOLIC PANEL: CPT

## 2022-11-28 PROCEDURE — 87205 SMEAR GRAM STAIN: CPT

## 2022-11-28 PROCEDURE — 87077 CULTURE AEROBIC IDENTIFY: CPT

## 2022-11-28 PROCEDURE — 6360000002 HC RX W HCPCS: Performed by: STUDENT IN AN ORGANIZED HEALTH CARE EDUCATION/TRAINING PROGRAM

## 2022-11-28 PROCEDURE — 87070 CULTURE OTHR SPECIMN AEROBIC: CPT

## 2022-11-28 PROCEDURE — 82962 GLUCOSE BLOOD TEST: CPT

## 2022-11-28 PROCEDURE — A4216 STERILE WATER/SALINE, 10 ML: HCPCS | Performed by: INTERNAL MEDICINE

## 2022-11-28 PROCEDURE — 1200000000 HC SEMI PRIVATE

## 2022-11-28 RX ORDER — POTASSIUM CHLORIDE 7.45 MG/ML
10 INJECTION INTRAVENOUS
Status: DISPENSED | OUTPATIENT
Start: 2022-11-28 | End: 2022-11-28

## 2022-11-28 RX ORDER — MORPHINE SULFATE 2 MG/ML
2 INJECTION, SOLUTION INTRAMUSCULAR; INTRAVENOUS EVERY 4 HOURS PRN
Status: DISCONTINUED | OUTPATIENT
Start: 2022-11-28 | End: 2022-12-08 | Stop reason: HOSPADM

## 2022-11-28 RX ORDER — POTASSIUM CHLORIDE 7.45 MG/ML
10 INJECTION INTRAVENOUS
Status: COMPLETED | OUTPATIENT
Start: 2022-11-28 | End: 2022-11-28

## 2022-11-28 RX ORDER — HALOPERIDOL 5 MG/ML
1 INJECTION INTRAMUSCULAR EVERY 6 HOURS PRN
Status: DISCONTINUED | OUTPATIENT
Start: 2022-11-28 | End: 2022-12-08 | Stop reason: HOSPADM

## 2022-11-28 RX ADMIN — FOLIC ACID 1 MG: 1 TABLET ORAL at 10:48

## 2022-11-28 RX ADMIN — SODIUM CHLORIDE 25 ML: 9 INJECTION, SOLUTION INTRAVENOUS at 12:08

## 2022-11-28 RX ADMIN — Medication 1 CAPSULE: at 10:48

## 2022-11-28 RX ADMIN — CARVEDILOL 25 MG: 25 TABLET, FILM COATED ORAL at 10:48

## 2022-11-28 RX ADMIN — INSULIN GLARGINE 20 UNITS: 100 INJECTION, SOLUTION SUBCUTANEOUS at 10:48

## 2022-11-28 RX ADMIN — MORPHINE SULFATE 2 MG: 2 INJECTION, SOLUTION INTRAMUSCULAR; INTRAVENOUS at 09:38

## 2022-11-28 RX ADMIN — SODIUM CHLORIDE 200 MG: 9 INJECTION, SOLUTION INTRAVENOUS at 11:35

## 2022-11-28 RX ADMIN — Medication 100 MG: at 10:49

## 2022-11-28 RX ADMIN — SODIUM CHLORIDE 25 ML: 9 INJECTION, SOLUTION INTRAVENOUS at 15:43

## 2022-11-28 RX ADMIN — SODIUM CHLORIDE, PRESERVATIVE FREE 40 MG: 5 INJECTION INTRAVENOUS at 09:27

## 2022-11-28 RX ADMIN — HALOPERIDOL LACTATE 1 MG: 5 INJECTION, SOLUTION INTRAMUSCULAR at 09:25

## 2022-11-28 RX ADMIN — LOSARTAN POTASSIUM 50 MG: 25 TABLET, FILM COATED ORAL at 10:48

## 2022-11-28 RX ADMIN — MORPHINE SULFATE 2 MG: 2 INJECTION, SOLUTION INTRAMUSCULAR; INTRAVENOUS at 16:58

## 2022-11-28 RX ADMIN — CIPROFLOXACIN 400 MG: 2 INJECTION, SOLUTION INTRAVENOUS at 05:06

## 2022-11-28 RX ADMIN — MORPHINE SULFATE 2 MG: 2 INJECTION, SOLUTION INTRAMUSCULAR; INTRAVENOUS at 21:11

## 2022-11-28 RX ADMIN — POTASSIUM CHLORIDE 10 MEQ: 7.46 INJECTION, SOLUTION INTRAVENOUS at 12:09

## 2022-11-28 RX ADMIN — HALOPERIDOL LACTATE 1 MG: 5 INJECTION, SOLUTION INTRAMUSCULAR at 15:39

## 2022-11-28 RX ADMIN — HALOPERIDOL LACTATE 1 MG: 5 INJECTION, SOLUTION INTRAMUSCULAR at 23:02

## 2022-11-28 RX ADMIN — SODIUM CHLORIDE 200 MG: 9 INJECTION, SOLUTION INTRAVENOUS at 21:46

## 2022-11-28 RX ADMIN — MORPHINE SULFATE 2 MG: 2 INJECTION, SOLUTION INTRAMUSCULAR; INTRAVENOUS at 13:14

## 2022-11-28 RX ADMIN — HALOPERIDOL LACTATE 1 MG: 5 INJECTION, SOLUTION INTRAMUSCULAR at 03:31

## 2022-11-28 RX ADMIN — POTASSIUM CHLORIDE 10 MEQ: 7.46 INJECTION, SOLUTION INTRAVENOUS at 13:41

## 2022-11-28 RX ADMIN — POTASSIUM CHLORIDE 10 MEQ: 7.46 INJECTION, SOLUTION INTRAVENOUS at 15:43

## 2022-11-28 RX ADMIN — CIPROFLOXACIN 400 MG: 2 INJECTION, SOLUTION INTRAVENOUS at 17:04

## 2022-11-28 RX ADMIN — SODIUM CHLORIDE 25 ML: 9 INJECTION, SOLUTION INTRAVENOUS at 13:40

## 2022-11-28 RX ADMIN — SODIUM CHLORIDE, PRESERVATIVE FREE 10 ML: 5 INJECTION INTRAVENOUS at 21:11

## 2022-11-28 RX ADMIN — OXYCODONE HYDROCHLORIDE 5 MG: 5 TABLET ORAL at 03:18

## 2022-11-28 ASSESSMENT — PAIN DESCRIPTION - ORIENTATION: ORIENTATION: LEFT

## 2022-11-28 ASSESSMENT — PAIN DESCRIPTION - LOCATION: LOCATION: FOOT

## 2022-11-28 ASSESSMENT — PAIN SCALES - WONG BAKER: WONGBAKER_NUMERICALRESPONSE: 0

## 2022-11-28 ASSESSMENT — PAIN - FUNCTIONAL ASSESSMENT: PAIN_FUNCTIONAL_ASSESSMENT: PREVENTS OR INTERFERES SOME ACTIVE ACTIVITIES AND ADLS

## 2022-11-28 ASSESSMENT — PAIN DESCRIPTION - DESCRIPTORS: DESCRIPTORS: ACHING

## 2022-11-28 NOTE — PROGRESS NOTES
Comprehensive Nutrition Assessment    Type and Reason for Visit:  Initial, Wound (New tube feeding, wounds, low BMI)    Nutrition Recommendations/Plan:   Please obtain updated pt weight, pt has not been weighed since 9/29  Change tube feed formula to Glucerna 1.2 d/t Glucerna 1.5 being out of stock  Continue to monitor tube feeding tolerance and increase to goal rate as able  Adjust tube feeding rate as needed if weight changes     Malnutrition Assessment:  Malnutrition Status:  Severe malnutrition (11/28/22 1254)    Context:  Chronic Illness     Findings of the 6 clinical characteristics of malnutrition:  Energy Intake:  Mild decrease in energy intake (Comment) (pt on PEG tube not at goal)  Weight Loss:  Unable to assess (last wt taken 9/29)     Body Fat Loss:  Severe body fat loss Buccal region, Triceps, Orbital, Fat Overlying Ribs   Muscle Mass Loss:  Severe muscle mass loss Calf (gastrocnemius), Thigh (quadraceps), Scapula (trapezius), Clavicles (pectoralis & deltoids), Temples (temporalis), Hand (interosseous)  Fluid Accumulation:  No significant fluid accumulation     Strength:  Not Performed    Nutrition Assessment:    Pt is nonverbal d/t trach, no family present at visit. Pt's tube feeding was at 20 mL/hr and she received 330 mL x 24 hrs. Goal rate is 60 mL/hr which provides 1728 kcal, 86 g protein and 1167 mL water with 30 mL flush Q8H for total of 1257 mL. Pt has not been weighed since 9/29, need updated wt and will adjust tube feeding as needed. NFPE shows evidence of severe muscle and fat loss, pt meets criteria for severe malnutrition in the context of chronic disease. Follow as high nutrition risk.     Nutrition Related Findings:    glu 190, K 3.4, alb 3.3, hgb 9.0, hct 28.7; on culturelle, thiamine, folic acid Wound Type: Pressure Injury, Surgical Incision (s/p toe amputation)       Current Nutrition Intake & Therapies:    Average Meal Intake: NPO  Average Supplements Intake: None Ordered  Current Tube Feeding (TF) Orders:  Feeding Route: PEG  Formula: Other Tube Feeding (Comment) (Glucerna 1.2 hanging in room)  Schedule: Continuous  Feeding Regimen: 60 mL/hr  Additives/Modulars: None  Water Flushes: 30 mL Q8H  Current TF & Flush Orders Provides: 20 mL/hr - 576 kcal, 29 g protein, 479 mL water  Goal TF & Flush Orders Provides: 60 mL/hr - 1728 kcal, 86 g protein, 1257 mL water    Anthropometric Measures:  Height: 5' 6\" (167.6 cm)  Ideal Body Weight (IBW): 130 lbs (59 kg)       Current Body Weight: 128 lb (58.1 kg) (no weight taken during admission, last weight take 9/29/22), 98.5 % IBW. Weight Source: Stated (no weight taken during this admission yet)  Current BMI (kg/m2): 20.7        Weight Adjustment For: No Adjustment                 BMI Categories: Underweight (BMI less than 22) age over 72    Estimated Daily Nutrient Needs:  Energy Requirements Based On: Kcal/kg     Energy (kcal/day): 2091-8438 (30-35 kcal/kg ABW)  Weight Used for Protein Requirements: Ideal  Protein (g/day): 71-89 (1.2-1.5 g/kg IBW)  Method Used for Fluid Requirements: 1 ml/kcal  Fluid (ml/day):      Nutrition Diagnosis:   Severe malnutrition, In context of chronic illness related to inadequate protein-energy intake, inadequate enteral nutrition infusion, cognitive or neurological impairment (wound) as evidenced by severe loss of subcutaneous fat, severe muscle loss, nutrition support - enteral nutrition, NPO or clear liquid status due to medical condition    Nutrition Interventions:   Food and/or Nutrient Delivery: Continue NPO, Continue Current Tube Feeding  Nutrition Education/Counseling: Education not indicated  Coordination of Nutrition Care: Continue to monitor while inpatient  Plan of Care discussed with: patient    Goals:  Previous Goal Met: No Progress toward Goal(s)  Goals:  Tolerate nutrition support at goal rate       Nutrition Monitoring and Evaluation:   Behavioral-Environmental Outcomes: None Identified  Food/Nutrient Intake Outcomes: Enteral Nutrition Intake/Tolerance, Vitamin/Mineral Intake  Physical Signs/Symptoms Outcomes: Biochemical Data, GI Status, Nausea or Vomiting, Fluid Status or Edema, Nutrition Focused Physical Findings, Skin, Weight    Discharge Planning:     Too soon to determine     Jeovany Helton  Contact: [phone]

## 2022-11-28 NOTE — PROGRESS NOTES
Pt found laying awkwardly in bed. Tube feed stopped. Trach suctioned and respiratory notified of pt needs. Hospitalist notified via PS. N.O. in system and orders given to keep tube feed on hold at this time until assessed by RT.

## 2022-11-28 NOTE — PLAN OF CARE
Problem: Discharge Planning  Goal: Discharge to home or other facility with appropriate resources  11/28/2022 0020 by Otis Sheridan RN  Outcome: Progressing  11/27/2022 1026 by Rafi Ramirez LPN  Outcome: Progressing     Problem: Pain  Goal: Verbalizes/displays adequate comfort level or baseline comfort level  11/28/2022 0020 by Otis Sheridan RN  Outcome: Progressing  11/27/2022 1026 by Rafi Ramirez LPN  Outcome: Progressing     Problem: Skin/Tissue Integrity  Goal: Absence of new skin breakdown  Description: 1. Monitor for areas of redness and/or skin breakdown  2. Assess vascular access sites hourly  3. Every 4-6 hours minimum:  Change oxygen saturation probe site  4. Every 4-6 hours:  If on nasal continuous positive airway pressure, respiratory therapy assess nares and determine need for appliance change or resting period.   Outcome: Progressing     Problem: Safety - Adult  Goal: Free from fall injury  Outcome: Progressing

## 2022-11-28 NOTE — PROGRESS NOTES
Called to room on request of RNYolanda, regarding trach suctioning and request of  If patient had tube feed in airway. Patients trach appears to be an 8.5, it is hard to read. Changed patient's inner cannula which was very full of yellowish sputum (do not believe it is tube feed). Patient is not on O2 and sating 98-99%. Suctioned patient and obtained sample on request of Dr. Radha Burr give sample to RN to send to lab. Called [facility] to get spare trach kit in room and extra 8.5 inner cannula.

## 2022-11-28 NOTE — CARE COORDINATION
Reviewed chart for discharge planning- pt admitted from [facility] but spouse would like [facility]. Referral sent to Yolanda to review. TC to Yolanda with [facility], verified referral was received, she will review and get back with me.

## 2022-11-28 NOTE — PROGRESS NOTES
V2.0  [facility] Hospitalist Progress Note      Name:  Eboni Ibarra /Age/Sex: 1955  (68 y.o. female)   MRN & CSN:  [id number] & [id number] Encounter Date/Time: 2022 9:23 AM EST    Location:  [id number] PCP: Qamar Gurrola MD       Hospital Day: 3    Assessment and Plan:     #. Acute on chronic anemia-suspected secondary to blood loss  -Hemoglobin 9.2, was 14.2 (2022), was 8.9 (2022)  -FOBT positive as per ED physician. -BUN elevated. -Keep patient n.p.o.  -Consult GI-Dr. Georgette Norton. GI discussed the case with the patient's family member in view of the patient's underlying metastatic disease with multiple co-morbidities and no gross GI bleeding. Would like to hold off on repeat EGD and colonoscopy at this point. -OK to restart feeds  -EGD-10/2020-HH, gastritis. -Monitor with repeat H&H, type and screen ordered.  -Plan is to monitor overnight and then discharge if hemoglobin remained stable    #. Necrotic left toe with mild cellulitic changes  -History of osteomyelitis s/p partial hallux amputation with excisional debridement-2022  -X-ray of the left foot-no suspicious osseous lesion  -Patient received Zosyn  -Patient has mild leukocytosis, lactic acid- 1.0  -Wound culture sent from ER.  -Continue ciprofloxacin- as wound cultures in the past grew Serratia, staph epi. -Consult podiatry-Dr. Bradley Escobar, awaiting final recommendations     #. Hypokalemia-replaced in ER  -Monitor with repeat BMP. replete as needed. #. history of metastatic lung cancer, status post left lobectomy, no chemo or radiation  -Patient had a right frontal craniotomy on 2022-pathology consistent with poorly differentiated metastatic adenocarcinoma. Subsequently patient had gamma knife radiosurgery     #. Seizure disorder secondary to brain metastasis  -Patient is on Vimpat 200 mg twice daily     #. S/p PEG tube-2022 for severe oropharyngeal dysphagia and subsequently removed as per documentation     #.   Patient had prolonged hospitalization to [facility] 9/29-11/10/2022 for status epilepticus  -Patient had aspiration pneumonia, tracheostomy 10/17 and G-tube placement-on Jevity 1.2-55 mL/hour continuous.  -Patient was also diagnosed with left peroneal DVT-was started on Eliquis.  -Patient also developed pressure injuries to the sacrum  -Patient is on thiamine 100 mg daily, scopolamine patch, bisacodyl suppository, folic acid, glycopyrrolate     #. Ad[facility] to [facility]11[facility]11/23/2022 after a fall.  -Patient had acute metabolic encephalopathy secondary to UTI secondary to Serratia. Patient was then discharged to [facility]. #.  peripheral vascular disease  -Patient is on aspirin, atorvastatin     #. Hypertension-patient is on carvedilol 25 mg twice daily, losartan 50 mg daily     #. Hyperlipidemia-continue simvastatin     #. diabetes mellitus type 2  -Patient was discharged on Lantus 20 units, sliding scale insulin. #.  Hiatal hernia-patient is on lansoprazole     #. Chronic pain-on oxycodone 5 mg every 4 hours as needed, gabapentin    Disposition:   Current Living situation: [facility]    (Discharge back to nursing home once medically stable, once hemoglobin may stable overnight and final recommendations from podiatry obtained, patient will be okay to return to nursing home with no arrangements except in the case she needs IV antibiotics on discharge and case management will need to know ASAP. Diet N.p.o. until evaluated by GI   DVT Prophylaxis [] Lovenox, []  Heparin, [x] SCDs, [] Ambulation,  [] Eliquis, [] Xarelto   Code Status  FULL   Surrogate Decision Maker/ POA       Subjective:     Chief Complaint: No chief complaint on file. Deann Cartagena is a 74 y.o. female who presents with GI bleeding. There was a concern from nursing that patient was aspirating.   Feedings, RT assessed patient and there was no concern that she is aspirating or with secretions that she is having or actually the feedings, she was doing well when seen today not interactive. Thought baseline. Review of Systems:    Review of Systems    Unable to obtain in details given clinical state. Objective: Intake/Output Summary (Last 24 hours) at 11/28/2022 0923  Last data filed at 11/28/2022 0328  Gross per 24 hour   Intake 120 ml   Output --   Net 120 ml        Vitals:   Vitals:    11/28/22 0757   BP: 99/86   Pulse: 81   Resp: (!) 45   Temp: 97.5 °F (36.4 °C)   SpO2: 100%       Physical Exam:   GEN  -Awake, alert, NAD, nonverbal.   EYES   -PERRL. HENT  -MM are moist.  Tracheostomy in place  RESP  -LS CTA equal bilat, no wheezes, rales or rhonchi. Symmetric chest movement. No respiratory distress noted. C/V  -S1/S2 auscultated. RRR without appreciable M/R/G. No JVD or carotid bruits. Peripheral pulses equal bilaterally and palpable. No peripheral edema. No reproducible chest wall tenderness. GI  -Abdomen is soft, non-distended, no significant tenderness. No masses or guarding. + BS in all quadrants. Rectal exam deferred.   -No CVA tenderness. Rogers catheter is not present. MS  -left toe-necrotic changes at the edges. Feeble pulses. SKIN  -Normal coloration, warm, dry. NEURO  -patient is mouthing words, unable to understand pt, following commands, moving all extremities.     Medications:   Medications:    sodium chloride flush  5-40 mL IntraVENous 2 times per day    pantoprazole (PROTONIX) 40 mg injection  40 mg IntraVENous Daily    carvedilol  25 mg Per G Tube BID     folic acid  1 mg Per G Tube Daily    insulin glargine  20 Units SubCUTAneous Daily with breakfast    lactobacillus  1 capsule Per G Tube Daily with breakfast    losartan  50 mg Per G Tube Daily    scopolamine  1 patch TransDERmal Q72H    thiamine  100 mg Per G Tube Daily    insulin lispro  0-8 Units SubCUTAneous TID     insulin lispro  0-4 Units SubCUTAneous Nightly    lacosamide (VIMPAT) IVPB  200 mg IntraVENous BID ciprofloxacin  400 mg IntraVENous Q12H      Infusions:    sodium chloride      dextrose       PRN Meds: haloperidol lactate, 1 mg, Q6H PRN  morphine, 2 mg, Q4H PRN  sodium chloride flush, 5-40 mL, PRN  sodium chloride, , PRN  ondansetron, 4 mg, Q8H PRN   Or  ondansetron, 4 mg, Q6H PRN  acetaminophen, 650 mg, Q6H PRN   Or  acetaminophen, 650 mg, Q6H PRN  oxyCODONE, 5 mg, Q4H PRN  glucose, 4 tablet, PRN  dextrose bolus, 125 mL, PRN   Or  dextrose bolus, 250 mL, PRN  glucagon (rDNA), 1 mg, PRN  dextrose, , Continuous PRN        Labs      Recent Results (from the past 24 hour(s))   POCT Glucose    Collection Time: 11/27/22  5:06 PM   Result Value Ref Range    POC Glucose 147 (H) 70 - 99 MG/DL   POCT Glucose    Collection Time: 11/27/22  8:25 PM   Result Value Ref Range    POC Glucose 134 (H) 70 - 99 MG/DL   Amylase    Collection Time: 11/28/22  5:10 AM   Result Value Ref Range    Amylase 70 25 - 115 U/L   Lipase    Collection Time: 11/28/22  5:10 AM   Result Value Ref Range    Lipase 16 13 - 60 IU/L   Protime/INR & PTT    Collection Time: 11/28/22  5:10 AM   Result Value Ref Range    Protime 13.7 11.7 - 14.5 SECONDS    INR 1.06 INDEX    aPTT 37.9 (H) 25.1 - 37.1 SECONDS   CBC with Auto Differential    Collection Time: 11/28/22  5:10 AM   Result Value Ref Range    WBC 9.5 4.0 - 10.5 K/CU MM    RBC 3.16 (L) 4.2 - 5.4 M/CU MM    Hemoglobin 9.0 (L) 12.5 - 16.0 GM/DL    Hematocrit 28.7 (L) 37 - 47 %    MCV 90.8 78 - 100 FL    MCH 28.5 27 - 31 PG    MCHC 31.4 (L) 32.0 - 36.0 %    RDW 14.1 11.7 - 14.9 %    Platelets 126 027 - 722 K/CU MM    MPV 9.1 7.5 - 11.1 FL    Differential Type AUTOMATED DIFFERENTIAL     Segs Relative 70.5 (H) 36 - 66 %    Lymphocytes % 19.6 (L) 24 - 44 %    Monocytes % 7.9 (H) 0 - 4 %    Eosinophils % 1.1 0 - 3 %    Basophils % 0.6 0 - 1 %    Segs Absolute 6.7 K/CU MM    Lymphocytes Absolute 1.9 K/CU MM    Monocytes Absolute 0.8 K/CU MM    Eosinophils Absolute 0.1 K/CU MM    Basophils Absolute 0.1 K/CU MM    Nucleated RBC % 0.0 %    Total Nucleated RBC 0.0 K/CU MM    Total Immature Neutrophil 0.03 K/CU MM    Immature Neutrophil % 0.3 0 - 0.43 %   Comprehensive Metabolic Panel    Collection Time: 11/28/22  5:10 AM   Result Value Ref Range    Sodium 141 135 - 145 MMOL/L    Potassium 3.4 (L) 3.5 - 5.1 MMOL/L    Chloride 105 99 - 110 mMol/L    CO2 22 21 - 32 MMOL/L    BUN 23 6 - 23 MG/DL    Creatinine 0.6 0.6 - 1.1 MG/DL    Est, Glom Filt Rate >60 >60 mL/min/1.73m2    Glucose 181 (H) 70 - 99 MG/DL    Calcium 9.2 8.3 - 10.6 MG/DL    Albumin 3.3 (L) 3.4 - 5.0 GM/DL    Total Protein 6.4 6.4 - 8.2 GM/DL    Total Bilirubin 0.3 0.0 - 1.0 MG/DL    ALT 15 10 - 40 U/L    AST 20 15 - 37 IU/L    Alkaline Phosphatase 92 40 - 128 IU/L    Anion Gap 14 4 - 16   POCT Glucose    Collection Time: 11/28/22  7:56 AM   Result Value Ref Range    POC Glucose 190 (H) 70 - 99 MG/DL        Imaging/Diagnostics Last 24 Hours   XR FOOT LEFT (MIN 3 VIEWS)    Result Date: 11/28/2022  EXAMINATION: THREE X-RAY VIEWS OF THE LEFT FOOT 11/27/2022 1:26 am COMPARISON: August 8, 2022 HISTORY: ORDERING SYSTEM PROVIDED HISTORY:  S/P great toe amputation, with worsening pain, open wound on stump. TECHNOLOGIST PROVIDED HISTORY: Reason for Exam:  S/P great toe amputation, with worsening pain, open wound on stump. FINDINGS: Three views of the left foot demonstrate amputation of the level of the 1st phalanx. Adjacent soft tissue injury of the stump is noted. No associated bony destruction. Elsewhere, no acute fracture or dislocation. Bony demineralization is seen. Degenerative changes of the 1st MTP joint are noted. Soft tissue swelling of the forefoot is present. 1. Status post amputation of the 1st proximal phalanx. No suspicious osseous lesion. 2. Soft tissue ulceration at the surgical stump. 3. Bony demineralization.        Electronically signed by Burton Bae MD on 11/28/2022 at 9:23 AM

## 2022-11-28 NOTE — PROGRESS NOTES
DOING FAIR NO GROSS GIT BLEEDING ON TUBE FEEDINGS PT WITH TRACHEOSTOMY AND PEG TUBE NO BM   VITALS STABLE   LABS NOTED HB 9.0   WILL CPM INCREASE TUBE FEEDINGS PER DIETICIAN TO GOAL RATE  HOLD OFF ON EGD / COLONOSCOPY BECAUSE OF PTS POOR GLOBAL CONDITION D/W   BEDSIDE NURSE

## 2022-11-29 LAB
GLUCOSE BLD-MCNC: 138 MG/DL (ref 70–99)
GLUCOSE BLD-MCNC: 162 MG/DL (ref 70–99)
GLUCOSE BLD-MCNC: 171 MG/DL (ref 70–99)
GLUCOSE BLD-MCNC: 188 MG/DL (ref 70–99)

## 2022-11-29 PROCEDURE — 6370000000 HC RX 637 (ALT 250 FOR IP): Performed by: INTERNAL MEDICINE

## 2022-11-29 PROCEDURE — 6360000002 HC RX W HCPCS: Performed by: INTERNAL MEDICINE

## 2022-11-29 PROCEDURE — 1200000000 HC SEMI PRIVATE

## 2022-11-29 PROCEDURE — A4216 STERILE WATER/SALINE, 10 ML: HCPCS | Performed by: INTERNAL MEDICINE

## 2022-11-29 PROCEDURE — 82962 GLUCOSE BLOOD TEST: CPT

## 2022-11-29 PROCEDURE — C9254 INJECTION, LACOSAMIDE: HCPCS | Performed by: INTERNAL MEDICINE

## 2022-11-29 PROCEDURE — 99211 OFF/OP EST MAY X REQ PHY/QHP: CPT

## 2022-11-29 PROCEDURE — 97167 OT EVAL HIGH COMPLEX 60 MIN: CPT

## 2022-11-29 PROCEDURE — 94761 N-INVAS EAR/PLS OXIMETRY MLT: CPT

## 2022-11-29 PROCEDURE — 2580000003 HC RX 258: Performed by: INTERNAL MEDICINE

## 2022-11-29 PROCEDURE — 97530 THERAPEUTIC ACTIVITIES: CPT

## 2022-11-29 PROCEDURE — C9113 INJ PANTOPRAZOLE SODIUM, VIA: HCPCS | Performed by: INTERNAL MEDICINE

## 2022-11-29 PROCEDURE — 89220 SPUTUM SPECIMEN COLLECTION: CPT

## 2022-11-29 PROCEDURE — 97162 PT EVAL MOD COMPLEX 30 MIN: CPT

## 2022-11-29 PROCEDURE — 6360000002 HC RX W HCPCS: Performed by: STUDENT IN AN ORGANIZED HEALTH CARE EDUCATION/TRAINING PROGRAM

## 2022-11-29 RX ADMIN — HALOPERIDOL LACTATE 1 MG: 5 INJECTION, SOLUTION INTRAMUSCULAR at 06:48

## 2022-11-29 RX ADMIN — SODIUM CHLORIDE, PRESERVATIVE FREE 10 ML: 5 INJECTION INTRAVENOUS at 11:04

## 2022-11-29 RX ADMIN — MORPHINE SULFATE 2 MG: 2 INJECTION, SOLUTION INTRAMUSCULAR; INTRAVENOUS at 11:17

## 2022-11-29 RX ADMIN — SODIUM CHLORIDE 200 MG: 9 INJECTION, SOLUTION INTRAVENOUS at 21:34

## 2022-11-29 RX ADMIN — MORPHINE SULFATE 2 MG: 2 INJECTION, SOLUTION INTRAMUSCULAR; INTRAVENOUS at 05:22

## 2022-11-29 RX ADMIN — LOSARTAN POTASSIUM 50 MG: 25 TABLET, FILM COATED ORAL at 11:04

## 2022-11-29 RX ADMIN — SODIUM CHLORIDE, PRESERVATIVE FREE 40 MG: 5 INJECTION INTRAVENOUS at 11:17

## 2022-11-29 RX ADMIN — Medication 1 CAPSULE: at 11:04

## 2022-11-29 RX ADMIN — CARVEDILOL 25 MG: 25 TABLET, FILM COATED ORAL at 11:04

## 2022-11-29 RX ADMIN — HALOPERIDOL LACTATE 1 MG: 5 INJECTION, SOLUTION INTRAMUSCULAR at 18:32

## 2022-11-29 RX ADMIN — CIPROFLOXACIN 400 MG: 2 INJECTION, SOLUTION INTRAVENOUS at 16:55

## 2022-11-29 RX ADMIN — MORPHINE SULFATE 2 MG: 2 INJECTION, SOLUTION INTRAMUSCULAR; INTRAVENOUS at 18:32

## 2022-11-29 RX ADMIN — SODIUM CHLORIDE 200 MG: 9 INJECTION, SOLUTION INTRAVENOUS at 11:25

## 2022-11-29 RX ADMIN — MORPHINE SULFATE 2 MG: 2 INJECTION, SOLUTION INTRAMUSCULAR; INTRAVENOUS at 01:25

## 2022-11-29 RX ADMIN — OXYCODONE HYDROCHLORIDE 5 MG: 5 TABLET ORAL at 11:04

## 2022-11-29 RX ADMIN — Medication 100 MG: at 11:04

## 2022-11-29 RX ADMIN — FOLIC ACID 1 MG: 1 TABLET ORAL at 11:04

## 2022-11-29 RX ADMIN — CIPROFLOXACIN 400 MG: 2 INJECTION, SOLUTION INTRAVENOUS at 05:25

## 2022-11-29 ASSESSMENT — PAIN SCALES - WONG BAKER: WONGBAKER_NUMERICALRESPONSE: 0

## 2022-11-29 NOTE — CONSULTS
Podiatry Consult Note      CHIEF COMPLAINT:  No chief complaint on file. HISTORY OF PRESENT ILLNESS:      The patient is a 74 y.o. female who presents with concern for residual wound to the great toe of the left foot. Patient is currently in the hospital being worked up for anemia. I have not seen the patient for the last several months. Her daughter is here today and provides majority of the history. Patient has been in and out of hospitals and nursing homes recently. Has not been following up with vascular surgery. Patient still getting pain to her foot per her daughter but no other new concerns other than this.     Past Medical History:    Past Medical History:   Diagnosis Date    Cancer ([facility] [address]     Diabetes mellitus ([facility] [address]     Hyperlipidemia     Hypertension     Seizure [facility])       Past Surgical History:    Past Surgical History:   Procedure Laterality Date    LUNG CANCER SURGERY Left 10/2019    TOE AMPUTATION Left 8/8/2022    LEFT FOOT HALLUX AMPUTATION EXCISIONAL DEBRIDEMENT ALL NON VIABLE   TISSUE AND BONE performed by Alex Eason DPM at [facility] N/A 10/3/2020    EGD BIOPSY performed by Yulia Adame MD at [address], [place]. \Hospital Has a New Name and Outlook.\"" ENDOSCOPY     Current Medications:   Current Facility-Administered Medications   Medication Dose Route Frequency Provider Last Rate Last Admin    haloperidol lactate (HALDOL) injection 1 mg  1 mg IntraVENous Q6H PRN Ciaran Armas MD   1 mg at 11/29/22 0648    morphine (PF) injection 2 mg  2 mg IntraVENous Q4H PRN Ciaran Armas MD   2 mg at 11/29/22 1117    sodium chloride flush 0.9 % injection 5-40 mL  5-40 mL IntraVENous 2 times per day Gena Brooks MD   10 mL at 11/29/22 1104    sodium chloride flush 0.9 % injection 5-40 mL  5-40 mL IntraVENous PRN Gena Brooks MD        0.9 % sodium chloride infusion   IntraVENous PRN Gena Brooks  mL/hr at 11/28/22 1543 25 mL at 11/28/22 1543    ondansetron (ZOFRAN-ODT) disintegrating tablet 4 mg  4 mg Oral Q8H PRN Luis Hernandez MD        Or    ondansetron (ZOFRAN) injection 4 mg  4 mg IntraVENous Q6H PRN Luis eHrnandez MD        acetaminophen (TYLENOL) tablet 650 mg  650 mg Oral Q6H PRN Luis Hernandez MD        Or    acetaminophen (TYLENOL) suppository 650 mg  650 mg Rectal Q6H PRN Luis Hernandez MD        pantoprazole (PROTONIX) 40 mg in sodium chloride (PF) 0.9 % 10 mL injection  40 mg IntraVENous Daily Luis Hernandez MD   40 mg at 11/29/22 1117    carvedilol (COREG) tablet 25 mg  25 mg Per G Tube BID  Luis Hernandez MD   25 mg at 50/50/91 5004    folic acid (FOLVITE) tablet 1 mg  1 mg Per G Tube Daily Luis Hernandez MD   1 mg at 11/29/22 1104    insulin glargine (LANTUS) injection vial 20 Units  20 Units SubCUTAneous Daily with breakfast Luis Hernandez MD   20 Units at 11/28/22 1048    lactobacillus (CULTURELLE) capsule 1 capsule  1 capsule Per G Tube Daily with breakfast Luis Hernandez MD   1 capsule at 11/29/22 1104    losartan (COZAAR) tablet 50 mg  50 mg Per G Tube Daily Luis Hernandez MD   50 mg at 11/29/22 1104    oxyCODONE (ROXICODONE) immediate release tablet 5 mg  5 mg Per G Tube Q4H PRN Luis Hernandez MD   5 mg at 11/29/22 1104    scopolamine (TRANSDERM-SCOP) transdermal patch 1 patch  1 patch TransDERmal Q72H Luis Hernandez MD   1 patch at 11/27/22 0631    thiamine tablet 100 mg  100 mg Per G Tube Daily Luis Hernandez MD   100 mg at 11/29/22 1104    insulin lispro (HUMALOG) injection vial 0-8 Units  0-8 Units SubCUTAneous TID  Rashawn Landon MD        insulin lispro (HUMALOG) injection vial 0-4 Units  0-4 Units SubCUTAneous Nightly Rashawn Landon MD        glucose chewable tablet 16 g  4 tablet Oral PRN Luis Hernandez MD        dextrose bolus 10% 125 mL  125 mL IntraVENous PRN Luis Hernandez MD        Or    dextrose bolus 10% 250 mL  250 mL IntraVENous PRN Epi Fuentes MD        glucagon (rDNA) injection 1 mg  1 mg SubCUTAneous PRN Epi Fuentes MD        dextrose 10 % infusion   IntraVENous Continuous PRN Epi Fuentes MD        lacosamide (VIMPAT) 200 mg in sodium chloride 0.9 % 70 mL IVPB  200 mg IntraVENous BID Epi Fuentes MD   Stopped at 11/29/22 1210    ciprofloxacin (CIPRO) IVPB 400 mg  400 mg IntraVENous Q12H Epi Fuentes  mL/hr at 11/29/22 1655 400 mg at 11/29/22 1655      Allergies: Patient has no known allergies. Social History:   Social History     Socioeconomic History    Marital status:      Spouse name: Not on file    Number of children: Not on file    Years of education: Not on file    Highest education level: Not on file   Occupational History    Not on file   Tobacco Use    Smoking status: Former     Packs/day: 1.00     Types: Cigarettes     Quit date: 2019     Years since quitting: 3.9    Smokeless tobacco: Never    Tobacco comments:     10/2019   Substance and Sexual Activity    Alcohol use: Yes     Alcohol/week: 1.0 standard drink     Types: 1 Cans of beer per week    Drug use: Yes     Types: Marijuana Dietra Due)    Sexual activity: Yes     Partners: Male   Other Topics Concern    Not on file   Social History Narrative    Not on file     Social Determinants of Health     Financial Resource Strain: Not on file   Food Insecurity: Not on file   Transportation Needs: Not on file   Physical Activity: Not on file   Stress: Not on file   Social Connections: Not on file   Intimate Partner Violence: Not on file   Housing Stability: Not on file     Family History:   No family history on file.      REVIEW OF SYSTEMS:    Cardiac: Denies chest pain, palpitations, or irregular heart beat  Pulmonary: Denies cough, wheezing, or sputum production  Constitutional: Denies fever, chills, or diarrhea      PHYSICAL EXAM:    /89   Pulse 73   Temp 97.3 °F (36.3 °C)   Resp 18   Ht 5' 6\" (1.676 m)   SpO2 94% BMI 20.66 kg/m²      Vascular: Pedal pulses not palpable. Skin is dry. Skin temperature warm to cool proximal tibial tuberosity distal digits  Neurologic: Gross and epicritic sensation absent  Dermatologic: Exposed residual proximal phalanx great toe amputation site with complete dehiscence left foot. Dark necrotic like changes noted to the bone with dusky changes as well. No drainage crepitus fluctuance or malodor. No surrounding erythema. Musculoskeletal: No pain on palpation to affected area of the left foot    Imaging:  (11/27/2022): Left foot x-ray negative for any acute findings      Assessment:   -Residual osteomyelitis left foot  -Surgical wound dehiscence left foot  -Type 2 diabetes mellitus with peripheral neuropathy  -Type 2 diabetes mellitus with foot ulceration and necrosis of bone  -Peripheral arterial disease      Plan:    -Patient was seen, evaluated, treated today. Daughter present for entirety of examination and treatment  -Patient currently in the hospital being worked up for anemia. Consulted for residual wound to the great toe of the left foot  -Clinically there is exposed proximal phalanx with dusky like changes and complete dehiscence of the surgical partially amputation site.  -Per the patient's daughter they have not been following up with vascular surgery because she has been in and out of hospital in nursing homes over the last few months  -I did reach out to the patient's primary care hospitalist to discuss goals of care to determine treatment for my end moving forward. If we are trying to do full treatment at this time I would need arterial studies and left foot MRI ordered to determine extent of osteomyelitis. This is all with the thought in mind that she would need a residual amputation to prevent infection spread. Concern is still healing potential as she has significant arterial disease.   She would still need close follow-up as an outpatient for arterial studies  -Would do palliative wound care if ultimately is determined that surgery is not wanted by the patient at this time depending on plan of care  -Hospitalist said that they will be discussing this with the patient tomorrow and will let me know  -Continue local wound care  -Please contact any questions      Jose Cruz Campos DPM    [facility] [address] [place] [address] and Ankle Surgery        Electronically signed by Jose Cruz Campos DPM on 11/29/2022 at 5:01 PM

## 2022-11-29 NOTE — PROGRESS NOTES
Physician Progress Note      Balaji Carias  CSN #:                  [id number]  :                       1955  ADMIT DATE:       2022 11:31 PM  DISCH DATE:  RESPONDING  PROVIDER #:        Raven Hampton MD          QUERY TEXT:    Patient admitted with GI bleed. If possible, please document in progress notes   and discharge summary if you are evaluating and /or treating any of the   following: The medical record reflects the following:  Risk Factors: PEG tube, trach  Clinical Indicators: Dietitian note \"Severe malnutrition, In context of   chronic illness related to inadequate protein-energy intake, inadequate   enteral nutrition infusion, cognitive or neurological impairment (wound) as   evidenced by severe loss of subcutaneous fat, severe muscle loss, nutrition   support - enteral nutrition, NPO or clear liquid status due to medical   condition\", BMI 20.7  Treatment: Dietitian consult, adult tube feed. ASPEN Criteria:    https://aspenjournals. onlinelibrary. ray. com/doi/full/10.1177/130189511697864  5    Thank you,  LIZZETTE BlakeN, RN, [facility]  [phone]  Options provided:  -- Protein calorie malnutrition severe  -- Other - I will add my own diagnosis  -- Disagree - Not applicable / Not valid  -- Disagree - Clinically unable to determine / Unknown  -- Refer to Clinical Documentation Reviewer    PROVIDER RESPONSE TEXT:    This patient has severe protein calorie malnutrition. Query created by: Gt Kline on 2022 1:59 PM      QUERY TEXT:    Patient admitted with GI bleeding, noted to have Hx of gastritis. If   possible, please document in progress notes and discharge summary the cause of   the GI bleeding: The medical record reflects the following:  Risk Factors: Hx gastritis,  Clinical Indicators:  progress note \"Acute on chronic anemia likely GI   bleed: Hemoglobin 9.2 on admission, previously 14.2 in September, FOBT   positive. ........   EGD in October 2020 showed hiatal hernia and gastritis. Treatment: GI consult, H&H, IV Protonix. Thank you,  LIZZETTE McgarryN, RN, CDS  [phone]  Options provided:  -- GI bleeding due to possible gastritis  -- Other - I will add my own diagnosis  -- Disagree - Not applicable / Not valid  -- Disagree - Clinically unable to determine / Unknown  -- Refer to Clinical Documentation Reviewer    PROVIDER RESPONSE TEXT:    This patient has GI bleeding due to possible gastritis.     Query created by: Kamran Ridley on 11/29/2022 1:59 PM      Electronically signed by:  Alessandra Early MD 11/29/2022 3:07 PM

## 2022-11-29 NOTE — PROGRESS NOTES
Pt sleeping and in no visible resp. Distress at this time. Sitter encouraged to call RT or RN if she feels PT is in need of suctioning.

## 2022-11-29 NOTE — CARE COORDINATION
Received call from Yolanda at [facility] that they have denied referral as they do not feel they can meet pt's needs. TC to pt's spouse and his next choice is [facility]. Referral called to with Christianne Crisostomo- faxed referral to [phone]. Pt will requires precert, sent PS to Dr. Becerra Given to request PT/OT orders and placed whiteboard note. Discussed pt in IDR and advised sitter needs removed if able as needs to be sitter free for 24hrs prior to discharge.

## 2022-11-29 NOTE — PROGRESS NOTES
[facility] THERAPY EVALUATION    Fidelina Talbert, 1955, [id number], 11/29/2022    Discharge Recommendation: SNF    History:  [place]:  The primary encounter diagnosis was Gastrointestinal hemorrhage, unspecified gastrointestinal hemorrhage type. Diagnoses of Hypokalemia and Cellulitis of toe of left foot were also pertinent to this visit. Patient  has a past medical history of Cancer ([facility] [address] Diabetes mellitus ([facility] [address] Hyperlipidemia, Hypertension, and Seizure ([facility] [address] Patient  has a past surgical history that includes Lung cancer surgery (Left, 10/2019); Upper gastrointestinal endoscopy (N/A, 10/3/2020); and Toe amputation (Left, 8/8/2022).     Subjective:  Patient states: pt nonverbal throughout session  Pain: LUTHER  Communication with other providers: coeval with PT Tessie Jurado  Restrictions: general precautions, fall risk, contact precautions, cog/sitter  brother at bedside    Home Setup/Prior level of function:   Lives With: Spouse  Type of Home: House  Home Layout: Multi-level, Bed/Bath upstairs  Home Access: Stairs to enter without rails  Entrance Stairs - Number of Steps: 1  Bathroom Shower/Tub: Tub/Shower unit, Shower chair with back  Bathroom Toilet: Standard  Bathroom Equipment: Shower chair  Home Equipment: [address], rolling, Rollator, Cane  Has the patient had two or more falls in the past year or any fall with injury in the past year?: Yes (pt state she passes out and falls)  ADL Assistance: Independent  Homemaking Assistance: Needs assistance (spouse performs IADLs)  Ambulation Assistance: Independent (mod I with walker)  Transfer Assistance: Independent    *info taken from last admission 8/10/22, has recently been in and out of hospitals and in SNF    Examination:  Observation: Pt was semi fowlers in bed upon arrival, agreeable to session, sitter, pocket tele, PIV, PEG tube, trach  Vision: WFL  Hearing: WFL  Objective Measures: stable    Body Systems and functions:  ROM: grossly WFL in BUE  Strength: grossly 3/5 in BUE  Sensation: LUTHER  Tone: normotonic in BUE  Coordination: movements fluid and coordinated  Activity Tolerance: fair-    Activities of Daily Living (ADLs):  Feeding: dep (PEG)  Grooming: max A  Toileting: dep  UB dressing/bathing: dep  LB dressing/bathing: dep    *pt ADL function inferred from gross functional assessment of mobility, balance, posture, safety awareness, activity tolerance (unless otherwise indicated)    Cognitive and Psychosocial Functioning:  Overall cognitive status: LUTHER formally, generally very confused. Follows 1/5 commands. Poor impulse control/safety awareness. Restless and nonverbal  Affect: confused    Functional Mobility:  Bed Mobility: mod Ax2  Sit <> Stand: DNT    Ambulation: DNT      AM-PAC 6 click short form for inpatient daily activity:   How much help from another person does the patient currently need. .. Unable  Dep A Lot  Max A A Lot   Mod A A Little  Min A A Little   CGA  SBA None   Mod I  Indep  Sup   1. Putting on and taking off regular lower body clothing? [x] 1    [] 2   [] 2   [] 3   [] 3   [] 4      2. Bathing (including washing, rinsing, drying)? [x] 1   [] 2   [] 2 [] 3 [] 3 [] 4   3. Toileting, which includes using toilet, bedpan, or urinal? [x] 1    [] 2   [] 2   [] 3   [] 3   [] 4     4. Putting on and taking off regular upper body clothing? [x] 1   [] 2   [] 2   [] 3   [] 3    [] 4      5. Taking care of personal grooming such as brushing teeth? [] 1   [x] 2    [] 2 [] 3    [] 3   [] 4      6. Eating meals? [x] 1   [] 2   [] 2   [] 3   [] 3   [] 4        Raw Score:  7      24/24 = unimpaired  23/24 = 1-20% impaired   20/24-22/24 = 21-40% impaired  15/24-19/24 = 41-59% impaired   10/24-14/24 = 60%-79% impaired  7/24-9/24 = 80%-99% impaired  6/24 = 100% impaired     Treatment:    Therapeutic Activity Training (9 minutes): Therapeutic activity training was instructed today.   Cues were given for safety, sequence, UE/LE placement, visual cues, and balance. Activities performed today included pt demo supine to sit EOB with mod Ax2, seated at EOB with min A progressing to CGA for ~5 minutes. Pt falling asleep frequently at EOB. Pt returning to supine with mod Ax2, positioned for comfort. Education: Role of OT, OT POC, discharge needs, safety, benefits of EOB/OOB activity, AD/DME needs, Home safety  Safety Measures: Gait belt used for safety of pt and therapist, Left in semi fowlers in bed asleep, Alarm in place, call light and phone within reach, lines managed    Assessment:  Pt is a 74 yr old female recently from SNF who presents with acute on chronic anemia. Prior to admission, pt was ind with ADLs and mobility using walker, recently receiving unknown levels of assistance at SNF. Pt currently well below baseline, mod Ax2 for limited bed mobility, significantly impaired cog. Pt also presents with generalized weakness, activity tolerance, impaired mobility. Pt would benefit from continued IP OT services during their stay and discharge to SNF.     Complexity: high  Prognosis: guarded  Barriers: cog/safety, comorbidities, deconditioning    Plan:  Plan: 1+/week    Treatment to include: Strengthening, ROM, Balance Training, Functional Mobility Training, Endurance Training, Gait Training, Pain Management, Safety Education and Training, Patient+Caregiver Education and Training, Equipment Evaluation Education and Procurement, Positioning, Self Care Training, Home Management Training, Coordination Training, cognitive/perceptual training    Pt Would Benefit from Continued Edu on none  Adaptive Equipment Recommendations: none    Goals:  Time frame for goals: 2 weeks    Pt will complete grooming tasks with mod A at EOB unsupported  Pt will complete toileting tasks with max A using BSC  Pt will complete UB dressing and bathing tasks with max A  Pt will complete LB dressing and bathing tasks with max A  Pt will complete therapeutic exercise/activity to increase independence in ADL/IADL function  Pt will practice functional transfers and mobility with AD for increased safety and independence    Time:   Time in: 1512  Time out: 1529  Treatment Minutes: 9  Evaluation Minutes: 8  Total time: 17    Electronically signed by:      RUPA Velasquez  11/29/2022, 4:05 PM

## 2022-11-29 NOTE — CONSULTS
[address] Tyrese [address], 1955, [id number], 11/29/2022    History  [place]:  The primary encounter diagnosis was Gastrointestinal hemorrhage, unspecified gastrointestinal hemorrhage type. Diagnoses of Hypokalemia and Cellulitis of toe of left foot were also pertinent to this visit. Patient  has a past medical history of Cancer ([facility] [address] Diabetes mellitus ([facility] [address] Hyperlipidemia, Hypertension, and Seizure ([facility] [address] Patient  has a past surgical history that includes Lung cancer surgery (Left, 10/2019); Upper gastrointestinal endoscopy (N/A, 10/3/2020); and Toe amputation (Left, 8/8/2022). Subjective:  Patient states: pt nonverbal this date    Pain:  facial expressions do not convey pain. Communication with other providers:  Handoff to RN, OT  Restrictions: tube feed, fall risk, general precautions, seizure precautions    Home Setup/Prior level of function   Per chart review, pt from SNF    Examination of body systems (includes body structures/functions, activity/participation limitations):  Observation:  Pt supine in bed with sitter present upon arrival and agreeable to therapy  Vision:  unable to formally test  Hearing:  [facility]  Cardiopulmonary:  no O2 needs  Cognition: unable to formally assess, pt intermittently following commands, see OT/SLP note for further evaluation. Musculoskeletal  ROM R/L:  WFL. Strength R/L:  3/5, significant impairment in function and endurance. Neuro:  unable to formally test      Mobility:  Rolling L/R:  mod A with cues for sequencing  Supine <-> sit:  mod A x2 with assist at bilateral LEs, hips, and trunk. Transfers: NT due to safety concerns  Sitting balance:  pt sat EOB ~5 minutes Chalino progressing to CGA.     Standing balance:  NT.    Gait: NT    AMPA 6 Clicks Inpatient Mobility:  AM-PAC Inpatient Mobility Raw Score : 8    Safety: patient left supine in bed with sitter present, bed alarm broken, call light within reach, RN notified, gait belt used. Assessment:  Pt is a 74 y.o. female admitted to the hospital for acute on chronic anemia. Pt has been at a SNF, unsure of functional mobility status as pt nonverbal this date. Pt is currently performing bed mobility mod A x2 and sitting EOB min A. Pt is presenting with decreased endurance, impaired transfers, impaired gait, impaired strength, impaired bed mobility, impaired balance. Pt would benefit from continued acute care PT as well as SNF placement upon discharge to continue to address impairments.      Complexity: moderate    Equipment: TBD at next level of care    Recommendations for NURSING mobility: DO    Time:   Time in: 1512  Time out: 1530  Timed treatment minutes: 0  Total time: 18    Electronically signed by:    Martita Hughes PT  11/29/2022, 4:05 PM

## 2022-11-29 NOTE — CONSULTS
[address] [id number] Continence Nurse  Consult Note       Jourdan Robledo  AGE: 74 y.o. GENDER: female  : 1955  TODAY'S DATE:  2022    Subjective:     Reason for Evaluation and Assessment: wound care evalMago Robledo is a 74 y.o. female referred by:   [x] Physician  [] Nursing  [] Other:     Wound Identification:  Wound Type: diabetic, pressure, and non-healing surgical  Contributing Factors: diabetes, chronic pressure, decreased mobility, malnutrition, and incontinence of urine        PAST MEDICAL HISTORY        Diagnosis Date    Cancer ([facility] [address]     Di[facility]     Hyperlipidemia     Hypertension     Seizure ([facility]        PAST SURGICAL HISTORY    Past Surgical History:   Procedure Laterality Date    LUNG CANCER SURGERY Left 10/2019    TOE AMPUTATION Left 2022    LEFT FOOT HALLUX AMPUTATION EXCISIONAL DEBRIDEMENT ALL NON VIABLE   TISSUE AND BONE performed by Leena Mena DPM at [facility] [address] N/A 10/3/2020    EGD BIOPSY performed by Sydnee Howell MD at [address] [facility]    No family history on file. SOCIAL HISTORY    Social History     Tobacco Use    Smoking status: Former     Packs/day: 1.00     Types: Cigarettes     Quit date:      Years since quitting: 3.9    Smokeless tobacco: Never    Tobacco comments:     10/2019   Substance Use Topics    Alcohol use: Yes     Alcohol/week: 1.0 standard drink     Types: 1 Cans of beer per week    Drug use: Yes     Types: Marijuana (Weed)       ALLERGIES    No Known Allergies    MEDICATIONS    No current facility-administered medications on file prior to encounter. Current Outpatient Medications on File Prior to Encounter   Medication Sig Dispense Refill    oxyCODONE (ROXICODONE) 5 MG immediate release tablet Take 5 mg by mouth every 4 hours as needed for Pain.       aspirin 81 MG chewable tablet 81 mg by Per G Tube route daily      apixaban (ELIQUIS) 5 MG TABS tablet by Per G Tube route 2 times daily      carvedilol (COREG) 25 MG tablet 25 mg by Per G Tube route 2 times daily (with meals)      folic acid (FOLVITE) 1 MG tablet 1 mg by Per G Tube route daily      glycopyrrolate (ROBINUL) 1 MG tablet 1 mg by Per G Tube route 3 times daily      insulin glargine (LANTUS) 100 UNIT/ML injection vial Inject 20 Units into the skin daily (with breakfast)      Lactobacillus Extra Strength CAPS by Per G Tube route      lansoprazole (PREVACID) 30 MG delayed release capsule Take 30 mg by mouth daily      losartan (COZAAR) 50 MG tablet 50 mg by Per G Tube route daily      scopolamine (TRANSDERM-SCOP) transdermal patch Place 1 patch onto the skin every 72 hours      vitamin B-1 (THIAMINE) 100 MG tablet 100 mg by Per G Tube route daily      lacosamide (VIMPAT) 50 MG TABS tablet 200 mg by Per G Tube route 2 times daily. alendronate (FOSAMAX) 70 MG tablet Take 1 tablet by mouth once a week      atorvastatin (LIPITOR) 40 MG tablet Take 1 tablet by mouth in the morning. (Patient taking differently: Take 20 mg by mouth daily) 30 tablet 1    gabapentin (NEURONTIN) 300 MG capsule Take 300 mg by mouth at bedtime.       [DISCONTINUED] simvastatin (ZOCOR) 20 MG tablet Take 40 mg by mouth nightly            Objective:      /72   Pulse 66   Temp 97.3 °F (36.3 °C) (Oral)   Resp 16   Ht 5' 6\" (1.676 m)   SpO2 99%   BMI 20.66 kg/m²   Jose Angel Risk Score: Jose Angel Scale Score: 15    LABS    CBC:   Lab Results   Component Value Date/Time    WBC 9.5 11/28/2022 05:10 AM    RBC 3.16 11/28/2022 05:10 AM    HGB 9.0 11/28/2022 05:10 AM    HCT 28.7 11/28/2022 05:10 AM    MCV 90.8 11/28/2022 05:10 AM    MCH 28.5 11/28/2022 05:10 AM    MCHC 31.4 11/28/2022 05:10 AM    RDW 14.1 11/28/2022 05:10 AM     11/28/2022 05:10 AM    MPV 9.1 11/28/2022 05:10 AM     CMP:    Lab Results   Component Value Date/Time     11/28/2022 05:10 AM    K 3.4 11/28/2022 05:10 AM     11/28/2022 05:10 AM    CO2 22 11/28/2022 05:10 AM BUN 23 11/28/2022 05:10 AM    CREATININE 0.6 11/28/2022 05:10 AM    GFRAA >60 09/29/2022 07:53 PM    LABGLOM >60 11/28/2022 05:10 AM    GLUCOSE 181 11/28/2022 05:10 AM    PROT 6.4 11/28/2022 05:10 AM    LABALBU 3.3 11/28/2022 05:10 AM    CALCIUM 9.2 11/28/2022 05:10 AM    BILITOT 0.3 11/28/2022 05:10 AM    ALKPHOS 92 11/28/2022 05:10 AM    AST 20 11/28/2022 05:10 AM    ALT 15 11/28/2022 05:10 AM     Albumin:    Lab Results   Component Value Date/Time    LABALBU 3.3 11/28/2022 05:10 AM     PT/INR:    Lab Results   Component Value Date/Time    PROTIME 13.7 11/28/2022 05:10 AM    INR 1.06 11/28/2022 05:10 AM     HgBA1c:    Lab Results   Component Value Date/Time    LABA1C 5.9 09/29/2022 07:53 PM         Assessment:     Patient Active Problem List   Diagnosis    Chest pain    Dyspnea and respiratory abnormalities    Acute gastritis without hemorrhage    Diabetic ulcer of toe of left foot associated with type 2 diabetes mellitus, with fat layer exposed ([address]    Type 2 diabetes mellitus with peripheral neuropathy (HCC)    Peripheral vascular disease ([address]    History of nicotine dependence    Sepsis ([address]    Brain metastases ([address]    Diabetic foot infection ([address]    Cancer of left lung ([address] - removed at [facility] in 2019    Former smoker - quit in 2019    DM (diabetes mellitus), type 2 (HCC)    Seizures (HCC)    Hypokalemia    Hypophosphatemia    Leukocytosis    Metabolic acidosis, increased anion gap    Elevated CK    Respiratory failure ([address]    Acute on chronic anemia    Severe malnutrition ([address]       Measurements:  Wound 08/16/22 Toe (Comment  which one) Anterior; Left Left Great Toe Incision (Active)   Number of days: 104       Wound 11/27/22 Toe (Comment  which one) Anterior; Left great toe Soft tissue ulceration at the surgical stump,erythema (Active)   Wound Image   11/29/22 1025   Wound Etiology Diabetic 11/29/22 1025   Dressing Status New dressing applied 11/29/22 1025   Wound Cleansed Cleansed with saline 11/29/22 1025   Dressing/Treatment Open to air 11/29/22 1100   Wound Length (cm) 2.5 cm 11/29/22 1025   Wound Width (cm) 2.5 cm 11/29/22 1025   Wound Depth (cm) 0.2 cm 11/29/22 1025   Wound Surface Area (cm^2) 6.25 cm^2 11/29/22 1025   Wound Volume (cm^3) 1.25 cm^3 11/29/22 1025   Distance Tunneling (cm) 0 cm 11/29/22 1025   Tunneling Position ___ O'Clock 0 11/29/22 1025   Undermining Starts ___ O'Clock 0 11/29/22 1025   Undermining Ends___ O'Clock 0 11/29/22 1025   Undermining Maxium Distance (cm) 0 11/29/22 1025   Wound Assessment Eschar dry;Pink/red 11/29/22 1025   Drainage Amount Moderate 11/29/22 1025   Drainage Description Serosanguinous 11/29/22 1025   Odor None 11/29/22 1025   Lauren-wound Assessment Intact 11/29/22 1025   Margins Attached edges 11/29/22 1025   Wound Thickness Description not for Pressure Injury Full thickness 11/29/22 1025   Number of days: 2       Wound 11/27/22 Foot Left;Lateral presure injury (Active)   Wound Image   11/29/22 1025   Wound Etiology Diabetic 11/29/22 1025   Dressing Status New dressing applied 11/29/22 1025   Wound Cleansed Cleansed with saline 11/29/22 1025   Dressing/Treatment Open to air 11/29/22 1100   Wound Length (cm) 1.2 cm 11/29/22 1025   Wound Width (cm) 0.8 cm 11/29/22 1025   Wound Depth (cm) 0.1 cm 11/29/22 1025   Wound Surface Area (cm^2) 0.96 cm^2 11/29/22 1025   Wound Volume (cm^3) 0.096 cm^3 11/29/22 1025   Distance Tunneling (cm) 0 cm 11/29/22 1025   Tunneling Position ___ O'Clock 0 11/29/22 1025   Undermining Starts ___ O'Clock 0 11/29/22 1025   Undermining Ends___ O'Clock 0 11/29/22 1025   Undermining Maxium Distance (cm) 0 11/29/22 1025   Wound Assessment Eschar dry 11/29/22 1025   Drainage Amount None 11/29/22 1025   Odor None 11/29/22 1025   Lauren-wound Assessment Intact 11/29/22 1025   Margins Attached edges 11/29/22 1025   Wound Thickness Description not for Pressure Injury Full thickness 11/29/22 1025   Number of days: 2       Wound 11/29/22 Coccyx (Active)   Wound Image   11/29/22 1025   Wound Etiology Pressure Stage 2 11/29/22 1025   Dressing Status New dressing applied 11/29/22 1025   Wound Cleansed Cleansed with saline 11/29/22 1025   Dressing/Treatment Collagen;Silicone border 95/30/58 1025   Wound Length (cm) 0.8 cm 11/29/22 1025   Wound Width (cm) 0.3 cm 11/29/22 1025   Wound Depth (cm) 0.1 cm 11/29/22 1025   Wound Surface Area (cm^2) 0.24 cm^2 11/29/22 1025   Wound Volume (cm^3) 0.024 cm^3 11/29/22 1025   Distance Tunneling (cm) 0 cm 11/29/22 1025   Tunneling Position ___ O'Clock 0 11/29/22 1025   Undermining Starts ___ O'Clock 0 11/29/22 1025   Undermining Ends___ O'Clock 0 11/29/22 1025   Undermining Maxium Distance (cm) 0 11/29/22 1025   Drainage Amount Small 11/29/22 1025   Drainage Description Serosanguinous 11/29/22 1025   Odor None 11/29/22 1025   Lauren-wound Assessment Intact 11/29/22 1025   Margins Defined edges 11/29/22 1025   Wound Thickness Description not for Pressure Injury Partial thickness 11/29/22 1025   Number of days: 0       Response to treatment:  Poorly tolerated by patient. Pain Assessment:  Severity:  none  Quality of pain:   Wound Pain Timing/Severity:   Premedicated: no    Plan:     Plan of Care: Wound 11/27/22 Toe (Comment  which one) Anterior; Left great toe Soft tissue ulceration at the surgical stump,erythema-Dressing/Treatment: Open to air  Wound 11/27/22 Foot Left;Lateral presure injury-Dressing/Treatment: Open to air  Wound 11/29/22 Coccyx-Dressing/Treatment: Collagen, Silicone border    Patient in bed with sitter in the room and  at bedside agreeable to wound care eval. Pt has left great toe/lateral foot wounds diabetic with eschar and great toe with bone exposed. Pt see's Dr Reji Ross podiatry. Cleansed with NS. Measured and pictured. Applied betadine moist gauze. Sacrum with pressure injury stage 2 cleansed with NS measured and pictured. Applied collagen and foam border. Heels intact and floated.  Pt was agitated hitting and pushing nurse away  at bedside to calm patient. Pt is incontinent of urine amrik care done and depends changed. Atmos air pump placed to the bed. Pt is at moderate risk for skin breakdown AEB magnus. Follow magnus orders. Specialty Bed Required : yes  [] Low Air Loss   [x] Pressure Redistribution  [] Fluid Immersion  [] Bariatric  [] Total Pressure Relief  [] Other:     Discharge Plan:  Placement for patient upon discharge: tbd  Hospice Care: no  Patient appropriate for Outpatient [address]: yes to continue     Patient/Caregiver Teaching:  Level of patient/caregiver understanding able to: pt's  voiced understanding. Electronically signed by Dallas Barton.  DENIZ Wilkins,  on 11/29/2022 at 1:36 PM

## 2022-11-29 NOTE — PROGRESS NOTES
V2.0  [facility] [place] Note      Name:  Deann Cartagena /Age/Sex: 1955  (74 y.o. female)   MRN & CSN:  [id number] & [id number] Encounter Date/Time: 2022 12:50 PM EST    Location:  7577/8794-W PCP: Leif Stoll MD       Hospital Day: 4    Assessment and Plan:   Deann Cartagena is a 74 y.o. female with pmh of foot toe amputation history, history of metastatic lung cancer s/p left lobectomy, right for orbital craniotomy on 2022, pathology consistent with poorly differentiated metastatic adenocarcinoma status post, knife radiosurgery, s/p PEG tube, seizure disorder who presents with Acute on chronic anemia      Plan:  Acute on chronic anemia likely GI bleed: Hemoglobin 9.2 on admission, previously 14.2 in September, FOBT positive. GI on board. Holding off on scope because of underlying metastatic lung disease history. To restart feeds. EGD in 2020 showed hiatal hernia and gastritis. Continue to monitor today. If hemoglobin stable plan for discharge tomorrow. Was agitated overnight DC the sitter. Pending placement. Necrotic left toe with mild cellulitic changes: Currently on ciprofloxacin, previous wound cultures grew Serratia and staph epi. Podiatry consult on board. History of osteomyelitis status post partial hallux amputation with excisional debridement in 2022. X-ray of the left foot showed no suspicion of osseous lesions  Hypokalemia replete as needed  History of metastatic lung cancer s/p left lobectomy no chemo or radiation underlying right frontal craniotomy on 2022: Pathology results are consistent with poorly differentiated metastatic adenocarcinoma. Patient subsequently had gamma knife radiosurgery.   Guarded prognosis  Seizure disorder secondary to brain metastasis on Vimpat 200 mg twice daily  Severe oropharyngeal dysphagia s/p trach and PEG tube since 2022  History of status epilepticus in 2022  Peripheral vascular disease on aspirin and statin  Benign essential hypertension on Coreg 25 mg twice daily and losartan 50 mg daily  Hyperlipidemia on statin  Non-insulin-dependent diabetes mellitus type 2 on sliding scale insulin  Hiatal hernia lansoprazole    Diet Diet NPO Exceptions are: Ice Chips  ADULT TUBE FEEDING; PEG; Diabetic; Continuous; 20; Yes; 20; Q 24 hours; 60; 30; Q 8 hours   DVT Prophylaxis [] Lovenox, []  Heparin, [] SCDs, [] Ambulation,  [] Eliquis, [] Xarelto  [] Coumadin   Code Status Full Code   Disposition From: [facility] rehab  Expected Disposition: Patient requesting different rehab  Estimated Date of Discharge: To be declared, pending placement  Patient requires continued admission due to pending placement   Surrogate Decision Maker/ POA      Subjective:     Chief Complaint: No chief complaint on file. Patient and family at bedside. Family is requesting for a different SNF. Case management is working on that. Continue to monitor. GI on board.     Review of Systems:    Review of Systems      Objective:   No intake or output data in the 24 hours ending 11/29/22 1250     Vitals:   Vitals:    11/29/22 1117   BP:    Pulse:    Resp: 16   Temp:    SpO2:        Physical Exam:   Physical Exam       Medications:   Medications:    sodium chloride flush  5-40 mL IntraVENous 2 times per day    pantoprazole (PROTONIX) 40 mg injection  40 mg IntraVENous Daily    carvedilol  25 mg Per G Tube BID WC    folic acid  1 mg Per G Tube Daily    insulin glargine  20 Units SubCUTAneous Daily with breakfast    lactobacillus  1 capsule Per G Tube Daily with breakfast    losartan  50 mg Per G Tube Daily    scopolamine  1 patch TransDERmal Q72H    thiamine  100 mg Per G Tube Daily    insulin lispro  0-8 Units SubCUTAneous TID WC    insulin lispro  0-4 Units SubCUTAneous Nightly    lacosamide (VIMPAT) IVPB  200 mg IntraVENous BID    ciprofloxacin  400 mg IntraVENous Q12H      Infusions:    sodium chloride 25 mL (11/28/22 1543)    dextrose       PRN Meds: haloperidol lactate, 1 mg, Q6H PRN  morphine, 2 mg, Q4H PRN  sodium chloride flush, 5-40 mL, PRN  sodium chloride, , PRN  ondansetron, 4 mg, Q8H PRN   Or  ondansetron, 4 mg, Q6H PRN  acetaminophen, 650 mg, Q6H PRN   Or  acetaminophen, 650 mg, Q6H PRN  oxyCODONE, 5 mg, Q4H PRN  glucose, 4 tablet, PRN  dextrose bolus, 125 mL, PRN   Or  dextrose bolus, 250 mL, PRN  glucagon (rDNA), 1 mg, PRN  dextrose, , Continuous PRN        Labs      Recent Results (from the past 24 hour(s))   POCT Glucose    Collection Time: 11/28/22  4:36 PM   Result Value Ref Range    POC Glucose 118 (H) 70 - 99 MG/DL   POCT Glucose    Collection Time: 11/28/22  7:56 PM   Result Value Ref Range    POC Glucose 142 (H) 70 - 99 MG/DL   POCT Glucose    Collection Time: 11/29/22  8:46 AM   Result Value Ref Range    POC Glucose 162 (H) 70 - 99 MG/DL   POCT Glucose    Collection Time: 11/29/22 11:59 AM   Result Value Ref Range    POC Glucose 138 (H) 70 - 99 MG/DL        Imaging/Diagnostics Last 24 Hours   No results found.     Electronically signed by Jessica Sun MD, MD on 11/29/2022 at 12:50 PM

## 2022-11-30 ENCOUNTER — APPOINTMENT (OUTPATIENT)
Dept: GENERAL RADIOLOGY | Age: 67
End: 2022-11-30
Payer: MEDICARE

## 2022-11-30 PROBLEM — E11.69 DIABETIC FOOT ULCER WITH OSTEOMYELITIS (HCC): Status: ACTIVE | Noted: 2022-01-01

## 2022-11-30 PROBLEM — A49.8 INFECTION DUE TO MULTIDRUG-RESISTANT STENOTROPHOMONAS MALTOPHILIA: Status: ACTIVE | Noted: 2022-01-01

## 2022-11-30 PROBLEM — E11.621 DIABETIC FOOT ULCER WITH OSTEOMYELITIS (HCC): Status: ACTIVE | Noted: 2022-11-30

## 2022-11-30 PROBLEM — K92.2 GASTROINTESTINAL HEMORRHAGE: Status: ACTIVE | Noted: 2022-01-01

## 2022-11-30 PROBLEM — L97.509 DIABETIC FOOT ULCER WITH OSTEOMYELITIS (HCC): Status: ACTIVE | Noted: 2022-01-01

## 2022-11-30 PROBLEM — M86.9 DIABETIC FOOT ULCER WITH OSTEOMYELITIS (HCC): Status: ACTIVE | Noted: 2022-01-01

## 2022-11-30 PROBLEM — Z16.24 INFECTION DUE TO MULTIDRUG-RESISTANT STENOTROPHOMONAS MALTOPHILIA: Status: ACTIVE | Noted: 2022-01-01

## 2022-11-30 LAB
GLUCOSE BLD-MCNC: 117 MG/DL (ref 70–99)
GLUCOSE BLD-MCNC: 137 MG/DL (ref 70–99)
GLUCOSE BLD-MCNC: 151 MG/DL (ref 70–99)
GLUCOSE BLD-MCNC: 171 MG/DL (ref 70–99)
GLUCOSE BLD-MCNC: 193 MG/DL (ref 70–99)
GLUCOSE BLD-MCNC: 198 MG/DL (ref 70–99)

## 2022-11-30 PROCEDURE — 6360000002 HC RX W HCPCS: Performed by: STUDENT IN AN ORGANIZED HEALTH CARE EDUCATION/TRAINING PROGRAM

## 2022-11-30 PROCEDURE — 94761 N-INVAS EAR/PLS OXIMETRY MLT: CPT

## 2022-11-30 PROCEDURE — 1200000000 HC SEMI PRIVATE

## 2022-11-30 PROCEDURE — 71045 X-RAY EXAM CHEST 1 VIEW: CPT

## 2022-11-30 PROCEDURE — 6370000000 HC RX 637 (ALT 250 FOR IP): Performed by: NURSE PRACTITIONER

## 2022-11-30 PROCEDURE — APPSS60 APP SPLIT SHARED TIME 46-60 MINUTES: Performed by: NURSE PRACTITIONER

## 2022-11-30 PROCEDURE — 99223 1ST HOSP IP/OBS HIGH 75: CPT | Performed by: INTERNAL MEDICINE

## 2022-11-30 PROCEDURE — 6370000000 HC RX 637 (ALT 250 FOR IP): Performed by: INTERNAL MEDICINE

## 2022-11-30 PROCEDURE — C9113 INJ PANTOPRAZOLE SODIUM, VIA: HCPCS | Performed by: INTERNAL MEDICINE

## 2022-11-30 PROCEDURE — 82962 GLUCOSE BLOOD TEST: CPT

## 2022-11-30 PROCEDURE — 6360000002 HC RX W HCPCS: Performed by: INTERNAL MEDICINE

## 2022-11-30 PROCEDURE — 2580000003 HC RX 258: Performed by: INTERNAL MEDICINE

## 2022-11-30 PROCEDURE — C9254 INJECTION, LACOSAMIDE: HCPCS | Performed by: INTERNAL MEDICINE

## 2022-11-30 RX ORDER — SULFAMETHOXAZOLE AND TRIMETHOPRIM 800; 160 MG/1; MG/1
1 TABLET ORAL EVERY 12 HOURS SCHEDULED
Status: DISCONTINUED | OUTPATIENT
Start: 2022-11-30 | End: 2022-12-08 | Stop reason: HOSPADM

## 2022-11-30 RX ORDER — LEVOFLOXACIN 750 MG/1
750 TABLET ORAL DAILY
Status: DISCONTINUED | OUTPATIENT
Start: 2022-11-30 | End: 2022-12-01

## 2022-11-30 RX ORDER — SULFAMETHOXAZOLE AND TRIMETHOPRIM 800; 160 MG/1; MG/1
1 TABLET ORAL EVERY 8 HOURS
Status: DISCONTINUED | OUTPATIENT
Start: 2022-11-30 | End: 2022-11-30

## 2022-11-30 RX ADMIN — OXYCODONE HYDROCHLORIDE 5 MG: 5 TABLET ORAL at 23:21

## 2022-11-30 RX ADMIN — MORPHINE SULFATE 2 MG: 2 INJECTION, SOLUTION INTRAMUSCULAR; INTRAVENOUS at 10:25

## 2022-11-30 RX ADMIN — SODIUM CHLORIDE 200 MG: 9 INJECTION, SOLUTION INTRAVENOUS at 10:39

## 2022-11-30 RX ADMIN — CIPROFLOXACIN 400 MG: 2 INJECTION, SOLUTION INTRAVENOUS at 05:01

## 2022-11-30 RX ADMIN — INSULIN GLARGINE 20 UNITS: 100 INJECTION, SOLUTION SUBCUTANEOUS at 08:53

## 2022-11-30 RX ADMIN — SODIUM CHLORIDE, PRESERVATIVE FREE 40 MG: 5 INJECTION INTRAVENOUS at 10:33

## 2022-11-30 RX ADMIN — SODIUM CHLORIDE 200 MG: 9 INJECTION, SOLUTION INTRAVENOUS at 21:33

## 2022-11-30 RX ADMIN — FOLIC ACID 1 MG: 1 TABLET ORAL at 08:50

## 2022-11-30 RX ADMIN — CARVEDILOL 25 MG: 25 TABLET, FILM COATED ORAL at 08:50

## 2022-11-30 RX ADMIN — MORPHINE SULFATE 2 MG: 2 INJECTION, SOLUTION INTRAMUSCULAR; INTRAVENOUS at 01:15

## 2022-11-30 RX ADMIN — LEVOFLOXACIN 750 MG: 750 TABLET, FILM COATED ORAL at 17:53

## 2022-11-30 RX ADMIN — SODIUM CHLORIDE, PRESERVATIVE FREE 10 ML: 5 INJECTION INTRAVENOUS at 18:14

## 2022-11-30 RX ADMIN — CARVEDILOL 25 MG: 25 TABLET, FILM COATED ORAL at 17:02

## 2022-11-30 RX ADMIN — SULFAMETHOXAZOLE AND TRIMETHOPRIM 1 TABLET: 800; 160 TABLET ORAL at 21:51

## 2022-11-30 RX ADMIN — Medication 1 CAPSULE: at 08:50

## 2022-11-30 RX ADMIN — HALOPERIDOL LACTATE 1 MG: 5 INJECTION, SOLUTION INTRAMUSCULAR at 01:15

## 2022-11-30 RX ADMIN — Medication 100 MG: at 08:50

## 2022-11-30 RX ADMIN — LOSARTAN POTASSIUM 50 MG: 25 TABLET, FILM COATED ORAL at 08:50

## 2022-11-30 RX ADMIN — OXYCODONE HYDROCHLORIDE 5 MG: 5 TABLET ORAL at 13:08

## 2022-11-30 RX ADMIN — MORPHINE SULFATE 2 MG: 2 INJECTION, SOLUTION INTRAMUSCULAR; INTRAVENOUS at 18:13

## 2022-11-30 RX ADMIN — HALOPERIDOL LACTATE 1 MG: 5 INJECTION, SOLUTION INTRAMUSCULAR at 10:25

## 2022-11-30 ASSESSMENT — PAIN DESCRIPTION - LOCATION: LOCATION: LEG

## 2022-11-30 ASSESSMENT — PAIN DESCRIPTION - ORIENTATION: ORIENTATION: RIGHT

## 2022-11-30 ASSESSMENT — PAIN DESCRIPTION - DESCRIPTORS: DESCRIPTORS: OTHER (COMMENT)

## 2022-11-30 NOTE — CARE COORDINATION
Received response from [facility] that they will accept pt, requested precert be initiated.

## 2022-11-30 NOTE — CONSULTS
Infectious Disease Consult Note  2022   Patient Name: Deann Cartagena : 1955   Impression  Left DFI with Residual OM and Dehiscence Secondary to PAD:  Stenotrophomonas maltophilia in Respiratory Culture:   Afebrile, no leukocytosis  Pct 0.089  -BC Pending  -Resp culture: Stenotrophomonas maltophilia  Past left foot cultures 2022-Serratia marcescens, Staphylococcus epidermidis   -Left Foot Culture: Serratia marcescens (sensi is back)  -XR Foot Left: 1. Status post amputation of the 1st proximal phalanx. No suspicious osseous   lesion. 2. Soft tissue ulceration at the surgical stump. 3. Bony demineralization. DMII:  Metastatic Lung Cancer with Brain Mets s/p Right Frontal Craniotomy:  Seizures:  S/p Trach/PEG 2022:  HTN  Multi-morbidity: per PMHx: HLD  Plan:  DC IV Cipro 400 mg q12h  Start per PEG tube- Bactrim DS bid x 6 weeks (end date 2023)  Start per PEG tube- Levaquin 750 qd x 14 days (end date 22)  Await Stenotrophomas sensi  Trend CRP- ordered  Ordered CXR to evaluate for pneumonia  ID recommends CTS to eval PAD, dependent upon the family wishes due to the patient's status with her poor healing and cancer onboard    Thank you for allowing me to consult in the care of this patient.  ------------------------  REASON FOR CONSULT: Infective syndrome \"Foot osteomyelitis\"  Requested by: Dr. Guido Chin is a 74 y.o.  -American female with a history of metastatic lung cancer with brain metastasis s/p right frontal craniotomy, gamma knife radiation, seizures, s/p trach and PEG 2022, PAD, s/p left hallux amputation, HLD, HTN, DMII, chronic pain, and currently resides at [facility] who was admitted 2022 for further evaluation and management of severe left great toe pain from the site of the surgical stump, she is s/p left great toe amputation 2022 which grew Serratia marcescens, Staphylococcus epidermidis, and the wound has been non-healing since the surgery. She was started on IV empiric ABX therapy of Zosyn. An XR of the left foot was obtained revealing s/p amputation of the 1st proximal phalanx. No suspicious osseous lesion. Soft tissue ulceration at the surgical stump. Bony demineralization. She also had a positive guiac from her stool. Dr. Sam Ma was consulted. Dr. Mel Hernandez, podiatry, was consulted, with an impression of residual OM of the left foot as there is exposed proximal phalanx and complete dehiscence of the surgical amputation site. ? Infectious diseases service was consulted to evaluate the pt, and recommend further investigative and therapeutic measures. ROS: Other systems reviewed Including eyes, ENT, respiratory, cardiovascular, GI, , dermatologic, neurologic, psych, hem/lymphatic, musculoskeletal and endocrine were negative other than what is mentioned above. Patient is unable to verbally respond due to her being non-verbal and disoriented.    Patient Active Problem List    Diagnosis Date Noted    Severe malnutrition ([address] 11/28/2022    Acute on chronic anemia 11/27/2022    Hypokalemia 09/30/2022    Hypophosphatemia 09/30/2022    Leukocytosis 98/13/1850    Metabolic acidosis, increased anion gap 09/30/2022    Elevated CK 09/30/2022    Respiratory failure ([address] 09/30/2022    Seizures ([address] 09/29/2022    Cancer of left lung ([address] - removed at [place] [id number] in 2019 08/15/2022    Former smoker - quit in 2019 08/15/2022    Diabetic foot infection ([address] 08/14/2022    Sepsis ([address] 08/03/2022    Diabetic ulcer of toe of left foot associated with type 2 diabetes mellitus, with fat layer exposed ([address] 07/27/2022    Type 2 diabetes mellitus with peripheral neuropathy ([address] 07/27/2022    Peripheral vascular disease ([address] 07/27/2022    History of nicotine dependence 07/27/2022    DM (diabetes mellitus), type 2 ([address] 08/15/2022    Brain metastases ([address] 08/10/2022    Acute gastritis without hemorrhage 10/04/2020    Chest pain 10/03/2020    Dyspnea and respiratory abnormalities      Past Medical History:   Diagnosis Date    [facility]cer [facility]     Diabetes mellitus ([facility]     Hyperlipidemia     Hypertension     Seizure [facility])       Past Surgical History:   Procedure Laterality Date    LUNG CANCER SURGERY Left 10/2019    TOE AMPUTATION Left 8/8/2022    LEFT FOOT HALLUX AMPUTATION EXCISIONAL DEBRIDEMENT ALL NON VIABLE   TISSUE AND BONE performed by Adrienne Gaitan DPM at [address] [place] [address] N/A 10/3/2020    EGD BIOPSY performed by Ken Mccarthy MD at [facility] ENDOSCOPY      No family history on file. Infectious disease related family history - not contibutory. SOCIAL HISTORY  Social History     Tobacco Use    Smoking status: Former     Packs/day: 1.00     Types: Cigarettes     Quit date: 2019     Years since quitting: 3.9    Smokeless tobacco: Never    Tobacco comments:     10/2019   Substance Use Topics    Alcohol use: Yes     Alcohol/week: 1.0 standard drink     Types: 1 Cans of beer per week      Born:  Lived  Occupation:  No recent travel of significance. No recent unusual exposures. NO pets    ? ALLERGIES  No Known Allergies   MEDICATIONS  Reviewed and are per the chart/EMR. ? Antibiotics:   Present:  Bactrim 11/30-  Levaquin 11/30-  Past:  Zosyn 11/27? Ciprofloxacin 11/27-30  -------------------------------------------------------------------------------------------------------------------    Vital Signs:  Vitals:    11/30/22 0850   BP: (!) 141/77   Pulse: 79   Resp:    Temp:    SpO2:          Exam:    VS: noted; wt 128 lb (58.1 kg) Height 5'6\"  Gen: Non-verbal, trach intact, does not follow commands   Skin: no stigmata of endocarditis  Wounds: C/D/I left hallux amputation site with necrosis  HEMT: AT/NC Oropharynx pink, moist, and without lesions or exudates; edentulous  Eyes: PERRLA, EOMI, conjunctiva pink, sclera anicteric. Neck: Supple. Trachea midline. No LAD. Chest: no distress and CTA.  Anterior diminished breath sounds. Trach intact. Heart: NSR and no MRG. Abd: soft, non-distended, no tenderness, no hepatomegaly. Normoactive bowel sounds. PEG Intact: LUQ with tube feeding infusion  Ext: no clubbing, cyanosis, or edema. See wounds above. Neuro: CN 2-12 intact and no focal sensory or motor deficits    ? Diagnostic Studies: reviewed  11/27/22 XR Foot Left:  Impression   1. Status post amputation of the 1st proximal phalanx. No suspicious osseous   lesion. 2. Soft tissue ulceration at the surgical stump. 3. Bony demineralization. ??  I have examined this patient and available medical records on this date and have made the above observations, conclusions and recommendations. Electronically signed by: Electronically signed by [facility].  CAYLA Raygoza CNP on 11/30/2022 at 10:25 AM

## 2022-11-30 NOTE — DISCHARGE INSTR - COC
Continuity of Care Form    Patient Name: Lottie Abernathy   :    MRN:  [id number]    Admit date:  2022  Discharge date:  ***    Code Status Order: Full Code   Advance Directives:     Admitting Physician:  Maki Martínez MD  PCP: Bernadette Tijerina MD    Discharging Nurse: [facility] Unit/Room#: 5910/2365-P  Discharging Unit Phone Number: ***    Emergency Contact:   Extended Emergency Contact Information  Primary Emergency Contact: Hussein Cody  Home Phone: [phone]  Relation: Spouse  Secondary Emergency Contact: Oswald Monique  Mobile Phone: [phone]  Relation: Child   needed? No    Past Surgical History:  Past Surgical History:   Procedure Laterality Date    LUNG CANCER SURGERY Left 10/2019    TOE AMPUTATION Left 2022    LEFT FOOT HALLUX AMPUTATION EXCISIONAL DEBRIDEMENT ALL NON VIABLE   TISSUE AND BONE performed by Marshall Obrien DPM at . [facility] N/A 10/3/2020    EGD BIOPSY performed by Dalton Sebastian MD at [facility] END[facility]       Immunization History: There is no immunization history on file for this patient.     Active Problems:  Patient Active Problem List   Diagnosis Code    Chest pain R07.9    Dyspnea and respiratory abnormalities R06.00, R06.89    Acute gastritis without hemorrhage K29.00    Diabetic ulcer of toe of left foot associated with type 2 diabetes mellitus, with fat layer exposed ([address] E11.621, L97.522    Type 2 diabetes mellitus with peripheral neuropathy (HCC) E11.42    Peripheral vascular disease (HCC) I73.9    History of nicotine dependence Z87.891    Sepsis ([address] A41.9    Brain metastases ([address] C79.31    Diabetic foot infection ([address] E11.628, L08.9    Cancer of left lung ([address] - removed at [facility] in 2019 C34.92    Former smoker - quit in 2019 Z87.891    DM (diabetes mellitus), type 2 ([address] E11.9    Seizures ([address] R56.9    Hypokalemia E87.6    Hypophosphatemia E83.39    Leukocytosis J04.008    Metabolic acidosis, increased anion gap E87.29    Elevated CK R74.8    Respiratory failure (HCC) J96.90    Acute on chronic anemia D64.9    Severe malnutrition ([address] E43       Isolation/Infection:   Isolation            Contact          Patient Infection Status       Infection Onset Added Last Indicated Last Indicated By Review Planned Expiration Resolved Resolved By    MDRO (multi-drug resistant organism) 11/27/22 11/29/22 11/29/22 Chris Goodman RN        11/27/22: foot wound: Serratia marcescens    Resolved    COVID-19 (Rule Out) 09/29/22 09/29/22 09/29/22 Respiratory Panel, Molecular, with COVID-19 (Restricted: peds pts or suitable admitted adults) (Ordered)   09/29/22 Rule-Out Test Resulted    COVID-19 (Rule Out) 10/03/20 10/03/20 10/03/20 COVID-19 (Ordered)   10/03/20 Rule-Out Test Resulted            Nurse Assessment:  Last Vital Signs: /84   Pulse 80   Temp 98.4 °F (36.9 °C) (Axillary)   Resp 20   Ht 5' 6\" (1.676 m)   SpO2 95%   BMI 20.66 kg/m²     Last documented pain score (0-10 scale): Pain Level:  (LUTHER)  Last Weight:   Wt Readings from Last 1 Encounters:   09/29/22 128 lb (58.1 kg)     Mental Status:  {IP PT MENTAL STATUS:20030}    IV Access:  { NILO IV ACCESS:823937992}    Nursing Mobility/ADLs:  Walking   {[facility] LAQO:257171767}  Transfer  {[facility] MJIT:599958751}  Bathing  {[facility] JJJS:425134489}  Dressing  {[facility] JXPO:572922864}  Toileting  {[facility] UMWR:480827817}  Feeding  {[facility] EGAP:968043123}  Med Admin  {[facility] ETKB:345736881}  Med Delivery   {[facility] MED Delivery:491545820}    Wound Care Documentation and Therapy:  Wound 08/16/22 Toe (Comment  which one) Anterior; Left Left Great Toe Incision (Active)   Number of days: 106       Wound 11/27/22 Toe (Comment  which one) Anterior; Left great toe Soft tissue ulceration at the surgical stump,erythema (Active)   Wound Image   11/29/22 1025   Wound Etiology Diabetic 11/29/22 1025   Dressing Status New dressing applied 11/29/22 1025 Wound Cleansed Cleansed with saline 11/29/22 1025   Dressing/Treatment Open to air 11/29/22 2116   Wound Length (cm) 2.5 cm 11/29/22 1025   Wound Width (cm) 2.5 cm 11/29/22 1025   Wound Depth (cm) 0.2 cm 11/29/22 1025   Wound Surface Area (cm^2) 6.25 cm^2 11/29/22 1025   Wound Volume (cm^3) 1.25 cm^3 11/29/22 1025   Distance Tunneling (cm) 0 cm 11/29/22 1025   Tunneling Position ___ O'Clock 0 11/29/22 1025   Undermining Starts ___ O'Clock 0 11/29/22 1025   Undermining Ends___ O'Clock 0 11/29/22 1025   Undermining Maxium Distance (cm) 0 11/29/22 1025   Wound Assessment Eschar dry;Pink/red 11/29/22 1025   Drainage Amount Moderate 11/29/22 1025   Drainage Description Serosanguinous 11/29/22 1025   Odor None 11/29/22 1025   Lauren-wound Assessment Intact 11/29/22 1025   Margins Attached edges 11/29/22 1025   Wound Thickness Description not for Pressure Injury Full thickness 11/29/22 1025   Number of days: 3       Wound 11/27/22 Foot Left;Lateral presure injury (Active)   Wound Image   11/29/22 1025   Wound Etiology Diabetic 11/29/22 1025   Dressing Status New dressing applied 11/29/22 1025   Wound Cleansed Cleansed with saline 11/29/22 1025   Dressing/Treatment Open to air 11/29/22 2116   Wound Length (cm) 1.2 cm 11/29/22 1025   Wound Width (cm) 0.8 cm 11/29/22 1025   Wound Depth (cm) 0.1 cm 11/29/22 1025   Wound Surface Area (cm^2) 0.96 cm^2 11/29/22 1025   Wound Volume (cm^3) 0.096 cm^3 11/29/22 1025   Distance Tunneling (cm) 0 cm 11/29/22 1025   Tunneling Position ___ O'Clock 0 11/29/22 1025   Undermining Starts ___ O'Clock 0 11/29/22 1025   Undermining Ends___ O'Clock 0 11/29/22 1025   Undermining Maxium Distance (cm) 0 11/29/22 1025   Wound Assessment Eschar dry 11/29/22 1025   Drainage Amount None 11/29/22 1025   Odor None 11/29/22 1025   Lauren-wound Assessment Intact 11/29/22 1025   Margins Attached edges 11/29/22 1025   Wound Thickness Description not for Pressure Injury Full thickness 11/29/22 1025   Number of days: 3       Wound 11/29/22 Coccyx (Active)   Wound Image   11/29/22 1025   Wound Etiology Pressure Stage 2 11/29/22 1025   Dressing Status New dressing applied 11/29/22 1025   Wound Cleansed Cleansed with saline 11/29/22 1025   Dressing/Treatment Collagen;Silicone border 70/71/83 1025   Wound Length (cm) 0.8 cm 11/29/22 1025   Wound Width (cm) 0.3 cm 11/29/22 1025   Wound Depth (cm) 0.1 cm 11/29/22 1025   Wound Surface Area (cm^2) 0.24 cm^2 11/29/22 1025   Wound Volume (cm^3) 0.024 cm^3 11/29/22 1025   Distance Tunneling (cm) 0 cm 11/29/22 1025   Tunneling Position ___ O'Clock 0 11/29/22 1025   Undermining Starts ___ O'Clock 0 11/29/22 1025   Undermining Ends___ O'Clock 0 11/29/22 1025   Undermining Maxium Distance (cm) 0 11/29/22 1025   Drainage Amount Small 11/29/22 1025   Drainage Description Serosanguinous 11/29/22 1025   Odor None 11/29/22 1025   Lauren-wound Assessment Intact 11/29/22 1025   Margins Defined edges 11/29/22 1025   Wound Thickness Description not for Pressure Injury Partial thickness 11/29/22 1025   Number of days: 1       Incision 08/08/22 Toe (Comment  which one) Anterior; Left (Active)   Number of days: 114        Elimination:  Continence: Bowel: {YES / RH:50262}  Bladder: {YES / UA:55263}  Urinary Catheter: {Urinary Catheter:402123936}   Colostomy/Ileostomy/Ileal Conduit: {YES / ME:33508}       Date of Last BM: ***  No intake or output data in the 24 hours ending 11/30/22 1511  No intake/output data recorded.     Safety Concerns:     [address] Safety Concerns:023287379}    Impairments/Disabilities:      [address] Impairments/Disabilities:791167280}      Patient's personal belongings (please select all that are sent with patient):  {CHP DME Belongings:386114041}    RN SIGNATURE:  {Esignature:120037015}    CASE MANAGEMENT/SOCIAL WORK SECTION    Inpatient Status Date: ***    Readmission Risk Assessment Score:  Readmission Risk              Risk of Unplanned Readmission:  34           Discharging to Facility/ Agency   Name:  [facility]  Address: [facility]  Phone: [phone]  Fax: [phone]    Dialysis Facility (if applicable)   Name:  Address:  Dialysis Schedule:  Phone:  Fax:      PHYSICIAN SECTION      Nutrition Therapy:  Current Nutrition Therapy:   [address] [facility] NILO Diet List:921541160}    Routes of Feeding: {CHP DME Other Feedings:528800969}  Liquids: {Slp liquid thickness:90191}  Daily Fluid Restriction: {CHP DME Yes amt example:606386255}  Last Modified Barium Swallow with Video (Video Swallowing Test): {Done Not Done ACYR:547294163}    Treatments at the Time of Hospital Discharge:   Respiratory Treatments: ***  Oxygen Therapy:  {Therapy; copd oxygen:45364}  Ventilator:    { CC Vent LVLJ:854040783}    Rehab Therapies: {THERAPEUTIC INTERVENTION:1375284772}  Weight Bearing Status/Restrictions: 50David VIVAR Weight Bearin}  Other Medical Equipment (for information only, NOT a DME order):  {EQUIPMENT:490774968}  Other Treatments: ***  Prognosis: {Prognosis:7598613907}    Condition at Discharge: 50[address] Patient Condition:487427420}    Rehab Potential (if transferring to Rehab): {Prognosis:0448315308}    Recommended Labs or Other Treatments After Discharge: ***    Physician Certification: I certify the above information and transfer of Bartolo Joe  is necessary for the continuing treatment of the diagnosis listed and that she requires {Admit to Appropriate Level of Care:65238} for {GREATER/LESS:885202920} 30 days.      Update Admission H&P: {CHP DME Changes in HSSDU:007250979}    PHYSICIAN SIGNATURE:  {Esignature:088345726} Upper Respiratory Infection    WHAT YOU NEED TO KNOW:    An upper respiratory infection is also called the common cold. It is an infection that can affect your nose, throat, ears, and sinuses. For healthy people, the common cold is usually not serious and does not need special treatment. Cold symptoms are usually worst for the first 3 to 5 days. Most people get better in 7 to 14 days. You may continue to cough for 2 to 3 weeks. Colds are caused by viruses and do not get better with antibiotics.     DISCHARGE INSTRUCTIONS:    Return to the emergency department if:     You have chest pain or trouble breathing.        Contact your healthcare provider if:     You have a fever over 102ºF (39°C).      Your sore throat gets worse or you see white or yellow spots in your throat.      Your symptoms get worse after 3 to 5 days or your cold is not better in 14 days.      You have a rash anywhere on your skin.      You have large, tender lumps in your neck.      You have thick, green or yellow drainage from your nose.      You cough up thick yellow, green, or bloody mucus.      You have vomiting for more than 24 hours and cannot keep fluids down.      You have a bad earache.      You have questions or concerns about your condition or care.    Medicines: You may need any of the following:     Decongestants help reduce nasal congestion and help you breathe more easily. If you take decongestant pills, they may make you feel restless or cause problems with your sleep. Do not use decongestant sprays for more than a few days.      Cough suppressants help reduce coughing. Ask your healthcare provider which type of cough medicine is best for you.       NSAIDs, such as ibuprofen, help decrease swelling, pain, and fever. NSAIDs can cause stomach bleeding or kidney problems in certain people. If you take blood thinner medicine, always ask your healthcare provider if NSAIDs are safe for you. Always read the medicine label and follow directions.      Acetaminophen decreases pain and fever. It is available without a doctor's order. Ask how much to take and how often to take it. Follow directions. Read the labels of all other medicines you are using to see if they also contain acetaminophen, or ask your doctor or pharmacist. Acetaminophen can cause liver damage if not taken correctly. Do not use more than 4 grams (4,000 milligrams) total of acetaminophen in one day.       Take your medicine as directed. Contact your healthcare provider if you think your medicine is not helping or if you have side effects. Tell him or her if you are allergic to any medicine. Keep a list of the medicines, vitamins, and herbs you take. Include the amounts, and when and why you take them. Bring the list or the pill bottles to follow-up visits. Carry your medicine list with you in case of an emergency.    Follow up with your healthcare provider as directed: Write down your questions so you remember to ask them during your visits.     Self-care:     Rest as much as possible. Slowly start to do more each day.       Drink more liquids as directed. Liquids will help thin and loosen mucus so you can cough it up. Liquids will also help prevent dehydration. Liquids that help prevent dehydration include water, fruit juice, and broth. Do not drink liquids that contain caffeine. Caffeine can increase your risk for dehydration. Ask your healthcare provider how much liquid to drink each day.       Soothe a sore throat. Gargle with warm salt water. This helps your sore throat feel better. Make salt water by dissolving ¼ teaspoon salt in 1 cup warm water. You may also suck on hard candy or throat lozenges. You may use a sore throat spray.      Use a humidifier or vaporizer. Use a cool mist humidifier or a vaporizer to increase air moisture in your home. This may make it easier for you to breathe and help decrease your cough.       Use saline nasal drops as directed. These help relieve congestion.       Apply petroleum-based jelly around the outside of your nostrils. This can decrease irritation from blowing your nose.       Do not smoke. Nicotine and other chemicals in cigarettes and cigars can make your symptoms worse. They can also cause infections such as bronchitis or pneumonia. Ask your healthcare provider for information if you currently smoke and need help to quit. E-cigarettes or smokeless tobacco still contain nicotine. Talk to your healthcare provider before you use these products.     Prevent spreading your cold to others:     Try to stay away from other people during the first 2 to 3 days of your cold when it is more easily spread.       Do not share food or drinks.       Do not share hand towels with household members.      Wash your hands often, especially after you blow your nose. Turn away from other people and cover your mouth and nose with a tissue when you sneeze or cough.

## 2022-11-30 NOTE — PROGRESS NOTES
Physician Progress Note      Mariana Buck  CSN #:                  [id number]  :                       1955  ADMIT DATE:       2022 11:31 PM  DISCH DATE:  RESPONDING  PROVIDER #:        Karyn Brunson MD          QUERY TEXT:    Pt admitted with GI bleed and has anemia documented. If possible, please   document in progress notes and discharge summary further specificity regarding   the acuity and type of anemia:    The medical record reflects the following:  Risk Factors: GI bleed  Clinical Indicators:  note \"Acute on chronic anemia likely GI bleed:   Hemoglobin 9.2 on admission, previously 14.2 in September, FOBT positive. \"  Treatment: GI consult, H&H, Protonix. Thank you,  LIZZETTE EnriquezN, RN, CDS  [phone]  Options provided:  -- Anemia due to acute on chronic blood loss  -- Other - I will add my own diagnosis  -- Disagree - Not applicable / Not valid  -- Disagree - Clinically unable to determine / Unknown  -- Refer to Clinical Documentation Reviewer    PROVIDER RESPONSE TEXT:    This patient has acute on chronic blood loss anemia.     Query created by: Desiderio Dakins on 2022 9:31 AM      Electronically signed by:  Karyn Brunson MD 2022 9:34 AM

## 2022-11-30 NOTE — PROGRESS NOTES
V2.0  [facility] [place]      Name:  Zainab Sharp /Age/Sex: 1955  (74 y.o. female)   MRN & CSN:  [id number] & [id number] Encounter Date/Time: 2022 12:50 PM EST    Location:  46/1489- PCP: Lisy Mosley MD       Hospital Day: 5    Assessment and Plan:   Zainab Sharp is a 74 y.o. female with pmh of foot toe amputation history, history of metastatic lung cancer s/p left lobectomy, right for orbital craniotomy on 2022, pathology consistent with poorly differentiated metastatic adenocarcinoma status post, knife radiosurgery, s/p PEG tube, seizure disorder who presents with Acute on chronic anemia    Plan:  Acute on chronic anemia likely GI bleed: Hemoglobin 9.2 on admission, previously 14.2 in September, FOBT positive. GI on board. Holding off on scope because of underlying metastatic lung disease history. To restart feeds. EGD in 2020 showed hiatal hernia and gastritis. Continue to monitor today. If hemoglobin stable plan for discharge tomorrow. Was agitated yesterday DCed the sitter. Pending placement. Necrotic left toe with osteomyelitis: Previous wound cultures grew Serratia and staph epi. Podiatry consult on board. History of osteomyelitis status post partial hallux amputation with excisional debridement in 2022. X-ray of the left foot showed no suspicion of osseous lesions. Podiatry on board. Infectious disease on board. Osteomyelitis concerning. Currently on levofloxacin and Bactrim. Hypokalemia replete as needed  History of metastatic lung cancer s/p left lobectomy no chemo or radiation underlying right frontal craniotomy on 2022: Pathology results are consistent with poorly differentiated metastatic adenocarcinoma. Patient subsequently had gamma knife radiosurgery.   Guarded prognosis  Seizure disorder secondary to brain metastasis on Vimpat 200 mg twice daily  Severe oropharyngeal dysphagia s/p trach and PEG tube since 2022  History of status epilepticus in September 2022  Peripheral vascular disease on aspirin and statin  Benign essential hypertension on Coreg 25 mg twice daily and losartan 50 mg daily  Hyperlipidemia on statin  Non-insulin-dependent diabetes mellitus type 2 on sliding scale insulin  Hiatal hernia lansoprazole    Diet Diet NPO Exceptions are: Ice Chips  ADULT TUBE FEEDING; PEG; Diabetic; Continuous; 20; Yes; 10; Q 4 hours; 45; 150; Q 4 hours   DVT Prophylaxis [] Lovenox, []  Heparin, [] SCDs, [] Ambulation,  [] Eliquis, [] Xarelto  [] Coumadin   Code Status Full Code   Disposition From: [place] [facility]  Expected Disposition: Patient requesting different rehab  Estimated Date of Discharge: To be declared, pending placement  Patient requires continued admission due to pending placement   Surrogate Decision Maker/ POA      Subjective:     Chief Complaint: No chief complaint on file. Patient and family at bedside. Family is requesting for a different SNF. Case management is working on that. Continue to monitor. GI on board. Pending placement. Review of Systems:    Review of Systems  ROS as above    Objective:   No intake or output data in the 24 hours ending 11/30/22 1627     Vitals:   Vitals:    11/30/22 1606   BP: 107/74   Pulse: 75   Resp: 20   Temp: 98.9 °F (37.2 °C)   SpO2: 99%       Physical Exam:   Physical Exam  Vitals reviewed. Constitutional:       Appearance: Normal appearance. She is normal weight. HENT:      Head: Normocephalic. Nose: Nose normal.      Mouth/Throat:      Mouth: Mucous membranes are moist.   Eyes:      Conjunctiva/sclera: Conjunctivae normal.      Pupils: Pupils are equal, round, and reactive to light. Cardiovascular:      Rate and Rhythm: Normal rate and regular rhythm. Pulses: Normal pulses. Heart sounds: Normal heart sounds. No murmur heard. Pulmonary:      Effort: Pulmonary effort is normal.      Breath sounds: Normal breath sounds. No wheezing, rhonchi or rales. Abdominal:      General: Abdomen is flat. Bowel sounds are normal. There is no distension. Palpations: Abdomen is soft. Tenderness: There is no abdominal tenderness. Musculoskeletal:         General: Tenderness, deformity and signs of injury present. Normal range of motion. Cervical back: Normal range of motion and neck supple. Right lower leg: No edema. Left lower leg: Edema present. Comments: Left toe wound   Skin:     Coloration: Skin is not jaundiced or pale. Neurological:      General: No focal deficit present. Mental Status: She is alert and oriented to person, place, and time. Mental status is at baseline. Motor: Weakness present.    Psychiatric:         Mood and Affect: Mood normal.         Behavior: Behavior normal.          Medications:   Medications:    levoFLOXacin  750 mg PEG Tube Daily    sulfamethoxazole-trimethoprim  1 tablet PEG Tube 2 times per day    sodium chloride flush  5-40 mL IntraVENous 2 times per day    pantoprazole (PROTONIX) 40 mg injection  40 mg IntraVENous Daily    carvedilol  25 mg Per G Tube BID WC    folic acid  1 mg Per G Tube Daily    insulin glargine  20 Units SubCUTAneous Daily with breakfast    lactobacillus  1 capsule Per G Tube Daily with breakfast    losartan  50 mg Per G Tube Daily    scopolamine  1 patch TransDERmal Q72H    thiamine  100 mg Per G Tube Daily    insulin lispro  0-8 Units SubCUTAneous TID WC    insulin lispro  0-4 Units SubCUTAneous Nightly    lacosamide (VIMPAT) IVPB  200 mg IntraVENous BID      Infusions:    sodium chloride 25 mL (11/28/22 6583)    dextrose       PRN Meds: haloperidol lactate, 1 mg, Q6H PRN  morphine, 2 mg, Q4H PRN  sodium chloride flush, 5-40 mL, PRN  sodium chloride, , PRN  ondansetron, 4 mg, Q8H PRN   Or  ondansetron, 4 mg, Q6H PRN  acetaminophen, 650 mg, Q6H PRN   Or  acetaminophen, 650 mg, Q6H PRN  oxyCODONE, 5 mg, Q4H PRN  glucose, 4 tablet, PRN  dextrose bolus, 125 mL, PRN   Or  dextrose bolus, 250 mL, PRN  glucagon (rDNA), 1 mg, PRN  dextrose, , Continuous PRN      Labs      Recent Results (from the past 24 hour(s))   POCT Glucose    Collection Time: 11/29/22  5:34 PM   Result Value Ref Range    POC Glucose 188 (H) 70 - 99 MG/DL   POCT Glucose    Collection Time: 11/29/22  9:00 PM   Result Value Ref Range    POC Glucose 171 (H) 70 - 99 MG/DL   POCT Glucose    Collection Time: 11/30/22  8:04 AM   Result Value Ref Range    POC Glucose 193 (H) 70 - 99 MG/DL   POCT Glucose    Collection Time: 11/30/22 12:07 PM   Result Value Ref Range    POC Glucose 151 (H) 70 - 99 MG/DL        Imaging/Diagnostics Last 24 Hours   No results found.     Electronically signed by Lidia Miller MD, MD on 11/30/2022 at 4:27 PM

## 2022-11-30 NOTE — CONSULTS
Comprehensive Nutrition Assessment    Type and Reason for Visit:  Reassess, Consult (Tube Feeding Order and Management)     Nutrition Recommendations/Plan:   Recommend Diabetic formula at goal of 45 ml/hr with FWF of 150 ml Q 4 H to meet at least 75% for caloric needs and 100% of protein needs. Will adjust FWF if IV fluids are present. Monitor glucose, skin status, GI status, and POC      Malnutrition Assessment:  Malnutrition Status:  Severe malnutrition (11/28/22 1254)    Context:  Chronic Illness       Nutrition Assessment:     formula hung at bedside, pump indicates tube feed running only at 20 ml/hr (providing equal to or less than 40% of estimated needs\". RN at bedside reports TF stopped due to residuals of 60 ml then restarted. Pt was \"tossing in bed\", may be affecting HOB and causing pt to be at risk for aspiration. Otherwise, pt with significant wounds and not currently meeting needs for healing. RN reports plans to advance if pt tolerates around lunch. Please advance to goal rate as able to better meet nutrition needs for wound healing. PS MD in regard to TF order and mgt, currently RD without TF order/mgt. MD agreed to consult RD. High nutrition risk. Nutrition Related Findings:    POCT 151, No IV fluids ordered. May adjust FWF. Wound Type: Multiple, Pressure Injury, Stage II, Diabetic Ulcer       Current Nutrition Intake & Therapies:    Average Meal Intake: NPO  Average Supplements Intake: NPO  Diet NPO Exceptions are: Ice Chips  ADULT TUBE FEEDING; PEG; Diabetic; Continuous; 20; Yes; 20; Q 24 hours; 60; 30;  Q 8 hours  Current Tube Feeding (TF) Orders:  Feeding Route: PEG  Formula:  (Diabetic - Glucerna 1.2)  Schedule: Continuous  Feeding Regimen: 60 mL/hr  Additives/Modulars: None  Water Flushes: 30 Q 8 H  Current TF & Flush Orders Provides: 20 ml/hr - 576 kcals (33%) , 29 gm (40%)  Goal TF & Flush Orders Provides: Glucerna 1.5 (Diabetic) will provide ~1620 kcals (93%) and 89 gm (100%)    Anthropometric Measures:  Height: 5' 6\" (167.6 cm)  Ideal Body Weight (IBW): 130 lbs (59 kg)    Admission Body Weight:  (no weight taken)  Current Body Weight: 128 lb (58.1 kg) (no weight taken during admission, last weight take 9/29/22), 98.5 % IBW. Weight Source: Stated (no weight taken during this admission yet)  Current BMI (kg/m2): 20.7  Usual Body Weight:  (limited)     Weight Adjustment For: No Adjustment                 BMI Categories: Underweight (BMI less than 22) age over 72    Estimated Daily Nutrient Needs:  Energy Requirements Based On: Kcal/kg     Energy (kcal/day): 6098-8051 (30-35 kcal/kg ABW)  Weight Used for Protein Requirements: Ideal  Protein (g/day): 71-89 (1.2-1.5 g/kg IBW)  Method Used for Fluid Requirements: 1 ml/kcal  Fluid (ml/day):  4575-5775    Nutrition Diagnosis:   Severe malnutrition, In context of chronic illness related to inadequate protein-energy intake, inadequate enteral nutrition infusion, cognitive or neurological impairment (wound) as evidenced by severe loss of subcutaneous fat, severe muscle loss, nutrition support - enteral nutrition, NPO or clear liquid status due to medical condition  Increased nutrient needs related to inadequate enteral nutrition infusion as evidenced by wounds, nutrition support - enteral nutrition (EN restarted not yet meeting goal rate)    Nutrition Interventions:   Food and/or Nutrient Delivery: Modify Tube Feeding  Nutrition Education/Counseling: Education not indicated  Coordination of Nutrition Care: Continue to monitor while inpatient  Plan of Care discussed with: PS MD about tube feed order and mgt. Spoke with RN about flowsheet, no G tube information in flowsheet in I/O    Goals:  Previous Goal Met: No Progress toward Goal(s)  Goals:  Tolerate nutrition support at goal rate       Nutrition Monitoring and Evaluation:   Behavioral-Environmental Outcomes: None Identified  Food/Nutrient Intake Outcomes: Enteral Nutrition Intake/Tolerance, Vitamin/Mineral Intake  Physical Signs/Symptoms Outcomes: Biochemical Data, GI Status, Nausea or Vomiting, Fluid Status or Edema, Nutrition Focused Physical Findings, Skin, Weight    Discharge Planning:     Too soon to determine     Cori Mcgrath RD, LD  Contact: [phone]

## 2022-12-01 ENCOUNTER — APPOINTMENT (OUTPATIENT)
Dept: GENERAL RADIOLOGY | Age: 67
End: 2022-12-01
Payer: MEDICARE

## 2022-12-01 LAB
C-REACTIVE PROTEIN, HIGH SENSITIVITY: 13.4 MG/L
CULTURE: ABNORMAL
CULTURE: ABNORMAL
GLUCOSE BLD-MCNC: 128 MG/DL (ref 70–99)
GLUCOSE BLD-MCNC: 146 MG/DL (ref 70–99)
GLUCOSE BLD-MCNC: 154 MG/DL (ref 70–99)
GLUCOSE BLD-MCNC: 161 MG/DL (ref 70–99)
GRAM SMEAR: ABNORMAL
Lab: ABNORMAL
SPECIMEN: ABNORMAL

## 2022-12-01 PROCEDURE — 86140 C-REACTIVE PROTEIN: CPT

## 2022-12-01 PROCEDURE — 74018 RADEX ABDOMEN 1 VIEW: CPT

## 2022-12-01 PROCEDURE — 6360000002 HC RX W HCPCS: Performed by: STUDENT IN AN ORGANIZED HEALTH CARE EDUCATION/TRAINING PROGRAM

## 2022-12-01 PROCEDURE — C9113 INJ PANTOPRAZOLE SODIUM, VIA: HCPCS | Performed by: INTERNAL MEDICINE

## 2022-12-01 PROCEDURE — 6370000000 HC RX 637 (ALT 250 FOR IP): Performed by: NURSE PRACTITIONER

## 2022-12-01 PROCEDURE — 82962 GLUCOSE BLOOD TEST: CPT

## 2022-12-01 PROCEDURE — 2140000000 HC CCU INTERMEDIATE R&B

## 2022-12-01 PROCEDURE — 6370000000 HC RX 637 (ALT 250 FOR IP): Performed by: INTERNAL MEDICINE

## 2022-12-01 PROCEDURE — 71045 X-RAY EXAM CHEST 1 VIEW: CPT

## 2022-12-01 PROCEDURE — 94761 N-INVAS EAR/PLS OXIMETRY MLT: CPT

## 2022-12-01 PROCEDURE — 6360000002 HC RX W HCPCS: Performed by: INTERNAL MEDICINE

## 2022-12-01 PROCEDURE — 92610 EVALUATE SWALLOWING FUNCTION: CPT

## 2022-12-01 PROCEDURE — 2580000003 HC RX 258: Performed by: INTERNAL MEDICINE

## 2022-12-01 PROCEDURE — 99231 SBSQ HOSP IP/OBS SF/LOW 25: CPT | Performed by: NURSE PRACTITIONER

## 2022-12-01 PROCEDURE — C9254 INJECTION, LACOSAMIDE: HCPCS | Performed by: INTERNAL MEDICINE

## 2022-12-01 PROCEDURE — 2700000000 HC OXYGEN THERAPY PER DAY

## 2022-12-01 PROCEDURE — A4216 STERILE WATER/SALINE, 10 ML: HCPCS | Performed by: INTERNAL MEDICINE

## 2022-12-01 RX ADMIN — SODIUM CHLORIDE, PRESERVATIVE FREE 10 ML: 5 INJECTION INTRAVENOUS at 08:21

## 2022-12-01 RX ADMIN — ONDANSETRON 4 MG: 2 INJECTION INTRAMUSCULAR; INTRAVENOUS at 23:15

## 2022-12-01 RX ADMIN — SULFAMETHOXAZOLE AND TRIMETHOPRIM 1 TABLET: 800; 160 TABLET ORAL at 08:18

## 2022-12-01 RX ADMIN — LOSARTAN POTASSIUM 50 MG: 25 TABLET, FILM COATED ORAL at 08:18

## 2022-12-01 RX ADMIN — FOLIC ACID 1 MG: 1 TABLET ORAL at 08:18

## 2022-12-01 RX ADMIN — SODIUM CHLORIDE, PRESERVATIVE FREE 40 MG: 5 INJECTION INTRAVENOUS at 08:18

## 2022-12-01 RX ADMIN — Medication 1 CAPSULE: at 08:18

## 2022-12-01 RX ADMIN — HALOPERIDOL LACTATE 1 MG: 5 INJECTION, SOLUTION INTRAMUSCULAR at 08:18

## 2022-12-01 RX ADMIN — INSULIN GLARGINE 20 UNITS: 100 INJECTION, SOLUTION SUBCUTANEOUS at 09:53

## 2022-12-01 RX ADMIN — ONDANSETRON 4 MG: 2 INJECTION INTRAMUSCULAR; INTRAVENOUS at 14:07

## 2022-12-01 RX ADMIN — Medication 100 MG: at 08:18

## 2022-12-01 RX ADMIN — LEVOFLOXACIN 750 MG: 750 TABLET, FILM COATED ORAL at 08:18

## 2022-12-01 RX ADMIN — CARVEDILOL 25 MG: 25 TABLET, FILM COATED ORAL at 08:18

## 2022-12-01 RX ADMIN — SODIUM CHLORIDE, PRESERVATIVE FREE 10 ML: 5 INJECTION INTRAVENOUS at 20:37

## 2022-12-01 RX ADMIN — SULFAMETHOXAZOLE AND TRIMETHOPRIM 1 TABLET: 800; 160 TABLET ORAL at 20:37

## 2022-12-01 RX ADMIN — SODIUM CHLORIDE 200 MG: 9 INJECTION, SOLUTION INTRAVENOUS at 10:09

## 2022-12-01 RX ADMIN — OXYCODONE HYDROCHLORIDE 5 MG: 5 TABLET ORAL at 22:42

## 2022-12-01 RX ADMIN — CARVEDILOL 25 MG: 25 TABLET, FILM COATED ORAL at 18:32

## 2022-12-01 RX ADMIN — SODIUM CHLORIDE 200 MG: 9 INJECTION, SOLUTION INTRAVENOUS at 22:11

## 2022-12-01 RX ADMIN — OXYCODONE HYDROCHLORIDE 5 MG: 5 TABLET ORAL at 18:31

## 2022-12-01 ASSESSMENT — ENCOUNTER SYMPTOMS
VOMITING: 1
EYES NEGATIVE: 1
ALLERGIC/IMMUNOLOGIC NEGATIVE: 1
COUGH: 1
WHEEZING: 1
COLOR CHANGE: 1
SHORTNESS OF BREATH: 1
NAUSEA: 1

## 2022-12-01 ASSESSMENT — PAIN SCALES - WONG BAKER
WONGBAKER_NUMERICALRESPONSE: 0
WONGBAKER_NUMERICALRESPONSE: 8

## 2022-12-01 NOTE — CARE COORDINATION
Discussed pt in IDR- Dr. Linton Lung plans to discuss goals of treatment and plan of care (continue aggressive care vs comfort measures) with pt's spouse today. TC to [place] with Sabino and dee dee remains pending- she will update me once received.

## 2022-12-01 NOTE — PROGRESS NOTES
Speech Language Pathology  Facility/Department: [address] [place] [address]   CLINICAL BEDSIDE SWALLOW EVALUATION    NAME: Deann Cartagena  : 1955  MRN: [id number]  ADMISSION DATE: 2022    Impressions:  Deann Cartagena was seen for a bedside swallow evaluation after being admitted to [facility] with acute on chronic anemia. Medical hx includes tracheostomy 10/17, remote TBI, lung cancer with mets to the brain, PEG tube, HTN, HLD, DM, and seizures. Last seen by SLP at [facility] in November, pt was recommended NPO. Deann Cartagena was seated upright in bed, alert and unable to follow directions. [facility] unable to be completed. PO trials of ice chips x5 were given. Assessment limited, oral stage appears [place] with intact bolus acceptance, adequate lingual manipulation, and oral clearance was observed. Pharyngeal stage appears intact for given consistency, with no s/s of aspiration with ice chips. SLP unable to rule out silent aspiration at bedside, MBSS is recommended. Recommendations:  Continue NPO until MBSS is completed. Results/recommendations d/w pt. MBSS is recommended to determine appropriate diet recommendations and rule out silent aspiration. ADMITTING DIAGNOSIS: has Chest pain; Dyspnea and respiratory abnormalities; Acute gastritis without hemorrhage; Diabetic ulcer of toe of left foot associated with type 2 diabetes mellitus, with fat layer exposed ([address] Type 2 diabetes mellitus with peripheral neuropathy ([address] Peripheral vascular disease ([address] History of nicotine dependence; Sepsis ([address] Brain metastases ([address] Diabetic foot infection ([address] Cancer of left lung ([address] - removed at [facility] in 2019; Former smoker - quit in 2019; DM (diabetes mellitus), type 2 ([address] Seizures ([address] Hypokalemia; Hypophosphatemia; Leukocytosis; Metabolic acidosis, increased anion gap; Elevated CK; Respiratory failure ([address] Acute on chronic anemia; Severe malnutrition ([address]  Gastrointestinal hemorrhage; Infection due to multidrug-resistant Stenotrophomonas maltophilia; and Diabetic foot ulcer with osteomyelitis ([address] on their problem list.  ONSET DATE: this admission     Recent Chest Xray/CT of Chest: see chart     Date of Eval: 12/1/2022  Evaluating Therapist: Rachell Cox    Current Diet level:  Current Diet : NPO      Primary Complaint       Pain:  Pain Assessment  Pain Assessment: Rouse-Baker FACES  Pain Level:  (unable to give a number)  Rouse-Kent Pain Rating: No hurt (no moaning or restless)  Patient's Stated Pain Goal: Unable to verbalize/indicate pain goal  Pain Location: Leg  Pain Orientation: Right  Pain Descriptors: Other (Comment) (unable)  Functional Pain Assessment: Prevents or interferes some active activities and ADLs  Non-Pharmaceutical Pain Intervention(s): Rest, Repositioned  Response to Pain Intervention: Unable to communicate (no moaning or restless)  Side Effects: No reported side effects  Faces, Legs, Activity, Cry, and Consolability (FLACC)  Face (F): occasional grimace or frown, withdrawn, disinterested  Legs (L): kicking, or legs drawn up  Activity (A): squirming, shifting back and forth, tense  Cry (C): moans or whimpers, occasional complaint  Consolability (C): reassured by occasional touch, hug or being talked to  [address] [place] [address] Score : 6    Reason for Referral  Radu Alamo was referred for a bedside swallow evaluation to assess the efficiency of her swallow function, identify signs and symptoms of aspiration and make recommendations regarding safe dietary consistencies, effective compensatory strategies, and safe eating environment. Impression  Dysphagia Impression : TBD by MBSS  Dysphagia Outcome Severity Scale: Level 1: Severe dysphagia- NPO.  Unable to tolerate any PO safely     Treatment Plan  Requires SLP Intervention: Yes  Duration of Treatment: tbd by MBSS          Recommended Diet and Intervention           Recommendations: Modified barium swallow study       Compensatory Swallowing Strategies       Treatment/Goals  Short-term Goals  Timeframe for Short-term Goals: LOS  Goal 1: Pt will participate in MBSS to determine appropriate diet recommendations. General  Chart Reviewed: Yes  Behavior/Cognition: Alert; Cooperative; Requires cueing;Doesn't follow directions  Respiratory Status:  (Trach mask)  O2 Device: Trach mask  Communication Observation: Non-verbal  Follows Directions: None  Patient Positioning: Upright in bed  Prior Dysphagia History: no  Consistencies Administered: Ice Chips           Vision/Hearing  Hearing  Hearing: Exceptions to [facility]  Hearing Exceptions: Hard of hearing/hearing concerns    Oral Motor Deficits       Oral Phase Dysfunction  Oral Phase  Oral Phase: Exceptions     Indicators of Pharyngeal Phase Dysfunction        Prognosis  Individuals consulted  Consulted and agree with results and recommendations: Patient    Education  Patient Education: Results/recommendations  Patient Education Response: No evidence of learning  Safety Devices in place: Yes  Type of devices: All fall risk precautions in place; Patient at risk for falls       Therapy Time  SLP Individual Minutes  Time In: [facility]  Time Out: 7596  Minutes: 15            Suzie Kraus  12/1/2022 4:03 PM

## 2022-12-01 NOTE — PROGRESS NOTES
Infectious Disease Progress Note  2022   Patient Name: Ruiz Lopez : 1955   Impression  Left DFI with Residual OM and Dehiscence Secondary to PAD:  Stenotrophomonas maltophilia in Respiratory Culture:   Afebrile, no leukocytosis  Pct 0.089, CRP 13.4  -BC 0/2-NGTD  -Resp culture: Stenotrophomonas maltophilia (resistant to levofloxacin)  Past left foot cultures 2022-Serratia marcescens, Staphylococcus epidermidis   -Left Foot Culture: Serratia marcescens  -XR Foot Left: 1. Status post amputation of the 1st proximal phalanx. No suspicious osseous   lesion. 2. Soft tissue ulceration at the surgical stump. 3. Bony demineralization. DMII:  Metastatic Lung Cancer with Brain Mets s/p Right Frontal Craniotomy:  Seizures:  S/p Trach/PEG 2022:  HTN  Multi-morbidity: per PMHx: HLD  Plan:  Continue per PEG tube- Bactrim DS bid x 6 weeks (end date 2023)  DC - Levaquin as Stenotrophomonas is resistant  Trend CRP- ordered  ID recommends CTS to eval PAD, dependent upon the family wishes due to the patient's status with her poor healing and cancer onboard    Ongoing Antimicrobial Therapy  Bactrim -? Completed Antimicrobial Therapy  Zosyn ? Levaquin -  Ciprofloxacin -? History:? Interval history noted  Denies n/v/d/f or untoward effects of antibiotics  Unable to obtain ROS as patient is Nonverbal.  Physical Exam:  Vital Signs: BP (!) 167/88   Pulse 66   Temp 97.2 °F (36.2 °C) (Oral)   Resp 18   Ht 5' 6\" (1.676 m)   SpO2 93%   BMI 20.66 kg/m²     Gen: Remains Non-verbal with trach intact, does not follow commands, restless in bed  Wounds: C/D/I left hallux amputation site with necrosis  Chest: no distress and CTA. Anterior diminished breath sounds. Trach intact. Heart: NSR and no MRG. Abd: soft, non-distended, no tenderness, no hepatomegaly. Normoactive bowel sounds.   PEG Intact: LUQ with tube feeding infusion  Ext: no clubbing, cyanosis, or edema. See wounds above. Neuro: CN 2-12 intact and no focal sensory or motor deficits     Radiologic / Imaging / TESTING  11/27/22 XR Foot Left:  Impression   1. Status post amputation of the 1st proximal phalanx. No suspicious osseous   lesion. 2. Soft tissue ulceration at the surgical stump. 3. Bony demineralization.      12/1/22 MRI Foot Left W WO Contrast:    12/1/22 XR Chest Portable:    12/1/22 XR Abdomen:       Labs:    Recent Results (from the past 24 hour(s))   POCT Glucose    Collection Time: 11/30/22  4:41 PM   Result Value Ref Range    POC Glucose 198 (H) 70 - 99 MG/DL   POCT Glucose    Collection Time: 11/30/22  7:28 PM   Result Value Ref Range    POC Glucose 137 (H) 70 - 99 MG/DL   POCT Glucose    Collection Time: 11/30/22  7:44 PM   Result Value Ref Range    POC Glucose 171 (H) 70 - 99 MG/DL   POCT Glucose    Collection Time: 11/30/22 10:44 PM   Result Value Ref Range    POC Glucose 117 (H) 70 - 99 MG/DL   C-Reactive Protein    Collection Time: 12/01/22  4:05 AM   Result Value Ref Range    CRP High Sensitivity 13.4 (H) <5.0 mg/L   POCT Glucose    Collection Time: 12/01/22  7:45 AM   Result Value Ref Range    POC Glucose 161 (H) 70 - 99 MG/DL   POCT Glucose    Collection Time: 12/01/22 12:07 PM   Result Value Ref Range    POC Glucose 146 (H) 70 - 99 MG/DL     CULTURE results: Invalid input(s): BLOOD CULTURE,  URINE CULTURE, SURGICAL CULTURE    Diagnosis:  Patient Active Problem List   Diagnosis    Chest pain    Dyspnea and respiratory abnormalities    Acute gastritis without hemorrhage    Diabetic ulcer of toe of left foot associated with type 2 diabetes mellitus, with fat layer exposed ([address]    Type 2 diabetes mellitus with peripheral neuropathy (HCC)    Peripheral vascular disease ([address]    History of nicotine dependence    Sepsis ([address]    Brain metastases ([address]    Diabetic foot infection ([address]    Cancer of left lung ([address] - removed at [facility] in 2019    Former smoker - quit in 2019    DM

## 2022-12-01 NOTE — PROGRESS NOTES
V2.0  [facility] Hospitalist Progress Note      Name:  Jaren Champion /Age/Sex: 1955  (74 y.o. female)   MRN & CSN:  [id number] & [id number] Encounter Date/Time: 2022 12:50 PM EST    Location:  [address] [place] [address]-V PCP: Niko Tipton MD       Hospital Day: 6    Assessment and Plan:   Jaren Champion is a 74 y.o. female with pmh of foot toe amputation history, history of metastatic lung cancer s/p left lobectomy, right frontal orbital craniotomy on 2022, pathology consistent with poorly differentiated metastatic adenocarcinoma status post, knife radiosurgery, s/p trach/PEG tube, seizure disorder who presents with Acute on chronic anemia    Plan:  Acute on chronic anemia likely GI bleed: Hemoglobin 9.2 on admission, previously 14.2 in September, FOBT positive. GI on board. Holding off on scope because of underlying metastatic lung disease history. EGD in 2020 showed hiatal hernia and gastritis.   Continue to monitor today.h/h stable    Acute metabolic encephalopathy--pt alert but not tracking, confused, not following commands, will order head CT, h/o metastatic lung ca to brain, consult onc and neurosurg for assistance with goals of care, pt would need left femoral endarterectomy and likely amputation to treat LLE osteo    N/v and acute resp failure--pt with 4 episodes of emesis today of TF, required frequent suctioning of TF from lungs, placed on 5L trach mask, check CXR, AXR, wnl, holding TF until n/v resolves, transferred to step down,  reports has n/v with IV morphine, will switch to Oxycodone through PEG, ST consulted for dysphagia, MBS ordered    Left DFI with Residual OM and Dehiscence Secondary to PAD:  Stenotrophomonas maltophilia in Respiratory Culture:   Afebrile, no leukocytosis  Pct 0.089, CRP 13.4  -BC 0/2-NGTD  -Resp culture: Stenotrophomonas maltophilia (resistant to levofloxacin)  Past left foot cultures 2022-Serratia marcescens, Staphylococcus epidermidis   -Left Foot Culture: Serratia marcescens  11/27-XR Foot Left: 1. Status post amputation of the 1st proximal phalanx. No suspicious osseous   lesion. 2. Soft tissue ulceration at the surgical stump. 3. Bony demineralization. Continue per PEG tube- Bactrim DS bid x 6 weeks (end date 1/11/2023)  DC - Levaquin as Stenotrophomonas is resistant  Trend CRP- ordered    Podiatry consult on board. History of osteomyelitis status post partial hallux amputation with excisional debridement in October 2022. X-ray of the left foot showed no suspicion of osseous lesions. Podiatry on board. Infectious disease on board. Osteomyelitis concerning. . D/w podiatry. If full code and wants everything done, would need left femoral endarterectomy with CTS. Pt saw Dr. Ileana Elena in 9/22 and that was the plan, followed by left foot MRI and likely amputation. For Vascular surgery eval, pt would need to be transferred as no CTS at [facility], d/w Dr. Ileana Elena and if the family decides on aggressive treatment of PAD, would need to be transferred, hospitalist would need to call Dr. Ileana Elena at [phone] to facilitate tx to [address], If no surgery wanted, discussed with  goals of care today, d/w podiatry, cardiology, and CTS,  wants to talk to daughter, will consult onc and neuro surg to help with goals of care, last head CT 9/22 with postop changes but no mass    History of metastatic lung cancer s/p left lobectomy no chemo or radiation, met to right frontal lob s/p  right frontal craniotomy on 6/20/2022: Pathology results are consistent with poorly differentiated metastatic adenocarcinoma. Patient subsequently had gamma knife radiosurgery. Guarded prognosis. Consult onc and neurosurg to help with goals of care. Pt alert but confused. Repeat head CT. Oncologist is Dr. [place] in [address].   Seizure disorder secondary to brain metastasis on Vimpat 200 mg twice daily  Severe oropharyngeal dysphagia s/p trach and PEG tube since June 2022--pulm consult,  requesting eval for trach and PEG removal, consulted speech therapy, ordered MBS  History of status epilepticus in September 2022  Peripheral vascular disease on aspirin and statin  Benign essential hypertension on Coreg 25 mg twice daily and losartan 50 mg daily  Hyperlipidemia on statin  Non-insulin-dependent diabetes mellitus type 2 on sliding scale insulin  Hiatal hernia lansoprazole  Severe PCM--nutrition following, on TF through PEG  Goals of care-- wants pt to be full code, expressed reservation about transferring pt to [place] for Dr. Philip Pardo vascular surgery eval, pt saw Dr. Philip Pardo 9/22 and rec left femoral endarterectomy,  wants to talk with onc, wants trach and peg removed, MBS ordered, head CT ordered, possibly pt could be discharged to SNF and see her oncologist and Dr. Philip Pardo as outpatient? Diet Diet NPO Exceptions are: Ice Chips  ADULT TUBE FEEDING; PEG; Diabetic; Continuous; 20; Yes; 10; Q 4 hours; 45; 150; Q 4 hours   DVT Prophylaxis [] Lovenox, []  Heparin, [] SCDs, [] Ambulation,  [] Eliquis, [] Xarelto  [] Coumadin   Code Status Full Code   Disposition From: [facility] rehab  Expected Disposition: Patient requesting different rehab  Estimated Date of Discharge: f/u onc, neurosurg recs for goals of care, if cannot be discharged to SNF, would need transfer for CTS eval for PAD and left femoral endarterectomy    Surrogate Decision Maker/ POA      Subjective:     Chief Complaint: No chief complaint on file. N/v today for TF, frequent suctioning, transferred to step down, confused, agitated,  at bedside, wants full code, he wants pt to have  left femoral endarterectomy and LLE amputation to treat LLE osteo,   to d/w daughter, this would require transfer to facility with CTS    Review of Systems:    Review of Systems   Constitutional:  Positive for activity change and fatigue. HENT: Negative. Eyes: Negative.     Respiratory: Positive for cough, shortness of breath and wheezing. Cardiovascular: Negative. Gastrointestinal:  Positive for nausea and vomiting. Endocrine: Negative. Genitourinary: Negative. Musculoskeletal: Negative. Skin:  Positive for color change. Allergic/Immunologic: Negative. Neurological:  Positive for speech difficulty. Hematological: Negative. Psychiatric/Behavioral:  Positive for agitation and confusion. ROS as above    Objective: Intake/Output Summary (Last 24 hours) at 12/1/2022 0948  Last data filed at 12/1/2022 0330  Gross per 24 hour   Intake 360 ml   Output 2 ml   Net 358 ml          Vitals:   Vitals:    12/01/22 0745   BP: (!) 167/88   Pulse: 66   Resp: 18   Temp:    SpO2: 93%       Physical Exam:   Physical Exam  Vitals reviewed. Constitutional:       General: She is in acute distress. Appearance: She is ill-appearing. HENT:      Head: Normocephalic. Nose: Nose normal.      Mouth/Throat:      Mouth: Mucous membranes are moist.   Eyes:      Conjunctiva/sclera: Conjunctivae normal.      Pupils: Pupils are equal, round, and reactive to light. Cardiovascular:      Rate and Rhythm: Normal rate and regular rhythm. Pulses: Normal pulses. Heart sounds: Normal heart sounds. No murmur heard. Pulmonary:      Effort: Respiratory distress present. Breath sounds: Rhonchi and rales present. No wheezing. Abdominal:      General: Abdomen is flat. Bowel sounds are normal. There is no distension. Palpations: Abdomen is soft. Tenderness: There is no abdominal tenderness. Comments: PEG in place   Musculoskeletal:         General: Tenderness, deformity and signs of injury present. Normal range of motion. Cervical back: Normal range of motion and neck supple. Right lower leg: No edema. Left lower leg: Edema present. Comments: Left toe wound   Skin:     Coloration: Skin is not jaundiced or pale.    Neurological:      Mental Status: She is alert. She is disoriented. Motor: Weakness present.    Psychiatric:      Comments: Confused, agitated          Medications:   Medications:    levoFLOXacin  750 mg PEG Tube Daily    sulfamethoxazole-trimethoprim  1 tablet PEG Tube 2 times per day    sodium chloride flush  5-40 mL IntraVENous 2 times per day    pantoprazole (PROTONIX) 40 mg injection  40 mg IntraVENous Daily    carvedilol  25 mg Per G Tube BID WC    folic acid  1 mg Per G Tube Daily    insulin glargine  20 Units SubCUTAneous Daily with breakfast    lactobacillus  1 capsule Per G Tube Daily with breakfast    losartan  50 mg Per G Tube Daily    scopolamine  1 patch TransDERmal Q72H    thiamine  100 mg Per G Tube Daily    insulin lispro  0-8 Units SubCUTAneous TID WC    insulin lispro  0-4 Units SubCUTAneous Nightly    lacosamide (VIMPAT) IVPB  200 mg IntraVENous BID      Infusions:    sodium chloride 25 mL (11/28/22 1543)    dextrose       PRN Meds: haloperidol lactate, 1 mg, Q6H PRN  morphine, 2 mg, Q4H PRN  sodium chloride flush, 5-40 mL, PRN  sodium chloride, , PRN  ondansetron, 4 mg, Q8H PRN   Or  ondansetron, 4 mg, Q6H PRN  acetaminophen, 650 mg, Q6H PRN   Or  acetaminophen, 650 mg, Q6H PRN  oxyCODONE, 5 mg, Q4H PRN  glucose, 4 tablet, PRN  dextrose bolus, 125 mL, PRN   Or  dextrose bolus, 250 mL, PRN  glucagon (rDNA), 1 mg, PRN  dextrose, , Continuous PRN      Labs      Recent Results (from the past 24 hour(s))   POCT Glucose    Collection Time: 11/30/22 12:07 PM   Result Value Ref Range    POC Glucose 151 (H) 70 - 99 MG/DL   POCT Glucose    Collection Time: 11/30/22  4:41 PM   Result Value Ref Range    POC Glucose 198 (H) 70 - 99 MG/DL   POCT Glucose    Collection Time: 11/30/22  7:28 PM   Result Value Ref Range    POC Glucose 137 (H) 70 - 99 MG/DL   POCT Glucose    Collection Time: 11/30/22  7:44 PM   Result Value Ref Range    POC Glucose 171 (H) 70 - 99 MG/DL   POCT Glucose    Collection Time: 11/30/22 10:44 PM   Result Value Ref Range    POC Glucose 117 (H) 70 - 99 MG/DL   C-Reactive Protein    Collection Time: 12/01/22  4:05 AM   Result Value Ref Range    CRP High Sensitivity 13.4 (H) <5.0 mg/L   POCT Glucose    Collection Time: 12/01/22  7:45 AM   Result Value Ref Range    POC Glucose 161 (H) 70 - 99 MG/DL        Imaging/Diagnostics Last 24 Hours   No results found.     Electronically signed by Vickie Valdez MD on 12/1/2022 at 9:48 AM

## 2022-12-02 ENCOUNTER — APPOINTMENT (OUTPATIENT)
Dept: CT IMAGING | Age: 67
End: 2022-12-02
Payer: MEDICARE

## 2022-12-02 ENCOUNTER — APPOINTMENT (OUTPATIENT)
Dept: GENERAL RADIOLOGY | Age: 67
End: 2022-12-02
Payer: MEDICARE

## 2022-12-02 LAB
ANION GAP SERPL CALCULATED.3IONS-SCNC: 12 MMOL/L (ref 4–16)
BASOPHILS ABSOLUTE: 0 K/CU MM
BASOPHILS RELATIVE PERCENT: 0.1 % (ref 0–1)
BUN BLDV-MCNC: 13 MG/DL (ref 6–23)
C-REACTIVE PROTEIN, HIGH SENSITIVITY: 15.5 MG/L
CALCIUM SERPL-MCNC: 9.5 MG/DL (ref 8.3–10.6)
CHLORIDE BLD-SCNC: 102 MMOL/L (ref 99–110)
CO2: 23 MMOL/L (ref 21–32)
CREAT SERPL-MCNC: 0.7 MG/DL (ref 0.6–1.1)
CULTURE: ABNORMAL
CULTURE: ABNORMAL
CULTURE: NORMAL
DIFFERENTIAL TYPE: ABNORMAL
EOSINOPHILS ABSOLUTE: 0 K/CU MM
EOSINOPHILS RELATIVE PERCENT: 0.2 % (ref 0–3)
GFR SERPL CREATININE-BSD FRML MDRD: >60 ML/MIN/1.73M2
GLUCOSE BLD-MCNC: 102 MG/DL (ref 70–99)
GLUCOSE BLD-MCNC: 107 MG/DL (ref 70–99)
GLUCOSE BLD-MCNC: 127 MG/DL (ref 70–99)
GLUCOSE BLD-MCNC: 163 MG/DL (ref 70–99)
GLUCOSE BLD-MCNC: 99 MG/DL (ref 70–99)
HCT VFR BLD CALC: 28.8 % (ref 37–47)
HEMOGLOBIN: 9.2 GM/DL (ref 12.5–16)
IMMATURE NEUTROPHIL %: 0.4 % (ref 0–0.43)
LYMPHOCYTES ABSOLUTE: 1.6 K/CU MM
LYMPHOCYTES RELATIVE PERCENT: 9 % (ref 24–44)
Lab: ABNORMAL
Lab: NORMAL
MCH RBC QN AUTO: 28.7 PG (ref 27–31)
MCHC RBC AUTO-ENTMCNC: 31.9 % (ref 32–36)
MCV RBC AUTO: 89.7 FL (ref 78–100)
MONOCYTES ABSOLUTE: 0.7 K/CU MM
MONOCYTES RELATIVE PERCENT: 4.2 % (ref 0–4)
NUCLEATED RBC %: 0 %
PDW BLD-RTO: 14 % (ref 11.7–14.9)
PLATELET # BLD: 335 K/CU MM (ref 140–440)
PMV BLD AUTO: 9.1 FL (ref 7.5–11.1)
POTASSIUM SERPL-SCNC: 3.9 MMOL/L (ref 3.5–5.1)
RBC # BLD: 3.21 M/CU MM (ref 4.2–5.4)
SEGMENTED NEUTROPHILS ABSOLUTE COUNT: 15.1 K/CU MM
SEGMENTED NEUTROPHILS RELATIVE PERCENT: 86.1 % (ref 36–66)
SODIUM BLD-SCNC: 137 MMOL/L (ref 135–145)
SPECIMEN: ABNORMAL
SPECIMEN: NORMAL
TOTAL IMMATURE NEUTOROPHIL: 0.07 K/CU MM
TOTAL NUCLEATED RBC: 0 K/CU MM
WBC # BLD: 17.6 K/CU MM (ref 4–10.5)

## 2022-12-02 PROCEDURE — 2140000000 HC CCU INTERMEDIATE R&B

## 2022-12-02 PROCEDURE — 6370000000 HC RX 637 (ALT 250 FOR IP): Performed by: NURSE PRACTITIONER

## 2022-12-02 PROCEDURE — 6360000002 HC RX W HCPCS: Performed by: INTERNAL MEDICINE

## 2022-12-02 PROCEDURE — 92611 MOTION FLUOROSCOPY/SWALLOW: CPT

## 2022-12-02 PROCEDURE — 99222 1ST HOSP IP/OBS MODERATE 55: CPT | Performed by: INTERNAL MEDICINE

## 2022-12-02 PROCEDURE — 85025 COMPLETE CBC W/AUTO DIFF WBC: CPT

## 2022-12-02 PROCEDURE — 74177 CT ABD & PELVIS W/CONTRAST: CPT

## 2022-12-02 PROCEDURE — C9254 INJECTION, LACOSAMIDE: HCPCS | Performed by: INTERNAL MEDICINE

## 2022-12-02 PROCEDURE — 86140 C-REACTIVE PROTEIN: CPT

## 2022-12-02 PROCEDURE — A4216 STERILE WATER/SALINE, 10 ML: HCPCS | Performed by: INTERNAL MEDICINE

## 2022-12-02 PROCEDURE — 6360000002 HC RX W HCPCS: Performed by: STUDENT IN AN ORGANIZED HEALTH CARE EDUCATION/TRAINING PROGRAM

## 2022-12-02 PROCEDURE — 82962 GLUCOSE BLOOD TEST: CPT

## 2022-12-02 PROCEDURE — 97530 THERAPEUTIC ACTIVITIES: CPT

## 2022-12-02 PROCEDURE — 2580000003 HC RX 258: Performed by: INTERNAL MEDICINE

## 2022-12-02 PROCEDURE — 2580000003 HC RX 258: Performed by: STUDENT IN AN ORGANIZED HEALTH CARE EDUCATION/TRAINING PROGRAM

## 2022-12-02 PROCEDURE — 80048 BASIC METABOLIC PNL TOTAL CA: CPT

## 2022-12-02 PROCEDURE — 74230 X-RAY XM SWLNG FUNCJ C+: CPT

## 2022-12-02 PROCEDURE — 6370000000 HC RX 637 (ALT 250 FOR IP): Performed by: INTERNAL MEDICINE

## 2022-12-02 PROCEDURE — 6360000004 HC RX CONTRAST MEDICATION: Performed by: NURSE PRACTITIONER

## 2022-12-02 PROCEDURE — 71260 CT THORAX DX C+: CPT

## 2022-12-02 PROCEDURE — C9113 INJ PANTOPRAZOLE SODIUM, VIA: HCPCS | Performed by: INTERNAL MEDICINE

## 2022-12-02 PROCEDURE — 97535 SELF CARE MNGMENT TRAINING: CPT

## 2022-12-02 PROCEDURE — 2700000000 HC OXYGEN THERAPY PER DAY

## 2022-12-02 PROCEDURE — 97112 NEUROMUSCULAR REEDUCATION: CPT

## 2022-12-02 PROCEDURE — 99231 SBSQ HOSP IP/OBS SF/LOW 25: CPT | Performed by: NURSE PRACTITIONER

## 2022-12-02 PROCEDURE — 94761 N-INVAS EAR/PLS OXIMETRY MLT: CPT

## 2022-12-02 PROCEDURE — 70450 CT HEAD/BRAIN W/O DYE: CPT

## 2022-12-02 RX ORDER — 0.9 % SODIUM CHLORIDE 0.9 %
10 VIAL (ML) INJECTION
Status: COMPLETED | OUTPATIENT
Start: 2022-12-02 | End: 2022-12-02

## 2022-12-02 RX ORDER — DEXTROSE MONOHYDRATE 50 MG/ML
INJECTION, SOLUTION INTRAVENOUS CONTINUOUS
Status: DISCONTINUED | OUTPATIENT
Start: 2022-12-02 | End: 2022-12-05

## 2022-12-02 RX ADMIN — ONDANSETRON 4 MG: 2 INJECTION INTRAMUSCULAR; INTRAVENOUS at 12:02

## 2022-12-02 RX ADMIN — FOLIC ACID 1 MG: 1 TABLET ORAL at 12:03

## 2022-12-02 RX ADMIN — Medication 1 CAPSULE: at 12:02

## 2022-12-02 RX ADMIN — IOPAMIDOL 75 ML: 755 INJECTION, SOLUTION INTRAVENOUS at 14:11

## 2022-12-02 RX ADMIN — MORPHINE SULFATE 2 MG: 2 INJECTION, SOLUTION INTRAMUSCULAR; INTRAVENOUS at 23:33

## 2022-12-02 RX ADMIN — SODIUM CHLORIDE 200 MG: 9 INJECTION, SOLUTION INTRAVENOUS at 21:06

## 2022-12-02 RX ADMIN — ACETAMINOPHEN 650 MG: 325 TABLET ORAL at 12:02

## 2022-12-02 RX ADMIN — OXYCODONE HYDROCHLORIDE 5 MG: 5 TABLET ORAL at 16:17

## 2022-12-02 RX ADMIN — OXYCODONE HYDROCHLORIDE 5 MG: 5 TABLET ORAL at 12:04

## 2022-12-02 RX ADMIN — LOSARTAN POTASSIUM 50 MG: 25 TABLET, FILM COATED ORAL at 12:03

## 2022-12-02 RX ADMIN — Medication 100 MG: at 12:03

## 2022-12-02 RX ADMIN — SODIUM CHLORIDE 200 MG: 9 INJECTION, SOLUTION INTRAVENOUS at 12:07

## 2022-12-02 RX ADMIN — ONDANSETRON 4 MG: 2 INJECTION INTRAMUSCULAR; INTRAVENOUS at 04:35

## 2022-12-02 RX ADMIN — SODIUM CHLORIDE, PRESERVATIVE FREE 40 MG: 5 INJECTION INTRAVENOUS at 12:02

## 2022-12-02 RX ADMIN — CARVEDILOL 25 MG: 25 TABLET, FILM COATED ORAL at 12:04

## 2022-12-02 RX ADMIN — MORPHINE SULFATE 2 MG: 2 INJECTION, SOLUTION INTRAMUSCULAR; INTRAVENOUS at 04:35

## 2022-12-02 RX ADMIN — CARVEDILOL 25 MG: 25 TABLET, FILM COATED ORAL at 16:17

## 2022-12-02 RX ADMIN — SULFAMETHOXAZOLE AND TRIMETHOPRIM 1 TABLET: 800; 160 TABLET ORAL at 12:03

## 2022-12-02 RX ADMIN — DEXTROSE MONOHYDRATE: 50 INJECTION, SOLUTION INTRAVENOUS at 12:35

## 2022-12-02 RX ADMIN — Medication 10 ML: at 14:11

## 2022-12-02 RX ADMIN — SODIUM CHLORIDE, PRESERVATIVE FREE 10 ML: 5 INJECTION INTRAVENOUS at 12:07

## 2022-12-02 ASSESSMENT — PAIN SCALES - WONG BAKER

## 2022-12-02 ASSESSMENT — ENCOUNTER SYMPTOMS
SHORTNESS OF BREATH: 1
COLOR CHANGE: 1
ALLERGIC/IMMUNOLOGIC NEGATIVE: 1
COUGH: 1
EYES NEGATIVE: 1
WHEEZING: 1
VOMITING: 1
NAUSEA: 1

## 2022-12-02 NOTE — CARE COORDINATION
Pre-cert received for [email or web address] today. Notified Dr Tre Garg. He informed CM that pt is very ill and will not be medically ready for d/c until nest week. LLUVIA notified [facility] and she informed CM that the pre-cert expires on 55/48. She states that they may be able to just do an extension. CM will update her on Monday.   TE

## 2022-12-02 NOTE — PROCEDURES
INSTRUMENTAL SWALLOW REPORT  MODIFIED BARIUM SWALLOW    NAME: Eboni Ibarra   : 1955  MRN: [id number]       Date of Eval: 2022     Ordering Physician: Dr. Tahir Louis  Radiologist: Dr. Karina Dominguez     Referring Diagnosis(es): Referring Diagnosis: dysphagia    Past Medical History:  has a past medical history of Cancer ([facility] [address] Diabetes mellitus ([facility] [address] Hyperlipidemia, Hypertension, and Seizure ([facility] [address] Past Surgical History:  has a past surgical history that includes Lung cancer surgery (Left, 10/2019); Upper gastrointestinal endoscopy (N/A, 10/3/2020); and Toe amputation (Left, 2022). Type of Study: Initial MBS       Recent CXR/CT of Chest: see chart    Patient Complaints/Reason for Referral:  Eboni Ibarra was referred for a MBS to assess the efficiency of his/her swallow function, assess for aspiration, and to make recommendations regarding safe dietary consistencies, effective compensatory strategies, and safe eating environment. Patient complaints: does not state    Onset of problem:   Date of Onset: prior to admission            Behavior/Cognition/Vision/Hearing:  Behavior/Cognition: Alert; Cooperative; Doesn't follow directions  Hearing: Exceptions to [facility]  Hearing Exceptions: Hard of hearing/hearing concerns    Impressions: Eboni Ibarra was seen for an inpatient modified barium swallow study. She was seated upright in chair, filmed in lateral view. She required assistance with feeding and required visual/tactile cues for participation throughout. Pt was presented with PO trials of thin liquids via tsp/cup/straw, nectar thick liquids via cup/straw, pureed, and solid consistencies. Oral stage is moderately impaired, characterized by adequate labial seal, oral holding, and reduced lingual coordination with resulting premature pharyngeal entry of liquids. She expectorated attempted trials of soft and regular solids.  Pharyngeal stage moderate-severely impaired with intact swallow initiation/hyolaryngeal excursion/soft palate elevation, poor tongue base retraction and reduced epiglottic inversion resulting in reduced laryngeal vestibule closure, decreased-absent posterior pharyngeal stripping wave, and narrowed UES distention. Minimal-moderate residue remained within the valleculae, pyriform sinuses, and within lateral channels (presumed from lateral view) with all consistencies. Laryngeal penetration and minimal-trace silent aspiration was identified with thin liquids, nectar thick liquids, and pureed consistencies. Volume of aspiration increased for thin liquids compared to nectar thick/pureed textures. Airway events occurred 2/2 reduced pharyngeal clearance and decreased sensation. Pt unable to follow any directions for compensatory strategies or volitional cough. Recommendations: Continue NPO with allowance for ice chips only. Please complete thorough oral care at least 3x/day to minimize risks of aspiration of ice chips and secretions. SLP will follow briefly for education. Treatment Dx and ICD 10: Dysphagia R13.10   Patient Position: Lateral and Upright    Consistencies Administered: Regular;Pureed; Thin cup; Thin teaspoon; Thin straw      Dysphagia Outcome Severity Scale: Level 2: Moderate Severe dysphagia- Maximum assistance or maximum use of strategies with partial PO only  Penetration-Aspiration Scale (PAS): 8 - Material Enters the airway, passes below the vocal folds, and no effort is made to eject    Recommended Diet:  Solid consistency: NPO  Liquid consistency: NPO       Medication administration: Via NG/PEG    Safe Swallow Protocol:  Oral care  General aspiration precautions (upright positioning, small boluses, slow rate of intake)      Recommendations/Treatment  Requires SLP Intervention: Yes        D/C Recommendations: Ongoing speech therapy is recommended during this hospitalization; To be determined;24 hour supervision/assistance         Recommended Exercises: Therapeutic Interventions: Therapeutic PO trials with SLP;Patient/Family education         Education: Images and recommendations were reviewed with Arden Villarreal following this exam.   Patient Education: Results/recommendations  Patient Education Response: No evidence of learning    Safety Devices  Safety Devices in place: Yes  Type of devices: All fall risk precautions in place  Restraints Initially in Place: No      Goals:      Short Term:  Goal 1: Caregivers will indicate understanding of education/recommendations.                           Oral Preparation / Oral Phase  Impaired        Pharyngeal Phase  Impaired      Esophageal Phase  Esophageal Screen: WFL        Pain   Pain Level:  (unable to give a number)  Pain Location: Leg      Therapy Time:   Individual Concurrent Group Co-treatment   Time In 0935         Time Out 1030         Minutes [facility], [address] [place] [address]-SLP, 12/2/2022, 11:14 AM

## 2022-12-02 NOTE — PROGRESS NOTES
Infectious Disease Progress Note  2022   Patient Name: Sonny Wheeler : 1955   Impression  Left DFI with Residual OM and Dehiscence Secondary to PAD:  Stenotrophomonas maltophilia in Respiratory Culture:   Afebrile, leukocytosis trended up, may be related to a non-infectious process as CRP remains low. Pct 0.089, CRP 13.4  -BC 0/2-NGTD  -Resp culture: Stenotrophomonas maltophilia (resistant to levofloxacin)  Past left foot cultures 2022-Serratia marcescens, Staphylococcus epidermidis   -Left Foot Culture: Serratia marcescens  -XR Foot Left: 1. Status post amputation of the 1st proximal phalanx. No suspicious osseous   lesion. 2. Soft tissue ulceration at the surgical stump. 3. Bony demineralization. DMII:  Metastatic Lung Cancer with Brain Mets s/p Right Frontal Craniotomy:  Seizures:  S/p Trach/PEG 2022:  -Dr. Will Turner onboard, pulmonology,  requests eval to determine if trach can be removed safely, rec to keep trach in and re-eval in 6 months. HTN  Multi-morbidity: per PMHx: HLD  Plan:  Continue per PEG tube- Bactrim DS bid x 6 weeks (end date 2023)  Trend CRP- ordered  ID recommends CTS to eval PAD, dependent upon the family wishes due to the patient's status with her poor healing and cancer onboard  Will await CT Chest, A&P W IV contrast and MRI of the left foot ordered   Following, goals of care per pts , wants full code at this time.  would like to see Dr. Estrella Running as an OP, could DC to SNF    Ongoing Antimicrobial Therapy  Bactrim -? Completed Antimicrobial Therapy  Zosyn ? Levaquin -  Ciprofloxacin -? History:? Interval history noted  Denies n/v/d/f or untoward effects of antibiotics  Unable to obtain ROS as patient is Nonverbal.  Physical Exam:  Vital Signs: /75   Pulse 83   Temp 97.5 °F (36.4 °C) (Oral)   Resp 21   Ht 5' 6\" (1.676 m)   SpO2 100%   BMI 20.66 kg/m²     Gen: Continues to be Non-verbal with trach intact, does not follow commands, restless in bed  Wounds: C/D/I left hallux amputation site with necrosis  Chest: no distress and CTA. Anterior diminished breath sounds. Trach intact. Heart: NSR and no MRG. Abd: soft, non-distended, no tenderness, no hepatomegaly. Normoactive bowel sounds. PEG Intact: LUQ with tube feeding infusion  Ext: no clubbing, cyanosis, or edema. See wounds above. Neuro: CN 2-12 intact and no focal sensory or motor deficits     Radiologic / Imaging / TESTING  11/27/22 XR Foot Left:  Impression   1. Status post amputation of the 1st proximal phalanx. No suspicious osseous   lesion. 2. Soft tissue ulceration at the surgical stump. 3. Bony demineralization. 12/2/22 MRI Foot Left W WO Contrast:    12/1/22 XR Chest Portable:  Impression   No significant interval change. No evidence of pneumonia. 12/1/22 XR Abdomen:  Impression   Upper abdomen is unremarkable. 12/2/22 FL Modified Barium Swallow:  Impression   C-arm fluoroscopy provided to department of speech pathology/therapy for   monitoring modified barium swallow. Please see separate speech pathology report for full discussion of findings   and recommendations.         Labs:    Recent Results (from the past 24 hour(s))   POCT Glucose    Collection Time: 12/01/22 12:07 PM   Result Value Ref Range    POC Glucose 146 (H) 70 - 99 MG/DL   POCT Glucose    Collection Time: 12/01/22  4:56 PM   Result Value Ref Range    POC Glucose 154 (H) 70 - 99 MG/DL   POCT Glucose    Collection Time: 12/01/22  8:21 PM   Result Value Ref Range    POC Glucose 128 (H) 70 - 99 MG/DL   C-Reactive Protein    Collection Time: 12/02/22  4:10 AM   Result Value Ref Range    CRP High Sensitivity 15.5 (H) <5.0 mg/L   Basic Metabolic Panel    Collection Time: 12/02/22  4:10 AM   Result Value Ref Range    Sodium 137 135 - 145 MMOL/L    Potassium 3.9 3.5 - 5.1 MMOL/L    Chloride 102 99 - 110 mMol/L    CO2 23 21 - 32 MMOL/L Anion Gap 12 4 - 16    BUN 13 6 - 23 MG/DL    Creatinine 0.7 0.6 - 1.1 MG/DL    Est, Glom Filt Rate >60 >60 mL/min/1.73m2    Glucose 102 (H) 70 - 99 MG/DL    Calcium 9.5 8.3 - 10.6 MG/DL   CBC with Auto Differential    Collection Time: 12/02/22  4:10 AM   Result Value Ref Range    WBC 17.6 (H) 4.0 - 10.5 K/CU MM    RBC 3.21 (L) 4.2 - 5.4 M/CU MM    Hemoglobin 9.2 (L) 12.5 - 16.0 GM/DL    Hematocrit 28.8 (L) 37 - 47 %    MCV 89.7 78 - 100 FL    MCH 28.7 27 - 31 PG    MCHC 31.9 (L) 32.0 - 36.0 %    RDW 14.0 11.7 - 14.9 %    Platelets 064 469 - 601 K/CU MM    MPV 9.1 7.5 - 11.1 FL    Differential Type AUTOMATED DIFFERENTIAL     Segs Relative 86.1 (H) 36 - 66 %    Lymphocytes % 9.0 (L) 24 - 44 %    Monocytes % 4.2 (H) 0 - 4 %    Eosinophils % 0.2 0 - 3 %    Basophils % 0.1 0 - 1 %    Segs Absolute 15.1 K/CU MM    Lymphocytes Absolute 1.6 K/CU MM    Monocytes Absolute 0.7 K/CU MM    Eosinophils Absolute 0.0 K/CU MM    Basophils Absolute 0.0 K/CU MM    Nucleated RBC % 0.0 %    Total Nucleated RBC 0.0 K/CU MM    Total Immature Neutrophil 0.07 K/CU MM    Immature Neutrophil % 0.4 0 - 0.43 %   POCT Glucose    Collection Time: 12/02/22  7:03 AM   Result Value Ref Range    POC Glucose 107 (H) 70 - 99 MG/DL     CULTURE results: Invalid input(s): BLOOD CULTURE,  URINE CULTURE, SURGICAL CULTURE    Diagnosis:  Patient Active Problem List   Diagnosis    Chest pain    Dyspnea and respiratory abnormalities    Acute gastritis without hemorrhage    Diabetic ulcer of toe of left foot associated with type 2 diabetes mellitus, with fat layer exposed ([facility] [address]    Type 2 diabetes mellitus with peripheral neuropathy (HCC)    Peripheral vascular disease ([facility] [address]    History of nicotine dependence    Sepsis ([facility] [address]    Brain metastases ([facility] [address]    Diabetic foot infection ([facility] [address]    Cancer of left lung ([facility] [address] - removed at [facility] in 2019    Former smoker - quit in 2019    DM (diabetes mellitus), type 2 (HCC)    Seizures (HCC)    Hypokalemia

## 2022-12-02 NOTE — PROGRESS NOTES
Comprehensive Nutrition Assessment    Type and Reason for Visit:  Reassess    Nutrition Recommendations/Plan:   Continue to advance EN to goal rate for optimal healing, recovery     Malnutrition Assessment:  Malnutrition Status:  Severe malnutrition (11/28/22 1254)    Context:  Chronic Illness     Findings of the 6 clinical characteristics of malnutrition:  Energy Intake:  Mild decrease in energy intake  (pt on PEG tube not at goal)  Weight Loss:  Unable to assess (last wt taken 9/29)     Body Fat Loss:  Severe body fat loss Buccal region, Triceps, Orbital, Fat Overlying Ribs   Muscle Mass Loss:  Severe muscle mass loss Calf (gastrocnemius), Thigh (quadraceps), Scapula (trapezius), Clavicles (pectoralis & deltoids), Temples (temporalis), Hand (interosseous)  Fluid Accumulation:  No significant fluid accumulation     Strength:  Not Performed    Nutrition Assessment:    Pt not in room during visits. Last EN update in Flowsheets indicate EN running at 30 ml/hr, inadequate for needs. No weights recorded dos? Will continue to follow as high nutrition risk.     Nutrition Related Findings:    H/H 9.21/28.8   Wound Type: Multiple, Pressure Injury, Stage II, Diabetic Ulcer       Current Nutrition Intake & Therapies:    Average Meal Intake: NPO  Average Supplements Intake: NPO  Diet NPO Exceptions are: Ice Chips  ADULT TUBE FEEDING; PEG; Diabetic; Continuous; 20; Yes; 10; Q 4 hours; 45; 150; Q 4 hours  Current Tube Feeding (TF) Orders:  Feeding Route: PEG  Formula:  (Diabetic - Glucerna 1.2)  Schedule: Continuous  Feeding Regimen: 60 mL/hr  Additives/Modulars: None  Water Flushes: 30 Q 8 H  Current TF & Flush Orders Provides: 30 ml/hr: 1080 kcal and 59 gm protein  Goal TF & Flush Orders Provides: Glucerna 1.5 (Diabetic) will provide ~1620 kcals (93%) and 89 gm (100%)    Anthropometric Measures:  Height: 5' 6\" (167.6 cm)  Ideal Body Weight (IBW): 130 lbs (59 kg)    Admission Body Weight:  (no weight taken)  Current Body Weight: 108 lb (49 kg) (?), 98.5 % IBW. Weight Source: Stated (no weight taken during this admission yet)  Current BMI (kg/m2): 17.4  Usual Body Weight: 128 lb 8 oz (58.3 kg) (8/11/22)  % Weight Change (Calculated): -16  Weight Adjustment For: No Adjustment                 BMI Categories: Underweight (BMI less than 22) age over 72    Estimated Daily Nutrient Needs:  Energy Requirements Based On: Kcal/kg     Energy (kcal/day): 0015-3134 (30-35 kcal/kg ABW)  Weight Used for Protein Requirements: Ideal  Protein (g/day): 71-89 (1.2-1.5 g/kg IBW)  Method Used for Fluid Requirements: 1 ml/kcal  Fluid (ml/day):  5986-6183    Nutrition Diagnosis:   Severe malnutrition, In context of chronic illness related to inadequate protein-energy intake, inadequate enteral nutrition infusion, cognitive or neurological impairment (wound) as evidenced by severe loss of subcutaneous fat, severe muscle loss, nutrition support - enteral nutrition, NPO or clear liquid status due to medical condition  Inadequate enteral nutrition infusion related to increase demand for energy/nutrients as evidenced by wounds, other (malnutrition)    Nutrition Interventions:   Food and/or Nutrient Delivery: Modify Tube Feeding  Nutrition Education/Counseling: Education not indicated  Coordination of Nutrition Care: Continue to monitor while inpatient  Plan of Care discussed with: PS MD about tube feed order and mgt. Spoke with RN about flowsheet, no G tube information in flowsheet in I/O    Goals:  Previous Goal Met: No Progress toward Goal(s)  Goals: Tolerate nutrition support at goal rate       Nutrition Monitoring and Evaluation:   Behavioral-Environmental Outcomes: None Identified  Food/Nutrient Intake Outcomes: Enteral Nutrition Intake/Tolerance, Vitamin/Mineral Intake  Physical Signs/Symptoms Outcomes: Biochemical Data, GI Status, Nausea or Vomiting, Fluid Status or Edema, Nutrition Focused Physical Findings, Skin, Weight    Discharge Planning:     Too soon to determine     Skylar Howard, [address] [place] [address], LD  Contact: [phone]

## 2022-12-02 NOTE — CONSULTS
HEMATOLOGY ONCOLOGY  Consultation Report    REASON FOR CONSULT    Metastatic lung cancer, assist w/goals of care    CHIEF COMPLAINT    No chief complaint on file. HISTORY OF PRESENT ILLNESS   Rosie Tay is a 74 y.o. female  Known with past medical history significant for metastatic lung cancer, brain metastases status post right frontal craniotomy, gamma knife radiation, history of seizures, history of TBI, respiratory failure status post tracheostomy and PET tube, OVD,, left hallux amputation, and, hyperlipidemia, diabetes type 2, chronic pain, resident of [place], [place] who was brought to the ED per  due to worsening weakness, and great toe pain. Past medical history of left upper lobectomy 10/2019 at [facility]. No adjuvant chemotherapy or radiation treatment. She presented to [facility] ED with AMS June 2022. MRI head with and without contrast 6/18/2022 showed multiple intra-axial enhancing lesions in the right frontal lobe and left occipital pole. CT chest abdomen pelvis 6/20/2022 showed increasing density next to surgical clips in the left upper lobe, few scattered sub-6 cm noncalcified pulmonary nodules and 8 mm focal lesion in the pancreatic body, but no obvious evidence of primary disease. She is status post right frontal craniotomy for resection of tumor 6/20/2022. Pathology was consistent with poorly differentiated metastatic adenocarcinoma from lung primary, NGS strata testing showed high TMB and PD-L1 expression but no FDA approved targetable mutation. She is status post gamma knife to postop cavity and remaining lesion 7/20/2022. She was recommended to have immunotherapy due to poor functional status for palliative systemic therapy. She is status post 1 cycle of Keytruda September 2022.     She had MRI brain 11/15/22 as below    Impression    Interval worsening/increase in the size of the bandlike enhancing lesion at the right frontal lobe postsurgical bed measuring 3.2 x 0.4 x 0.9 cm from the previous measurement of 2.2 x 0.7 x 1.2 cm with surrounding vasogenic edema remaining. Essentially stable 0.6 x 0.7 x 0.8 cm left occipital lobe enhancing lesion from the previous measurement of 0.6 x 0.9 x 1.1 cm. It also has mild associated vasogenic edema. Stable 1.7 x 0.4 x 1.7 cm right frontal dural thickening and homogeneous enhancement demonstrating isointensity to gray matter on T1 and T2-weighted sequences suggestive of meningioma. Less likely differential consideration would be a dural based metastasis. No new lesion is seen. Stable moderately severe nonspecific bilateral white matter disease. Severe chronic sphenoid sinus disease and mild right side mastoid effusion/mastoiditis. Please see above more details. Per patients , they are meant to have meeting with medical  and radiation oncology to discuss results and formulate a plan. Per  he has been unable to reach the oncology office to secure a meeting. On admission she is noted to be anemic, hemoglobin 9.2, hematocrit 28.4, platelets 147. She is noted to be FOBT positive per the ED physician with consult per Dr. Wendy Caldera. Did review the chart and patient history with  and patient. We did bring up poor performance status and stage IV cancer. We discussed option of palliative care versus hospice versus treatment.  who is decision-maker reports that she is not a quitter and they would like to pursue aggressive treatment. We did discuss treatment options locally or ongoing care at [facility].   He prefers to pursue care at [address] [place] [address]    Past Medical History:   Diagnosis Date    Cancer [facility])     Diabetes mellitus ([facility] [address]     Hyperlipidemia     Hypertension     Seizure [facility])        SURGICAL HISTORY    Past Surgical History:   Procedure Laterality Date    LUNG CANCER SURGERY Left 10/2019    TOE AMPUTATION Left 8/8/2022    LEFT FOOT HALLUX AMPUTATION EXCISIONAL DEBRIDEMENT ALL NON VIABLE   TISSUE AND BONE performed by Marbin Devine DPM at [address] [place] [address] N/A 10/3/2020    EGD BIOPSY performed by Anthony Walker MD at [facility]    No family history on file. SOCIAL HISTORY    Social History     Socioeconomic History    Marital status:    Tobacco Use    Smoking status: Former     Packs/day: 1.00     Types: Cigarettes     Quit date: 2019     Years since quitting: 3.9    Smokeless tobacco: Never    Tobacco comments:     10/2019   Substance and Sexual Activity    Alcohol use: Yes     Alcohol/week: 1.0 standard drink     Types: 1 Cans of beer per week    Drug use: Yes     Types: Marijuana [facility])    Sexual activity: Yes     Partners: Male       REVIEW OF SYSTEMS    Constitutional:  Denies fever, chills, loss of appetite, weight loss, tiredness, fatigue or weakness   HEENT:  Denies swelling of neck glands  Respiratory:  Denies cough, shortness of breath or hemoptysis  Cardiovascular:  Denies chest pain, palpitations or swelling   GI:  Denies abdominal pain, nausea, vomiting, constipation or diarrhea   Musculoskeletal:  Denies back pain   Skin:  Denies rash   Neurologic:  Denies headache, focal weakness or sensory changes   All systems negative except as marked.      PHYSICAL EXAM    Vitals: BP (!) 105/56   Pulse 71   Temp 97.9 °F (36.6 °C) (Oral)   Resp 15   Ht 5' 6\" (1.676 m)   SpO2 98%   BMI 20.66 kg/m²   CONSTITUTIONAL: awake, non verbal  EYES: Pallor, EOM intact  ENT: Trach  LUNGS: no increased work of breathing, diminished bases  CARDIOVASCULAR: regular rate and rhythm, normal S1 and S2, no murmur noted  ABDOMEN: normal bowel sounds x 4, soft, non-distended, non-tender +Gtube  MUSCULOSKELETAL: left foot dressing in place  NEUROLOGIC: awake, alert  SKIN: Normal skin color, texture, turgor and no jaundice  EXTREMITIES: no LE edema    LABORATORY RESULTS  CBC:   Recent Labs     12/02/22  0410   WBC 17.6* HGB 9.2*        BMP:    Recent Labs     12/02/22  0410      K 3.9      CO2 23   BUN 13   CREATININE 0.7   GLUCOSE 102*     Hepatic: No results for input(s): AST, ALT, ALB, BILITOT, ALKPHOS in the last 72 hours. INR: No results for input(s): INR in the last 72 hours. RADIOLOGY REPORTS  CT HEAD WO CONTRAST   Preliminary Result   No acute intracranial abnormality. Two enlarging nodular areas of soft tissue thickening in the scalp. Differential includes pseudomeningoceles  or recurrent tumor. FL MODIFIED BARIUM SWALLOW W VIDEO   Final Result   C-arm fluoroscopy provided to department of speech pathology/therapy for   monitoring modified barium swallow. Please see separate speech pathology report for full discussion of findings   and recommendations. RECOMMENDATIONS:   Unavailable         XR CHEST PORTABLE   Preliminary Result   No significant interval change. No evidence of pneumonia. XR ABDOMEN (KUB) (SINGLE AP VIEW)   Final Result   Upper abdomen is unremarkable. XR CHEST PORTABLE   Final Result      Lungs are clear         XR FOOT LEFT (MIN 3 VIEWS)   Final Result   1. Status post amputation of the 1st proximal phalanx. No suspicious osseous   lesion. 2. Soft tissue ulceration at the surgical stump. 3. Bony demineralization. MRI FOOT LEFT W WO CONTRAST    (Results Pending)   CT CHEST W CONTRAST    (Results Pending)   CT ABDOMEN PELVIS W IV CONTRAST Additional Contrast? Radiologist Recommendation    (Results Pending)       ASSESSMENT/RECOMMENDATION    Metastatic lung cancer-stage BRENDA(cTx, Cnx, pM1b), follows with Dr. Fina Troncoso at [facility]. Has had recent MRI brain with ? Progression. We did discuss poor performance status. We discussed palliative care, vs hospice vs ongoing treatment.  would like to pursue aggressive treatment. We will obtain CT CAP for restaging. We will facilitate outpatient appointment with Dr. Fina Troncoso.  I did call to review plan of care with Dr. Arlin Whitman. Anemia- FOBT positive, evaluated by Dr. Chikis Veronica, defers c-scope EGD due to global condition. Monitor CBC, Transfuse to keep above 7    Hx of osteomyelitis- s/p partial hallux amputation. Needs left femoral endarterectomy with CTS, has seen Dr. Jorje Zimmer in past. Per chart review considering transfer to [place] to pursue treatment. We will follow the patient. Thank you for allowing me to participate in the care of this very pleasant patient. I have independently evaluated and examined this patient today. I have reviewed radiologic and biochemical tests on this patient. Management Plan is developed mutually with Ariana Moeller NP.  I have reviewed above note and agree with assessment and plan    DEEP

## 2022-12-02 NOTE — PROGRESS NOTES
Occupational Therapy  . Occupational Therapy Treatment Note    Name: Rae Will MRN: [id number] :   1955   Date:  2022   Admission Date: 2022 Room:  [address] [place] [address]-A     Primary Problem:      Restrictions/Precautions:          General precautions, fall risk, trach, tube feed, seizure precautions. Communication with other providers: cotx with PTA Torres Jaramillo for assist and safety as well as patient tolerance with therapy. Subjective:  Patient states:  non verbal  Pain:   Location, Type, Intensity (0/10 to 10/10):  mouthed \"everywhere\" but showed no s/s of pain. Objective:    Observation: patient getting cleaned up with staff upon arrival.  present and encouraging. Patient tends to push back when moving. takes awhile for patient to relax with cues. Objective Measures:  tele, trach, tube feed    Treatment, including education:    ADL activity training was instructed today. Cues were given for safety, sequence, UE/LE placement, visual cues, and balance. Activities performed today included  toileting,  Toileting- DEP     Therapeutic activity training was instructed today. Cues were given for safety, sequence, UE/LE placement, awareness, and balance. Activities performed today included bed mobility training, sup-sit, sit-stand, SPT. Patient is resistive to movement and tends to push back. Rolling- Max x2 plus Max to hold side lying. Supine to EOB- Max x2  Once upright patient only Mod A initially progressed to  [facility] for balance and upright posture. Patient tolerated 10 min EOB. Patient demo BLE ex with PTA. Return supine- Max x2  Boost to [facility]- DEP x2  Patient immediately sat upright in long sitting once boosted to [facility]. A lot of cues and encouragement to return supine. Placed HOB into high fowlers position. Patient educated on role of OT , benefits of OT and rationale for therapeutic intervention. Benefit of OOB/EOB activities, benefit of movement.  AE/AD, WS/EC, All therapeutic intervention performed c emphasis on trunk strengthening, bed mobility and supine<>sit to maintain / increase strength for functional tasks / transfers. Patient left safely in bed at end of session, with call light in reach, alarm on and nursing aware. Assessment / Impression:    Patient needs redirection throughout. And once relaxed patient has noticeably less tone. Does tend to pull at wires and grasp onto lines. Patient follows simple commands less than 25% of the time this session. Patient's tolerance of treatment: fair  Adverse Reaction: None  Significant change in status and impact: Improved from initial evaluation  Barriers to improvement: weakness, endurance, cogn      Plan for Next Session:    Continue with OT POC  Patients  would like to try for OOB actvity.      Time in:  1144  Time out:  1208  Timed treatment minutes:  24  Total treatment time:  24      Electronically signed by:    ANGE Parekh COTA/L [id number]    12/2/2022, 12:12 PM

## 2022-12-02 NOTE — PLAN OF CARE
Problem: Pain  Goal: Verbalizes/displays adequate comfort level or baseline comfort level  Outcome: Progressing     Problem: Skin/Tissue Integrity  Goal: Absence of new skin breakdown  Description: 1. Monitor for areas of redness and/or skin breakdown  2. Assess vascular access sites hourly  3. Every 4-6 hours minimum:  Change oxygen saturation probe site  4. Every 4-6 hours:  If on nasal continuous positive airway pressure, respiratory therapy assess nares and determine need for appliance change or resting period.   Outcome: Progressing     Problem: Safety - Adult  Goal: Free from fall injury  Outcome: Progressing     Problem: Discharge Planning  Goal: Discharge to home or other facility with appropriate resources  Outcome: Not Progressing     Problem: Chronic Conditions and Co-morbidities  Goal: Patient's chronic conditions and co-morbidity symptoms are monitored and maintained or improved  Outcome: Not Progressing     Problem: Nutrition Deficit:  Goal: Optimize nutritional status  Outcome: Not Progressing

## 2022-12-02 NOTE — PROGRESS NOTES
V2.0  [facility] [place] Hospitalist Progress Note      Name:  Elizabeth Feldman /Age/Sex: 1955  (74 y.o. female)   MRN & CSN:  [id number] & [id number] Encounter Date/Time: 2022 12:50 PM EST    Location:  [facility]-O PCP: Oz Townsend MD       Hospital Day: 7    Assessment and Plan:   Elizabeth Feldman is a 74 y.o. female with pmh of foot toe amputation history, history of metastatic lung cancer s/p left lobectomy, right frontal orbital craniotomy on 2022, pathology consistent with poorly differentiated metastatic adenocarcinoma status post, knife radiosurgery, s/p trach/PEG tube, seizure disorder who presents with Acute on chronic anemia    Plan:  Acute on chronic anemia likely GI bleed: Hemoglobin 9.2 on admission, previously 14.2 in September, FOBT positive. GI on board. Holding off on scope because of underlying metastatic lung disease history. EGD in 2020 showed hiatal hernia and gastritis. Continue to monitor today.h/h stable  Acute metabolic encephalopathy--pt alert but not tracking, confused, not following commands, will order head CT, h/o metastatic lung ca to brain, oncology was consulted to discuss goals of care on 5 mg. Family wants to do everything. Family want to see an outpatient [facility] oncology that the patient used to see before after discharge. Oncology called and decided to order imagings for further assessment. But apparently ultimate management of metastatic cancer to the outpatient oncology team.  pt would need left femoral endarterectomy and likely amputation to treat LLE osteo  N/v and acute resp failure--pt with 4 episodes of emesis yesterday of TF, required frequent suctioning of TF from lungs, placed on 5L trach mask, check CXR, AXR, wnl, holding TF until n/v resolves, transferred to step down,  reports has n/v with IV morphine, will switch to Oxycodone through PEG, ST consulted for dysphagia, MBS ordered.   Speech therapy team believes patient is having microaspiration's recommends n.p.o. with therefore tube feedings. We will start D5. Left DFI with Residual OM and Dehiscence Secondary to PAD: Stenotrophomonas maltophilia in Respiratory Culture:   Afebrile, no leukocytosis  Pct 0.089, CRP 13.4  11/27-BC 0/2-NGTD  11/28-Resp culture: Stenotrophomonas maltophilia (resistant to levofloxacin)  Past left foot cultures 8/8/2022-Serratia marcescens, Staphylococcus epidermidis   11/27-Left Foot Culture: Serratia marcescens  11/27-XR Foot Left: 1. Status post amputation of the 1st proximal phalanx. No suspicious osseous   lesion. 2. Soft tissue ulceration at the surgical stump. 3. Bony demineralization. Continue per PEG tube- Bactrim DS bid x 6 weeks (end date 1/11/2023)  DC - Levaquin as Stenotrophomonas is resistant  Trend CRP- ordered    Podiatry consult on board. History of osteomyelitis status post partial hallux amputation with excisional debridement in October 2022. X-ray of the left foot showed no suspicion of osseous lesions. Podiatry on board. Infectious disease on board. Osteomyelitis concerning. . D/w podiatry. If full code and wants everything done, would need left femoral endarterectomy with CTS. Pt saw Dr. Mike Butler in 9/22 and that was the plan, followed by left foot MRI and likely amputation. For Vascular surgery eval, pt would need to be transferred as no CTS at [facility], d/w Dr. Mike Butler and if the family decides on aggressive treatment of PAD, would need to be transferred, hospitalist would need to call Dr. Mike Butler at [phone] to facilitate tx to [address], Family wishes due to do everything. Recommend CTS for Femoral endarterectomy after more discussion with the family tomorrow. History of metastatic lung cancer s/p left lobectomy no chemo or radiation, met to right frontal lob s/p  right frontal craniotomy on 6/20/2022: Pathology results are consistent with poorly differentiated metastatic adenocarcinoma.   Patient subsequently had gamma knife radiosurgery. Guarded prognosis. Consult onc and neurosurg to help with goals of care. Pt alert but confused. Repeat head CT. [place] [address]. Seizure disorder secondary to brain metastasis on Vimpat 200 mg twice daily  Severe oropharyngeal dysphagia s/p trach and PEG tube since June 2022--pulm consult,  requesting eval for trach and PEG removal, consulted speech therapy, ordered MBS  History of status epilepticus in September 2022  Peripheral vascular disease on aspirin and statin  Benign essential hypertension on Coreg 25 mg twice daily and losartan 50 mg daily  Hyperlipidemia on statin  Non-insulin-dependent diabetes mellitus type 2 on sliding scale insulin  Hiatal hernia lansoprazole  Severe PCM--nutrition following, on TF through PEG  Goals of care-- wants pt to be full code, expressed reservation about transferring pt to [place] for Dr. Gris Hobson vascular surgery eval, pt saw Dr. Gris Hobson 9/22 and rec left femoral endarterectomy,  wants to talk with onc, wants trach and peg removed, MBS ordered, head CT ordered, possibly pt could be discharged to SNF and see her oncologist and Dr. Gris Hobson as outpatient? Diet Diet NPO Exceptions are: Ice Chips  ADULT TUBE FEEDING; PEG; Diabetic; Continuous; 20; Yes; 10; Q 4 hours; 45; 150; Q 4 hours   DVT Prophylaxis [] Lovenox, []  Heparin, [] SCDs, [] Ambulation,  [] Eliquis, [] Xarelto  [] Coumadin   Code Status Full Code   Disposition From: [facility] rehab  Expected Disposition: Patient requesting different rehab  Estimated Date of Discharge: f/u onc, neurosurg recs for goals of care, if cannot be discharged to SNF, would need transfer for CTS eval for PAD and left femoral endarterectomy    Surrogate Decision Maker/ POA      Subjective:     Chief Complaint: No chief complaint on file. Family wishes due to do everything.   Recommend CTS for Femoral endarterectomy after more discussion with the family tomorrow. Started D5. Family at bedside discussed care. Review of Systems:    Review of Systems   Constitutional:  Positive for activity change and fatigue. HENT: Negative. Eyes: Negative. Respiratory:  Positive for cough, shortness of breath and wheezing. Cardiovascular: Negative. Gastrointestinal:  Positive for nausea and vomiting. Endocrine: Negative. Genitourinary: Negative. Musculoskeletal: Negative. Skin:  Positive for color change. Allergic/Immunologic: Negative. Neurological:  Positive for speech difficulty. Hematological: Negative. Psychiatric/Behavioral:  Positive for agitation and confusion. ROS as above    Objective: Intake/Output Summary (Last 24 hours) at 12/2/2022 1336  Last data filed at 12/1/2022 2045  Gross per 24 hour   Intake 240 ml   Output --   Net 240 ml          Vitals:   Vitals:    12/02/22 1148   BP: 133/83   Pulse: 79   Resp: 24   Temp: 97 °F (36.1 °C)   SpO2: 93%       Physical Exam:   Physical Exam  Vitals reviewed. Constitutional:       General: She is in acute distress. Appearance: She is ill-appearing. HENT:      Head: Normocephalic. Nose: Nose normal.      Mouth/Throat:      Mouth: Mucous membranes are moist.   Eyes:      Conjunctiva/sclera: Conjunctivae normal.      Pupils: Pupils are equal, round, and reactive to light. Cardiovascular:      Rate and Rhythm: Normal rate and regular rhythm. Pulses: Normal pulses. Heart sounds: Normal heart sounds. No murmur heard. Pulmonary:      Effort: Respiratory distress present. Breath sounds: Rhonchi and rales present. No wheezing. Abdominal:      General: Abdomen is flat. Bowel sounds are normal. There is no distension. Palpations: Abdomen is soft. Tenderness: There is no abdominal tenderness. Comments: PEG in place   Musculoskeletal:         General: Tenderness, deformity and signs of injury present. Normal range of motion.       Cervical back: Range    CRP High Sensitivity 15.5 (H) <5.0 mg/L   Basic Metabolic Panel    Collection Time: 12/02/22  4:10 AM   Result Value Ref Range    Sodium 137 135 - 145 MMOL/L    Potassium 3.9 3.5 - 5.1 MMOL/L    Chloride 102 99 - 110 mMol/L    CO2 23 21 - 32 MMOL/L    Anion Gap 12 4 - 16    BUN 13 6 - 23 MG/DL    Creatinine 0.7 0.6 - 1.1 MG/DL    Est, Glom Filt Rate >60 >60 mL/min/1.73m2    Glucose 102 (H) 70 - 99 MG/DL    Calcium 9.5 8.3 - 10.6 MG/DL   CBC with Auto Differential    Collection Time: 12/02/22  4:10 AM   Result Value Ref Range    WBC 17.6 (H) 4.0 - 10.5 K/CU MM    RBC 3.21 (L) 4.2 - 5.4 M/CU MM    Hemoglobin 9.2 (L) 12.5 - 16.0 GM/DL    Hematocrit 28.8 (L) 37 - 47 %    MCV 89.7 78 - 100 FL    MCH 28.7 27 - 31 PG    MCHC 31.9 (L) 32.0 - 36.0 %    RDW 14.0 11.7 - 14.9 %    Platelets 140 764 - 983 K/CU MM    MPV 9.1 7.5 - 11.1 FL    Differential Type AUTOMATED DIFFERENTIAL     Segs Relative 86.1 (H) 36 - 66 %    Lymphocytes % 9.0 (L) 24 - 44 %    Monocytes % 4.2 (H) 0 - 4 %    Eosinophils % 0.2 0 - 3 %    Basophils % 0.1 0 - 1 %    Segs Absolute 15.1 K/CU MM    Lymphocytes Absolute 1.6 K/CU MM    Monocytes Absolute 0.7 K/CU MM    Eosinophils Absolute 0.0 K/CU MM    Basophils Absolute 0.0 K/CU MM    Nucleated RBC % 0.0 %    Total Nucleated RBC 0.0 K/CU MM    Total Immature Neutrophil 0.07 K/CU MM    Immature Neutrophil % 0.4 0 - 0.43 %   POCT Glucose    Collection Time: 12/02/22  7:03 AM   Result Value Ref Range    POC Glucose 107 (H) 70 - 99 MG/DL   POCT Glucose    Collection Time: 12/02/22 12:09 PM   Result Value Ref Range    POC Glucose 99 70 - 99 MG/DL        Imaging/Diagnostics Last 24 Hours   No results found.     Electronically signed by Mohsen Del Cid MD, MD on 12/2/2022 at 1:36 PM

## 2022-12-02 NOTE — PROGRESS NOTES
Physical Therapy    Physical Therapy Treatment Note  Name: Jaqueline Hernandez MRN: [id number] :   1955   Date:  2022   Admission Date: 2022 Room:  [address] [place] [address]   Restrictions/Precautions:          tube feed, fall risk, general precautions, seizure precautions  Communication with other providers:  nurse and co-tx /c CASTRO  Subjective:  Patient states:  non verbal  Pain:   Location, Type, Intensity (0/10 to 10/10): unable to verbalize, but pt seems to nod when asked about pain      Objective:    Observation:  supine in bed upon entry, being changed by tech and nurse upon entry  Objective Measures: SpO2: 93%  Treatment, including education/measures:  Transfers with line management of   Rolling: maxAx2  Supine to sit :maxAx2  Sit to supine :maxAx2  Scooting : maxAx2  Bed mob for cleaning/changing, pt somewhat resistive to motion or tendency to fold into flexion limits motion. Sitting Balance:  ~10 EOB, initially requiring modA1-2 and progressing to intermittent Tammi once CEDRIC established. Posterior lean initially corrected /c assist and vc's. Pt tends to resist motion and initially holding LE's and hands in flexion, but after relaxing, pt able to tolerate B knee ext full ROM. Redirection required at times due to pt reaching and grasping items around her. Safety  Patient left safely in the bed, with call light/phone in reach with alarm applied. Left in care of nurse. Assessment / Impression:    Pt improves sitting balance and trunk control/posture as tx progresses.   Patient's tolerance of treatment:  Fair   Adverse Reaction: na  Significant change in status and impact:  na  Barriers to improvement:  weakness, endurance  Plan for Next Session:    Sitting balance and bed mob training, look into wb status in LLE to assess transfer training (nurse states wound consult is pending for L foot)  Time in:  1144  Time out:  1208  Timed treatment minutes:  24  Total treatment time:  24    Previously filed items:                Electronically signed by:    Hanna Roberts, GABRIELLA  12/2/2022, 12:12 PM

## 2022-12-03 LAB
ANION GAP SERPL CALCULATED.3IONS-SCNC: 11 MMOL/L (ref 4–16)
BASOPHILS ABSOLUTE: 0 K/CU MM
BASOPHILS RELATIVE PERCENT: 0.2 % (ref 0–1)
BUN BLDV-MCNC: 10 MG/DL (ref 6–23)
C-REACTIVE PROTEIN, HIGH SENSITIVITY: 17.2 MG/L
CALCIUM SERPL-MCNC: 8.8 MG/DL (ref 8.3–10.6)
CHLORIDE BLD-SCNC: 101 MMOL/L (ref 99–110)
CO2: 21 MMOL/L (ref 21–32)
CREAT SERPL-MCNC: 0.6 MG/DL (ref 0.6–1.1)
DIFFERENTIAL TYPE: ABNORMAL
EOSINOPHILS ABSOLUTE: 0.1 K/CU MM
EOSINOPHILS RELATIVE PERCENT: 0.6 % (ref 0–3)
GFR SERPL CREATININE-BSD FRML MDRD: >60 ML/MIN/1.73M2
GLUCOSE BLD-MCNC: 102 MG/DL (ref 70–99)
GLUCOSE BLD-MCNC: 159 MG/DL (ref 70–99)
GLUCOSE BLD-MCNC: 176 MG/DL (ref 70–99)
GLUCOSE BLD-MCNC: 181 MG/DL (ref 70–99)
GLUCOSE BLD-MCNC: 205 MG/DL (ref 70–99)
HCT VFR BLD CALC: 26.7 % (ref 37–47)
HEMOGLOBIN: 8.7 GM/DL (ref 12.5–16)
IMMATURE NEUTROPHIL %: 0.4 % (ref 0–0.43)
LYMPHOCYTES ABSOLUTE: 1.4 K/CU MM
LYMPHOCYTES RELATIVE PERCENT: 13.4 % (ref 24–44)
MCH RBC QN AUTO: 29.1 PG (ref 27–31)
MCHC RBC AUTO-ENTMCNC: 32.6 % (ref 32–36)
MCV RBC AUTO: 89.3 FL (ref 78–100)
MONOCYTES ABSOLUTE: 0.7 K/CU MM
MONOCYTES RELATIVE PERCENT: 6.8 % (ref 0–4)
NUCLEATED RBC %: 0 %
PDW BLD-RTO: 14.2 % (ref 11.7–14.9)
PLATELET # BLD: 304 K/CU MM (ref 140–440)
PMV BLD AUTO: 9.4 FL (ref 7.5–11.1)
POTASSIUM SERPL-SCNC: 3.3 MMOL/L (ref 3.5–5.1)
RBC # BLD: 2.99 M/CU MM (ref 4.2–5.4)
SEGMENTED NEUTROPHILS ABSOLUTE COUNT: 8.3 K/CU MM
SEGMENTED NEUTROPHILS RELATIVE PERCENT: 78.6 % (ref 36–66)
SODIUM BLD-SCNC: 133 MMOL/L (ref 135–145)
TOTAL IMMATURE NEUTOROPHIL: 0.04 K/CU MM
TOTAL NUCLEATED RBC: 0 K/CU MM
WBC # BLD: 10.5 K/CU MM (ref 4–10.5)

## 2022-12-03 PROCEDURE — 6370000000 HC RX 637 (ALT 250 FOR IP): Performed by: NURSE PRACTITIONER

## 2022-12-03 PROCEDURE — 99231 SBSQ HOSP IP/OBS SF/LOW 25: CPT | Performed by: INTERNAL MEDICINE

## 2022-12-03 PROCEDURE — 6370000000 HC RX 637 (ALT 250 FOR IP): Performed by: INTERNAL MEDICINE

## 2022-12-03 PROCEDURE — 86140 C-REACTIVE PROTEIN: CPT

## 2022-12-03 PROCEDURE — 6360000002 HC RX W HCPCS: Performed by: NURSE PRACTITIONER

## 2022-12-03 PROCEDURE — C9254 INJECTION, LACOSAMIDE: HCPCS | Performed by: INTERNAL MEDICINE

## 2022-12-03 PROCEDURE — C9113 INJ PANTOPRAZOLE SODIUM, VIA: HCPCS | Performed by: INTERNAL MEDICINE

## 2022-12-03 PROCEDURE — 2700000000 HC OXYGEN THERAPY PER DAY

## 2022-12-03 PROCEDURE — 89220 SPUTUM SPECIMEN COLLECTION: CPT

## 2022-12-03 PROCEDURE — 2140000000 HC CCU INTERMEDIATE R&B

## 2022-12-03 PROCEDURE — 80048 BASIC METABOLIC PNL TOTAL CA: CPT

## 2022-12-03 PROCEDURE — 82962 GLUCOSE BLOOD TEST: CPT

## 2022-12-03 PROCEDURE — 2580000003 HC RX 258: Performed by: INTERNAL MEDICINE

## 2022-12-03 PROCEDURE — 6360000002 HC RX W HCPCS: Performed by: INTERNAL MEDICINE

## 2022-12-03 PROCEDURE — 94761 N-INVAS EAR/PLS OXIMETRY MLT: CPT

## 2022-12-03 PROCEDURE — 2580000003 HC RX 258: Performed by: STUDENT IN AN ORGANIZED HEALTH CARE EDUCATION/TRAINING PROGRAM

## 2022-12-03 PROCEDURE — 6360000002 HC RX W HCPCS: Performed by: HOSPITALIST

## 2022-12-03 PROCEDURE — 85025 COMPLETE CBC W/AUTO DIFF WBC: CPT

## 2022-12-03 PROCEDURE — 6360000002 HC RX W HCPCS: Performed by: STUDENT IN AN ORGANIZED HEALTH CARE EDUCATION/TRAINING PROGRAM

## 2022-12-03 PROCEDURE — 36591 DRAW BLOOD OFF VENOUS DEVICE: CPT

## 2022-12-03 RX ORDER — POTASSIUM CHLORIDE 29.8 MG/ML
20 INJECTION INTRAVENOUS 2 TIMES DAILY
Status: COMPLETED | OUTPATIENT
Start: 2022-12-03 | End: 2022-12-03

## 2022-12-03 RX ORDER — LORAZEPAM 2 MG/ML
1 INJECTION INTRAMUSCULAR EVERY 6 HOURS PRN
Status: DISCONTINUED | OUTPATIENT
Start: 2022-12-03 | End: 2022-12-08 | Stop reason: HOSPADM

## 2022-12-03 RX ADMIN — MORPHINE SULFATE 2 MG: 2 INJECTION, SOLUTION INTRAMUSCULAR; INTRAVENOUS at 11:01

## 2022-12-03 RX ADMIN — POTASSIUM CHLORIDE 20 MEQ: 29.8 INJECTION, SOLUTION INTRAVENOUS at 22:13

## 2022-12-03 RX ADMIN — FOLIC ACID 1 MG: 1 TABLET ORAL at 11:08

## 2022-12-03 RX ADMIN — MORPHINE SULFATE 2 MG: 2 INJECTION, SOLUTION INTRAMUSCULAR; INTRAVENOUS at 23:42

## 2022-12-03 RX ADMIN — SODIUM CHLORIDE, PRESERVATIVE FREE 40 MG: 5 INJECTION INTRAVENOUS at 11:09

## 2022-12-03 RX ADMIN — SODIUM CHLORIDE 200 MG: 9 INJECTION, SOLUTION INTRAVENOUS at 21:10

## 2022-12-03 RX ADMIN — INSULIN LISPRO 2 UNITS: 100 INJECTION, SOLUTION INTRAVENOUS; SUBCUTANEOUS at 10:57

## 2022-12-03 RX ADMIN — SODIUM CHLORIDE 200 MG: 9 INJECTION, SOLUTION INTRAVENOUS at 10:51

## 2022-12-03 RX ADMIN — SULFAMETHOXAZOLE AND TRIMETHOPRIM 1 TABLET: 800; 160 TABLET ORAL at 11:08

## 2022-12-03 RX ADMIN — DEXTROSE MONOHYDRATE: 50 INJECTION, SOLUTION INTRAVENOUS at 13:22

## 2022-12-03 RX ADMIN — LORAZEPAM 1 MG: 2 INJECTION INTRAMUSCULAR at 11:01

## 2022-12-03 RX ADMIN — MORPHINE SULFATE 2 MG: 2 INJECTION, SOLUTION INTRAMUSCULAR; INTRAVENOUS at 17:35

## 2022-12-03 RX ADMIN — Medication 1 CAPSULE: at 11:08

## 2022-12-03 RX ADMIN — INSULIN GLARGINE 20 UNITS: 100 INJECTION, SOLUTION SUBCUTANEOUS at 11:00

## 2022-12-03 RX ADMIN — ONDANSETRON 4 MG: 2 INJECTION INTRAMUSCULAR; INTRAVENOUS at 11:02

## 2022-12-03 RX ADMIN — SODIUM CHLORIDE, PRESERVATIVE FREE 10 ML: 5 INJECTION INTRAVENOUS at 17:35

## 2022-12-03 RX ADMIN — LOSARTAN POTASSIUM 50 MG: 25 TABLET, FILM COATED ORAL at 11:08

## 2022-12-03 RX ADMIN — POTASSIUM CHLORIDE 20 MEQ: 29.8 INJECTION, SOLUTION INTRAVENOUS at 11:55

## 2022-12-03 RX ADMIN — SODIUM CHLORIDE, PRESERVATIVE FREE 10 ML: 5 INJECTION INTRAVENOUS at 20:31

## 2022-12-03 RX ADMIN — LORAZEPAM 1 MG: 2 INJECTION INTRAMUSCULAR at 17:35

## 2022-12-03 RX ADMIN — HYDROMORPHONE HYDROCHLORIDE 0.5 MG: 1 INJECTION, SOLUTION INTRAMUSCULAR; INTRAVENOUS; SUBCUTANEOUS at 20:28

## 2022-12-03 RX ADMIN — ONDANSETRON 4 MG: 2 INJECTION INTRAMUSCULAR; INTRAVENOUS at 17:35

## 2022-12-03 RX ADMIN — Medication 100 MG: at 11:08

## 2022-12-03 RX ADMIN — ONDANSETRON 4 MG: 2 INJECTION INTRAMUSCULAR; INTRAVENOUS at 23:43

## 2022-12-03 RX ADMIN — SULFAMETHOXAZOLE AND TRIMETHOPRIM 1 TABLET: 800; 160 TABLET ORAL at 20:59

## 2022-12-03 RX ADMIN — LORAZEPAM 1 MG: 2 INJECTION INTRAMUSCULAR at 23:38

## 2022-12-03 ASSESSMENT — ENCOUNTER SYMPTOMS
COLOR CHANGE: 1
SHORTNESS OF BREATH: 1
EYES NEGATIVE: 1
COUGH: 1
VOMITING: 1
NAUSEA: 1
ALLERGIC/IMMUNOLOGIC NEGATIVE: 1
WHEEZING: 1

## 2022-12-03 ASSESSMENT — PAIN SCALES - GENERAL
PAINLEVEL_OUTOF10: 0
PAINLEVEL_OUTOF10: 1
PAINLEVEL_OUTOF10: 0

## 2022-12-03 ASSESSMENT — PAIN SCALES - WONG BAKER
WONGBAKER_NUMERICALRESPONSE: 2
WONGBAKER_NUMERICALRESPONSE: 0
WONGBAKER_NUMERICALRESPONSE: 0

## 2022-12-03 NOTE — PROGRESS NOTES
In-depth discussions with the daughter and then finally with her  regarding patient's poor prognosis. Family is leaning towards hospice. Patient's family would want her home with hospice. They are also amenable to changing her CODE STATUS to DNR CC. We will oblige.

## 2022-12-03 NOTE — PROGRESS NOTES
Spoke w nurse - inside MRI foot coil is broken - we will need to do pt foot MRI outside in our mobile unit - if  deems it safe to take pt outside to scanner w other medical conditions ongoing. Nurse will let me know.  (Service ticket has been turned in on coil - we expect an  on Monday)

## 2022-12-03 NOTE — PROGRESS NOTES
[facility]OG[facility]ATOLOGY [facility] ([place])  PROGRESS NOTE      12/3/2022  1:46 PM    Patient:    Maria Elena Rosa  : 1955   74 y.o. MRN: [id number]  Admitted: 2022 11:31 PM ATT: Juliet Fine MD   [id number]  AdmitDx: Hypokalemia [E87.6]  Cellulitis of toe of left foot [L03.032]  Gastrointestinal hemorrhage, unspecified gastrointestinal hemorrhage type [K92.2]  Acute on chronic anemia [D64.9]  PCP: Valentin Spears MD      Patient was seen and examined today  Agitated at times  No fever    PHYSICAL EXAM :    Vitals: /62   Pulse 80   Temp 98.3 °F (36.8 °C) (Axillary)   Resp 21   Ht 5' 6\" (1.676 m)   SpO2 100%   BMI 20.66 kg/m²     CONSTITUTIONAL: awake, non verbal  EYES: Pallor, EOM intact  ENT: Trach  LUNGS: no increased work of breathing, diminished bases  CARDIOVASCULAR: regular rate and rhythm, normal S1 and S2, no murmur noted  ABDOMEN: normal bowel sounds x 4, soft, non-distended, non-tender +Gtube  MUSCULOSKELETAL: left foot dressing in place  NEUROLOGIC: awake, alert  SKIN: Normal skin color, texture, turgor and no jaundice  EXTREMITIES: no LE edema    LABORATORY RESULTS  CBC:   Recent Labs     22  0410 22  0600   WBC 17.6* 10.5   HGB 9.2* 8.7*    304     BMP:    Recent Labs     22  0410 22  0600    133*   K 3.9 3.3*    101   CO2 23 21   BUN 13 10   CREATININE 0.7 0.6   GLUCOSE 102* 181*     Hepatic: No results for input(s): AST, ALT, ALB, BILITOT, ALKPHOS in the last 72 hours. INR: No results for input(s): INR in the last 72 hours. RADIOLOGY REPORTS  Reviewed    ASSESSMENT & RECOMMENDATIONS:  Metastatic lung cancer-stage BRENDA(cTx, Cnx, pM1b), follows with Dr. Brendan Moody at [facility]. Has had recent MRI brain with ? Progression. Has not been on any systemic treatment recently. Discussed poor prognosis again and very limited treatment options and looks like Admitting team already discussed regarding best supportive care/hospice care. Recommend hospice consult placement. Also change CODE STATUS to DNR CC. Discussed with her  and also niece     Anemia-hemoglobin 8.7     Hx of osteomyelitis- s/p partial hallux amputation. Adequate pain control. Continue other medical care. This plan was discussed with the patient's  and niece and they verbalized understanding    We will continue to follow the patient, while inpatient. Thank you for allowing us to participate in the care of this patient.      Elvin Daniel MD, 12/3/2022, 1:46 PM

## 2022-12-03 NOTE — PROGRESS NOTES
V2.0  [facility] [place] Hospitalist Progress Note      Name:  Genevieve Hernandez /Age/Sex: 1955  (74 y.o. female)   MRN & CSN:  [id number] & [id number] Encounter Date/Time: 12/3/2022 12:50 PM EST    Location:  [facility]- PCP: Kanwal Yañez MD       Hospital Day: 8    Assessment and Plan:   Genevieve Hernandez is a 74 y.o. female with pmh of foot toe amputation history, history of metastatic lung cancer s/p left lobectomy, right frontal orbital craniotomy on 2022, pathology consistent with poorly differentiated metastatic adenocarcinoma status post, knife radiosurgery, s/p trach/PEG tube, seizure disorder who presents with Acute on chronic anemia    Plan:  Acute on chronic anemia likely GI bleed: Hemoglobin 9.2 on admission, previously 14.2 in September, FOBT positive. GI on board. Holding off on scope because of underlying metastatic lung disease history. EGD in 2020 showed hiatal hernia and gastritis. Continue to monitor today.h/h stable  Acute metabolic encephalopathy--pt alert but not tracking, confused, not following commands, will order head CT, h/o metastatic lung ca to brain, oncology was consulted to discuss goals of care on 5 mg. Family wants to do everything. Family want to see an outpatient [facility] oncology that the patient used to see before after discharge. Oncology called and decided to order imagings for further assessment. But apparently ultimate management of metastatic cancer to the outpatient oncology team.  pt would need left femoral endarterectomy and likely amputation to treat LLE osteo  N/v and acute resp failure--pt with 4 episodes of emesis yesterday of TF, required frequent suctioning of TF from lungs, placed on 5L trach mask, check CXR, AXR, wnl, holding TF until n/v resolves, transferred to step down,  reports has n/v with IV morphine, will switch to Oxycodone through PEG, ST consulted for dysphagia, MBS ordered.   Speech therapy team believes patient is having microaspiration's recommends n.p.o. with therefore tube feedings. On IV fluids  Left DFI with Residual OM and Dehiscence Secondary to PAD: Stenotrophomonas maltophilia in Respiratory Culture:   Afebrile, no leukocytosis  Pct 0.089, CRP 13.4  11/27-BC 0/2-NGTD  11/28-Resp culture: Stenotrophomonas maltophilia (resistant to levofloxacin)  Past left foot cultures 8/8/2022-Serratia marcescens, Staphylococcus epidermidis   11/27-Left Foot Culture: Serratia marcescens  11/27-XR Foot Left: 1. Status post amputation of the 1st proximal phalanx. No suspicious osseous   lesion. 2. Soft tissue ulceration at the surgical stump. 3. Bony demineralization. Continue per PEG tube- Bactrim DS bid x 6 weeks (end date 1/11/2023)  DC - Levaquin as Stenotrophomonas is resistant  Trend CRP- ordered    Podiatry consult on board. History of osteomyelitis status post partial hallux amputation with excisional debridement in October 2022. X-ray of the left foot showed no suspicion of osseous lesions. Podiatry on board. Infectious disease on board. Osteomyelitis concerning. . D/w podiatry. If full code and wants everything done, would need left femoral endarterectomy with CTS. Pt saw Dr. Martin Jenkins in 9/22 and that was the plan, followed by left foot MRI and likely amputation. For Vascular surgery eval, pt would need to be transferred as no CTS at [facility], d/w Dr. Martin Jenkins and if the family decides on aggressive treatment of PAD, would need to be transferred, hospitalist would need to call Dr. Martin Jenkins at [phone] to facilitate tx [place] [address], Family wishes due to do everything. Recommend CTS for Femoral endarterectomy after more discussion with the family tomorrow. History of metastatic lung cancer s/p left lobectomy no chemo or radiation, met to right frontal lob s/p  right frontal craniotomy on 6/20/2022: Pathology results are consistent with poorly differentiated metastatic adenocarcinoma.   Patient subsequently had gamma knife radiosurgery. Guarded prognosis. Consult onc and neurosurg to help with goals of care. Pt alert but confused. Repeat head CT. Oncologist [place] Dr. [place] in [address] [address]. Seizure disorder secondary to brain metastasis on Vimpat 200 mg twice daily  Severe oropharyngeal dysphagia s/p trach and PEG tube since June 2022--pulm consult,  requesting eval for trach and PEG removal, consulted speech therapy, ordered MBS  History of status epilepticus in September 2022  Peripheral vascular disease on aspirin and statin  Benign essential hypertension on Coreg 25 mg twice daily and losartan 50 mg daily  Hyperlipidemia on statin  Non-insulin-dependent diabetes mellitus type 2 on sliding scale insulin  Hiatal hernia lansoprazole  Severe PCM--nutrition following, on TF through PEG  Goals of care-- wants pt to be full code, expressed reservation about transferring pt to [place] for Dr. Arti Hart vascular surgery eval, pt saw Dr. Arti Hart 9/22 and rec left femoral endarterectomy,  wants to talk with onc, wants trach and peg removed, MBS ordered, head CT ordered, possibly pt could be discharged to SNF and see her oncologist and Dr. Arti Hart as outpatient? Diet Diet NPO Exceptions are: Ice Chips  ADULT TUBE FEEDING; PEG; Diabetic; Continuous; 20; Yes; 10; Q 4 hours; 45; 150; Q 4 hours   DVT Prophylaxis [] Lovenox, []  Heparin, [] SCDs, [] Ambulation,  [] Eliquis, [] Xarelto  [] Coumadin   Code Status Full Code   Disposition From: [facility]  Expected Disposition: Patient requesting different rehab  Estimated Date of Discharge: f/u onc, neurosurg recs for goals of care, if cannot be discharged to SNF, would need transfer for CTS eval for PAD and left femoral endarterectomy    Surrogate Decision Maker/ POA      Subjective:     Chief Complaint: No chief complaint on file. Patient examined at bedside. Patient continues to be in a pitiable state. .  She is noninteractive but encephalopathy.   Discussed with staff.  Continue with IV Ativan as needed for agitation. I have been not discussed her prognosis with the  as he is not available but looks like from reading the notes that he is totally unrealistic. Family wishes due to do everything. Recommend CTS for Femoral endarterectomy after more discussion with the family tomorrow. Started D5. Family at bedside discussed care. Review of Systems:    Review of Systems   Constitutional:  Positive for activity change and fatigue. HENT: Negative. Eyes: Negative. Respiratory:  Positive for cough, shortness of breath and wheezing. Cardiovascular: Negative. Gastrointestinal:  Positive for nausea and vomiting. Endocrine: Negative. Genitourinary: Negative. Musculoskeletal: Negative. Skin:  Positive for color change. Allergic/Immunologic: Negative. Neurological:  Positive for speech difficulty. Hematological: Negative. Psychiatric/Behavioral:  Positive for agitation and confusion. ROS as above    Objective: Intake/Output Summary (Last 24 hours) at 12/3/2022 0901  Last data filed at 12/2/2022 1456  Gross per 24 hour   Intake 1032.86 ml   Output --   Net 1032.86 ml          Vitals:   Vitals:    12/03/22 0055   BP: 123/77   Pulse: 81   Resp: 20   Temp: 96.9 °F (36.1 °C)   SpO2: 100%       Physical Exam:   Physical Exam  Vitals reviewed. Constitutional:       General: She is in acute distress. Appearance: She is ill-appearing. HENT:      Head: Normocephalic. Nose: Nose normal.      Mouth/Throat:      Mouth: Mucous membranes are moist.   Eyes:      Conjunctiva/sclera: Conjunctivae normal.      Pupils: Pupils are equal, round, and reactive to light. Cardiovascular:      Rate and Rhythm: Normal rate and regular rhythm. Pulses: Normal pulses. Heart sounds: Normal heart sounds. No murmur heard. Pulmonary:      Effort: Respiratory distress present. Breath sounds: Rhonchi and rales present. No wheezing. Abdominal:      General: Abdomen is flat. Bowel sounds are normal. There is no distension. Palpations: Abdomen is soft. Tenderness: There is no abdominal tenderness. Comments: PEG in place   Musculoskeletal:         General: Tenderness, deformity and signs of injury present. Normal range of motion. Cervical back: Normal range of motion and neck supple. Right lower leg: No edema. Left lower leg: Edema present. Comments: Left toe wound   Skin:     Coloration: Skin is not jaundiced or pale. Neurological:      Mental Status: She is alert. She is disoriented. Motor: Weakness present.    Psychiatric:      Comments: Confused, agitated          Medications:   Medications:    sulfamethoxazole-trimethoprim  1 tablet PEG Tube 2 times per day    sodium chloride flush  5-40 mL IntraVENous 2 times per day    pantoprazole (PROTONIX) 40 mg injection  40 mg IntraVENous Daily    carvedilol  25 mg Per G Tube BID WC    folic acid  1 mg Per G Tube Daily    insulin glargine  20 Units SubCUTAneous Daily with breakfast    lactobacillus  1 capsule Per G Tube Daily with breakfast    losartan  50 mg Per G Tube Daily    scopolamine  1 patch TransDERmal Q72H    thiamine  100 mg Per G Tube Daily    insulin lispro  0-8 Units SubCUTAneous TID WC    insulin lispro  0-4 Units SubCUTAneous Nightly    lacosamide (VIMPAT) IVPB  200 mg IntraVENous BID      Infusions:    dextrose 50 mL/hr at 12/02/22 1456    sodium chloride 25 mL (11/28/22 1543)    dextrose       PRN Meds: haloperidol lactate, 1 mg, Q6H PRN  morphine, 2 mg, Q4H PRN  sodium chloride flush, 5-40 mL, PRN  sodium chloride, , PRN  ondansetron, 4 mg, Q8H PRN   Or  ondansetron, 4 mg, Q6H PRN  acetaminophen, 650 mg, Q6H PRN   Or  acetaminophen, 650 mg, Q6H PRN  oxyCODONE, 5 mg, Q4H PRN  glucose, 4 tablet, PRN  dextrose bolus, 125 mL, PRN   Or  dextrose bolus, 250 mL, PRN  glucagon (rDNA), 1 mg, PRN  dextrose, , Continuous PRN      Labs      Recent Results (from the past 24 hour(s))   POCT Glucose    Collection Time: 12/02/22 12:09 PM   Result Value Ref Range    POC Glucose 99 70 - 99 MG/DL   POCT Glucose    Collection Time: 12/02/22  4:06 PM   Result Value Ref Range    POC Glucose 127 (H) 70 - 99 MG/DL   POCT Glucose    Collection Time: 12/02/22  8:36 PM   Result Value Ref Range    POC Glucose 163 (H) 70 - 99 MG/DL   C-Reactive Protein    Collection Time: 12/03/22  6:00 AM   Result Value Ref Range    CRP High Sensitivity 17.2 (H) <5.0 mg/L   Basic Metabolic Panel    Collection Time: 12/03/22  6:00 AM   Result Value Ref Range    Sodium 133 (L) 135 - 145 MMOL/L    Potassium 3.3 (L) 3.5 - 5.1 MMOL/L    Chloride 101 99 - 110 mMol/L    CO2 21 21 - 32 MMOL/L    Anion Gap 11 4 - 16    BUN 10 6 - 23 MG/DL    Creatinine 0.6 0.6 - 1.1 MG/DL    Est, Glom Filt Rate >60 >60 mL/min/1.73m2    Glucose 181 (H) 70 - 99 MG/DL    Calcium 8.8 8.3 - 10.6 MG/DL   CBC with Auto Differential    Collection Time: 12/03/22  6:00 AM   Result Value Ref Range    WBC 10.5 4.0 - 10.5 K/CU MM    RBC 2.99 (L) 4.2 - 5.4 M/CU MM    Hemoglobin 8.7 (L) 12.5 - 16.0 GM/DL    Hematocrit 26.7 (L) 37 - 47 %    MCV 89.3 78 - 100 FL    MCH 29.1 27 - 31 PG    MCHC 32.6 32.0 - 36.0 %    RDW 14.2 11.7 - 14.9 %    Platelets 772 891 - 323 K/CU MM    MPV 9.4 7.5 - 11.1 FL    Differential Type AUTOMATED DIFFERENTIAL     Segs Relative 78.6 (H) 36 - 66 %    Lymphocytes % 13.4 (L) 24 - 44 %    Monocytes % 6.8 (H) 0 - 4 %    Eosinophils % 0.6 0 - 3 %    Basophils % 0.2 0 - 1 %    Segs Absolute 8.3 K/CU MM    Lymphocytes Absolute 1.4 K/CU MM    Monocytes Absolute 0.7 K/CU MM    Eosinophils Absolute 0.1 K/CU MM    Basophils Absolute 0.0 K/CU MM    Nucleated RBC % 0.0 %    Total Nucleated RBC 0.0 K/CU MM    Total Immature Neutrophil 0.04 K/CU MM    Immature Neutrophil % 0.4 0 - 0.43 %        Imaging/Diagnostics Last 24 Hours   No results found.     Electronically signed by Hubert Bedolla MD on 12/3/2022 at 9:01 AM

## 2022-12-04 LAB
ANION GAP SERPL CALCULATED.3IONS-SCNC: 10 MMOL/L (ref 4–16)
BASOPHILS ABSOLUTE: 0 K/CU MM
BASOPHILS RELATIVE PERCENT: 0.3 % (ref 0–1)
BUN BLDV-MCNC: 8 MG/DL (ref 6–23)
CALCIUM SERPL-MCNC: 8.9 MG/DL (ref 8.3–10.6)
CHLORIDE BLD-SCNC: 105 MMOL/L (ref 99–110)
CO2: 23 MMOL/L (ref 21–32)
CREAT SERPL-MCNC: 0.6 MG/DL (ref 0.6–1.1)
DIFFERENTIAL TYPE: ABNORMAL
EOSINOPHILS ABSOLUTE: 0.1 K/CU MM
EOSINOPHILS RELATIVE PERCENT: 0.9 % (ref 0–3)
GFR SERPL CREATININE-BSD FRML MDRD: >60 ML/MIN/1.73M2
GLUCOSE BLD-MCNC: 163 MG/DL (ref 70–99)
GLUCOSE BLD-MCNC: 165 MG/DL (ref 70–99)
GLUCOSE BLD-MCNC: 182 MG/DL (ref 70–99)
GLUCOSE BLD-MCNC: 206 MG/DL (ref 70–99)
GLUCOSE BLD-MCNC: 206 MG/DL (ref 70–99)
HCT VFR BLD CALC: 28 % (ref 37–47)
HEMOGLOBIN: 8.8 GM/DL (ref 12.5–16)
IMMATURE NEUTROPHIL %: 0.2 % (ref 0–0.43)
LYMPHOCYTES ABSOLUTE: 1.7 K/CU MM
LYMPHOCYTES RELATIVE PERCENT: 15.2 % (ref 24–44)
MCH RBC QN AUTO: 28.9 PG (ref 27–31)
MCHC RBC AUTO-ENTMCNC: 31.4 % (ref 32–36)
MCV RBC AUTO: 92.1 FL (ref 78–100)
MONOCYTES ABSOLUTE: 1 K/CU MM
MONOCYTES RELATIVE PERCENT: 9.3 % (ref 0–4)
NUCLEATED RBC %: 0 %
PDW BLD-RTO: 14.2 % (ref 11.7–14.9)
PLATELET # BLD: 320 K/CU MM (ref 140–440)
PMV BLD AUTO: 9.5 FL (ref 7.5–11.1)
POTASSIUM SERPL-SCNC: 3.9 MMOL/L (ref 3.5–5.1)
RBC # BLD: 3.04 M/CU MM (ref 4.2–5.4)
SEGMENTED NEUTROPHILS ABSOLUTE COUNT: 8.2 K/CU MM
SEGMENTED NEUTROPHILS RELATIVE PERCENT: 74.1 % (ref 36–66)
SODIUM BLD-SCNC: 138 MMOL/L (ref 135–145)
TOTAL IMMATURE NEUTOROPHIL: 0.02 K/CU MM
TOTAL NUCLEATED RBC: 0 K/CU MM
WBC # BLD: 11 K/CU MM (ref 4–10.5)

## 2022-12-04 PROCEDURE — 2140000000 HC CCU INTERMEDIATE R&B

## 2022-12-04 PROCEDURE — 85025 COMPLETE CBC W/AUTO DIFF WBC: CPT

## 2022-12-04 PROCEDURE — 2580000003 HC RX 258: Performed by: STUDENT IN AN ORGANIZED HEALTH CARE EDUCATION/TRAINING PROGRAM

## 2022-12-04 PROCEDURE — 6370000000 HC RX 637 (ALT 250 FOR IP): Performed by: NURSE PRACTITIONER

## 2022-12-04 PROCEDURE — 2700000000 HC OXYGEN THERAPY PER DAY

## 2022-12-04 PROCEDURE — 6360000002 HC RX W HCPCS: Performed by: STUDENT IN AN ORGANIZED HEALTH CARE EDUCATION/TRAINING PROGRAM

## 2022-12-04 PROCEDURE — 80048 BASIC METABOLIC PNL TOTAL CA: CPT

## 2022-12-04 PROCEDURE — C9254 INJECTION, LACOSAMIDE: HCPCS | Performed by: INTERNAL MEDICINE

## 2022-12-04 PROCEDURE — 2580000003 HC RX 258: Performed by: INTERNAL MEDICINE

## 2022-12-04 PROCEDURE — 94761 N-INVAS EAR/PLS OXIMETRY MLT: CPT

## 2022-12-04 PROCEDURE — 6360000002 HC RX W HCPCS: Performed by: NURSE PRACTITIONER

## 2022-12-04 PROCEDURE — 89220 SPUTUM SPECIMEN COLLECTION: CPT

## 2022-12-04 PROCEDURE — C9113 INJ PANTOPRAZOLE SODIUM, VIA: HCPCS | Performed by: INTERNAL MEDICINE

## 2022-12-04 PROCEDURE — 6360000002 HC RX W HCPCS: Performed by: HOSPITALIST

## 2022-12-04 PROCEDURE — 82962 GLUCOSE BLOOD TEST: CPT

## 2022-12-04 PROCEDURE — 36591 DRAW BLOOD OFF VENOUS DEVICE: CPT

## 2022-12-04 PROCEDURE — 6360000002 HC RX W HCPCS: Performed by: INTERNAL MEDICINE

## 2022-12-04 PROCEDURE — 6370000000 HC RX 637 (ALT 250 FOR IP): Performed by: INTERNAL MEDICINE

## 2022-12-04 RX ADMIN — ONDANSETRON 4 MG: 2 INJECTION INTRAMUSCULAR; INTRAVENOUS at 15:46

## 2022-12-04 RX ADMIN — MORPHINE SULFATE 2 MG: 2 INJECTION, SOLUTION INTRAMUSCULAR; INTRAVENOUS at 21:36

## 2022-12-04 RX ADMIN — ONDANSETRON 4 MG: 2 INJECTION INTRAMUSCULAR; INTRAVENOUS at 08:20

## 2022-12-04 RX ADMIN — CARVEDILOL 25 MG: 25 TABLET, FILM COATED ORAL at 08:21

## 2022-12-04 RX ADMIN — Medication 1 CAPSULE: at 08:21

## 2022-12-04 RX ADMIN — LORAZEPAM 1 MG: 2 INJECTION INTRAMUSCULAR at 15:46

## 2022-12-04 RX ADMIN — INSULIN LISPRO 2 UNITS: 100 INJECTION, SOLUTION INTRAVENOUS; SUBCUTANEOUS at 08:23

## 2022-12-04 RX ADMIN — LOSARTAN POTASSIUM 50 MG: 25 TABLET, FILM COATED ORAL at 08:21

## 2022-12-04 RX ADMIN — SODIUM CHLORIDE 200 MG: 9 INJECTION, SOLUTION INTRAVENOUS at 21:29

## 2022-12-04 RX ADMIN — INSULIN GLARGINE 20 UNITS: 100 INJECTION, SOLUTION SUBCUTANEOUS at 10:21

## 2022-12-04 RX ADMIN — OXYCODONE HYDROCHLORIDE 5 MG: 5 TABLET ORAL at 15:45

## 2022-12-04 RX ADMIN — ONDANSETRON 4 MG: 2 INJECTION INTRAMUSCULAR; INTRAVENOUS at 21:36

## 2022-12-04 RX ADMIN — HYDROMORPHONE HYDROCHLORIDE 0.5 MG: 1 INJECTION, SOLUTION INTRAMUSCULAR; INTRAVENOUS; SUBCUTANEOUS at 04:45

## 2022-12-04 RX ADMIN — Medication 100 MG: at 08:21

## 2022-12-04 RX ADMIN — SODIUM CHLORIDE, PRESERVATIVE FREE 40 MG: 5 INJECTION INTRAVENOUS at 08:20

## 2022-12-04 RX ADMIN — SODIUM CHLORIDE, PRESERVATIVE FREE 10 ML: 5 INJECTION INTRAVENOUS at 21:39

## 2022-12-04 RX ADMIN — MORPHINE SULFATE 2 MG: 2 INJECTION, SOLUTION INTRAMUSCULAR; INTRAVENOUS at 15:46

## 2022-12-04 RX ADMIN — DEXTROSE MONOHYDRATE: 50 INJECTION, SOLUTION INTRAVENOUS at 15:52

## 2022-12-04 RX ADMIN — FOLIC ACID 1 MG: 1 TABLET ORAL at 08:21

## 2022-12-04 RX ADMIN — OXYCODONE HYDROCHLORIDE 5 MG: 5 TABLET ORAL at 08:21

## 2022-12-04 RX ADMIN — LORAZEPAM 1 MG: 2 INJECTION INTRAMUSCULAR at 08:20

## 2022-12-04 RX ADMIN — SULFAMETHOXAZOLE AND TRIMETHOPRIM 1 TABLET: 800; 160 TABLET ORAL at 08:21

## 2022-12-04 RX ADMIN — SODIUM CHLORIDE 200 MG: 9 INJECTION, SOLUTION INTRAVENOUS at 10:21

## 2022-12-04 RX ADMIN — SODIUM CHLORIDE, PRESERVATIVE FREE 10 ML: 5 INJECTION INTRAVENOUS at 08:22

## 2022-12-04 RX ADMIN — SULFAMETHOXAZOLE AND TRIMETHOPRIM 1 TABLET: 800; 160 TABLET ORAL at 22:06

## 2022-12-04 RX ADMIN — MORPHINE SULFATE 2 MG: 2 INJECTION, SOLUTION INTRAMUSCULAR; INTRAVENOUS at 08:20

## 2022-12-04 ASSESSMENT — PAIN SCALES - GENERAL
PAINLEVEL_OUTOF10: 7
PAINLEVEL_OUTOF10: 2

## 2022-12-04 ASSESSMENT — PAIN SCALES - WONG BAKER
WONGBAKER_NUMERICALRESPONSE: 2

## 2022-12-04 ASSESSMENT — ENCOUNTER SYMPTOMS
NAUSEA: 1
COUGH: 1
EYES NEGATIVE: 1
WHEEZING: 1
ALLERGIC/IMMUNOLOGIC NEGATIVE: 1
SHORTNESS OF BREATH: 1
VOMITING: 1
COLOR CHANGE: 1

## 2022-12-04 NOTE — PROGRESS NOTES
V2.0  [facility] [place] Progress Note      Name:  Jaren Champion /Age/Sex: 1955  (74 y.o. female)   MRN & CSN:  [id number] & [id number] Encounter Date/Time: 2022 12:50 PM EST    Location:  [address] [place] [address] PCP: Niko Tipton MD       Hospital Day: 9    Assessment and Plan:   Jaren Champion is a 74 y.o. female with pmh of foot toe amputation history, history of metastatic lung cancer s/p left lobectomy, right frontal orbital craniotomy on 2022, pathology consistent with poorly differentiated metastatic adenocarcinoma status post, knife radiosurgery, s/p trach/PEG tube, seizure disorder who presents with Acute on chronic anemia    Plan:  Acute on chronic anemia likely GI bleed: Hemoglobin 9.2 on admission, previously 14.2 in September, FOBT positive. GI on board. Holding off on scope because of underlying metastatic lung disease history. EGD in 2020 showed hiatal hernia and gastritis. Continue to monitor today.h/h stable  Acute metabolic encephalopathy--pt alert but not tracking, confused, not following commands, will order head CT, h/o metastatic lung ca to brain, oncology was consulted to discuss goals of care on 5 mg. Family wants to do everything. Family want to see an outpatient [facility] oncology that the patient used to see before after discharge. Oncology called and decided to order imagings for further assessment. But apparently ultimate management of metastatic cancer to the outpatient oncology team.  pt would need left femoral endarterectomy and likely amputation to treat LLE osteo  N/v and acute resp failure--pt with 4 episodes of emesis yesterday of TF, required frequent suctioning of TF from lungs, placed on 5L trach mask, check CXR, AXR, wnl, holding TF until n/v resolves, transferred to step down,  reports has n/v with IV morphine, will switch to Oxycodone through PEG, ST consulted for dysphagia, MBS ordered.   Speech therapy team believes patient is having microaspiration's recommends n.p.o. with therefore tube feedings. On IV fluids  Left DFI with Residual OM and Dehiscence Secondary to PAD: Stenotrophomonas maltophilia in Respiratory Culture:   Afebrile, no leukocytosis  Pct 0.089, CRP 13.4  11/27-BC 0/2-NGTD  11/28-Resp culture: Stenotrophomonas maltophilia (resistant to levofloxacin)  Past left foot cultures 8/8/2022-Serratia marcescens, Staphylococcus epidermidis   11/27-Left Foot Culture: Serratia marcescens  11/27-XR Foot Left: 1. Status post amputation of the 1st proximal phalanx. No suspicious osseous   lesion. 2. Soft tissue ulceration at the surgical stump. 3. Bony demineralization. Continue per PEG tube- Bactrim DS bid x 6 weeks (end date 1/11/2023)  DC - Levaquin as Stenotrophomonas is resistant  Trend CRP- ordered    Podiatry consult on board. History of osteomyelitis status post partial hallux amputation with excisional debridement in October 2022. X-ray of the left foot showed no suspicion of osseous lesions. Podiatry on board. Infectious disease on board. Osteomyelitis concerning. . D/w podiatry. If full code and wants everything done, would need left femoral endarterectomy with CTS. Pt saw Dr. Ferris Nyhan in 9/22 and that was the plan, followed by left foot MRI and likely amputation. For Vascular surgery eval, pt would need to be transferred as no CTS at [facility], d/w Dr. Ferris Nyhan and if the family decides on aggressive treatment of PAD, would need to be transferred, hospitalist would need to call Dr. Ferris Nyhan at [phone] to facilitate tx to [address], Family wishes due to do everything. Recommend CTS for Femoral endarterectomy after more discussion with the family tomorrow. History of metastatic lung cancer s/p left lobectomy no chemo or radiation, met to right frontal lob s/p  right frontal craniotomy on 6/20/2022: Pathology results are consistent with poorly differentiated metastatic adenocarcinoma.   Patient subsequently had gamma knife radiosurgery. Guarded prognosis. Consult onc and neurosurg to help with goals of care. Pt alert but confused. Repeat head CT. [place] [address] [address]. Seizure disorder secondary to brain metastasis on Vimpat 200 mg twice daily  Severe oropharyngeal dysphagia s/p trach and PEG tube since June 2022--pulm consult,  requesting eval for trach and PEG removal, consulted speech therapy, ordered MBS  History of status epilepticus in September 2022  Peripheral vascular disease on aspirin and statin  Benign essential hypertension on Coreg 25 mg twice daily and losartan 50 mg daily  Hyperlipidemia on statin  Non-insulin-dependent diabetes mellitus type 2 on sliding scale insulin  Hiatal hernia lansoprazole  Severe PCM--nutrition following, on TF through PEG  Goals of care-- wants pt to be full code, expressed reservation about transferring pt to [place] for Dr. Alva Choudhary vascular surgery eval, pt saw Dr. Alva Choudhary 9/22 and rec left femoral endarterectomy,  wants to talk with onc, wants trach and peg removed, MBS ordered, head CT ordered, possibly pt could be discharged to SNF and see her oncologist and Dr. Alva Choudhary as outpatient? Diet Diet NPO Exceptions are: Ice Chips  ADULT TUBE FEEDING; PEG; Diabetic; Continuous; 20; Yes; 10; Q 4 hours; 45; 150; Q 4 hours   DVT Prophylaxis [] Lovenox, []  Heparin, [] SCDs, [] Ambulation,  [] Eliquis, [] Xarelto  [] Coumadin   Code Status DNR-CC   Disposition From: [facility]  Expected Disposition: Patient requesting different rehab  Estimated Date of Discharge: f/u onc, neurosurg recs for goals of care, if cannot be discharged to SNF, would need transfer for CTS eval for PAD and left femoral endarterectomy    Surrogate Decision Maker/ POA      Subjective:     Chief Complaint: No chief complaint on file. Patient examined at bedside. Labs reviewed and are acceptable. Patient is comfortable. Sitter at the bedside who reports no issues. In-depth discussions with the family and the family is agreed for comfort care and hospice. Oncology in agreement as well. Await hospice consult. Ideally patient's family would want home with hospice. Review of Systems:    Review of Systems   Constitutional:  Positive for activity change and fatigue. HENT: Negative. Eyes: Negative. Respiratory:  Positive for cough, shortness of breath and wheezing. Cardiovascular: Negative. Gastrointestinal:  Positive for nausea and vomiting. Endocrine: Negative. Genitourinary: Negative. Musculoskeletal: Negative. Skin:  Positive for color change. Allergic/Immunologic: Negative. Neurological:  Positive for speech difficulty. Hematological: Negative. Psychiatric/Behavioral:  Positive for agitation and confusion. ROS as above    Objective: Intake/Output Summary (Last 24 hours) at 12/4/2022 0911  Last data filed at 12/4/2022 0826  Gross per 24 hour   Intake 3661.55 ml   Output --   Net 3661.55 ml          Vitals:   Vitals:    12/04/22 0816   BP: 137/73   Pulse: 79   Resp: 20   Temp: 98.2 °F (36.8 °C)   SpO2: 100%       Physical Exam:   Physical Exam  Vitals reviewed. Constitutional:       General: She is in acute distress. Appearance: She is ill-appearing. HENT:      Head: Normocephalic. Nose: Nose normal.      Mouth/Throat:      Mouth: Mucous membranes are moist.   Eyes:      Conjunctiva/sclera: Conjunctivae normal.      Pupils: Pupils are equal, round, and reactive to light. Cardiovascular:      Rate and Rhythm: Normal rate and regular rhythm. Pulses: Normal pulses. Heart sounds: Normal heart sounds. No murmur heard. Pulmonary:      Effort: Respiratory distress present. Breath sounds: Rhonchi and rales present. No wheezing. Abdominal:      General: Abdomen is flat. Bowel sounds are normal. There is no distension. Palpations: Abdomen is soft. Tenderness: There is no abdominal tenderness. Comments: PEG in place   Musculoskeletal:         General: Tenderness, deformity and signs of injury present. Normal range of motion. Cervical back: Normal range of motion and neck supple. Right lower leg: No edema. Left lower leg: Edema present. Comments: Left toe wound   Skin:     Coloration: Skin is not jaundiced or pale. Neurological:      Mental Status: She is alert. She is disoriented. Motor: Weakness present.    Psychiatric:      Comments: Confused, agitated          Medications:   Medications:    sulfamethoxazole-trimethoprim  1 tablet PEG Tube 2 times per day    sodium chloride flush  5-40 mL IntraVENous 2 times per day    pantoprazole (PROTONIX) 40 mg injection  40 mg IntraVENous Daily    carvedilol  25 mg Per G Tube BID WC    folic acid  1 mg Per G Tube Daily    insulin glargine  20 Units SubCUTAneous Daily with breakfast    lactobacillus  1 capsule Per G Tube Daily with breakfast    losartan  50 mg Per G Tube Daily    scopolamine  1 patch TransDERmal Q72H    thiamine  100 mg Per G Tube Daily    insulin lispro  0-8 Units SubCUTAneous TID WC    insulin lispro  0-4 Units SubCUTAneous Nightly    lacosamide (VIMPAT) IVPB  200 mg IntraVENous BID      Infusions:    dextrose 50 mL/hr at 12/03/22 1849    sodium chloride 25 mL (11/28/22 1543)    dextrose       PRN Meds: LORazepam, 1 mg, Q6H PRN  haloperidol lactate, 1 mg, Q6H PRN  morphine, 2 mg, Q4H PRN  sodium chloride flush, 5-40 mL, PRN  sodium chloride, , PRN  ondansetron, 4 mg, Q8H PRN   Or  ondansetron, 4 mg, Q6H PRN  acetaminophen, 650 mg, Q6H PRN   Or  acetaminophen, 650 mg, Q6H PRN  oxyCODONE, 5 mg, Q4H PRN  glucose, 4 tablet, PRN  dextrose bolus, 125 mL, PRN   Or  dextrose bolus, 250 mL, PRN  glucagon (rDNA), 1 mg, PRN  dextrose, , Continuous PRN      Labs      Recent Results (from the past 24 hour(s))   POCT Glucose    Collection Time: 12/03/22 12:08 PM   Result Value Ref Range    POC Glucose 102 (H) 70 - 99 MG/DL   POCT Glucose    Collection Time: 12/03/22  5:28 PM   Result Value Ref Range    POC Glucose 159 (H) 70 - 99 MG/DL   POCT Glucose    Collection Time: 12/03/22  8:01 PM   Result Value Ref Range    POC Glucose 176 (H) 70 - 99 MG/DL   Basic Metabolic Panel    Collection Time: 12/04/22  5:45 AM   Result Value Ref Range    Sodium 138 135 - 145 MMOL/L    Potassium 3.9 3.5 - 5.1 MMOL/L    Chloride 105 99 - 110 mMol/L    CO2 23 21 - 32 MMOL/L    Anion Gap 10 4 - 16    BUN 8 6 - 23 MG/DL    Creatinine 0.6 0.6 - 1.1 MG/DL    Est, Glom Filt Rate >60 >60 mL/min/1.73m2    Glucose 206 (H) 70 - 99 MG/DL    Calcium 8.9 8.3 - 10.6 MG/DL   CBC with Auto Differential    Collection Time: 12/04/22  5:45 AM   Result Value Ref Range    WBC 11.0 (H) 4.0 - 10.5 K/CU MM    RBC 3.04 (L) 4.2 - 5.4 M/CU MM    Hemoglobin 8.8 (L) 12.5 - 16.0 GM/DL    Hematocrit 28.0 (L) 37 - 47 %    MCV 92.1 78 - 100 FL    MCH 28.9 27 - 31 PG    MCHC 31.4 (L) 32.0 - 36.0 %    RDW 14.2 11.7 - 14.9 %    Platelets 436 285 - 101 K/CU MM    MPV 9.5 7.5 - 11.1 FL    Differential Type AUTOMATED DIFFERENTIAL     Segs Relative 74.1 (H) 36 - 66 %    Lymphocytes % 15.2 (L) 24 - 44 %    Monocytes % 9.3 (H) 0 - 4 %    Eosinophils % 0.9 0 - 3 %    Basophils % 0.3 0 - 1 %    Segs Absolute 8.2 K/CU MM    Lymphocytes Absolute 1.7 K/CU MM    Monocytes Absolute 1.0 K/CU MM    Eosinophils Absolute 0.1 K/CU MM    Basophils Absolute 0.0 K/CU MM    Nucleated RBC % 0.0 %    Total Nucleated RBC 0.0 K/CU MM    Total Immature Neutrophil 0.02 K/CU MM    Immature Neutrophil % 0.2 0 - 0.43 %   POCT Glucose    Collection Time: 12/04/22  6:36 AM   Result Value Ref Range    POC Glucose 206 (H) 70 - 99 MG/DL        Imaging/Diagnostics Last 24 Hours   No results found.     Electronically signed by Lupillo Scott MD on 12/4/2022 at 9:11 AM

## 2022-12-04 NOTE — PLAN OF CARE
Problem: Discharge Planning  Goal: Discharge to home or other facility with appropriate resources  Outcome: Progressing     Problem: Pain  Goal: Verbalizes/displays adequate comfort level or baseline comfort level  Outcome: Progressing     Problem: Skin/Tissue Integrity  Goal: Absence of new skin breakdown  Description: 1. Monitor for areas of redness and/or skin breakdown  2. Assess vascular access sites hourly  3. Every 4-6 hours minimum:  Change oxygen saturation probe site  4. Every 4-6 hours:  If on nasal continuous positive airway pressure, respiratory therapy assess nares and determine need for appliance change or resting period. Outcome: Progressing     Problem: Safety - Adult  Goal: Free from fall injury  Outcome: Progressing     Problem: Chronic Conditions and Co-morbidities  Goal: Patient's chronic conditions and co-morbidity symptoms are monitored and maintained or improved  Outcome: Progressing     Problem: Nutrition Deficit:  Goal: Optimize nutritional status  Outcome: Progressing     Problem: Dyspnea Due to End of Life  Goal: Demonstrate understanding of and ability to manage respiratory symptoms at end of life  Description: Patient  and or family/caregiver will verbalize recall of breathing strategies to maintain an effective breathing pattern during the inpatient hospice stay. Outcome: Progressing     Problem: Communication Deficit  Goal: Effectively communicate symptoms, needs, and concerns  Description: Patient  and/or family/caregiver will be able to communicate symptoms, needs, and concerns as evidenced by the use of language services during the inpatient hospice stay.     Outcome: Progressing

## 2022-12-04 NOTE — PROGRESS NOTES
RRT checked on pt, SaO2 99% on 30% CATC, RRT decreased pt to 28%, 10lpm  Pt tolerating well, RRT took off 30 cc of tubing.

## 2022-12-05 LAB
GLUCOSE BLD-MCNC: 146 MG/DL (ref 70–99)
GLUCOSE BLD-MCNC: 159 MG/DL (ref 70–99)
GLUCOSE BLD-MCNC: 162 MG/DL (ref 70–99)
GLUCOSE BLD-MCNC: 192 MG/DL (ref 70–99)

## 2022-12-05 PROCEDURE — 6360000002 HC RX W HCPCS: Performed by: INTERNAL MEDICINE

## 2022-12-05 PROCEDURE — 2580000003 HC RX 258: Performed by: INTERNAL MEDICINE

## 2022-12-05 PROCEDURE — 6360000002 HC RX W HCPCS: Performed by: HOSPITALIST

## 2022-12-05 PROCEDURE — 6370000000 HC RX 637 (ALT 250 FOR IP): Performed by: NURSE PRACTITIONER

## 2022-12-05 PROCEDURE — 99231 SBSQ HOSP IP/OBS SF/LOW 25: CPT | Performed by: NURSE PRACTITIONER

## 2022-12-05 PROCEDURE — 82962 GLUCOSE BLOOD TEST: CPT

## 2022-12-05 PROCEDURE — C9254 INJECTION, LACOSAMIDE: HCPCS | Performed by: INTERNAL MEDICINE

## 2022-12-05 PROCEDURE — 94761 N-INVAS EAR/PLS OXIMETRY MLT: CPT

## 2022-12-05 PROCEDURE — 89220 SPUTUM SPECIMEN COLLECTION: CPT

## 2022-12-05 PROCEDURE — A4216 STERILE WATER/SALINE, 10 ML: HCPCS | Performed by: INTERNAL MEDICINE

## 2022-12-05 PROCEDURE — 6370000000 HC RX 637 (ALT 250 FOR IP): Performed by: INTERNAL MEDICINE

## 2022-12-05 PROCEDURE — 2140000000 HC CCU INTERMEDIATE R&B

## 2022-12-05 PROCEDURE — 2700000000 HC OXYGEN THERAPY PER DAY

## 2022-12-05 PROCEDURE — C9113 INJ PANTOPRAZOLE SODIUM, VIA: HCPCS | Performed by: INTERNAL MEDICINE

## 2022-12-05 PROCEDURE — 99231 SBSQ HOSP IP/OBS SF/LOW 25: CPT | Performed by: INTERNAL MEDICINE

## 2022-12-05 PROCEDURE — 6360000002 HC RX W HCPCS: Performed by: STUDENT IN AN ORGANIZED HEALTH CARE EDUCATION/TRAINING PROGRAM

## 2022-12-05 RX ORDER — CARVEDILOL 6.25 MG/1
12.5 TABLET ORAL 2 TIMES DAILY WITH MEALS
Status: DISCONTINUED | OUTPATIENT
Start: 2022-12-05 | End: 2022-12-08 | Stop reason: HOSPADM

## 2022-12-05 RX ADMIN — LORAZEPAM 1 MG: 2 INJECTION INTRAMUSCULAR at 15:42

## 2022-12-05 RX ADMIN — INSULIN GLARGINE 20 UNITS: 100 INJECTION, SOLUTION SUBCUTANEOUS at 11:03

## 2022-12-05 RX ADMIN — OXYCODONE HYDROCHLORIDE 5 MG: 5 TABLET ORAL at 21:05

## 2022-12-05 RX ADMIN — SULFAMETHOXAZOLE AND TRIMETHOPRIM 1 TABLET: 800; 160 TABLET ORAL at 21:05

## 2022-12-05 RX ADMIN — Medication 1 CAPSULE: at 09:17

## 2022-12-05 RX ADMIN — MORPHINE SULFATE 2 MG: 2 INJECTION, SOLUTION INTRAMUSCULAR; INTRAVENOUS at 15:43

## 2022-12-05 RX ADMIN — LOSARTAN POTASSIUM 50 MG: 25 TABLET, FILM COATED ORAL at 09:16

## 2022-12-05 RX ADMIN — CARVEDILOL 12.5 MG: 6.25 TABLET, FILM COATED ORAL at 16:23

## 2022-12-05 RX ADMIN — SULFAMETHOXAZOLE AND TRIMETHOPRIM 1 TABLET: 800; 160 TABLET ORAL at 09:16

## 2022-12-05 RX ADMIN — OXYCODONE HYDROCHLORIDE 5 MG: 5 TABLET ORAL at 09:16

## 2022-12-05 RX ADMIN — SODIUM CHLORIDE, PRESERVATIVE FREE 40 MG: 5 INJECTION INTRAVENOUS at 09:16

## 2022-12-05 RX ADMIN — SODIUM CHLORIDE, PRESERVATIVE FREE 10 ML: 5 INJECTION INTRAVENOUS at 09:18

## 2022-12-05 RX ADMIN — LORAZEPAM 1 MG: 2 INJECTION INTRAMUSCULAR at 09:05

## 2022-12-05 RX ADMIN — MORPHINE SULFATE 2 MG: 2 INJECTION, SOLUTION INTRAMUSCULAR; INTRAVENOUS at 09:05

## 2022-12-05 RX ADMIN — SODIUM CHLORIDE 200 MG: 9 INJECTION, SOLUTION INTRAVENOUS at 11:02

## 2022-12-05 RX ADMIN — FOLIC ACID 1 MG: 1 TABLET ORAL at 09:17

## 2022-12-05 RX ADMIN — OXYCODONE HYDROCHLORIDE 5 MG: 5 TABLET ORAL at 15:42

## 2022-12-05 RX ADMIN — SODIUM CHLORIDE, PRESERVATIVE FREE 10 ML: 5 INJECTION INTRAVENOUS at 21:06

## 2022-12-05 RX ADMIN — Medication 100 MG: at 09:16

## 2022-12-05 RX ADMIN — SODIUM CHLORIDE 200 MG: 9 INJECTION, SOLUTION INTRAVENOUS at 21:36

## 2022-12-05 RX ADMIN — MORPHINE SULFATE 2 MG: 2 INJECTION, SOLUTION INTRAMUSCULAR; INTRAVENOUS at 04:11

## 2022-12-05 ASSESSMENT — ENCOUNTER SYMPTOMS
WHEEZING: 1
ALLERGIC/IMMUNOLOGIC NEGATIVE: 1
NAUSEA: 1
COLOR CHANGE: 1
COUGH: 1
SHORTNESS OF BREATH: 1
VOMITING: 1
EYES NEGATIVE: 1

## 2022-12-05 ASSESSMENT — PAIN SCALES - WONG BAKER: WONGBAKER_NUMERICALRESPONSE: 2

## 2022-12-05 ASSESSMENT — PAIN SCALES - GENERAL
PAINLEVEL_OUTOF10: 6
PAINLEVEL_OUTOF10: 3
PAINLEVEL_OUTOF10: 0
PAINLEVEL_OUTOF10: 9

## 2022-12-05 NOTE — PROGRESS NOTES
V2.0  [facility] [place] Hospitalist Progress Note      Name:  Sonny Wheeler /Age/Sex: 1955  (74 y.o. female)   MRN & CSN:  [id number] & [id number] Encounter Date/Time: 2022 12:50 PM EST    Location:  1012/3014-U PCP: Merly Blandon MD       Hospital Day: 10    Assessment and Plan:   Sonny Wheeler is a 74 y.o. female with pmh of foot toe amputation history, history of metastatic lung cancer s/p left lobectomy, right frontal orbital craniotomy on 2022, pathology consistent with poorly differentiated metastatic adenocarcinoma status post, knife radiosurgery, s/p trach/PEG tube, seizure disorder who presents with Acute on chronic anemia    Plan:  Acute on chronic anemia likely GI bleed: Hemoglobin 9.2 on admission, previously 14.2 in September, FOBT positive. GI on board. Holding off on scope because of underlying metastatic lung disease history. EGD in 2020 showed hiatal hernia and gastritis. Continue to monitor today.h/h stable  Acute metabolic encephalopathy--pt somnolent, would not open eyes, turned head to verbal and physical stimulation, head CT-no acute intracranial abnormality, Two enlarging nodular areas of soft tissue thickening in the scalp. Differential includes pseudomeningoceles  or recurrent tumor. ,  Dysphagia--ST consulted for dysphagia, MBS ordered. Speech therapy team believes patient is having microaspiration's recommends n.p.o. TF through PEG, nutrition following  Left DFI with Residual OM and Dehiscence Secondary to PAD: Stenotrophomonas maltophilia in Respiratory Culture:   Afebrile, no leukocytosis  Pct 0.089, CRP 13.4  -BC 0/2-NGTD  -Resp culture: Stenotrophomonas maltophilia (resistant to levofloxacin)  Past left foot cultures 2022-Serratia marcescens, Staphylococcus epidermidis   -Left Foot Culture: Serratia marcescens  -XR Foot Left: 1. Status post amputation of the 1st proximal phalanx. No suspicious osseous   lesion.    2. Soft tissue ulceration at the surgical stump. 3. Bony demineralization. Continue per PEG tube- Bactrim DS bid x 6 weeks (end date 1/11/2023)  DC - Levaquin as Stenotrophomonas is resistant  Trend CRP- ordered    Podiatry consult on board. History of osteomyelitis status post partial hallux amputation with excisional debridement in October 2022. X-ray of the left foot showed no suspicion of osseous lesions. Podiatry on board. Infectious disease on board. Osteomyelitis concerning. History of metastatic lung cancer s/p left lobectomy no chemo or radiation, met to right frontal lob s/p  right frontal craniotomy on 6/20/2022: h/o metastatic lung ca to brain, oncology was consulted to discuss goals of care, pt previously following with oncologist at [facility] in [facility], Metastatic lung cancer-stage BRENDA(cTx, Cnx, pM1b), follows with Dr. Nicholas Warner at [facility]. Discussed the findings, stage, very poor prognosis and very very limited options and discussed BSC/Hospice care and family has agreed to explore that option, meeting with hospice today at 7pm, Pathology results are consistent with poorly differentiated metastatic adenocarcinoma. Patient subsequently had gamma knife radiosurgery. Guarded prognosis.    Seizure disorder secondary to brain metastasis on Vimpat 200 mg twice daily  Chronic resp failure s/p trach 9/22--on 5L trach mask, requires frequent suctioning  History of status epilepticus in September 2022  Peripheral vascular disease on aspirin and statin, saw Dr. Young Buchanan as outpatient 9/22, recommended left femoral endarterectomy but never had  Benign essential hypertension on Coreg 25 mg twice daily and losartan 50 mg daily  Hyperlipidemia on statin  Non-insulin-dependent diabetes mellitus type 2 on sliding scale insulin  Hiatal hernia lansoprazole  Severe PCM--nutrition following, on TF through PEG  Goals of care--meeting with hospice today, [facility]    Diet Diet NPO Exceptions are: Ice Chips  ADULT TUBE FEEDING; PEG; Diabetic; Continuous; 20; Yes; 10; Q 4 hours; 45; 150; Q 4 hours   DVT Prophylaxis [] Lovenox, []  Heparin, [] SCDs, [] Ambulation,  [] Eliquis, [] Xarelto  [] Coumadin   Code Status DNR-CC   Disposition From: [facility]  Expected Disposition: SNF with hospice vs. Inpatient hospice  Estimated Date of Discharge: 1-2 days, hospice meeting 7pm   Surrogate Decision Maker/ POA      Subjective:     Chief Complaint: No chief complaint on file. Patient examined at bedside. Labs reviewed and are acceptable. Patient is comfortable. Sitter at the bedside who reports no issues. In-depth discussions with the family and the family is agreed for comfort care and hospice. Oncology in agreement as well. Await hospice consult. Ideally patient's family would want home with hospice. Review of Systems:    Review of Systems   Constitutional:  Positive for activity change and fatigue. HENT: Negative. Eyes: Negative. Respiratory:  Positive for cough, shortness of breath and wheezing. Cardiovascular: Negative. Gastrointestinal:  Positive for nausea and vomiting. Endocrine: Negative. Genitourinary: Negative. Musculoskeletal: Negative. Skin:  Positive for color change. Allergic/Immunologic: Negative. Neurological:  Positive for speech difficulty. Hematological: Negative. Psychiatric/Behavioral:  Positive for agitation and confusion. ROS as above    Objective: Intake/Output Summary (Last 24 hours) at 12/5/2022 1409  Last data filed at 12/5/2022 0002  Gross per 24 hour   Intake 2182 ml   Output --   Net 2182 ml          Vitals:   Vitals:    12/05/22 1219   BP: 126/78   Pulse: 83   Resp: 21   Temp: 97.3 °F (36.3 °C)   SpO2: 94%       Physical Exam:   Physical Exam  Vitals reviewed. Constitutional:       General: She is in acute distress. Appearance: She is ill-appearing. HENT:      Head: Normocephalic.       Nose: Nose normal.      Mouth/Throat:      Mouth: Mucous membranes are moist. Eyes:      Conjunctiva/sclera: Conjunctivae normal.      Pupils: Pupils are equal, round, and reactive to light. Cardiovascular:      Rate and Rhythm: Normal rate and regular rhythm. Pulses: Normal pulses. Heart sounds: Normal heart sounds. No murmur heard. Pulmonary:      Effort: Respiratory distress present. Breath sounds: Rhonchi and rales present. No wheezing. Abdominal:      General: Abdomen is flat. Bowel sounds are normal. There is no distension. Palpations: Abdomen is soft. Tenderness: There is no abdominal tenderness. Comments: PEG in place   Musculoskeletal:         General: Tenderness, deformity and signs of injury present. Normal range of motion. Cervical back: Normal range of motion and neck supple. Right lower leg: No edema. Left lower leg: Edema present. Comments: Left toe wound   Skin:     Coloration: Skin is not jaundiced or pale. Neurological:      Mental Status: She is alert. She is disoriented. Motor: Weakness present.    Psychiatric:      Comments: Confused, agitated    somnolent      Medications:   Medications:    carvedilol  12.5 mg Per G Tube BID     sulfamethoxazole-trimethoprim  1 tablet PEG Tube 2 times per day    sodium chloride flush  5-40 mL IntraVENous 2 times per day    pantoprazole (PROTONIX) 40 mg injection  40 mg IntraVENous Daily    folic acid  1 mg Per G Tube Daily    insulin glargine  20 Units SubCUTAneous Daily with breakfast    lactobacillus  1 capsule Per G Tube Daily with breakfast    losartan  50 mg Per G Tube Daily    scopolamine  1 patch TransDERmal Q72H    thiamine  100 mg Per G Tube Daily    insulin lispro  0-8 Units SubCUTAneous TID     insulin lispro  0-4 Units SubCUTAneous Nightly    lacosamide (VIMPAT) IVPB  200 mg IntraVENous BID      Infusions:    sodium chloride 25 mL (11/28/22 1543)    dextrose       PRN Meds: LORazepam, 1 mg, Q6H PRN  haloperidol lactate, 1 mg, Q6H PRN  morphine, 2 mg, Q4H PRN  sodium chloride flush, 5-40 mL, PRN  sodium chloride, , PRN  ondansetron, 4 mg, Q8H PRN   Or  ondansetron, 4 mg, Q6H PRN  acetaminophen, 650 mg, Q6H PRN   Or  acetaminophen, 650 mg, Q6H PRN  oxyCODONE, 5 mg, Q4H PRN  glucose, 4 tablet, PRN  dextrose bolus, 125 mL, PRN   Or  dextrose bolus, 250 mL, PRN  glucagon (rDNA), 1 mg, PRN  dextrose, , Continuous PRN      Labs      Recent Results (from the past 24 hour(s))   POCT Glucose    Collection Time: 12/04/22  4:26 PM   Result Value Ref Range    POC Glucose 163 (H) 70 - 99 MG/DL   POCT Glucose    Collection Time: 12/04/22  9:31 PM   Result Value Ref Range    POC Glucose 182 (H) 70 - 99 MG/DL   POCT Glucose    Collection Time: 12/05/22  9:11 AM   Result Value Ref Range    POC Glucose 159 (H) 70 - 99 MG/DL   POCT Glucose    Collection Time: 12/05/22 11:21 AM   Result Value Ref Range    POC Glucose 192 (H) 70 - 99 MG/DL        Imaging/Diagnostics Last 24 Hours   No results found.     Electronically signed by Vickie Valdez MD on 12/5/2022 at 2:09 PM

## 2022-12-05 NOTE — PLAN OF CARE
Problem: Discharge Planning  Goal: Discharge to home or other facility with appropriate resources  Outcome: Progressing     Problem: Pain  Goal: Verbalizes/displays adequate comfort level or baseline comfort level  Outcome: Progressing     Problem: Skin/Tissue Integrity  Goal: Absence of new skin breakdown  Description: 1. Monitor for areas of redness and/or skin breakdown  2. Assess vascular access sites hourly  3. Every 4-6 hours minimum:  Change oxygen saturation probe site  4. Every 4-6 hours:  If on nasal continuous positive airway pressure, respiratory therapy assess nares and determine need for appliance change or resting period. Outcome: Progressing     Problem: Safety - Adult  Goal: Free from fall injury  Outcome: Progressing     Problem: Chronic Conditions and Co-morbidities  Goal: Patient's chronic conditions and co-morbidity symptoms are monitored and maintained or improved  Outcome: Progressing     Problem: Nutrition Deficit:  Goal: Optimize nutritional status  Outcome: Progressing     Problem: Dyspnea Due to End of Life  Goal: Demonstrate understanding of and ability to manage respiratory symptoms at end of life  Description: Patient  and or family/caregiver will verbalize recall of breathing strategies to maintain an effective breathing pattern during the inpatient hospice stay. Outcome: Progressing     Problem: Communication Deficit  Goal: Effectively communicate symptoms, needs, and concerns  Description: Patient  and/or family/caregiver will be able to communicate symptoms, needs, and concerns as evidenced by the use of language services during the inpatient hospice stay.     Outcome: Progressing     Problem: ABCDS Injury Assessment  Goal: Absence of physical injury  Outcome: Progressing

## 2022-12-05 NOTE — CONSULTS
[address] [place] [address] Consult Note    Date: 12/6/2022  Name: Deann Cartagena  MRN: [id number]  YOB: 1955   Patient's PCP: Leif Stoll MD and [place]  Consultants during acute care: Pulmonary, ID, Podiatry, Gastroenterology, Wound care  Referring Physician: Dr. Savage Feliciano care admission date: 11/26/2022 and 11/11 to 11/23/22 at [facility]     Informant: Chart reviewed, discussed with the hospice nurse liaison. I examined the patient who is unable to provide any information due to clinical status. There are no family here. CC: lung cancer    [place]: This is a 74 y.o. female nursing home resident with a history of Non Small Cell left lung cancer with partial left pneumonectomy 10/2019 (adenocarcinoma) with cerebral metastases and gamma knife in June - July 2022, seizure disorder on Lacosamide, severe protein calorie malnutrition with PEG, respiratory failure with tracheostomy, peripheral vascular disease with left hallux amputation for distal phalanx osteomyelitis, Type 2 diabetes mellitus, hypertension, hyperlipidemia, anemia,  readmitted on 11/26/2022. The patient has been treated for diabetic left foot infection, encephalopathy and is on Bactrim DS per G tube. The patient has had 29 pounds of involuntary weight loss from 8/5/22 to 12/4/22 (21% of body weight). The patient is dependent for all ADL's and non verbal. The patient has Morphine 2 mg IV every 4 hours prn pain and Lorazepam 1 mg IV every 6 hours prn anxiety. Oncology has been through [employer] [facility]. The hospice nurse liaisons met with the patient's spouse and daughter on 12/5/22 and provided hospice information. The patient's spouse was not ready to decide at the visit and wants to contemplate the decision. There was discussion about taking the patient home rather than returning to the Nursing Home. The patient is DNR-comfort care.     Oncology history [facility] from Care Everywhere:  Metastatic adenocarcinoma of the lung of lung, mets to brain    A 79-year-old female with history of lung cancer s/p left lobectomy 10/2019 who initially presented to [facility] emergency department 6/18/2022 due to acute altered mental status. MRI brain 6/18/2022 showed multiple brain lesions including 3.5 x 2.5 x 3.7 cm lesion located in the right frontal, 1.4 x 1.3 cm lesion left occipital pole, and 0.7 cm enhancing dural implant overlying the vertex. CT chest/abdomen/pelvis with IV contrast 6/20/2022 showed postsurgical changes with increased density next to surgical clips and 8 mm focal lesion in the proximal pancreatic body but otherwise no suspicious area for malignancy. S/p craniotomy 6/20/2022 with resection of the right frontal lobe the surgical pathology review metastatic poorly differentiated adenocarcinoma, primary lung cancer is favored. S/p gamma knife to post op cavity as well as remaining lesions 7/2022. CURRENT TREATMENT:   Surgical resection of right frontal lobe lesion 6/20/2022.   Gamma knife for the postop cavity, remaining lesions 7/2022    Past Medical History:   Diagnosis Date    Cancer ([facility] [address] [facility]     Diabetes mellitus ([facility] [address] [facility]     Hyperlipidemia     Hypertension     Seizure [facility])        Past Surgical History:   Procedure Laterality Date    LUNG CANCER SURGERY Left 10/2019    TOE AMPUTATION Left 8/8/2022    LEFT FOOT HALLUX AMPUTATION EXCISIONAL DEBRIDEMENT ALL NON VIABLE   TISSUE AND BONE performed by Ruben Waller DPM at [facility] 10/3/2020    EGD BIOPSY performed by Michael Hylton MD at [address] History     Socioeconomic History    Marital status:      Spouse name: Not on file    Number of children: Not on file    Years of education: Not on file    Highest education level: Not on file   Occupational History    Not on file   Tobacco Use    Smoking status: Former     Packs/day: 1.00     Types: Cigarettes     Quit date: 2019 Years since quitting: 3.9    Smokeless tobacco: Never    Tobacco comments:     10/2019   Substance and Sexual Activity    Alcohol use: Yes     Alcohol/week: 1.0 standard drink     Types: 1 Cans of beer per week    Drug use: Yes     Types: Marijuana [employer])    Sexual activity: Yes     Partners: Male   Other Topics Concern    Not on file   Social History Narrative    Not on file     Social Determinants of Health     Financial Resource Strain: Not on file   Food Insecurity: Not on file   Transportation Needs: Not on file   Physical Activity: Not on file   Stress: Not on file   Social Connections: Not on file   Intimate Partner Violence: Not on file   Housing Stability: Not on file       No family history on file. No Known Allergies    Medication list reviewed  Prior to Admission medications    Medication Sig Start Date End Date Taking? Authorizing Provider   oxyCODONE (ROXICODONE) 5 MG immediate release tablet Take 5 mg by mouth every 4 hours as needed for Pain.    Yes Historical Provider, MD   aspirin 81 MG chewable tablet 81 mg by Per G Tube route daily   Yes Historical Provider, MD   apixaban (ELIQUIS) 5 MG TABS tablet by Per G Tube route 2 times daily   Yes Historical Provider, MD   carvedilol (COREG) 25 MG tablet 25 mg by Per G Tube route 2 times daily (with meals)   Yes Historical Provider, MD   folic acid (FOLVITE) 1 MG tablet 1 mg by Per G Tube route daily   Yes Historical Provider, MD   glycopyrrolate (ROBINUL) 1 MG tablet 1 mg by Per G Tube route 3 times daily   Yes Historical Provider, MD   insulin glargine (LANTUS) 100 UNIT/ML injection vial Inject 20 Units into the skin daily (with breakfast)   Yes Historical Provider, MD   Lactobacillus Extra Strength CAPS by Per G Tube route   Yes Historical Provider, MD   lansoprazole (PREVACID) 30 MG delayed release capsule Take 30 mg by mouth daily   Yes Historical Provider, MD   losartan (COZAAR) 50 MG tablet 50 mg by Per G Tube route daily   Yes Historical Provider, MD   scopolamine (TRANSDERM-SCOP) transdermal patch Place 1 patch onto the skin every 72 hours   Yes Historical Provider, MD   vitamin B-1 (THIAMINE) 100 MG tablet 100 mg by Per G Tube route daily   Yes Historical Provider, MD   lacosamide (VIMPAT) 50 MG TABS tablet 200 mg by Per G Tube route 2 times daily. Yes Historical Provider, MD   alendronate (FOSAMAX) 70 MG tablet Take 1 tablet by mouth once a week 6/12/22   Historical Provider, MD   atorvastatin (LIPITOR) 40 MG tablet Take 1 tablet by mouth in the morning. Patient taking differently: Take 20 mg by mouth daily 8/12/22   Dennys Romero MD   gabapentin (NEURONTIN) 300 MG capsule Take 300 mg by mouth at bedtime. 3/15/22   Historical Provider, MD   simvastatin (ZOCOR) 20 MG tablet Take 40 mg by mouth nightly   8/11/22  Historical Provider, MD       ROS: As noted in [address], all other systems are unobtainable due to the patient's clinical condition    Weight:    Wt Readings from Last 5 Encounters:   12/04/22 109 lb 1.6 oz (49.5 kg)   12/02/22 108 lb (49 kg)   09/29/22 128 lb (58.1 kg)   08/29/22 118 lb (53.5 kg)   08/23/22 128 lb (58.1 kg)       Data reviewed 12/6/2022:  Frontal lobe lesion resection 6/20/2022  FINAL DIAGNOSIS   A. BRAIN MASS:  - METASTATIC, POORLY DIFFERENTIATED ADENOCARCINOMA, SEE NOTE. B. BRAIN MASS:  - METASTATIC, POORLY DIFFERENTIATED ADENOCARCINOMA, SEE NOTE. CT-scan of the brain 12/2/22:  No acute intracranial abnormality. Two enlarging nodular areas of soft tissue thickening in the scalp. Differential includes pseudomeningoceles  or recurrent tumor. CT chest, abdomen, pelvis 12/2/22:  Chest:       Cardiomegaly. Coronary artery disease. No effusion. Right-sided MediPort   device tip within the right SVC near the cavoatrial junction. There is   mucosal thickening of the left bronchus intermedius and left lower lobe   bronchus which may be seen with bronchitis or emphysematous changes.   No   suspicious hilar or mediastinal adenopathy. There emphysematous changes. Previous left partial pneumonectomy. Tree-in-bud nodules identified throughout much of the left lung favored to be   due to small airways disease or aspiration. Pleural base nodule left upper   lung 3 mm in size stable dating back to 2018. Tracheostomy. Degenerate changes of the thoracic spine. No suspicious osseous lesions. Abdomen/Pelvis:       Motion and streak artifact challenge evaluation. Gallbladder is contracted. No suspicious liver, splenic, adrenal glands,   kidneys, or pancreas lesions. The pancreas is diminutive size and with   calcifications and ductal dilatation can be seen with chronic pancreatitis. Pancreatic duct measuring 5 mm. Common bile duct measures 5 mm. Mild retained stool. Thickening of the gastric antrum could relate to   underdistention. No bowel obstruction. Gastrostomy tube within the   stomach. .  Moderate stool rectosigmoid junction. No suspicious adenopathy. Lobulated uterus compatible with fibroids. Bladder is unremarkable. No suspicious adnexal mass. Moderate to severe   aortic calcifications. Evidence of bilateral iliac artery stents noted. Posterior changes right and left groin greatest on right. Loss of contrast   involving the right proximal femoral artery. Degenerate changes lumbar spine. Degenerate changes of the hips pelvis. Age   indeterminate compression fracture at L4. With mild retropulsion.       Hemoglobin A1C 9/29/22: 5.9%  Accuchecks:   Recent Labs     12/05/22  1618 12/05/22  2118 12/06/22  1214   POCGLU 162* 146* 202*        Hepatic Function Panel:    Lab Results   Component Value Date/Time    ALKPHOS 92 11/28/2022 05:10 AM    ALT 15 11/28/2022 05:10 AM    AST 20 11/28/2022 05:10 AM    PROT 6.4 11/28/2022 05:10 AM    BILITOT 0.3 11/28/2022 05:10 AM    BILIDIR 0.2 10/02/2020 08:34 PM    IBILI 0.5 10/02/2020 08:34 PM    LABALBU 3.3 11/28/2022 05:10 AM     CBC:   Recent Labs     12/04/22  0545   WBC 11.0*   HGB 8.8*   HCT 28.0*   MCV 92.1        BMP:   Recent Labs     12/04/22  0545      K 3.9      CO2 23   BUN 8   CREATININE 0.6   GLUCOSE 206*       Physical Exam:   /75   Pulse 82   Temp 97.2 °F (36.2 °C) (Axillary)   Resp 20   Ht 5' 6\" (1.676 m)   Wt 109 lb 1.6 oz (49.5 kg)   SpO2 100%   BMI 17.61 kg/m²   General: lethargic, nonverbal, chronically ill appearing   Skin: wound images reviewed in the media tab  HEENT: Mucous membranes are dry, sclerae are clear, mild conjunctival pallor   Neck: tracheostomy in place, carotid upstrokes are diminished without bruit   Chest: right Mediport    Heart: distant tones, RRR, S1S2, no murmurs  Lungs:  coarse breath sounds bilaterally, diminished at the bases, without rales, scattered rhonchi   Abdomen: soft, bowel sounds present, no apparent tenderness, PEG in place, nondistended   and rectal: deferred  Extremities:  left hallux surgically absent, no mottling, no edema  Neurologic: lethargic, opens eyes,     Assessment/Plan:  . Metastatic Non Small Cell left lung cancer diagnosed October 2019 with right frontal cerebral metastasis and gamma knife June 2022. The patient is Hospice eligible on acute care discharge if spouse agrees with comfort care.  PPS 73%  Metabolic encephalopathy, likely multifactorial  Dysphagia, has PEG on tube feeding, speech recommends npo  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, followed by ID  Chronic respiratory failure with tracheostomy  Peripheral vascular disease   Seizure disorder with brain metastases, on Lacosamide (history of status epilepticus September 2022)  Type 2 diabetes mellitus, Hemoglobin A1C 9/29/22: 5.9%     Patient Active Problem List   Diagnosis Code    Chest pain R07.9    Dyspnea and respiratory abnormalities R06.00, R06.89    Acute gastritis without hemorrhage K29.00    Diabetic ulcer of toe of left foot associated with type 2 diabetes mellitus, with fat layer exposed ([address] E11.621, L97.522    Type 2 diabetes mellitus with peripheral neuropathy (HCC) E11.42    Peripheral vascular disease (HCC) I73.9    History of nicotine dependence Z87.891    Sepsis ([address] A41.9    Brain metastases (HCC) C79.31    Diabetic foot infection (HCC) E11.628, L08.9    Cancer of left lung ([facility] [address] - [facility] in 2019 C34.92    Former smoker - quit in 2019 Z87.891    DM (diabetes mellitus), type 2 ([address] E11.9    Seizures ([address] R56.9    Hypokalemia E87.6    Hypophosphatemia E83.39    Leukocytosis F68.682    Metabolic acidosis, increased anion gap E87.29    Elevated CK R74.8    Respiratory failure (HCC) J96.90    Acute on chronic anemia D64.9    Severe malnutrition (HCC) E43    Gastrointestinal hemorrhage K92.2    Infection due to multidrug-resistant Stenotrophomonas maltophilia A49.8, Z16.24    Diabetic foot ulcer with osteomyelitis (HCC) E11.621, E11.69, L97.509, Q09.7         Certification of Terminal Illness: I certify that this patient is eligible for Hospice services for a terminal diagnosis of metastatic Non Small Cell lung cancer (adenocarcinoma) with a life expectancy predicted to be less than 6 months if this illness follows its expected course.       Erum Nunez MD

## 2022-12-05 NOTE — CARE COORDINATION
[facility] meeting with spouse today at 200 for possible GIP Hospice. Pre-cert for [facility]  yesterday.   TE

## 2022-12-05 NOTE — PROGRESS NOTES
Patient's daughter, [facility], called and was updated on patient's condition. All questions answered at this time. She states she will be in to visit today.

## 2022-12-05 NOTE — PROGRESS NOTES
ONCOLOGY HEMATOLOGY CARE ([facility])  PROGRESS NOTE      2022  8:37 AM    Patient:    Rosie Tya  : 1955   74 y.o. MRN: [id number]  Admitted: 2022 11:31 PM ATT: Gregory Crawley MD   [id number]  AdmitDx: Hypokalemia [E87.6]  Cellulitis of toe of left foot [L03.032]  Gastrointestinal hemorrhage, unspecified gastrointestinal hemorrhage type [K92.2]  Acute on chronic anemia [D64.9]  PCP: Julius Kinney MD      Patient was seen and examined today  Seems agitated  Hospice consulted, meeting at 7 today with family    PHYSICAL EXAM :    Vitals: BP (!) 99/56   Pulse 94   Temp 97.2 °F (36.2 °C) (Axillary)   Resp 25   Ht 5' 6\" (1.676 m)   Wt 109 lb 1.6 oz (49.5 kg)   SpO2 96%   BMI 17.61 kg/m²     CONSTITUTIONAL: agitated  LUNGS: ctab, dec bs though  CARDIOVASCULAR: s1s2   ABDOMEN: bs pos +Gtube  MUSCULOSKELETAL: left foot dressing in place  NEUROLOGIC: awake, alert  SKIN: Normal skin color, texture, turgor and no jaundice  EXTREMITIES: no LE edema    LABORATORY RESULTS  CBC:   Recent Labs     22  0600 22  0545   WBC 10.5 11.0*   HGB 8.7* 8.8*    320     BMP:    Recent Labs     22  0600 22  0545   * 138   K 3.3* 3.9    105   CO2 21 23   BUN 10 8   CREATININE 0.6 0.6   GLUCOSE 181* 206*     Hepatic: No results for input(s): AST, ALT, ALB, BILITOT, ALKPHOS in the last 72 hours. INR: No results for input(s): INR in the last 72 hours. RADIOLOGY REPORTS  Reviewed    ASSESSMENT & RECOMMENDATIONS:  Metastatic lung cancer-stage BRENDA(cTx, Cnx, pM1b), follows with Dr. Torito Martinez at [facility]. Discussed the findings, stage, very poor prognosis and very very limited options and discussed BSC/Hospice care and family has agreed to explore that option, meeting with hospice today.      We will sign off at this point but please call if any questions    Discussed with nursing staff    DEEP

## 2022-12-05 NOTE — PROGRESS NOTES
Infectious Disease Progress Note  2022   Patient Name: Arden Villarreal : 1955   Impression  Left DFI with Residual OM and Dehiscence Secondary to PAD:  Stenotrophomonas maltophilia in Respiratory Culture:   Afebrile, slight leukocytosis  Pct 0.089, CRP 13.4  -BC 0/2-NGTD  -Resp culture: Stenotrophomonas maltophilia (resistant to levofloxacin)  Past left foot cultures 2022-Serratia marcescens, Staphylococcus epidermidis   -Left Foot Culture: Serratia marcescens  -XR Foot Left: 1. Status post amputation of the 1st proximal phalanx. No suspicious osseous   lesion. 2. Soft tissue ulceration at the surgical stump. 3. Bony demineralization. 22 CT Chest, A&P W Contrast: No evidence of new or progression of metastatic disease. Age indeterminate compression fracture of L4 new from the CT angiogram   2022      DMII:  Metastatic Lung Cancer with Brain Mets s/p Right Frontal Craniotomy:  Seizures:  S/p Trach/PEG 2022:  -Dr. Suzanne Kincaid onboard, pulmonology,  requests eval to determine if trach can be removed safely, rec to keep trach in and re-eval in 6 months. HTN  Multi-morbidity: per PMHx: HLD  Plan:  Continue per PEG tube- Bactrim DS bid x 6 weeks (end date 2023)  Trend CRP- ordered  Will sign off and not follow the patient actively, please reconsult as needed. Ongoing Antimicrobial Therapy  Bactrim -? Completed Antimicrobial Therapy  Zosyn ? Levaquin -  Ciprofloxacin -? History:? Interval history noted  Denies n/v/d/f or untoward effects of antibiotics  Unable to obtain ROS as patient is Nonverbal.  Physical Exam:  Vital Signs: /68   Pulse 83   Temp 97.2 °F (36.2 °C) (Axillary)   Resp 20   Ht 5' 6\" (1.676 m)   Wt 109 lb 1.6 oz (49.5 kg)   SpO2 (!) 87%   BMI 17.61 kg/m²     Gen: Trach intact, non-verbal, no distress  Wounds: C/D/I left hallux amputation site with necrosis  Chest: no distress and CTA.  Anterior diminished breath sounds. Trach intact. Heart: NSR and no MRG. Abd: soft, non-distended, no tenderness, no hepatomegaly. Normoactive bowel sounds. PEG Intact: LUQ with tube feeding infusion  Ext: no clubbing, cyanosis, or edema. See wounds above. Neuro: CN 2-12 intact and no focal sensory or motor deficits     Radiologic / Imaging / TESTING  11/27/22 XR Foot Left:  Impression   1. Status post amputation of the 1st proximal phalanx. No suspicious osseous   lesion. 2. Soft tissue ulceration at the surgical stump. 3. Bony demineralization. 12/2/22 MRI Foot Left W WO Contrast:    12/1/22 XR Chest Portable:  Impression   No significant interval change. No evidence of pneumonia. 12/1/22 XR Abdomen:  Impression   Upper abdomen is unremarkable. 12/2/22 FL Modified Barium Swallow:  Impression   C-arm fluoroscopy provided to department of speech pathology/therapy for   monitoring modified barium swallow. Please see separate speech pathology report for full discussion of findings   and recommendations. 12/2/22 CT Head WO Contrast:  Impression   No acute intracranial abnormality. Two enlarging nodular areas of soft tissue thickening in the scalp. Differential includes pseudomeningoceles  or recurrent tumor. 12/2/22 CT Chest W Contrast:  Impression   No evidence of new or progression of metastatic disease. Age indeterminate compression fracture of L4 new from the CT angiogram   08/07/2022 12/2/22 CT Abdomen Pelvis W IV Contrast:  Impression   No evidence of new or progression of metastatic disease.        Age indeterminate compression fracture of L4 new from the CT angiogram   08/07/2022        Labs:    Recent Results (from the past 24 hour(s))   POCT Glucose    Collection Time: 12/04/22  4:26 PM   Result Value Ref Range    POC Glucose 163 (H) 70 - 99 MG/DL   POCT Glucose    Collection Time: 12/04/22  9:31 PM   Result Value Ref Range    POC Glucose 182 (H) 70 - 99 MG/DL POCT Glucose    Collection Time: 12/05/22  9:11 AM   Result Value Ref Range    POC Glucose 159 (H) 70 - 99 MG/DL   POCT Glucose    Collection Time: 12/05/22 11:21 AM   Result Value Ref Range    POC Glucose 192 (H) 70 - 99 MG/DL     CULTURE results: Invalid input(s): BLOOD CULTURE,  URINE CULTURE, SURGICAL CULTURE    Diagnosis:  Patient Active Problem List   Diagnosis    Chest pain    Dyspnea and respiratory abnormalities    Acute gastritis without hemorrhage    Diabetic ulcer of toe of left foot associated with type 2 diabetes mellitus, with fat layer exposed ([address]    Type 2 diabetes mellitus with peripheral neuropathy (HCC)    Peripheral vascular disease ([address]    History of nicotine dependence    Sepsis ([address]    Brain metastases ([address]    Diabetic foot infection ([address]    Cancer of left lung ([address] - removed at [facility] in 2019    Former smoker - quit in 2019    DM (diabetes mellitus), type 2 (HCC)    Seizures (HCC)    Hypokalemia    Hypophosphatemia    Leukocytosis    Metabolic acidosis, increased anion gap    Elevated CK    Respiratory failure (HCC)    Acute on chronic anemia    Severe malnutrition (HCC)    Gastrointestinal hemorrhage    Infection due to multidrug-resistant Stenotrophomonas maltophilia    Diabetic foot ulcer with osteomyelitis ([address]       Active Problems  Principal Problem:    Acute on chronic anemia  Active Problems:    Severe malnutrition (HCC)    Gastrointestinal hemorrhage    Infection due to multidrug-resistant Stenotrophomonas maltophilia    Diabetic foot ulcer with osteomyelitis ([address]  Resolved Problems:    * No resolved hospital problems. *    Electronically signed by: Electronically signed by Enmanuel Acharya.  CAYLA Raygoza CNP on 12/5/2022 at 4:00 PM

## 2022-12-06 LAB
GLUCOSE BLD-MCNC: 144 MG/DL (ref 70–99)
GLUCOSE BLD-MCNC: 159 MG/DL (ref 70–99)
GLUCOSE BLD-MCNC: 202 MG/DL (ref 70–99)

## 2022-12-06 PROCEDURE — 6370000000 HC RX 637 (ALT 250 FOR IP): Performed by: INTERNAL MEDICINE

## 2022-12-06 PROCEDURE — 82962 GLUCOSE BLOOD TEST: CPT

## 2022-12-06 PROCEDURE — 97110 THERAPEUTIC EXERCISES: CPT

## 2022-12-06 PROCEDURE — 97530 THERAPEUTIC ACTIVITIES: CPT

## 2022-12-06 PROCEDURE — 2140000000 HC CCU INTERMEDIATE R&B

## 2022-12-06 PROCEDURE — 6360000002 HC RX W HCPCS: Performed by: STUDENT IN AN ORGANIZED HEALTH CARE EDUCATION/TRAINING PROGRAM

## 2022-12-06 PROCEDURE — 6360000002 HC RX W HCPCS: Performed by: INTERNAL MEDICINE

## 2022-12-06 PROCEDURE — 94761 N-INVAS EAR/PLS OXIMETRY MLT: CPT

## 2022-12-06 PROCEDURE — C9113 INJ PANTOPRAZOLE SODIUM, VIA: HCPCS | Performed by: INTERNAL MEDICINE

## 2022-12-06 PROCEDURE — 97112 NEUROMUSCULAR REEDUCATION: CPT

## 2022-12-06 PROCEDURE — 99211 OFF/OP EST MAY X REQ PHY/QHP: CPT

## 2022-12-06 PROCEDURE — 2580000003 HC RX 258: Performed by: INTERNAL MEDICINE

## 2022-12-06 PROCEDURE — 6370000000 HC RX 637 (ALT 250 FOR IP): Performed by: NURSE PRACTITIONER

## 2022-12-06 PROCEDURE — 89220 SPUTUM SPECIMEN COLLECTION: CPT

## 2022-12-06 PROCEDURE — 2700000000 HC OXYGEN THERAPY PER DAY

## 2022-12-06 PROCEDURE — 6360000002 HC RX W HCPCS: Performed by: HOSPITALIST

## 2022-12-06 PROCEDURE — 97535 SELF CARE MNGMENT TRAINING: CPT

## 2022-12-06 PROCEDURE — C9254 INJECTION, LACOSAMIDE: HCPCS | Performed by: INTERNAL MEDICINE

## 2022-12-06 RX ADMIN — FOLIC ACID 1 MG: 1 TABLET ORAL at 11:20

## 2022-12-06 RX ADMIN — MORPHINE SULFATE 2 MG: 2 INJECTION, SOLUTION INTRAMUSCULAR; INTRAVENOUS at 21:00

## 2022-12-06 RX ADMIN — Medication 1 CAPSULE: at 11:19

## 2022-12-06 RX ADMIN — MORPHINE SULFATE 2 MG: 2 INJECTION, SOLUTION INTRAMUSCULAR; INTRAVENOUS at 01:41

## 2022-12-06 RX ADMIN — SODIUM CHLORIDE 200 MG: 9 INJECTION, SOLUTION INTRAVENOUS at 21:59

## 2022-12-06 RX ADMIN — SODIUM CHLORIDE, PRESERVATIVE FREE 40 MG: 5 INJECTION INTRAVENOUS at 11:21

## 2022-12-06 RX ADMIN — SULFAMETHOXAZOLE AND TRIMETHOPRIM 1 TABLET: 800; 160 TABLET ORAL at 11:20

## 2022-12-06 RX ADMIN — CARVEDILOL 12.5 MG: 6.25 TABLET, FILM COATED ORAL at 17:41

## 2022-12-06 RX ADMIN — MORPHINE SULFATE 2 MG: 2 INJECTION, SOLUTION INTRAMUSCULAR; INTRAVENOUS at 12:32

## 2022-12-06 RX ADMIN — OXYCODONE HYDROCHLORIDE 5 MG: 5 TABLET ORAL at 21:00

## 2022-12-06 RX ADMIN — SODIUM CHLORIDE, PRESERVATIVE FREE 10 ML: 5 INJECTION INTRAVENOUS at 21:01

## 2022-12-06 RX ADMIN — LORAZEPAM 1 MG: 2 INJECTION INTRAMUSCULAR at 01:41

## 2022-12-06 RX ADMIN — SODIUM CHLORIDE, PRESERVATIVE FREE 10 ML: 5 INJECTION INTRAVENOUS at 11:23

## 2022-12-06 RX ADMIN — Medication 100 MG: at 11:20

## 2022-12-06 RX ADMIN — SODIUM CHLORIDE 200 MG: 9 INJECTION, SOLUTION INTRAVENOUS at 09:56

## 2022-12-06 RX ADMIN — CARVEDILOL 12.5 MG: 6.25 TABLET, FILM COATED ORAL at 11:20

## 2022-12-06 RX ADMIN — INSULIN GLARGINE 20 UNITS: 100 INJECTION, SOLUTION SUBCUTANEOUS at 12:30

## 2022-12-06 RX ADMIN — SULFAMETHOXAZOLE AND TRIMETHOPRIM 1 TABLET: 800; 160 TABLET ORAL at 21:00

## 2022-12-06 RX ADMIN — LOSARTAN POTASSIUM 50 MG: 25 TABLET, FILM COATED ORAL at 11:20

## 2022-12-06 ASSESSMENT — ENCOUNTER SYMPTOMS
VOMITING: 1
COUGH: 1
WHEEZING: 1
ALLERGIC/IMMUNOLOGIC NEGATIVE: 1
EYES NEGATIVE: 1
NAUSEA: 1
COLOR CHANGE: 1
SHORTNESS OF BREATH: 1

## 2022-12-06 ASSESSMENT — PAIN - FUNCTIONAL ASSESSMENT: PAIN_FUNCTIONAL_ASSESSMENT: INTOLERABLE, UNABLE TO DO ANY ACTIVE OR PASSIVE ACTIVITIES

## 2022-12-06 ASSESSMENT — PAIN SCALES - WONG BAKER
WONGBAKER_NUMERICALRESPONSE: 2

## 2022-12-06 ASSESSMENT — PAIN DESCRIPTION - LOCATION
LOCATION: GENERALIZED
LOCATION: GENERALIZED

## 2022-12-06 ASSESSMENT — PAIN DESCRIPTION - DESCRIPTORS: DESCRIPTORS: ACHING

## 2022-12-06 NOTE — PROGRESS NOTES
Comprehensive Nutrition Assessment    Type and Reason for Visit:  Reassess    Nutrition Recommendations/Plan:   Continue to increase EN to gaol of 45 ml/hr  W/O IVF, 135 ml free water flush Q4H needed to provide 1 ml/kcal fluid requirement     Malnutrition Assessment:  Malnutrition Status:  Severe malnutrition (11/28/22 1254)    Context:  Chronic Illness     Findings of the 6 clinical characteristics of malnutrition:  Energy Intake:  Mild decrease in energy intake (pt on PEG tube not at goal)  Weight Loss:  Unable to assess (last wt taken 9/29)     Body Fat Loss:  Severe body fat loss Buccal region, Triceps, Orbital, Fat Overlying Ribs   Muscle Mass Loss:  Severe muscle mass loss Calf (gastrocnemius), Thigh (quadraceps), Scapula (trapezius), Clavicles (pectoralis & deltoids), Temples (temporalis), Hand (interosseous)  Fluid Accumulation:  No significant fluid accumulation     Strength:  Not Performed    Nutrition Assessment:    Pt continues NPO on EN, running at 40 ml/hr. Tolerating well. Has arterial, diabetic, and non-healing surgical wounds. Plan ffffor hospice pending. Will continue to follow as high nutrition risk.     Nutrition Related Findings:    H/H 9.21/28.8 Wound Type: Multiple, Pressure Injury, Stage II, Diabetic Ulcer       Current Nutrition Intake & Therapies:    Average Meal Intake: NPO  Average Supplements Intake: NPO  Diet NPO Exceptions are: Ice Chips  ADULT TUBE FEEDING; PEG; Diabetic; Continuous; 20; Yes; 10; Q 4 hours; 45; 150; Q 4 hours  Current Tube Feeding (TF) Orders:  Feeding Route: PEG  Formula:  (Glucerna 1.5 Moises)  Schedule: Continuous  Feeding Regimen: 60 mL/hr  Additives/Modulars: None  Water Flushes: 30 Q 8 H  Current TF & Flush Orders Provides: 40 ml/hr: 1440 kcal and 79 gm protein  Goal TF & Flush Orders Provides: Glucerna 1.5 (Diabetic) will provide ~1620 kcals (93%) and 89 gm (100%)    Anthropometric Measures:  Height: 5' 6\" (167.6 cm)  Ideal Body Weight (IBW): 130 lbs (59 kg) Admission Body Weight: 109 lb 1.6 oz (49.5 kg)  Current Body Weight: 109 lb 1.6 oz (49.5 kg), 83.9 % IBW. Weight Source: Stated (no weight taken during this admission yet)  Current BMI (kg/m2): 17.6  Usual Body Weight: 128 lb 8 oz (58.3 kg) (8/11/22)  % Weight Change (Calculated): -16  Weight Adjustment For: No Adjustment                 BMI Categories: Underweight (BMI less than 22) age over 72    Estimated Daily Nutrient Needs:  Energy Requirements Based On: Kcal/kg     Energy (kcal/day): 9711-2733 (30-35 kcal/kg ABW)  Weight Used for Protein Requirements: Ideal  Protein (g/day): 71-89 (1.2-1.5 g/kg IBW)  Method Used for Fluid Requirements: 1 ml/kcal  Fluid (ml/day):  4720-3436    Nutrition Diagnosis:   Severe malnutrition, In context of chronic illness related to inadequate protein-energy intake, inadequate enteral nutrition infusion, cognitive or neurological impairment (wound) as evidenced by severe loss of subcutaneous fat, severe muscle loss, nutrition support - enteral nutrition, NPO or clear liquid status due to medical condition  Inadequate enteral nutrition infusion related to increase demand for energy/nutrients as evidenced by wounds,  (malnutrition)    Nutrition Interventions:   Food and/or Nutrient Delivery: Modify Tube Feeding  Nutrition Education/Counseling: Education not indicated  Coordination of Nutrition Care: Continue to monitor while inpatient  Plan of Care discussed with: PS MD about tube feed order and mgt. Spoke with RN about flowsheet, no G tube information in flowsheet in I/O    Goals:  Previous Goal Met: Progressing toward Goal(s)  Goals:  Tolerate nutrition support at goal rate       Nutrition Monitoring and Evaluation:   Behavioral-Environmental Outcomes: None Identified  Food/Nutrient Intake Outcomes: Enteral Nutrition Intake/Tolerance, Vitamin/Mineral Intake  Physical Signs/Symptoms Outcomes: Biochemical Data, GI Status, Nausea or Vomiting, Fluid Status or Edema, Nutrition Focused Physical Findings, Skin, Weight    Discharge Planning:    Enteral Nutrition     Daivd Push, [address] [place] [address],   Contact: [phone]

## 2022-12-06 NOTE — PROGRESS NOTES
Physical Therapy    Physical Therapy Treatment Note  Name: Radu Alamo MRN: [id number] :   1955   Date:  2022   Admission Date: 2022 Room:  [address] [place], [place] [address]A   Restrictions/Precautions:          tube feed, fall risk, general precautions, seizure precautions  Communication with other providers:  nurse and co-tx /c Jaron Crowe per patient safety and tolerance  Subjective:  Patient states: Patient is unable to verbalize. However she is able to make facial expressions with pain response. LLE demo's pain response with knee extension in supine. Pain:   Location, Type, Intensity (0/10 to 10/10): unable to verbalize, but pt seems to nod when asked about pain    Objective:    Observation:  supine in bed upon entry Flat on back across midline in bed, patient has trach O2 tube in her Rt hand. Fed held until EOS    Objective Measures: SpO2: 95%-96%    Treatment, including education/measures:  Supine stretching: Alan knees extension 2x30\", ankle DF stretch 2x30\"    Transfers with line management of   Supine to sit :Dependent A x 2  Seated balance: Patient is initially Max A x 1 for seated balance, with time, patient intermittently CGA-Max A ~15'. She needed intermittently Max manual assist for WB with BUE in tripod position for balance. Rhythmic stabilization lateral x 3'. Unable to perform light activities with BUE and received dependent self care at EOB  Sit to supine :Dep A x 2  Scooting : Max A x 2  Positioning: Alan heels floated, pillows under ea shoulder to keep patient in midline. Safety  Patient left safely in the bed, with call light/phone in reach with alarm applied. Left in care of nurse.       Assessment / Impression:    Patient's tolerance of treatment:  Fair   Adverse Reaction: na  Significant change in status and impact:  na  Barriers to improvement:  weakness, endurance, strength, balance  Plan for Next Session:    Sitting balance and bed mob training, look into wb status in LLE to assess transfer training (nurse states wound consult is pending for L foot)  Time in:  0813  Time out:  0851  Timed treatment minutes:  38  Total treatment time:  38    Electronically signed by:     Patti Sánchez PTA  12/6/2022, 11:06 AM

## 2022-12-06 NOTE — PROGRESS NOTES
V2.0  [facility] Hospitalist Progress Note      Name:  Nikko Florian /Age/Sex: 1955  (74 y.o. female)   MRN & CSN:  [id number] & [id number] Encounter Date/Time: 2022 12:50 PM EST    Location:  [address] [place] [address] PCP: Kishan Aviles MD       Hospital Day: 11    Assessment and Plan:   Nikko Florian is a 74 y.o. female with pmh of foot toe amputation history, history of metastatic lung cancer s/p left lobectomy, right frontal orbital craniotomy on 2022, pathology consistent with poorly differentiated metastatic adenocarcinoma status post, knife radiosurgery, s/p trach/PEG tube, seizure disorder who presents with Acute on chronic anemia    Plan:  Acute on chronic anemia likely GI bleed: Hemoglobin 9.2 on admission, previously 14.2 in September, FOBT positive. GI on board. Holding off on scope because of underlying metastatic lung disease history. H&H stable  Acute metabolic encephalopathy--pt somnolent, would not open eyes, turned head to verbal and physical stimulation, head CT-no acute intracranial abnormality, Two enlarging nodular areas of soft tissue thickening in the scalp. Differential includes pseudomeningoceles or metastatic tumor. Dysphagia--ST consulted for dysphagia, MBS ordered. Speech therapy team believes patient is having microaspiration's recommends n.p.o. TF through PEG, nutrition following  Left DFI with Residual OM and Dehiscence Secondary to PAD: Stenotrophomonas maltophilia in Respiratory Culture:   Afebrile, no leukocytosis  Pct 0.089, CRP 13.4  -BC 0/2-NGTD  -Resp culture: Stenotrophomonas maltophilia (resistant to levofloxacin)  Past left foot cultures 2022-Serratia marcescens, Staphylococcus epidermidis   -Left Foot Culture: Serratia marcescens  -XR Foot Left: 1. Status post amputation of the 1st proximal phalanx. No suspicious osseous   lesion. 2. Soft tissue ulceration at the surgical stump. 3. Bony demineralization.    Continue per PEG tube- Bactrim DS bid x 6 weeks (end date 1/11/2023)  DC - Levaquin as Stenotrophomonas is resistant  Trend CRP- ordered    Podiatry consult on board. History of osteomyelitis status post partial hallux amputation with excisional debridement in October 2022. X-ray of the left foot showed no suspicion of osseous lesions. Podiatry on board. Infectious disease on board. Osteomyelitis concerning. 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 Daisha Mehta at [facility]. Discussed the findings, stage, very poor prognosis and very very limited options and discussed BSC/Hospice care and family has agreed to explore that option, meeting with hospice today at 7pm, Pathology results are consistent with poorly differentiated metastatic adenocarcinoma. Patient subsequently had gamma knife radiosurgery. Guarded prognosis. Goals of care--meeting with hospice today, [facility] Awaiting hospice home vs Inpatient    Diet Diet NPO Exceptions are: Ice Chips  ADULT TUBE FEEDING; PEG; Diabetic; Continuous; 20; Yes; 10; Q 4 hours; 45; 150; Q 4 hours   DVT Prophylaxis [] Lovenox, []  Heparin, [] SCDs, [] Ambulation,  [] Eliquis, [] Xarelto  [] Coumadin   Code Status DNR-CC   Disposition From: [facility]  Expected Disposition: SNF with hospice vs. Inpatient hospice  Estimated Date of Discharge: 1-2 days, hospice meeting 7pm   Surrogate Decision Maker/ POA      Subjective:     Chief Complaint: No chief complaint on file. Patient examined at bedside. Patient is comfortable. Brother at bedside. Review of Systems:    Review of Systems   Constitutional:  Positive for activity change and fatigue. HENT: Negative. Eyes: Negative.     Respiratory: Positive for cough, shortness of breath and wheezing. Cardiovascular: Negative. Gastrointestinal:  Positive for nausea and vomiting. Endocrine: Negative. Genitourinary: Negative. Musculoskeletal: Negative. Skin:  Positive for color change. Allergic/Immunologic: Negative. Neurological:  Positive for speech difficulty. Hematological: Negative. Psychiatric/Behavioral:  Positive for agitation and confusion. ROS as above    Objective: Intake/Output Summary (Last 24 hours) at 12/6/2022 1457  Last data filed at 12/6/2022 0453  Gross per 24 hour   Intake 762 ml   Output 1200 ml   Net -438 ml          Vitals:   Vitals:    12/06/22 1253   BP:    Pulse: (!) 106   Resp: 25   Temp:    SpO2: 99%       Physical Exam:   Physical Exam  Vitals reviewed. Constitutional:       General: She is in acute distress. Appearance: She is ill-appearing. HENT:      Head: Normocephalic. Nose: Nose normal.      Mouth/Throat:      Mouth: Mucous membranes are moist.   Eyes:      Conjunctiva/sclera: Conjunctivae normal.      Pupils: Pupils are equal, round, and reactive to light. Cardiovascular:      Rate and Rhythm: Normal rate and regular rhythm. Pulses: Normal pulses. Heart sounds: Normal heart sounds. No murmur heard. Pulmonary:      Effort: Respiratory distress present. Breath sounds: Rhonchi and rales present. No wheezing. Abdominal:      General: Abdomen is flat. Bowel sounds are normal. There is no distension. Palpations: Abdomen is soft. Tenderness: There is no abdominal tenderness. Comments: PEG in place   Musculoskeletal:         General: Tenderness, deformity and signs of injury present. Normal range of motion. Cervical back: Normal range of motion and neck supple. Right lower leg: No edema. Left lower leg: Edema present. Comments: Left toe wound   Skin:     Coloration: Skin is not jaundiced or pale.    Neurological:      Mental Status: She is alert. She is disoriented. Motor: Weakness present. Psychiatric:         Mood and Affect: Mood normal.      Comments: Confused    somnolent      Medications:   Medications:    carvedilol  12.5 mg Per G Tube BID     sulfamethoxazole-trimethoprim  1 tablet PEG Tube 2 times per day    sodium chloride flush  5-40 mL IntraVENous 2 times per day    pantoprazole (PROTONIX) 40 mg injection  40 mg IntraVENous Daily    folic acid  1 mg Per G Tube Daily    insulin glargine  20 Units SubCUTAneous Daily with breakfast    lactobacillus  1 capsule Per G Tube Daily with breakfast    losartan  50 mg Per G Tube Daily    scopolamine  1 patch TransDERmal Q72H    thiamine  100 mg Per G Tube Daily    insulin lispro  0-8 Units SubCUTAneous TID WC    insulin lispro  0-4 Units SubCUTAneous Nightly    lacosamide (VIMPAT) IVPB  200 mg IntraVENous BID      Infusions:    sodium chloride 25 mL (11/28/22 5343)    dextrose       PRN Meds: LORazepam, 1 mg, Q6H PRN  haloperidol lactate, 1 mg, Q6H PRN  morphine, 2 mg, Q4H PRN  sodium chloride flush, 5-40 mL, PRN  sodium chloride, , PRN  ondansetron, 4 mg, Q8H PRN   Or  ondansetron, 4 mg, Q6H PRN  acetaminophen, 650 mg, Q6H PRN   Or  acetaminophen, 650 mg, Q6H PRN  oxyCODONE, 5 mg, Q4H PRN  glucose, 4 tablet, PRN  dextrose bolus, 125 mL, PRN   Or  dextrose bolus, 250 mL, PRN  glucagon (rDNA), 1 mg, PRN  dextrose, , Continuous PRN      Labs      Recent Results (from the past 24 hour(s))   POCT Glucose    Collection Time: 12/05/22  4:18 PM   Result Value Ref Range    POC Glucose 162 (H) 70 - 99 MG/DL   POCT Glucose    Collection Time: 12/05/22  9:18 PM   Result Value Ref Range    POC Glucose 146 (H) 70 - 99 MG/DL   POCT Glucose    Collection Time: 12/06/22 12:14 PM   Result Value Ref Range    POC Glucose 202 (H) 70 - 99 MG/DL        Imaging/Diagnostics Last 24 Hours   No results found.     Electronically signed by Dragan Strong MD on 12/6/2022 at 2:57 PM

## 2022-12-06 NOTE — CARE COORDINATION
Called [facility] to find out what the plan is, spoke with [address] She states that pt's spouse told them that he wants to take her home with Hospice. She informed CM that they have been attempting to get a hold of pt's spouse or daughter for plan, home vs GIP. She will notify CM of plan after she talks to spouse or daughter. This CM called pt's spouse and he does not have a VM set up. Spouse not at bedside.   TE

## 2022-12-06 NOTE — PLAN OF CARE
Problem: Discharge Planning  Goal: Discharge to home or other facility with appropriate resources  Outcome: Progressing     Problem: Pain  Goal: Verbalizes/displays adequate comfort level or baseline comfort level  Outcome: Progressing     Problem: Skin/Tissue Integrity  Goal: Absence of new skin breakdown  Description: 1. Monitor for areas of redness and/or skin breakdown  2. Assess vascular access sites hourly  3. Every 4-6 hours minimum:  Change oxygen saturation probe site  4. Every 4-6 hours:  If on nasal continuous positive airway pressure, respiratory therapy assess nares and determine need for appliance change or resting period. Outcome: Progressing     Problem: Safety - Adult  Goal: Free from fall injury  Outcome: Progressing     Problem: Chronic Conditions and Co-morbidities  Goal: Patient's chronic conditions and co-morbidity symptoms are monitored and maintained or improved  Outcome: Progressing     Problem: Nutrition Deficit:  Goal: Optimize nutritional status  Outcome: Progressing     Problem: Dyspnea Due to End of Life  Goal: Demonstrate understanding of and ability to manage respiratory symptoms at end of life  Description: Patient  and or family/caregiver will verbalize recall of breathing strategies to maintain an effective breathing pattern during the inpatient hospice stay. Outcome: Progressing     Problem: Communication Deficit  Goal: Effectively communicate symptoms, needs, and concerns  Description: Patient  and/or family/caregiver will be able to communicate symptoms, needs, and concerns as evidenced by the use of language services during the inpatient hospice stay.     Outcome: Progressing     Problem: ABCDS Injury Assessment  Goal: Absence of physical injury  Outcome: Progressing     Problem: Communication Deficit  Goal: Effectively communicate symptoms, needs, and concerns  Description: Patient  and/or family/caregiver will be able to communicate symptoms, needs, and concerns as evidenced by the use of language services during the inpatient hospice stay.     Outcome: Progressing

## 2022-12-06 NOTE — CONSULTS
Weekly port needle and dressing change completed per protocol. Patient tolerated well. Please consult IV/PICC team for questions, concerns, or patient's needs change.

## 2022-12-06 NOTE — PROGRESS NOTES
Occupational Therapy  . Occupational Therapy Treatment Note    Name: Ruiz Lopez MRN: [id number] :   1955   Date:  2022   Admission Date: 2022 Room:  [address] [place] [address]-A     Primary Problem:      Restrictions/Precautions:          General precautions, fall risk  Contact, trach    Communication with other providers: cotx with PTA Liz Pata for assist and safety as well as patient tolerance with therapy. Subjective:  Patient states:  non verbal  Pain:   Location, Type, Intensity (0/10 to 10/10):  none stated    Objective:    Observation: patient has EDUARDO sitter- patient not at 30 degrees upon arrival, trach tube not in correct spot, BLE hanging off of bed. Family member asleep on couch asking for breakfast. Patient very lethargic this date, mostly kept eyes closed. Objective Measures:  trach, tele    Treatment, including education:    ADL activity training was instructed today. Cues were given for safety, sequence, UE/LE placement, visual cues, and balance. Activities performed today included  hygiene, and grooming. Oral care- swab stick- DEP EOB  Robe change- DEP  UB Bathing- DEP  Trach management and clean up- DEP  Facial hygiene- DEP    Therapeutic activity training was instructed today. Cues were given for safety, sequence, UE/LE placement, awareness, and balance. Activities performed today included bed mobility training, sup-sit, sit-stand, SPT. Supine to EOB- DEP  EOB balance- DEP- Max throughout for safety. Tolerated x15 min. Lethargic throughout. Cues for eyes to be opened. Patient demo WB for BUE with HHA assist to place in proper position, and did demo lateral sways with Max A/DEP. At this time patient unable to perform activities with BUEs. Was grabbing at lines. Provided towel rolls for patient to grab onto. Return supine- DEP x2  Bed positing- DEP x2    Cues were given for position, posture, kinesthetic sense, safety, recruitment, and rationale.   Cues were verbal and/or tactile. Static balance w/ tapping and variety of tactile stimuli to orient to upright and correct  lean  Assisted trunk flexion and oblique activity  Positional tolerance training w/ BUE engagement to promote optimal functional positioning       PTA demo BLE stretching d/t patients BLE inconstant flexed position. CASTRO socrates attempting to stretch BUEs- patient would not allow. All therapeutic intervention performed c emphasis on trunk strengthening, bed mobility and supine<>sit to maintain / increase strength for functional tasks / transfers. Patient left safely in bed at 30 degrees at end of session, with call light in reach, alarm on and nursing aware. Assessment / Impression:    Patient's tolerance of treatment: poor  Adverse Reaction: None  Significant change in status and impact: more lethargic   Barriers to improvement: weakness , cogn      Plan for Next Session:    Continue with OT POC  Not safe to leave in recliner.        Time in:  813  Time out:  851  Timed treatment minutes:  38  Total treatment time:  38      Electronically signed by:    ANGE Elaine COTA/L [id number]    12/6/2022, 11:05 AM

## 2022-12-06 NOTE — CONSULTS
[address] [address] Continence Nurse  Consult Note       Landon Montanez  AGE: 74 y.o. GENDER: female  : 1955  TODAY'S DATE:  2022    Subjective:     Reason for  Evaluation and Assessment: wound care reassessment. Landon Montanez is a 74 y.o. female referred by:   [x] Physician  [] Nursing  [] Other:     Wound Identification:  Wound Type: arterial, diabetic, and non-healing surgical  Contributing Factors: diabetes and arterial insufficiency        PAST MEDICAL HISTORY        Diagnosis Date    Cancer ([facility]     Diabetes mellitus ([facility]     Hyperlipidemia     Hypertension     Seizure ([facility]        PAST SURGICAL HISTORY    Past Surgical History:   Procedure Laterality Date    LUNG CANCER SURGERY Left 10/2019    TOE AMPUTATION Left 2022    LEFT FOOT HALLUX AMPUTATION EXCISIONAL DEBRIDEMENT ALL NON VIABLE   TISSUE AND BONE performed by Ramone Bullard DPM at [address] [place] [address] N/A 10/3/2020    EGD BIOPSY performed by Burgess David MD at [facility]    No family history on file. SOCIAL HISTORY    Social History     Tobacco Use    Smoking status: Former     Packs/day: 1.00     Types: Cigarettes     Quit date:      Years since quitting: 3.9    Smokeless tobacco: Never    Tobacco comments:     10/2019   Substance Use Topics    Alcohol use: Yes     Alcohol/week: 1.0 standard drink     Types: 1 Cans of beer per week    Drug use: Yes     Types: Marijuana (Weed)       ALLERGIES    No Known Allergies    MEDICATIONS    No current facility-administered medications on file prior to encounter. Current Outpatient Medications on File Prior to Encounter   Medication Sig Dispense Refill    oxyCODONE (ROXICODONE) 5 MG immediate release tablet Take 5 mg by mouth every 4 hours as needed for Pain.       aspirin 81 MG chewable tablet 81 mg by Per G Tube route daily      apixaban (ELIQUIS) 5 MG TABS tablet by Per G Tube route 2 times daily      carvedilol (COREG) 25 MG tablet 25 mg by Per G Tube route 2 times daily (with meals)      folic acid (FOLVITE) 1 MG tablet 1 mg by Per G Tube route daily      glycopyrrolate (ROBINUL) 1 MG tablet 1 mg by Per G Tube route 3 times daily      insulin glargine (LANTUS) 100 UNIT/ML injection vial Inject 20 Units into the skin daily (with breakfast)      Lactobacillus Extra Strength CAPS by Per G Tube route      lansoprazole (PREVACID) 30 MG delayed release capsule Take 30 mg by mouth daily      losartan (COZAAR) 50 MG tablet 50 mg by Per G Tube route daily      scopolamine (TRANSDERM-SCOP) transdermal patch Place 1 patch onto the skin every 72 hours      vitamin B-1 (THIAMINE) 100 MG tablet 100 mg by Per G Tube route daily      lacosamide (VIMPAT) 50 MG TABS tablet 200 mg by Per G Tube route 2 times daily. alendronate (FOSAMAX) 70 MG tablet Take 1 tablet by mouth once a week      atorvastatin (LIPITOR) 40 MG tablet Take 1 tablet by mouth in the morning. (Patient taking differently: Take 20 mg by mouth daily) 30 tablet 1    gabapentin (NEURONTIN) 300 MG capsule Take 300 mg by mouth at bedtime.       [DISCONTINUED] simvastatin (ZOCOR) 20 MG tablet Take 40 mg by mouth nightly            Objective:      /75   Pulse 82   Temp 97.2 °F (36.2 °C) (Axillary)   Resp 14   Ht 5' 6\" (1.676 m)   Wt 109 lb 1.6 oz (49.5 kg)   SpO2 100%   BMI 17.61 kg/m²   Jose Angel Risk Score: Jose Angel Scale Score: 13    LABS    CBC:   Lab Results   Component Value Date/Time    WBC 11.0 12/04/2022 05:45 AM    RBC 3.04 12/04/2022 05:45 AM    HGB 8.8 12/04/2022 05:45 AM    HCT 28.0 12/04/2022 05:45 AM    MCV 92.1 12/04/2022 05:45 AM    MCH 28.9 12/04/2022 05:45 AM    MCHC 31.4 12/04/2022 05:45 AM    RDW 14.2 12/04/2022 05:45 AM     12/04/2022 05:45 AM    MPV 9.5 12/04/2022 05:45 AM     CMP:    Lab Results   Component Value Date/Time     12/04/2022 05:45 AM    K 3.9 12/04/2022 05:45 AM     12/04/2022 05:45 AM    CO2 23 12/04/2022 05:45 AM    BUN 8 12/04/2022 05:45 AM    CREATININE 0.6 12/04/2022 05:45 AM    GFRAA >60 09/29/2022 07:53 PM    LABGLOM >60 12/04/2022 05:45 AM    GLUCOSE 206 12/04/2022 05:45 AM    PROT 6.4 11/28/2022 05:10 AM    LABALBU 3.3 11/28/2022 05:10 AM    CALCIUM 8.9 12/04/2022 05:45 AM    BILITOT 0.3 11/28/2022 05:10 AM    ALKPHOS 92 11/28/2022 05:10 AM    AST 20 11/28/2022 05:10 AM    ALT 15 11/28/2022 05:10 AM     Albumin:    Lab Results   Component Value Date/Time    LABALBU 3.3 11/28/2022 05:10 AM     PT/INR:    Lab Results   Component Value Date/Time    PROTIME 13.7 11/28/2022 05:10 AM    INR 1.06 11/28/2022 05:10 AM     HgBA1c:    Lab Results   Component Value Date/Time    LABA1C 5.9 09/29/2022 07:53 PM         Assessment:     Patient Active Problem List   Diagnosis    Chest pain    Dyspnea and respiratory abnormalities    Acute gastritis without hemorrhage    Diabetic ulcer of toe of left foot associated with type 2 diabetes mellitus, with fat layer exposed ([address]    Type 2 diabetes mellitus with peripheral neuropathy (HCC)    Peripheral vascular disease ([address]    History of nicotine dependence    Sepsis ([address]    Brain metastases ([address]    Diabetic foot infection ([address]    Cancer of left lung ([address] - removed at [facility] in 2019    Former smoker - quit in 2019    DM (diabetes mellitus), type 2 (HCC)    Seizures (HCC)    Hypokalemia    Hypophosphatemia    Leukocytosis    Metabolic acidosis, increased anion gap    Elevated CK    Respiratory failure (HCC)    Acute on chronic anemia    Severe malnutrition (HCC)    Gastrointestinal hemorrhage    Infection due to multidrug-resistant Stenotrophomonas maltophilia    Diabetic foot ulcer with osteomyelitis ([address]       Measurements:  Wound 08/16/22 Toe (Comment  which one) Anterior; Left Left Great Toe Incision (Active)   Number of days: 111       Wound 11/27/22 Toe (Comment  which one) Anterior; Left great toe Soft tissue ulceration at the surgical stump,erythema (Active)   Wound Image   12/06/22 1007   Wound Etiology Non-Healing Surgical 12/06/22 1007   Dressing Status New dressing applied 12/06/22 1007   Wound Cleansed Cleansed with saline 12/06/22 1007   Dressing/Treatment Betadine swabs/povidone iodine;ABD;Roll gauze 12/06/22 1007   Wound Length (cm) 2.5 cm 12/06/22 1007   Wound Width (cm) 2.5 cm 12/06/22 1007   Wound Depth (cm) 0.2 cm 12/06/22 1007   Wound Surface Area (cm^2) 6.25 cm^2 12/06/22 1007   Change in Wound Size % (l*w) 0 12/06/22 1007   Wound Volume (cm^3) 1.25 cm^3 12/06/22 1007   Wound Healing % 0 12/06/22 1007   Distance Tunneling (cm) 0 cm 12/06/22 1007   Tunneling Position ___ O'Clock 0 12/06/22 1007   Undermining Starts ___ O'Clock 0 12/06/22 1007   Undermining Ends___ O'Clock 0 12/06/22 1007   Undermining Maxium Distance (cm) 0 12/06/22 1007   Wound Assessment Eschar dry 12/06/22 1007   Drainage Amount None 12/06/22 1007   Drainage Description Serosanguinous 12/03/22 1930   Odor None 12/03/22 1930   Lauren-wound Assessment Intact 12/06/22 1007   Margins Attached edges 12/06/22 1007   Wound Thickness Description not for Pressure Injury Full thickness 12/06/22 1007   Number of days: 9       Wound 11/27/22 Foot Left;Lateral presure injury (Active)   Wound Image   12/06/22 1007   Wound Etiology Diabetic 12/06/22 1007   Dressing Status New dressing applied 12/06/22 1007   Wound Cleansed Cleansed with saline 12/06/22 1007   Dressing/Treatment Betadine swabs/povidone iodine;ABD;Roll gauze 12/06/22 1007   Wound Length (cm) 1 cm 12/06/22 1007   Wound Width (cm) 1 cm 12/06/22 1007   Wound Depth (cm) 0.1 cm 12/06/22 1007   Wound Surface Area (cm^2) 1 cm^2 12/06/22 1007   Change in Wound Size % (l*w) -4.17 12/06/22 1007   Wound Volume (cm^3) 0.1 cm^3 12/06/22 1007   Wound Healing % -4 12/06/22 1007   Distance Tunneling (cm) 0 cm 12/06/22 1007   Tunneling Position ___ O'Clock 0 12/06/22 1007   Undermining Starts ___ O'Clock 0 12/06/22 1007   Undermining Ends___ O'Clock 0 12/06/22 1007   Undermining Maxium Distance (cm) 0 12/06/22 1007   Wound Assessment Eschar dry 12/06/22 1007   Drainage Amount None 12/06/22 1007   Odor None 12/06/22 1007   Lauren-wound Assessment Intact 12/06/22 1007   Margins Attached edges 12/06/22 1007   Wound Thickness Description not for Pressure Injury Full thickness 12/06/22 1007   Number of days: 9       Wound 11/29/22 Coccyx (Active)   Wound Image   11/29/22 1025   Wound Etiology Pressure Stage 2 12/06/22 1007   Dressing Status New dressing applied 12/06/22 1007   Wound Cleansed Cleansed with saline 12/06/22 1007   Dressing/Treatment Silicone border 25/28/38 1007   Wound Length (cm) 0 cm 12/06/22 1007   Wound Width (cm) 0 cm 12/06/22 1007   Wound Depth (cm) 0 cm 12/06/22 1007   Wound Surface Area (cm^2) 0 cm^2 12/06/22 1007   Change in Wound Size % (l*w) 100 12/06/22 1007   Wound Volume (cm^3) 0 cm^3 12/06/22 1007   Wound Healing % 100 12/06/22 1007   Distance Tunneling (cm) 0 cm 12/03/22 1930   Tunneling Position ___ O'Clock 0 12/03/22 1930   Undermining Starts ___ O'Clock 0 12/03/22 1930   Undermining Ends___ O'Clock 0 12/03/22 1930   Undermining Maxium Distance (cm) 0 12/03/22 1930   Drainage Amount None 12/06/22 1007   Drainage Description Serosanguinous 12/03/22 1930   Odor None 12/03/22 1930   Lauren-wound Assessment Intact 12/03/22 1930   Margins Attached edges 12/06/22 1007   Wound Thickness Description not for Pressure Injury Partial thickness 12/03/22 1930   Number of days: 6       Response to treatment:  Well tolerated by patient. Pain Assessment:  Severity:  none  Quality of pain:   Wound Pain Timing/Severity:   Premedicated: no    Plan:     Plan of Care: Wound 11/27/22 Toe (Comment  which one) Anterior; Left great toe Soft tissue ulceration at the surgical stump,erythema-Dressing/Treatment: Betadine swabs/povidone iodine, ABD, Roll gauze (betadine moist gauze)  Wound 11/27/22 Foot Left;Lateral presure injury-Dressing/Treatment: Betadine swabs/povidone iodine, ABD, Roll gauze (betadine moist gauze)  Wound 11/29/22 Coccyx-Dressing/Treatment: Silicone border    Patient in bed  at bedside sleeping. Pt is non-verbal with a tracheostomy. Left foot with arteral/diabetic/surgical wounds lateral and left great toe amp site. Dry eschar bone exposed to left great toe. Cleansed with NS. Measured and pictured. Applied betadine moist gauze abd kerlix. Heels intact. Sacrum intact with dry eschar previous stage 2 not open now. Applied foam border. Pt turned to left side with pillow support. Pt is at moderate risk for skin breakdown AEB magnus. Follow magnus orders. Atmos air pump placed to the bed. Specialty Bed Required : yes  [] Low Air Loss   [x] Pressure Redistribution  [] Fluid Immersion  [] Bariatric  [] Total Pressure Relief  [] Other:     Discharge Plan:  Placement for patient upon discharge: tbd  Hospice Care: no  Patient appropriate for Outpatient [address] [facility] [address] [facility] [address]: yes    Patient/Caregiver Teaching:  Level of patient/caregiver understanding able to: pt not appropriate for teaching. Electronically signed by Wiliam Cheney.  DENIZ Wilkins,  on 12/6/2022 at 12:03 PM

## 2022-12-07 LAB
GLUCOSE BLD-MCNC: 108 MG/DL (ref 70–99)
GLUCOSE BLD-MCNC: 158 MG/DL (ref 70–99)
GLUCOSE BLD-MCNC: 162 MG/DL (ref 70–99)
GLUCOSE BLD-MCNC: 164 MG/DL (ref 70–99)

## 2022-12-07 PROCEDURE — 2580000003 HC RX 258: Performed by: INTERNAL MEDICINE

## 2022-12-07 PROCEDURE — C9113 INJ PANTOPRAZOLE SODIUM, VIA: HCPCS | Performed by: INTERNAL MEDICINE

## 2022-12-07 PROCEDURE — A4216 STERILE WATER/SALINE, 10 ML: HCPCS | Performed by: INTERNAL MEDICINE

## 2022-12-07 PROCEDURE — 6370000000 HC RX 637 (ALT 250 FOR IP): Performed by: INTERNAL MEDICINE

## 2022-12-07 PROCEDURE — 6360000002 HC RX W HCPCS: Performed by: INTERNAL MEDICINE

## 2022-12-07 PROCEDURE — 6360000002 HC RX W HCPCS: Performed by: HOSPITALIST

## 2022-12-07 PROCEDURE — 6370000000 HC RX 637 (ALT 250 FOR IP): Performed by: NURSE PRACTITIONER

## 2022-12-07 PROCEDURE — 2700000000 HC OXYGEN THERAPY PER DAY

## 2022-12-07 PROCEDURE — 2140000000 HC CCU INTERMEDIATE R&B

## 2022-12-07 PROCEDURE — C9254 INJECTION, LACOSAMIDE: HCPCS | Performed by: INTERNAL MEDICINE

## 2022-12-07 PROCEDURE — 82962 GLUCOSE BLOOD TEST: CPT

## 2022-12-07 PROCEDURE — 94761 N-INVAS EAR/PLS OXIMETRY MLT: CPT

## 2022-12-07 PROCEDURE — 6360000002 HC RX W HCPCS: Performed by: STUDENT IN AN ORGANIZED HEALTH CARE EDUCATION/TRAINING PROGRAM

## 2022-12-07 RX ADMIN — SODIUM CHLORIDE 200 MG: 9 INJECTION, SOLUTION INTRAVENOUS at 09:24

## 2022-12-07 RX ADMIN — SODIUM CHLORIDE, PRESERVATIVE FREE 40 MG: 5 INJECTION INTRAVENOUS at 09:22

## 2022-12-07 RX ADMIN — MORPHINE SULFATE 2 MG: 2 INJECTION, SOLUTION INTRAMUSCULAR; INTRAVENOUS at 10:50

## 2022-12-07 RX ADMIN — Medication 100 MG: at 09:29

## 2022-12-07 RX ADMIN — MORPHINE SULFATE 2 MG: 2 INJECTION, SOLUTION INTRAMUSCULAR; INTRAVENOUS at 16:07

## 2022-12-07 RX ADMIN — SODIUM CHLORIDE, PRESERVATIVE FREE 10 ML: 5 INJECTION INTRAVENOUS at 20:45

## 2022-12-07 RX ADMIN — SODIUM CHLORIDE 200 MG: 9 INJECTION, SOLUTION INTRAVENOUS at 23:16

## 2022-12-07 RX ADMIN — Medication 1 CAPSULE: at 09:36

## 2022-12-07 RX ADMIN — INSULIN GLARGINE 20 UNITS: 100 INJECTION, SOLUTION SUBCUTANEOUS at 09:30

## 2022-12-07 RX ADMIN — MORPHINE SULFATE 2 MG: 2 INJECTION, SOLUTION INTRAMUSCULAR; INTRAVENOUS at 20:45

## 2022-12-07 RX ADMIN — LORAZEPAM 1 MG: 2 INJECTION INTRAMUSCULAR at 12:57

## 2022-12-07 RX ADMIN — SODIUM CHLORIDE, PRESERVATIVE FREE 5 ML: 5 INJECTION INTRAVENOUS at 23:15

## 2022-12-07 RX ADMIN — SULFAMETHOXAZOLE AND TRIMETHOPRIM 1 TABLET: 800; 160 TABLET ORAL at 09:28

## 2022-12-07 RX ADMIN — CARVEDILOL 12.5 MG: 6.25 TABLET, FILM COATED ORAL at 09:36

## 2022-12-07 RX ADMIN — SULFAMETHOXAZOLE AND TRIMETHOPRIM 1 TABLET: 800; 160 TABLET ORAL at 21:16

## 2022-12-07 RX ADMIN — OXYCODONE HYDROCHLORIDE 5 MG: 5 TABLET ORAL at 02:21

## 2022-12-07 RX ADMIN — LOSARTAN POTASSIUM 50 MG: 25 TABLET, FILM COATED ORAL at 09:29

## 2022-12-07 RX ADMIN — FOLIC ACID 1 MG: 1 TABLET ORAL at 09:28

## 2022-12-07 RX ADMIN — MORPHINE SULFATE 2 MG: 2 INJECTION, SOLUTION INTRAMUSCULAR; INTRAVENOUS at 02:21

## 2022-12-07 RX ADMIN — SODIUM CHLORIDE, PRESERVATIVE FREE 20 ML: 5 INJECTION INTRAVENOUS at 10:01

## 2022-12-07 ASSESSMENT — PAIN SCALES - WONG BAKER
WONGBAKER_NUMERICALRESPONSE: 2

## 2022-12-07 ASSESSMENT — ENCOUNTER SYMPTOMS
COUGH: 1
EYES NEGATIVE: 1
ALLERGIC/IMMUNOLOGIC NEGATIVE: 1
SHORTNESS OF BREATH: 1
COLOR CHANGE: 1
NAUSEA: 1
VOMITING: 1
WHEEZING: 1

## 2022-12-07 ASSESSMENT — PAIN DESCRIPTION - LOCATION
LOCATION: GENERALIZED
LOCATION: GENERALIZED

## 2022-12-07 ASSESSMENT — PAIN SCALES - PAIN ASSESSMENT IN ADVANCED DEMENTIA (PAINAD)
CONSOLABILITY: 0
TOTALSCORE: 4
NEGVOCALIZATION: 0
FACIALEXPRESSION: 2
BODYLANGUAGE: 1
BREATHING: 1

## 2022-12-07 ASSESSMENT — PAIN SCALES - GENERAL
PAINLEVEL_OUTOF10: 7
PAINLEVEL_OUTOF10: 9

## 2022-12-07 ASSESSMENT — PAIN DESCRIPTION - DESCRIPTORS
DESCRIPTORS: ACHING;OTHER (COMMENT)
DESCRIPTORS: ACHING

## 2022-12-07 ASSESSMENT — PAIN DESCRIPTION - ONSET: ONSET: UNABLE TO COMMUNICATE

## 2022-12-07 ASSESSMENT — PAIN DESCRIPTION - PAIN TYPE
TYPE: CHRONIC PAIN
TYPE: CHRONIC PAIN

## 2022-12-07 ASSESSMENT — PAIN - FUNCTIONAL ASSESSMENT
PAIN_FUNCTIONAL_ASSESSMENT: INTOLERABLE, UNABLE TO DO ANY ACTIVE OR PASSIVE ACTIVITIES
PAIN_FUNCTIONAL_ASSESSMENT: PREVENTS OR INTERFERES SOME ACTIVE ACTIVITIES AND ADLS

## 2022-12-07 ASSESSMENT — PAIN DESCRIPTION - FREQUENCY: FREQUENCY: CONTINUOUS

## 2022-12-07 NOTE — PLAN OF CARE
Problem: Pain  Goal: Verbalizes/displays adequate comfort level or baseline comfort level  12/7/2022 1343 by Kimberly Rodriguez RN  Outcome: Progressing  12/7/2022 0342 by Misha Obrien RN  Outcome: Progressing     Problem: Skin/Tissue Integrity  Goal: Absence of new skin breakdown  Description: 1. Monitor for areas of redness and/or skin breakdown  2. Assess vascular access sites hourly  3. Every 4-6 hours minimum:  Change oxygen saturation probe site  4. Every 4-6 hours:  If on nasal continuous positive airway pressure, respiratory therapy assess nares and determine need for appliance change or resting period.   12/7/2022 1343 by Kimberly Rodriguez RN  Outcome: Progressing  12/7/2022 0342 by Misha Obrien RN  Outcome: Progressing

## 2022-12-07 NOTE — PLAN OF CARE
Problem: Discharge Planning  Goal: Discharge to home or other facility with appropriate resources  Outcome: Progressing     Problem: Pain  Goal: Verbalizes/displays adequate comfort level or baseline comfort level  Outcome: Progressing     Problem: Skin/Tissue Integrity  Goal: Absence of new skin breakdown  Description: 1. Monitor for areas of redness and/or skin breakdown  2. Assess vascular access sites hourly  3. Every 4-6 hours minimum:  Change oxygen saturation probe site  4. Every 4-6 hours:  If on nasal continuous positive airway pressure, respiratory therapy assess nares and determine need for appliance change or resting period. Outcome: Progressing     Problem: Safety - Adult  Goal: Free from fall injury  Outcome: Progressing     Problem: Chronic Conditions and Co-morbidities  Goal: Patient's chronic conditions and co-morbidity symptoms are monitored and maintained or improved  Outcome: Progressing     Problem: Chronic Conditions and Co-morbidities  Goal: Patient's chronic conditions and co-morbidity symptoms are monitored and maintained or improved  Outcome: Progressing     Problem: Nutrition Deficit:  Goal: Optimize nutritional status  Outcome: Progressing     Problem: Dyspnea Due to End of Life  Goal: Demonstrate understanding of and ability to manage respiratory symptoms at end of life  Description: Patient  and or family/caregiver will verbalize recall of breathing strategies to maintain an effective breathing pattern during the inpatient hospice stay. Outcome: Progressing     Problem: Communication Deficit  Goal: Effectively communicate symptoms, needs, and concerns  Description: Patient  and/or family/caregiver will be able to communicate symptoms, needs, and concerns as evidenced by the use of language services during the inpatient hospice stay.     Outcome: Progressing     Problem: ABCDS Injury Assessment  Goal: Absence of physical injury  Outcome: Progressing

## 2022-12-07 NOTE — PROGRESS NOTES
V2.0  [facility] Hospitalist Progress Note      Name:  Federico Roberts /Age/Sex: 1955  (74 y.o. female)   MRN & CSN:  [id number] & [id number] Encounter Date/Time: 2022 12:50 PM EST    Location:  [address] [place] [address] PCP: Ricardo Sandhu MD       Hospital Day: 12    Assessment and Plan:   Federico Roberts is a 74 y.o. female with pmh of foot toe amputation history, history of metastatic lung cancer s/p left lobectomy, right frontal orbital craniotomy on 2022, pathology consistent with poorly differentiated metastatic adenocarcinoma status post, knife radiosurgery, s/p trach/PEG tube, seizure disorder who presents with Acute on chronic anemia    Plan:  Acute on chronic anemia likely GI bleed: Hemoglobin 9.2 on admission, previously 14.2 in September, FOBT positive. GI on board. Holding off on scope because of underlying metastatic lung disease history. H&H stable  Acute metabolic encephalopathy--pt somnolent, would not open eyes, turned head to verbal and physical stimulation, head CT-no acute intracranial abnormality, Two enlarging nodular areas of soft tissue thickening in the scalp. Differential includes pseudomeningoceles or metastatic tumor. Dysphagia--ST consulted for dysphagia, MBS ordered. Speech therapy team believes patient is having microaspiration's recommends n.p.o. TF through PEG, nutrition following  Left DFI with Residual OM and Dehiscence Secondary to PAD: Stenotrophomonas maltophilia in Respiratory Culture:   Afebrile, no leukocytosis  Pct 0.089, CRP 13.4  -BC 0/2-NGTD  -Resp culture: Stenotrophomonas maltophilia (resistant to levofloxacin)  Past left foot cultures 2022-Serratia marcescens, Staphylococcus epidermidis   -Left Foot Culture: Serratia marcescens  -XR Foot Left: 1. Status post amputation of the 1st proximal phalanx. No suspicious osseous   lesion. 2. Soft tissue ulceration at the surgical stump. 3. Bony demineralization.    Continue per PEG tube- Bactrim DS bid x 6 weeks (end date 1/11/2023)  DC - Levaquin as Stenotrophomonas is resistant  Trend CRP- ordered    Podiatry consult on board. History of osteomyelitis status post partial hallux amputation with excisional debridement in October 2022. X-ray of the left foot showed no suspicion of osseous lesions. Podiatry on board. Infectious disease on board. Osteomyelitis concerning. History of metastatic lung cancer s/p left lobectomy no chemo or radiation, met to right frontal lob s/p  right frontal craniotomy on 6/20/2022: h/o metastatic lung ca to brain, oncology was consulted to discuss goals of care, pt previously following with oncologist at [facility], Metastatic lung cancer-stage BRENDA(cTx, Cnx, pM1b), follows with Dr. Karissa Storey at [facility]. Discussed the findings, stage, very poor prognosis and very very limited options and discussed BSC/Hospice care and family has agreed to explore that option, meeting with hospice today at 7pm, Pathology results are consistent with poorly differentiated metastatic adenocarcinoma. Patient subsequently had gamma knife radiosurgery. Guarded prognosis. Goals of care--meeting with hospice today, [facility] Awaiting hospice home/SNF vs Inpatient    Diet Diet NPO Exceptions are: Ice Chips  ADULT TUBE FEEDING; PEG; Diabetic; Continuous; 20; Yes; 10; Q 4 hours; 45; 150; Q 4 hours   DVT Prophylaxis [] Lovenox, []  Heparin, [] SCDs, [] Ambulation,  [] Eliquis, [] Xarelto  [] Coumadin   Code Status DNR-CC   Disposition From: [facility]  Expected Disposition: SNF with hospice vs. Inpatient hospice  Estimated Date of Discharge: 1-2 days, hospice meeting 7pm   Surrogate Decision Maker/ POA      Subjective:     Chief Complaint: No chief complaint on file. Patient examined at bedside. Patient is comfortable. Opens eyes on verbal stimuli, not able to follow commands  Review of Systems:    Review of Systems   Constitutional:  Positive for activity change and fatigue. HENT: Negative. Eyes: Negative. Respiratory:  Positive for cough, shortness of breath and wheezing. Cardiovascular: Negative. Gastrointestinal:  Positive for nausea and vomiting. Endocrine: Negative. Genitourinary: Negative. Musculoskeletal: Negative. Skin:  Positive for color change. Allergic/Immunologic: Negative. Neurological:  Positive for speech difficulty. Hematological: Negative. Psychiatric/Behavioral:  Positive for agitation and confusion. ROS as above    Objective: Intake/Output Summary (Last 24 hours) at 12/7/2022 1332  Last data filed at 12/7/2022 0500  Gross per 24 hour   Intake 1750 ml   Output 550 ml   Net 1200 ml          Vitals:   Vitals:    12/07/22 1100   BP: (!) 120/55   Pulse: 83   Resp: 16   Temp: 98.5 °F (36.9 °C)   SpO2: 95%       Physical Exam:   Physical Exam  Vitals reviewed. Constitutional:       General: She is in acute distress. Appearance: She is ill-appearing. HENT:      Head: Normocephalic. Nose: Nose normal.      Mouth/Throat:      Mouth: Mucous membranes are moist.   Eyes:      Conjunctiva/sclera: Conjunctivae normal.      Pupils: Pupils are equal, round, and reactive to light. Cardiovascular:      Rate and Rhythm: Normal rate and regular rhythm. Pulses: Normal pulses. Heart sounds: Normal heart sounds. No murmur heard. Pulmonary:      Effort: Respiratory distress present. Breath sounds: Rhonchi and rales present. No wheezing. Abdominal:      General: Abdomen is flat. Bowel sounds are normal. There is no distension. Palpations: Abdomen is soft. Tenderness: There is no abdominal tenderness. Comments: PEG in place   Musculoskeletal:         General: Tenderness, deformity and signs of injury present. Normal range of motion. Cervical back: Normal range of motion and neck supple. Right lower leg: No edema. Left lower leg: Edema present.       Comments: Left toe wound   Skin:     Coloration: Skin is not jaundiced or pale. Neurological:      Mental Status: She is alert. She is disoriented. Motor: Weakness present.    Psychiatric:         Mood and Affect: Mood normal.      Comments: Confused    somnolent      Medications:   Medications:    carvedilol  12.5 mg Per G Tube BID WC    sulfamethoxazole-trimethoprim  1 tablet PEG Tube 2 times per day    sodium chloride flush  5-40 mL IntraVENous 2 times per day    pantoprazole (PROTONIX) 40 mg injection  40 mg IntraVENous Daily    folic acid  1 mg Per G Tube Daily    insulin glargine  20 Units SubCUTAneous Daily with breakfast    lactobacillus  1 capsule Per G Tube Daily with breakfast    losartan  50 mg Per G Tube Daily    scopolamine  1 patch TransDERmal Q72H    thiamine  100 mg Per G Tube Daily    insulin lispro  0-8 Units SubCUTAneous TID WC    insulin lispro  0-4 Units SubCUTAneous Nightly    lacosamide (VIMPAT) IVPB  200 mg IntraVENous BID      Infusions:    sodium chloride 25 mL (11/28/22 1913)    dextrose       PRN Meds: LORazepam, 1 mg, Q6H PRN  haloperidol lactate, 1 mg, Q6H PRN  morphine, 2 mg, Q4H PRN  sodium chloride flush, 5-40 mL, PRN  sodium chloride, , PRN  ondansetron, 4 mg, Q8H PRN   Or  ondansetron, 4 mg, Q6H PRN  acetaminophen, 650 mg, Q6H PRN   Or  acetaminophen, 650 mg, Q6H PRN  oxyCODONE, 5 mg, Q4H PRN  glucose, 4 tablet, PRN  dextrose bolus, 125 mL, PRN   Or  dextrose bolus, 250 mL, PRN  glucagon (rDNA), 1 mg, PRN  dextrose, , Continuous PRN      Labs      Recent Results (from the past 24 hour(s))   POCT Glucose    Collection Time: 12/06/22  4:45 PM   Result Value Ref Range    POC Glucose 159 (H) 70 - 99 MG/DL   POCT Glucose    Collection Time: 12/06/22  8:56 PM   Result Value Ref Range    POC Glucose 144 (H) 70 - 99 MG/DL   POCT Glucose    Collection Time: 12/07/22  6:39 AM   Result Value Ref Range    POC Glucose 162 (H) 70 - 99 MG/DL   POCT Glucose    Collection Time: 12/07/22 12:12 PM   Result Value Ref Range    POC Glucose 164 (H) 70 - 99 MG/DL        Imaging/Diagnostics Last 24 Hours   No results found.     Electronically signed by Aishwarya Iverson MD on 12/7/2022 at 1:32 PM

## 2022-12-07 NOTE — PROGRESS NOTES
Pts  Jacquelin Scanlon, came to the nurses station wanting to speak with someone to set up hospice for pt. [address] who spouse has been in contact with before and scheduled an appt with Nusrat tomorrow @ 4:00 PM. Pts spouse aware to be in pts room tomorrow by 4:00 PM for hospice meeting.          Sylvester Weaver RN

## 2022-12-08 VITALS
RESPIRATION RATE: 25 BRPM | SYSTOLIC BLOOD PRESSURE: 112 MMHG | WEIGHT: 196.7 LBS | HEIGHT: 66 IN | OXYGEN SATURATION: 100 % | TEMPERATURE: 98.7 F | HEART RATE: 84 BPM | DIASTOLIC BLOOD PRESSURE: 69 MMHG | BODY MASS INDEX: 31.61 KG/M2

## 2022-12-08 PROBLEM — C34.90 LUNG CANCER METASTATIC TO BRAIN (HCC): Status: ACTIVE | Noted: 2022-01-01

## 2022-12-08 PROBLEM — C34.90 ADENOCARCINOMA, LUNG (HCC): Status: ACTIVE | Noted: 2022-01-01

## 2022-12-08 PROBLEM — Z93.0 TRACHEOSTOMY STATUS (HCC): Status: ACTIVE | Noted: 2022-01-01

## 2022-12-08 PROBLEM — A49.8 INFECTION DUE TO MULTIDRUG-RESISTANT STENOTROPHOMONAS MALTOPHILIA: Status: RESOLVED | Noted: 2022-01-01 | Resolved: 2022-01-01

## 2022-12-08 PROBLEM — C34.90 NON-SMALL CELL LUNG CANCER WITH METASTASIS (HCC): Status: ACTIVE | Noted: 2022-01-01

## 2022-12-08 PROBLEM — Z93.0 TRACHEOSTOMY STATUS (HCC): Chronic | Status: ACTIVE | Noted: 2022-01-01

## 2022-12-08 PROBLEM — C79.31 LUNG CANCER METASTATIC TO BRAIN (HCC): Status: ACTIVE | Noted: 2022-01-01

## 2022-12-08 PROBLEM — Z16.24 INFECTION DUE TO MULTIDRUG-RESISTANT STENOTROPHOMONAS MALTOPHILIA: Status: RESOLVED | Noted: 2022-11-30 | Resolved: 2022-12-08

## 2022-12-08 LAB
GLUCOSE BLD-MCNC: 157 MG/DL (ref 70–99)
GLUCOSE BLD-MCNC: 160 MG/DL (ref 70–99)
GLUCOSE BLD-MCNC: 188 MG/DL (ref 70–99)
GLUCOSE BLD-MCNC: 198 MG/DL (ref 70–99)

## 2022-12-08 PROCEDURE — 6370000000 HC RX 637 (ALT 250 FOR IP): Performed by: NURSE PRACTITIONER

## 2022-12-08 PROCEDURE — 6370000000 HC RX 637 (ALT 250 FOR IP): Performed by: INTERNAL MEDICINE

## 2022-12-08 PROCEDURE — 2700000000 HC OXYGEN THERAPY PER DAY

## 2022-12-08 PROCEDURE — C9113 INJ PANTOPRAZOLE SODIUM, VIA: HCPCS | Performed by: INTERNAL MEDICINE

## 2022-12-08 PROCEDURE — 89220 SPUTUM SPECIMEN COLLECTION: CPT

## 2022-12-08 PROCEDURE — 2580000003 HC RX 258: Performed by: INTERNAL MEDICINE

## 2022-12-08 PROCEDURE — 82962 GLUCOSE BLOOD TEST: CPT

## 2022-12-08 PROCEDURE — C9254 INJECTION, LACOSAMIDE: HCPCS | Performed by: INTERNAL MEDICINE

## 2022-12-08 PROCEDURE — A4216 STERILE WATER/SALINE, 10 ML: HCPCS | Performed by: INTERNAL MEDICINE

## 2022-12-08 PROCEDURE — 6360000002 HC RX W HCPCS: Performed by: HOSPITALIST

## 2022-12-08 PROCEDURE — 94761 N-INVAS EAR/PLS OXIMETRY MLT: CPT

## 2022-12-08 PROCEDURE — 6360000002 HC RX W HCPCS: Performed by: INTERNAL MEDICINE

## 2022-12-08 PROCEDURE — 6360000002 HC RX W HCPCS: Performed by: STUDENT IN AN ORGANIZED HEALTH CARE EDUCATION/TRAINING PROGRAM

## 2022-12-08 RX ORDER — HALOPERIDOL 5 MG/ML
1 INJECTION INTRAMUSCULAR
Status: CANCELLED | OUTPATIENT
Start: 2022-12-08

## 2022-12-08 RX ORDER — MORPHINE SULFATE 4 MG/ML
4 INJECTION, SOLUTION INTRAMUSCULAR; INTRAVENOUS
Status: CANCELLED | OUTPATIENT
Start: 2022-12-08

## 2022-12-08 RX ORDER — LORAZEPAM 2 MG/ML
0.5 INJECTION INTRAMUSCULAR
Status: CANCELLED | OUTPATIENT
Start: 2022-12-08

## 2022-12-08 RX ORDER — INSULIN LISPRO 100 [IU]/ML
0-8 INJECTION, SOLUTION INTRAVENOUS; SUBCUTANEOUS
Status: CANCELLED | OUTPATIENT
Start: 2022-12-09

## 2022-12-08 RX ORDER — SULFAMETHOXAZOLE AND TRIMETHOPRIM 800; 160 MG/1; MG/1
1 TABLET ORAL EVERY 12 HOURS SCHEDULED
Status: CANCELLED | OUTPATIENT
Start: 2022-12-08 | End: 2023-01-11

## 2022-12-08 RX ORDER — BISACODYL 10 MG
10 SUPPOSITORY, RECTAL RECTAL DAILY PRN
Status: CANCELLED | OUTPATIENT
Start: 2022-12-08

## 2022-12-08 RX ORDER — INSULIN LISPRO 100 [IU]/ML
0-4 INJECTION, SOLUTION INTRAVENOUS; SUBCUTANEOUS NIGHTLY
Status: CANCELLED | OUTPATIENT
Start: 2022-12-08

## 2022-12-08 RX ORDER — ACETAMINOPHEN 650 MG/1
650 SUPPOSITORY RECTAL EVERY 4 HOURS PRN
Status: CANCELLED | OUTPATIENT
Start: 2022-12-08

## 2022-12-08 RX ORDER — LACOSAMIDE 10 MG/ML
200 SOLUTION ORAL 2 TIMES DAILY
Status: CANCELLED | OUTPATIENT
Start: 2022-12-08

## 2022-12-08 RX ORDER — SODIUM CHLORIDE 9 MG/ML
INJECTION, SOLUTION INTRAVENOUS PRN
Status: CANCELLED | OUTPATIENT
Start: 2022-12-08

## 2022-12-08 RX ORDER — CARVEDILOL 6.25 MG/1
12.5 TABLET ORAL 2 TIMES DAILY WITH MEALS
Status: CANCELLED | OUTPATIENT
Start: 2022-12-09

## 2022-12-08 RX ORDER — MORPHINE SULFATE 2 MG/ML
2 INJECTION, SOLUTION INTRAMUSCULAR; INTRAVENOUS
Status: CANCELLED | OUTPATIENT
Start: 2022-12-08

## 2022-12-08 RX ORDER — SODIUM CHLORIDE 0.9 % (FLUSH) 0.9 %
5-40 SYRINGE (ML) INJECTION EVERY 12 HOURS SCHEDULED
Status: CANCELLED | OUTPATIENT
Start: 2022-12-08

## 2022-12-08 RX ORDER — ACETAMINOPHEN 160 MG/5ML
650 SUSPENSION, ORAL (FINAL DOSE FORM) ORAL EVERY 6 HOURS PRN
Status: CANCELLED | OUTPATIENT
Start: 2022-12-08

## 2022-12-08 RX ORDER — SODIUM CHLORIDE 0.9 % (FLUSH) 0.9 %
5-40 SYRINGE (ML) INJECTION PRN
Status: CANCELLED | OUTPATIENT
Start: 2022-12-08

## 2022-12-08 RX ORDER — INSULIN GLARGINE 100 [IU]/ML
20 INJECTION, SOLUTION SUBCUTANEOUS
Status: CANCELLED | OUTPATIENT
Start: 2022-12-09

## 2022-12-08 RX ORDER — LORAZEPAM 2 MG/ML
1 INJECTION INTRAMUSCULAR
Status: CANCELLED | OUTPATIENT
Start: 2022-12-08

## 2022-12-08 RX ORDER — GLYCOPYRROLATE 0.2 MG/ML
0.2 INJECTION INTRAMUSCULAR; INTRAVENOUS EVERY 4 HOURS PRN
Status: CANCELLED | OUTPATIENT
Start: 2022-12-08

## 2022-12-08 RX ORDER — DEXTROSE MONOHYDRATE 100 MG/ML
INJECTION, SOLUTION INTRAVENOUS CONTINUOUS PRN
Status: CANCELLED | OUTPATIENT
Start: 2022-12-08

## 2022-12-08 RX ORDER — LOSARTAN POTASSIUM 25 MG/1
50 TABLET ORAL DAILY
Status: CANCELLED | OUTPATIENT
Start: 2022-12-09

## 2022-12-08 RX ORDER — SCOLOPAMINE TRANSDERMAL SYSTEM 1 MG/1
1 PATCH, EXTENDED RELEASE TRANSDERMAL
Status: CANCELLED | OUTPATIENT
Start: 2022-12-09

## 2022-12-08 RX ADMIN — OXYCODONE HYDROCHLORIDE 5 MG: 5 TABLET ORAL at 10:05

## 2022-12-08 RX ADMIN — MORPHINE SULFATE 2 MG: 2 INJECTION, SOLUTION INTRAMUSCULAR; INTRAVENOUS at 03:33

## 2022-12-08 RX ADMIN — SULFAMETHOXAZOLE AND TRIMETHOPRIM 1 TABLET: 800; 160 TABLET ORAL at 20:58

## 2022-12-08 RX ADMIN — SODIUM CHLORIDE, PRESERVATIVE FREE 10 ML: 5 INJECTION INTRAVENOUS at 10:10

## 2022-12-08 RX ADMIN — SODIUM CHLORIDE 200 MG: 9 INJECTION, SOLUTION INTRAVENOUS at 21:01

## 2022-12-08 RX ADMIN — SULFAMETHOXAZOLE AND TRIMETHOPRIM 1 TABLET: 800; 160 TABLET ORAL at 10:05

## 2022-12-08 RX ADMIN — MORPHINE SULFATE 2 MG: 2 INJECTION, SOLUTION INTRAMUSCULAR; INTRAVENOUS at 17:55

## 2022-12-08 RX ADMIN — SODIUM CHLORIDE, PRESERVATIVE FREE 10 ML: 5 INJECTION INTRAVENOUS at 03:32

## 2022-12-08 RX ADMIN — LORAZEPAM 1 MG: 2 INJECTION INTRAMUSCULAR at 01:57

## 2022-12-08 RX ADMIN — SODIUM CHLORIDE 200 MG: 9 INJECTION, SOLUTION INTRAVENOUS at 10:12

## 2022-12-08 RX ADMIN — LORAZEPAM 1 MG: 2 INJECTION INTRAMUSCULAR at 10:05

## 2022-12-08 RX ADMIN — CARVEDILOL 12.5 MG: 6.25 TABLET, FILM COATED ORAL at 10:05

## 2022-12-08 RX ADMIN — SODIUM CHLORIDE, PRESERVATIVE FREE 10 ML: 5 INJECTION INTRAVENOUS at 01:57

## 2022-12-08 RX ADMIN — LOSARTAN POTASSIUM 50 MG: 25 TABLET, FILM COATED ORAL at 10:05

## 2022-12-08 RX ADMIN — MORPHINE SULFATE 2 MG: 2 INJECTION, SOLUTION INTRAMUSCULAR; INTRAVENOUS at 14:37

## 2022-12-08 RX ADMIN — Medication 100 MG: at 10:05

## 2022-12-08 RX ADMIN — Medication 1 CAPSULE: at 10:18

## 2022-12-08 RX ADMIN — LORAZEPAM 1 MG: 2 INJECTION INTRAMUSCULAR at 16:23

## 2022-12-08 RX ADMIN — MORPHINE SULFATE 2 MG: 2 INJECTION, SOLUTION INTRAMUSCULAR; INTRAVENOUS at 10:05

## 2022-12-08 RX ADMIN — SODIUM CHLORIDE, PRESERVATIVE FREE 10 ML: 5 INJECTION INTRAVENOUS at 20:58

## 2022-12-08 RX ADMIN — FOLIC ACID 1 MG: 1 TABLET ORAL at 10:05

## 2022-12-08 RX ADMIN — SODIUM CHLORIDE, PRESERVATIVE FREE 40 MG: 5 INJECTION INTRAVENOUS at 10:05

## 2022-12-08 RX ADMIN — INSULIN GLARGINE 20 UNITS: 100 INJECTION, SOLUTION SUBCUTANEOUS at 10:06

## 2022-12-08 ASSESSMENT — ENCOUNTER SYMPTOMS
SHORTNESS OF BREATH: 1
COUGH: 1
VOMITING: 1
COLOR CHANGE: 1
ALLERGIC/IMMUNOLOGIC NEGATIVE: 1
NAUSEA: 1
EYES NEGATIVE: 1
WHEEZING: 1

## 2022-12-08 ASSESSMENT — PAIN SCALES - PAIN ASSESSMENT IN ADVANCED DEMENTIA (PAINAD)
TOTALSCORE: 5
BREATHING: 0
CONSOLABILITY: 1
FACIALEXPRESSION: 2
BODYLANGUAGE: 2
NEGVOCALIZATION: 0

## 2022-12-08 ASSESSMENT — PAIN SCALES - GENERAL: PAINLEVEL_OUTOF10: 5

## 2022-12-08 NOTE — PROGRESS NOTES
[address] [place] [address]  Progress Note    Date: 12/8/2022  Name: Wanda Funes  MRN: [id number]  YOB: 1955   Patient's PCP: Niki Bhatia MD  Consultants during acute care: Pulmonary, ID, Podiatry, Gastroenterology, Wound care  Acute care admission date: 11/26/2022 and 11/11 to 11/23/22 at [facility]     Subjective: The patient's eyes are open and she stares off and there is no acute distress. Per records, the patient's spouse is now agreeable for hospice and requests hospice at home rather than return to Nursing Home, and a meeting with the hospice nurse liaison is scheduled for late on 12/8/22 (there was prior meeting 12/5/22 and the spouse was not ready to make a decision). The patient is on tube feeding. Objective:   Pain is managed with Morphine IV prn with 4 doses in the past 24 hours, snd Lorazepam is available for anxiety. Data reviewed 12/8/2022:  Frontal lobe lesion resection 6/20/2022  FINAL DIAGNOSIS   A. BRAIN MASS:  - METASTATIC, POORLY DIFFERENTIATED ADENOCARCINOMA, SEE NOTE. B. BRAIN MASS:  - METASTATIC, POORLY DIFFERENTIATED ADENOCARCINOMA, SEE NOTE. CT-scan of the brain 12/2/22:  No acute intracranial abnormality. Two enlarging nodular areas of soft tissue thickening in the scalp. Differential includes pseudomeningoceles  or recurrent tumor. CT chest, abdomen, pelvis 12/2/22:  Chest:       Cardiomegaly. Coronary artery disease. No effusion. Right-sided MediPort   device tip within the right SVC near the cavoatrial junction. There is   mucosal thickening of the left bronchus intermedius and left lower lobe   bronchus which may be seen with bronchitis or emphysematous changes. No   suspicious hilar or mediastinal adenopathy. There emphysematous changes. Previous left partial pneumonectomy. Tree-in-bud nodules identified throughout much of the left lung favored to be   due to small airways disease or aspiration.   Pleural base nodule left upper   lung 3 mm in size stable dating back to 2018. Tracheostomy. Degenerate changes of the thoracic spine. No suspicious osseous lesions. Abdomen/Pelvis:       Motion and streak artifact challenge evaluation. Gallbladder is contracted. No suspicious liver, splenic, adrenal glands,   kidneys, or pancreas lesions. The pancreas is diminutive size and with   calcifications and ductal dilatation can be seen with chronic pancreatitis. Pancreatic duct measuring 5 mm. Common bile duct measures 5 mm. Mild retained stool. Thickening of the gastric antrum could relate to   underdistention. No bowel obstruction. Gastrostomy tube within the   stomach. .  Moderate stool rectosigmoid junction. No suspicious adenopathy. Lobulated uterus compatible with fibroids. Bladder is unremarkable. No suspicious adnexal mass. Moderate to severe   aortic calcifications. Evidence of bilateral iliac artery stents noted. Posterior changes right and left groin greatest on right. Loss of contrast   involving the right proximal femoral artery. Degenerate changes lumbar spine. Degenerate changes of the hips pelvis. Age   indeterminate compression fracture at L4. With mild retropulsion.       Hemoglobin A1C 9/29/22: 5.9%  Accuchecks:   Recent Labs     12/07/22  2117 12/08/22  0648 12/08/22  0849   POCGLU 158* 188* 198*      Hepatic Function Panel:    Lab Results   Component Value Date/Time    ALKPHOS 92 11/28/2022 05:10 AM    ALT 15 11/28/2022 05:10 AM    AST 20 11/28/2022 05:10 AM    PROT 6.4 11/28/2022 05:10 AM    BILITOT 0.3 11/28/2022 05:10 AM    BILIDIR 0.2 10/02/2020 08:34 PM    IBILI 0.5 10/02/2020 08:34 PM    LABALBU 3.3 11/28/2022 05:10 AM     CBC with Differential:    Lab Results   Component Value Date/Time    WBC 11.0 12/04/2022 05:45 AM    RBC 3.04 12/04/2022 05:45 AM    HGB 8.8 12/04/2022 05:45 AM    HCT 28.0 12/04/2022 05:45 AM     12/04/2022 05:45 AM    MCV 92.1 12/04/2022 05:45 AM    MCH 28.9 12/04/2022 05:45 AM    MCHC 31.4 12/04/2022 05:45 AM    RDW 14.2 12/04/2022 05:45 AM    SEGSPCT 74.1 12/04/2022 05:45 AM    LYMPHOPCT 15.2 12/04/2022 05:45 AM    MONOPCT 9.3 12/04/2022 05:45 AM    BASOPCT 0.3 12/04/2022 05:45 AM    MONOSABS 1.0 12/04/2022 05:45 AM    LYMPHSABS 1.7 12/04/2022 05:45 AM    EOSABS 0.1 12/04/2022 05:45 AM    BASOSABS 0.0 12/04/2022 05:45 AM    DIFFTYPE AUTOMATED DIFFERENTIAL 12/04/2022 05:45 AM     BMP:    Lab Results   Component Value Date/Time     12/04/2022 05:45 AM    K 3.9 12/04/2022 05:45 AM     12/04/2022 05:45 AM    CO2 23 12/04/2022 05:45 AM    BUN 8 12/04/2022 05:45 AM    LABALBU 3.3 11/28/2022 05:10 AM    CREATININE 0.6 12/04/2022 05:45 AM    CALCIUM 8.9 12/04/2022 05:45 AM    GFRAA >60 09/29/2022 07:53 PM    LABGLOM >60 12/04/2022 05:45 AM    GLUCOSE 206 12/04/2022 05:45 AM       Physical Exam:   /66   Pulse 92   Temp 98 °F (36.7 °C) (Oral)   Resp 25   Ht 5' 6\" (1.676 m)   Wt 196 lb 11.2 oz (89.2 kg)   SpO2 100%   BMI 31.75 kg/m²   General: eyes are open and stares off, nonverbal, chronically ill appearing   Skin: wound images reviewed in the media tab  HEENT: Mucous membranes are dry, mild conjunctival pallor   Neck: tracheostomy in place with trach collar oxygen  Chest: right Mediport    Heart: distant tones, mildly tachycardic RRR, S1S2, no murmurs  Lungs:  coarse breath sounds bilaterally, diminished at the bases, without rales, scattered rhonchi   Abdomen: soft, bowel sounds present, no apparent tenderness, PEG in place, nondistended   and rectal: deferred  Extremities:  left hallux surgically absent, no mottling, no edema  Neurologic: staring off, does not follow commands      Assessment/Plan:  . Metastatic Non Small Cell left lung cancer diagnosed October 2019 with right frontal cerebral metastasis and gamma knife June 2022.  The patient is Hospice eligible on acute care discharge if spouse agrees with comfort care and follow up appointment is arranged for 12/8. 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, followed by ID  Chronic respiratory failure with tracheostomy  Peripheral vascular disease   Seizure disorder with brain metastases, on Lacosamide (history of status epilepticus September 2022)  Type 2 diabetes mellitus, Hemoglobin A1C 9/29/22: 5.9%       Patient Active Problem List   Diagnosis Code    Chest pain R07.9    Dyspnea and respiratory abnormalities R06.00, R06.89    Acute gastritis without hemorrhage K29.00    Diabetic ulcer of toe of left foot associated with type 2 diabetes mellitus, with fat layer exposed ([address] E11.621, L97.522    Type 2 diabetes mellitus with peripheral neuropathy (HCC) E11.42    Peripheral vascular disease (HCC) I73.9    History of nicotine dependence Z87.891    Sepsis ([address] A41.9    Brain metastases (HCC) C79.31    Diabetic foot infection ([address] E11.628, L08.9    Cancer of left lung ([address] - removed at [facility] in 2019 C34.92    Former smoker - quit in 2019 Z87.891    DM (diabetes mellitus), type 2 ([address] E11.9    Seizures ([address] R56.9    Hypokalemia E87.6    Hypophosphatemia E83.39    Leukocytosis G39.448    Metabolic acidosis, increased anion gap E87.29    Elevated CK R74.8    Respiratory failure (HCC) J96.90    Acute on chronic anemia D64.9    Severe malnutrition (HCC) E43    Gastrointestinal hemorrhage K92.2    Infection due to multidrug-resistant Stenotrophomonas maltophilia A49.8, Z16.24    Diabetic foot ulcer with osteomyelitis (HCC) E11.621, E11.69, L97.509, M86.9       BERTRAM Denson MD  12/8/2022

## 2022-12-08 NOTE — PROGRESS NOTES
[address] [facility] with the hospice nurse liaison. The family is in agreement with comfort care and hospice. The patient has become more agitated and painful since yesterday and recommend [facility]ospice for symptom management of pain, delirium, anxiety and possible end of life care. I will place orders for discharge readmit.      Angel Hull MD

## 2022-12-08 NOTE — PROGRESS NOTES
V2.0  [facility] Hospitalist Progress Note      Name:  Lottie Abernathy /Age/Sex: 1955  (74 y.o. female)   MRN & CSN:  [id number] & [id number] Encounter Date/Time: 2022 12:50 PM EST    Location:  [address] [place], [place] [address] PCP: Bernadette Tijerina MD       Hospital Day: 13    Assessment and Plan:   Lottie Abernathy is a 74 y.o. female with pmh of foot toe amputation history, history of metastatic lung cancer s/p left lobectomy, right frontal orbital craniotomy on 2022, pathology consistent with poorly differentiated metastatic adenocarcinoma status post, knife radiosurgery, s/p trach/PEG tube, seizure disorder who presents with Acute on chronic anemia    Plan:  Hospice has been consulted, meeting set up with  and hospice RN at 4 PM today, plan for hospice at home. Acute on chronic anemia likely GI bleed: Hemoglobin 9.2 on admission, previously 14.2 in September, FOBT positive. GI on board. Holding off on scope because of underlying metastatic lung disease history. H&H stable  Acute metabolic encephalopathy--pt somnolent, would not open eyes, turned head to verbal and physical stimulation, head CT-no acute intracranial abnormality, Two enlarging nodular areas of soft tissue thickening in the scalp. Differential includes pseudomeningoceles or metastatic tumor. Dysphagia--ST consulted for dysphagia, MBS ordered. Speech therapy team believes patient is having microaspiration's recommends n.p.o. TF through PEG, nutrition following  Left DFI with Residual OM and Dehiscence Secondary to PAD: Stenotrophomonas maltophilia in Respiratory Culture:   Afebrile, no leukocytosis  Pct 0.089, CRP 13.4  -BC 0/2-NGTD  -Resp culture: Stenotrophomonas maltophilia (resistant to levofloxacin)  Past left foot cultures 2022-Serratia marcescens, Staphylococcus epidermidis   -Left Foot Culture: Serratia marcescens  -XR Foot Left: 1. Status post amputation of the 1st proximal phalanx. No suspicious osseous   lesion. 2. Soft tissue ulceration at the surgical stump. 3. Bony demineralization. Continue per PEG tube- Bactrim DS bid x 6 weeks (end date 1/11/2023)  History of metastatic lung cancer s/p left lobectomy no chemo or radiation, met to right frontal lob s/p  right frontal craniotomy on 6/20/2022: h/o metastatic lung ca to brain, oncology was consulted to discuss goals of care, pt previously following with oncologist at [facility] in [facility], Metastatic lung cancer-stage BRENDA(cTx, Cnx, pM1b), follows with Dr. Porsha Renae at [facility]. Discussed the findings, stage, very poor prognosis and very very limited options and discussed BSC/Hospice care and family has agreed to explore that option, meeting with hospice today at 7pm, Pathology results are consistent with poorly differentiated metastatic adenocarcinoma. Patient subsequently had gamma knife radiosurgery. Guarded prognosis. Goals of care--meeting with hospice today, [facility] Awaiting hospice home/SNF vs Inpatient    Diet Diet NPO Exceptions are: Ice Chips  ADULT TUBE FEEDING; PEG; Diabetic; Continuous; 20; Yes; 10; Q 4 hours; 45; 150; Q 4 hours   DVT Prophylaxis [] Lovenox, []  Heparin, [] SCDs, [] Ambulation,  [] Eliquis, [] Xarelto  [] Coumadin   Code Status DNR-CC   Disposition From: [facility]  Expected Disposition: SNF with hospice vs. Inpatient hospice  Estimated Date of Discharge: 1-2 days, hospice meeting 7pm   Surrogate Decision Maker/ POA      Subjective:     Chief Complaint: No chief complaint on file. Patient examined at bedside. Patient is comfortable. Opens eyes on verbal stimuli, not able to follow commands  Review of Systems:    Review of Systems   Constitutional:  Positive for activity change and fatigue. HENT: Negative. Eyes: Negative. Respiratory:  Positive for cough, shortness of breath and wheezing. Cardiovascular: Negative. Gastrointestinal:  Positive for nausea and vomiting. Endocrine: Negative. Genitourinary: Negative. Musculoskeletal: Negative. Skin:  Positive for color change. Allergic/Immunologic: Negative. Neurological:  Positive for speech difficulty. Hematological: Negative. Psychiatric/Behavioral:  Positive for agitation and confusion. ROS as above    Objective: Intake/Output Summary (Last 24 hours) at 12/8/2022 1456  Last data filed at 12/8/2022 0600  Gross per 24 hour   Intake 1215 ml   Output --   Net 1215 ml          Vitals:   Vitals:    12/08/22 1126   BP: 129/67   Pulse:    Resp:    Temp: 97.6 °F (36.4 °C)   SpO2: 100%       Physical Exam:   Physical Exam  Vitals reviewed. Constitutional:       General: She is in acute distress. Appearance: She is ill-appearing. HENT:      Head: Normocephalic. Nose: Nose normal.      Mouth/Throat:      Mouth: Mucous membranes are moist.   Eyes:      Conjunctiva/sclera: Conjunctivae normal.      Pupils: Pupils are equal, round, and reactive to light. Cardiovascular:      Rate and Rhythm: Normal rate and regular rhythm. Pulses: Normal pulses. Heart sounds: Normal heart sounds. No murmur heard. Pulmonary:      Effort: Respiratory distress present. Breath sounds: Rhonchi and rales present. No wheezing. Abdominal:      General: Abdomen is flat. Bowel sounds are normal. There is no distension. Palpations: Abdomen is soft. Tenderness: There is no abdominal tenderness. Comments: PEG in place   Musculoskeletal:         General: Tenderness, deformity and signs of injury present. Normal range of motion. Cervical back: Normal range of motion and neck supple. Right lower leg: No edema. Left lower leg: Edema present. Comments: Left toe wound   Skin:     Coloration: Skin is not jaundiced or pale. Neurological:      Mental Status: She is alert. She is disoriented. Motor: Weakness present.    Psychiatric:         Mood and Affect: Mood normal.      Comments: Confused    somnolent      Medications: Medications:    carvedilol  12.5 mg Per G Tube BID     sulfamethoxazole-trimethoprim  1 tablet PEG Tube 2 times per day    sodium chloride flush  5-40 mL IntraVENous 2 times per day    pantoprazole (PROTONIX) 40 mg injection  40 mg IntraVENous Daily    folic acid  1 mg Per G Tube Daily    insulin glargine  20 Units SubCUTAneous Daily with breakfast    lactobacillus  1 capsule Per G Tube Daily with breakfast    losartan  50 mg Per G Tube Daily    scopolamine  1 patch TransDERmal Q72H    thiamine  100 mg Per G Tube Daily    insulin lispro  0-8 Units SubCUTAneous TID     insulin lispro  0-4 Units SubCUTAneous Nightly    lacosamide (VIMPAT) IVPB  200 mg IntraVENous BID      Infusions:    sodium chloride 25 mL (11/28/22 1543)    dextrose       PRN Meds: LORazepam, 1 mg, Q6H PRN  haloperidol lactate, 1 mg, Q6H PRN  morphine, 2 mg, Q4H PRN  sodium chloride flush, 5-40 mL, PRN  sodium chloride, , PRN  ondansetron, 4 mg, Q8H PRN   Or  ondansetron, 4 mg, Q6H PRN  acetaminophen, 650 mg, Q6H PRN   Or  acetaminophen, 650 mg, Q6H PRN  oxyCODONE, 5 mg, Q4H PRN  glucose, 4 tablet, PRN  dextrose bolus, 125 mL, PRN   Or  dextrose bolus, 250 mL, PRN  glucagon (rDNA), 1 mg, PRN  dextrose, , Continuous PRN      Labs      Recent Results (from the past 24 hour(s))   POCT Glucose    Collection Time: 12/07/22  4:21 PM   Result Value Ref Range    POC Glucose 108 (H) 70 - 99 MG/DL   POCT Glucose    Collection Time: 12/07/22  9:17 PM   Result Value Ref Range    POC Glucose 158 (H) 70 - 99 MG/DL   POCT Glucose    Collection Time: 12/08/22  6:48 AM   Result Value Ref Range    POC Glucose 188 (H) 70 - 99 MG/DL   POCT Glucose    Collection Time: 12/08/22  8:49 AM   Result Value Ref Range    POC Glucose 198 (H) 70 - 99 MG/DL   POCT Glucose    Collection Time: 12/08/22  2:32 PM   Result Value Ref Range    POC Glucose 160 (H) 70 - 99 MG/DL        Imaging/Diagnostics Last 24 Hours   No results found.     Electronically signed by Kelsi Delgado MD on 12/8/2022 at 2:56 PM

## 2022-12-08 NOTE — PROGRESS NOTES
José Miguel Hayes, 1955, 3109/[id number]-A, 12/8/2022    Per charting, pt family has decided upon hospice at home upon discharge. Will d/c pt from IP OT caseload. Please reorder services if pt becomes appropriate and has acute OT needs.      Roshan Mak, OTR/L  12/8/2022, 5:05 PM

## 2022-12-09 NOTE — PROGRESS NOTES
RRT suctioned pt for scant amt of thick while/clear secretions. Pt has inline suction cath. Pt tolerating well.     Pt is on 30% CATC

## 2022-12-09 NOTE — PROGRESS NOTES
[facility], [facility]  General Inpatient Hospice Progress Note    Date: 12/9/2022  Name: Mardy Dance  MRN: [id number]  YOB: 1955   Patient's PCP: Wanda Prieto MD  Consultants during acute care: Pulmonary, ID, Podiatry, Gastroenterology, Wound care  Acute care admission date: 11/26 to 12/8/2022 and 11/11 to 11/23/22 at [address] [place] [address] to [address]: 12/8/2022     Subjective: The patient's eyes are open and she stares off and does not follow commands. There is intermittent facial grimacing. She remains on tube feeding at family request. I met with the patient's daughter, Lovely Yanez, and niece, Doreen Narayanan, at the bedside and reviewed history, declining health. They report that the patient's spouse is still struggling with the patient's decline. I spent 20 minutes in discussion of [address] with Gloria Mcallister and Doreen Narayanan re: comfort medications, nutrition at end of life care, and comfort focused plan of care. We discussed that there will likely come a time where she does not tolerate the tube feeding with congestion, high residuals, etc and discussed stopping at that time and they agreed. We discussed that [facility] is for management of symptoms, and that if the patient stabilizes, alternate arrangements for care will be needed. The patient's spouse had expressed desire for the patient to be at home, but her care needs are high and family works. Objective:   Pain is managed with Morphine IV prn with 4 doses in the past 24 hours, transdermal Scopolamine patch and Glycopyrrolate IV for terminal congestion, Haloperidol for nausea or delirium, and Lorazepam is available for anxiety. Data reviewed 12/9/2022:  Frontal lobe lesion resection 6/20/2022  FINAL DIAGNOSIS   A. BRAIN MASS:  - METASTATIC, POORLY DIFFERENTIATED ADENOCARCINOMA, SEE NOTE. B. BRAIN MASS:  - METASTATIC, POORLY DIFFERENTIATED ADENOCARCINOMA, SEE NOTE.      CT-scan of the brain 12/2/22:  No acute intracranial abnormality. Two enlarging nodular areas of soft tissue thickening in the scalp. Differential includes pseudomeningoceles  or recurrent tumor. CT chest, abdomen, pelvis 12/2/22:  Chest:       Cardiomegaly. Coronary artery disease. No effusion. Right-sided MediPort   device tip within the right SVC near the cavoatrial junction. There is   mucosal thickening of the left bronchus intermedius and left lower lobe   bronchus which may be seen with bronchitis or emphysematous changes. No   suspicious hilar or mediastinal adenopathy. There emphysematous changes. Previous left partial pneumonectomy. Tree-in-bud nodules identified throughout much of the left lung favored to be   due to small airways disease or aspiration. Pleural base nodule left upper   lung 3 mm in size stable dating back to 2018. Tracheostomy. Degenerate changes of the thoracic spine. No suspicious osseous lesions. Abdomen/Pelvis:       Motion and streak artifact challenge evaluation. Gallbladder is contracted. No suspicious liver, splenic, adrenal glands,   kidneys, or pancreas lesions. The pancreas is diminutive size and with   calcifications and ductal dilatation can be seen with chronic pancreatitis. Pancreatic duct measuring 5 mm. Common bile duct measures 5 mm. Mild retained stool. Thickening of the gastric antrum could relate to   underdistention. No bowel obstruction. Gastrostomy tube within the   stomach. .  Moderate stool rectosigmoid junction. No suspicious adenopathy. Lobulated uterus compatible with fibroids. Bladder is unremarkable. No suspicious adnexal mass. Moderate to severe   aortic calcifications. Evidence of bilateral iliac artery stents noted. Posterior changes right and left groin greatest on right. Loss of contrast   involving the right proximal femoral artery. Degenerate changes lumbar spine.   Degenerate changes of the hips pelvis. Age   indeterminate compression fracture at L4. With mild retropulsion.       Hemoglobin A1C 9/29/22: 5.9%  Accuchecks:   Recent Labs     12/08/22  1432 12/08/22  2113 12/09/22  0835   POCGLU 160* 157* 184*      Hepatic Function Panel:    Lab Results   Component Value Date/Time    ALKPHOS 92 11/28/2022 05:10 AM    ALT 15 11/28/2022 05:10 AM    AST 20 11/28/2022 05:10 AM    PROT 6.4 11/28/2022 05:10 AM    BILITOT 0.3 11/28/2022 05:10 AM    BILIDIR 0.2 10/02/2020 08:34 PM    IBILI 0.5 10/02/2020 08:34 PM    LABALBU 3.3 11/28/2022 05:10 AM     CBC with Differential:    Lab Results   Component Value Date/Time    WBC 11.0 12/04/2022 05:45 AM    RBC 3.04 12/04/2022 05:45 AM    HGB 8.8 12/04/2022 05:45 AM    HCT 28.0 12/04/2022 05:45 AM     12/04/2022 05:45 AM    MCV 92.1 12/04/2022 05:45 AM    MCH 28.9 12/04/2022 05:45 AM    MCHC 31.4 12/04/2022 05:45 AM    RDW 14.2 12/04/2022 05:45 AM    SEGSPCT 74.1 12/04/2022 05:45 AM    LYMPHOPCT 15.2 12/04/2022 05:45 AM    MONOPCT 9.3 12/04/2022 05:45 AM    BASOPCT 0.3 12/04/2022 05:45 AM    MONOSABS 1.0 12/04/2022 05:45 AM    LYMPHSABS 1.7 12/04/2022 05:45 AM    EOSABS 0.1 12/04/2022 05:45 AM    BASOSABS 0.0 12/04/2022 05:45 AM    DIFFTYPE AUTOMATED DIFFERENTIAL 12/04/2022 05:45 AM     BMP:    Lab Results   Component Value Date/Time     12/04/2022 05:45 AM    K 3.9 12/04/2022 05:45 AM     12/04/2022 05:45 AM    CO2 23 12/04/2022 05:45 AM    BUN 8 12/04/2022 05:45 AM    LABALBU 3.3 11/28/2022 05:10 AM    CREATININE 0.6 12/04/2022 05:45 AM    CALCIUM 8.9 12/04/2022 05:45 AM    GFRAA >60 09/29/2022 07:53 PM    LABGLOM >60 12/04/2022 05:45 AM    GLUCOSE 206 12/04/2022 05:45 AM       Physical Exam:   SpO2 100%   General: eyes are open and stares off, nonverbal, chronically ill appearing   Skin: wound images reviewed in the media tab  HEENT: Mucous membranes are dry, mild conjunctival pallor   Neck: tracheostomy in place with trach collar oxygen  Chest: right Mediport    Heart: distant tones, mildly tachycardic RRR, S1S2, no murmurs  Lungs:  coarse breath sounds bilaterally, diminished at the bases, without rales, scattered rhonchi   Abdomen: soft, bowel sounds present, no apparent tenderness, PEG in place, nondistended   and rectal: deferred  Extremities:  dressing in place, left hallux surgically absent, no mottling, no edema  Neurologic: staring off, does not follow commands      Assessment/Plan:  . Metastatic Non Small Cell left lung cancer diagnosed October 2019 with right frontal cerebral metastasis and gamma knife June 2022. The patient is continued on [address] [place] [address] for management of pain, delirium and restlessness, respiratory failure and possible end of life care. PPS 30%  Cancer pain and postop pain, will add Methadone and continue prn Morphine. Metabolic encephalopathy, likely multifactorial  Dysphagia, has PEG on tube feeding, speech recommends npo. Discussed nutrition at end of life with family on 12/9/22 and request to continue for now. 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 Jomar Wilson will be covering the [address] [place] [address] patients for Dr Lisa Jacques beginning Friday, December 9 at 1700 until Monday, December 12 at 0800. Dr. Deandre Garcia can be reached through [facility].        Patient Active Problem List   Diagnosis Code    Chest pain R07.9    Dyspnea and respiratory abnormalities R06.00, R06.89    Acute gastritis without hemorrhage K29.00    Diabetic ulcer of toe of left foot associated with type 2 diabetes mellitus, with fat layer exposed ([address] E11.621, M30.010    Type 2 diabetes mellitus with peripheral neuropathy ([address] E11.42 Peripheral vascular disease (HCC) I73.9    History of nicotine dependence Z87.891    Sepsis ([address] A41.9    Brain metastases ([address] C79.31    Diabetic foot infection (HCC) E11.628, L08.9    Cancer of left lung ([address] - removed at [facility] in 2019 C34.92    Former smoker - quit in 2019 Z87.891    DM (diabetes mellitus), type 2 ([address] E11.9    Seizures ([address] R56.9    Hypokalemia E87.6    Hypophosphatemia E83.39    Leukocytosis A74.972    Metabolic acidosis, increased anion gap E87.29    Elevated CK R74.8    Respiratory failure (HCC) J96.90    Acute on chronic anemia D64.9    Severe malnutrition (HCC) E43    Gastrointestinal hemorrhage K92.2    Diabetic foot ulcer with osteomyelitis (HCC) E11.621, E11.69, L97.509, M86.9    Adenocarcinoma, lung (HCC) C34.90    Tracheostomy status ([address] Z93.0    Lung cancer metastatic to brain ([address] C34.90, C79.31    Non-small cell lung cancer with metastasis ([address] C34.90       BERTRAM Haley MD  12/9/2022

## 2022-12-09 NOTE — H&P
[address] [place] [address]+    Date: 12/8/2022  Name: Dicky Bosworth  MRN: [id number]  YOB: 1955   Patient's PCP: Millie Smith MD and [place]  Consultants during acute care: Pulmonary, ID, Podiatry, Gastroenterology, Wound care  Acute care admission date: 11/26 to 12/8/2022 and 11/11 to 11/23/22 at [address] [place] [address] to [address]: 12/8/2022     Informant: Chart reviewed, discussed with the hospice nurse liaison. I examined the patient who is unable to provide any information due to clinical status. There are no family here. CC: lung cancer    [place]: This is a 74 y.o. female nursing home resident with a history of Non Small Cell left lung cancer with partial left pneumonectomy 10/2019 (adenocarcinoma) with cerebral metastases and gamma knife in June - July 2022, seizure disorder on Lacosamide, severe protein calorie malnutrition with PEG, respiratory failure with tracheostomy, peripheral vascular disease with left hallux amputation for distal phalanx osteomyelitis, Type 2 diabetes mellitus, hypertension, hyperlipidemia, anemia,  readmitted on 11/26/2022. The patient has been treated for diabetic left foot infection, encephalopathy and is on Bactrim DS per G tube. The patient has had 29 pounds of involuntary weight loss from 8/5/22 to 12/4/22 (21% of body weight). The patient is dependent for all ADL's and non verbal. The patient has Morphine 2 mg IV every 4 hours prn pain and Lorazepam 1 mg IV every 6 hours prn anxiety. Oncology has been through [facility]. The hospice nurse liaisons met with the patient's spouse and daughter on 12/5/22 and provided hospice information. The patient's spouse was not ready to decide at the visit and wants to contemplate the decision. There was discussion about taking the patient home rather than returning to the Nursing Home. The patient is DNR-comfort care.       I collaborated with the hospice nurse liaison on 12/8/22 who has met with the patient's spouse. The family is in agreement with comfort care and hospice. The patient has become more agitated and painful since yesterday and family is agreeable to [facility] for symptom management of pain, delirium, anxiety and possible end of life care. Tube feeding will be continued for now. Oncology history [place] from Care Every[facility]:  Metastatic adenocarcinoma of the lung of lung, mets to brain    A 62-year-old female with history of lung cancer s/p left lobectomy 10/2019 who initially presented to [facility] emergency department 6/18/2022 due to acute altered mental status. MRI brain 6/18/2022 showed multiple brain lesions including 3.5 x 2.5 x 3.7 cm lesion located in the right frontal, 1.4 x 1.3 cm lesion left occipital pole, and 0.7 cm enhancing dural implant overlying the vertex. CT chest/abdomen/pelvis with IV contrast 6/20/2022 showed postsurgical changes with increased density next to surgical clips and 8 mm focal lesion in the proximal pancreatic body but otherwise no suspicious area for malignancy. S/p craniotomy 6/20/2022 with resection of the right frontal lobe the surgical pathology review metastatic poorly differentiated adenocarcinoma, primary lung cancer is favored. S/p gamma knife to post op cavity as well as remaining lesions 7/2022. CURRENT TREATMENT:   Surgical resection of right frontal lobe lesion 6/20/2022. Gamma knife for the postop cavity, remaining lesions 7/2022    Past Medical History:   Diagnosis Date    Brain metastases ([address] 8/10/2022    Biopsy (frozen section) done 6/202022 at [facility] showed METASTATIC, [address]      Cancer ([address]     Diabetes mellitus ([address]     Hyperlipidemia     Hypertension     Lung cancer metastatic to brain ([address] 11/12/2022    Seizure ([address]     Tracheostomy status ([address] 11/10/2022       Past Surgical History:   Procedure Laterality Date LUNG CANCER SURGERY Left 10/2019    TOE AMPUTATION Left 8/8/2022    LEFT FOOT HALLUX AMPUTATION EXCISIONAL DEBRIDEMENT ALL NON VIABLE   TISSUE AND BONE performed by Leena Mena DPM at [address] N/A 10/3/2020    EGD BIOPSY performed by Sydnee Howell MD at [facility] ENDOSCOPY       Social History     Socioeconomic History    Marital status:      Spouse name: Not on file    Number of children: Not on file    Years of education: Not on file    Highest education level: Not on file   Occupational History    Not on file   Tobacco Use    Smoking status: Former     Packs/day: 1.00     Types: Cigarettes     Quit date: 2019     Years since quitting: 3.9    Smokeless tobacco: Never    Tobacco comments:     10/2019   Substance and Sexual Activity    Alcohol use: Yes     Alcohol/week: 1.0 standard drink     Types: 1 Cans of beer per week    Drug use: Yes     Types: Marijuana [place])    Sexual activity: Yes     Partners: Male   Other Topics Concern    Not on file   Social History Narrative    Not on file     Social Determinants of Health     Financial Resource Strain: Not on file   Food Insecurity: Not on file   Transportation Needs: Not on file   Physical Activity: Not on file   Stress: Not on file   Social Connections: Not on file   Intimate Partner Violence: Not on file   Housing Stability: Not on file       No family history on file. No Known Allergies    Medication list reviewed  Prior to Admission medications    Medication Sig Start Date End Date Taking? Authorizing Provider   oxyCODONE (ROXICODONE) 5 MG immediate release tablet Take 5 mg by mouth every 4 hours as needed for Pain.     Historical Provider, MD   aspirin 81 MG chewable tablet 81 mg by Per G Tube route daily    Historical Provider, MD   apixaban (ELIQUIS) 5 MG TABS tablet by Per G Tube route 2 times daily    Historical Provider, MD   carvedilol (COREG) 25 MG tablet 25 mg by Per G Tube route 2 times daily (with meals) Historical Provider, MD   folic acid (FOLVITE) 1 MG tablet 1 mg by Per G Tube route daily    Historical Provider, MD   glycopyrrolate (ROBINUL) 1 MG tablet 1 mg by Per G Tube route 3 times daily    Historical Provider, MD   insulin glargine (LANTUS) 100 UNIT/ML injection vial Inject 20 Units into the skin daily (with breakfast)    Historical Provider, MD   Lactobacillus Extra Strength CAPS by Per G Tube route    Historical Provider, MD   lansoprazole (PREVACID) 30 MG delayed release capsule Take 30 mg by mouth daily    Historical Provider, MD   losartan (COZAAR) 50 MG tablet 50 mg by Per G Tube route daily    Historical Provider, MD   scopolamine (TRANSDERM-SCOP) transdermal patch Place 1 patch onto the skin every 72 hours    Historical Provider, MD   vitamin B-1 (THIAMINE) 100 MG tablet 100 mg by Per G Tube route daily    Historical Provider, MD   lacosamide (VIMPAT) 50 MG TABS tablet 200 mg by Per G Tube route 2 times daily. Historical Provider, MD   alendronate (FOSAMAX) 70 MG tablet Take 1 tablet by mouth once a week 6/12/22   Historical Provider, MD   atorvastatin (LIPITOR) 40 MG tablet Take 1 tablet by mouth in the morning. Patient taking differently: Take 20 mg by mouth daily 8/12/22   Shanna Lee MD   gabapentin (NEURONTIN) 300 MG capsule Take 300 mg by mouth at bedtime. 3/15/22   Historical Provider, MD   simvastatin (ZOCOR) 20 MG tablet Take 40 mg by mouth nightly   8/11/22  Historical Provider, MD       ROS: As noted in [address], all other systems are unobtainable due to the patient's clinical condition    Weight:    Wt Readings from Last 5 Encounters:   12/08/22 196 lb 11.2 oz (89.2 kg)   12/02/22 108 lb (49 kg)   09/29/22 128 lb (58.1 kg)   08/29/22 118 lb (53.5 kg)   08/23/22 128 lb (58.1 kg)       Data reviewed 12/8/2022:  Frontal lobe lesion resection 6/20/2022  FINAL DIAGNOSIS   A. BRAIN MASS:  - METASTATIC, POORLY DIFFERENTIATED ADENOCARCINOMA, SEE NOTE.   B. BRAIN MASS:  - METASTATIC, POORLY DIFFERENTIATED ADENOCARCINOMA, SEE NOTE. CT-scan of the brain 12/2/22:  No acute intracranial abnormality. Two enlarging nodular areas of soft tissue thickening in the scalp. Differential includes pseudomeningoceles  or recurrent tumor. CT chest, abdomen, pelvis 12/2/22:  Chest:       Cardiomegaly. Coronary artery disease. No effusion. Right-sided MediPort   device tip within the right SVC near the cavoatrial junction. There is   mucosal thickening of the left bronchus intermedius and left lower lobe   bronchus which may be seen with bronchitis or emphysematous changes. No   suspicious hilar or mediastinal adenopathy. There emphysematous changes. Previous left partial pneumonectomy. Tree-in-bud nodules identified throughout much of the left lung favored to be   due to small airways disease or aspiration. Pleural base nodule left upper   lung 3 mm in size stable dating back to 2018. Tracheostomy. Degenerate changes of the thoracic spine. No suspicious osseous lesions. Abdomen/Pelvis:       Motion and streak artifact challenge evaluation. Gallbladder is contracted. No suspicious liver, splenic, adrenal glands,   kidneys, or pancreas lesions. The pancreas is diminutive size and with   calcifications and ductal dilatation can be seen with chronic pancreatitis. Pancreatic duct measuring 5 mm. Common bile duct measures 5 mm. Mild retained stool. Thickening of the gastric antrum could relate to   underdistention. No bowel obstruction. Gastrostomy tube within the   stomach. .  Moderate stool rectosigmoid junction. No suspicious adenopathy. Lobulated uterus compatible with fibroids. Bladder is unremarkable. No suspicious adnexal mass. Moderate to severe   aortic calcifications. Evidence of bilateral iliac artery stents noted. Posterior changes right and left groin greatest on right.   Loss of contrast   involving the right proximal femoral artery. Degenerate changes lumbar spine. Degenerate changes of the hips pelvis. Age   indeterminate compression fracture at L4. With mild retropulsion.       Hemoglobin A1C 9/29/22: 5.9%  Accuchecks:   Recent Labs     12/08/22  0849 12/08/22  1432 12/08/22 2113   POCGLU 198* 160* 157*          Hepatic Function Panel:    Lab Results   Component Value Date/Time    ALKPHOS 92 11/28/2022 05:10 AM    ALT 15 11/28/2022 05:10 AM    AST 20 11/28/2022 05:10 AM    PROT 6.4 11/28/2022 05:10 AM    BILITOT 0.3 11/28/2022 05:10 AM    BILIDIR 0.2 10/02/2020 08:34 PM    IBILI 0.5 10/02/2020 08:34 PM    LABALBU 3.3 11/28/2022 05:10 AM     CBC with Differential:          Lab Results   Component Value Date/Time     WBC 11.0 12/04/2022 05:45 AM     RBC 3.04 12/04/2022 05:45 AM     HGB 8.8 12/04/2022 05:45 AM     HCT 28.0 12/04/2022 05:45 AM      12/04/2022 05:45 AM     MCV 92.1 12/04/2022 05:45 AM     MCH 28.9 12/04/2022 05:45 AM     MCHC 31.4 12/04/2022 05:45 AM     RDW 14.2 12/04/2022 05:45 AM     SEGSPCT 74.1 12/04/2022 05:45 AM     LYMPHOPCT 15.2 12/04/2022 05:45 AM     MONOPCT 9.3 12/04/2022 05:45 AM     BASOPCT 0.3 12/04/2022 05:45 AM     MONOSABS 1.0 12/04/2022 05:45 AM     LYMPHSABS 1.7 12/04/2022 05:45 AM     EOSABS 0.1 12/04/2022 05:45 AM     BASOSABS 0.0 12/04/2022 05:45 AM     DIFFTYPE AUTOMATED DIFFERENTIAL 12/04/2022 05:45 AM      BMP:          Lab Results   Component Value Date/Time      12/04/2022 05:45 AM     K 3.9 12/04/2022 05:45 AM      12/04/2022 05:45 AM     CO2 23 12/04/2022 05:45 AM     BUN 8 12/04/2022 05:45 AM     LABALBU 3.3 11/28/2022 05:10 AM     CREATININE 0.6 12/04/2022 05:45 AM     CALCIUM 8.9 12/04/2022 05:45 AM     GFRAA >60 09/29/2022 07:53 PM     LABGLOM >60 12/04/2022 05:45 AM     GLUCOSE 206 12/04/2022 05:45 AM       Physical Exam:   /66   Pulse 92   Temp 98 °F (36.7 °C) (Oral)   Resp 25   Ht 5' 6\" (1.676 m)   Wt 196 lb 11.2 oz (89.2 kg)   SpO2 100%   BMI 31.75 kg/m²   General: eyes are open and stares off, nonverbal, chronically ill appearing   Skin: wound images reviewed in the media tab  HEENT: Mucous membranes are dry, mild conjunctival pallor   Neck: tracheostomy in place with trach collar oxygen  Chest: right Mediport    Heart: distant tones, mildly tachycardic RRR, S1S2, no murmurs  Lungs:  coarse breath sounds bilaterally, diminished at the bases, without rales, scattered rhonchi   Abdomen: soft, bowel sounds present, no apparent tenderness, PEG in place, nondistended   and rectal: deferred  Extremities:  left hallux surgically absent, no mottling, no edema  Neurologic: staring off, does not follow commands      Assessment/Plan:  . Metastatic Non Small Cell left lung cancer diagnosed October 2019 with right frontal cerebral metastasis and gamma knife June 2022. The patient is admitted to [facility] for management of pain, delirium and restlessness, respiratory failure and possible end of life care. Prognosis is grim.  PPS 00%  Metabolic encephalopathy, likely multifactorial  Dysphagia, has PEG on tube feeding to be continued for now, speech recommends npo  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, followed by ID and on TMP/SMX  Chronic respiratory failure with tracheostomy  Peripheral vascular disease   Seizure disorder with brain metastases, on Lacosamide (history of status epilepticus September 2022), change to G tube  Type 2 diabetes mellitus, Hemoglobin A1C 9/29/22: 5.9%       Patient Active Problem List   Diagnosis Code    Chest pain R07.9    Dyspnea and respiratory abnormalities R06.00, R06.89    Acute gastritis without hemorrhage K29.00    Diabetic ulcer of toe of left foot associated with type 2 diabetes mellitus, with fat layer exposed ([facility] E11.621, L97.522    Type 2 diabetes mellitus with peripheral neuropathy (HCC) E11.42    Peripheral vascular disease (HCC) I73.9    History of nicotine dependence Z87.891    Sepsis ([address] A41.9    Brain metastases ([address] C79.31    Diabetic foot infection ([address] E11.628, L08.9    Cancer of left lung ([address] - removed at [facility] in 2019 C34.92    Former smoker - quit in 2019 Z87.891    DM (diabetes mellitus), type 2 ([address] E11.9    Seizures ([address] R56.9    Hypokalemia E87.6    Hypophosphatemia E83.39    Leukocytosis W36.980    Metabolic acidosis, increased anion gap E87.29    Elevated CK R74.8    Respiratory failure (HCC) J96.90    Acute on chronic anemia D64.9    Severe malnutrition (HCC) E43    Gastrointestinal hemorrhage K92.2    Diabetic foot ulcer with osteomyelitis (HCC) E11.621, E11.69, L97.509, M86.9    Adenocarcinoma, lung (HCC) C34.90    Tracheostomy status ([address] Z93.0    Lung cancer metastatic to brain ([address] C34.90, C79.31    Non-small cell lung cancer with metastasis ([address] N90.38     Certification of Terminal Illness: I certify that this patient is eligible for Hospice services for a terminal diagnosis of metastatic Non Small Cell lung cancer (adenocarcinoma) with a life expectancy predicted to be less than 6 months if this illness follows its expected course.       Simran Long MD

## 2022-12-10 NOTE — PLAN OF CARE
Problem: Discharge Planning  Goal: Discharge to home or other facility with appropriate resources  Outcome: Progressing     Problem: Skin/Tissue Integrity  Goal: Absence of new skin breakdown  Description: 1. Monitor for areas of redness and/or skin breakdown  2. Assess vascular access sites hourly  3. Every 4-6 hours minimum:  Change oxygen saturation probe site  4. Every 4-6 hours:  If on nasal continuous positive airway pressure, respiratory therapy assess nares and determine need for appliance change or resting period.   Outcome: Progressing     Problem: Chronic Conditions and Co-morbidities  Goal: Patient's chronic conditions and co-morbidity symptoms are monitored and maintained or improved  Outcome: Progressing     Problem: Pain  Goal: Verbalizes/displays adequate comfort level or baseline comfort level  Outcome: Progressing

## 2022-12-11 NOTE — PROGRESS NOTES
[facility] Progress Note    Date: 12/11/2022  Name: Wanda Funes  MRN: [id number]  YOB: 1955   Patient's PCP: Niki Bhatia MD  Consultants during acute care: Pulmonary, ID, Podiatry, Gastroenterology, Wound care  Acute care admission date: 11/26 to 12/8/2022 and 11/11 to 11/23/22 at [facility] to [address]: 12/8/2022     Subjective:     Patient seen and examined. Patient resting comfortably this morning. No family members present. Per RN, patient was a bit agitated, attempting to pull of her trach mask, she was given ativan which helped with her agitation. I reviewed upon assuming care of patient on 12/10/2022:  Past MHx  Past SHx  Soc Hx:  Fam Hx:  Allergies:  Medication list since admission:  Advanced directives: DNR CC. Labs:   Imaging:    Objective:   Pain is managed with Morphine IV prn with 4 doses (2 mg x 4) in the past 24 hours, transdermal Scopolamine patch and Glycopyrrolate IV for terminal congestion, Haloperidol for nausea or delirium, and Lorazepam (1 mg x 4 in the last 24 hours) is available for anxiety. Data reviewed 12/11/2022:  Frontal lobe lesion resection 6/20/2022  FINAL DIAGNOSIS   A. BRAIN MASS:  - METASTATIC, POORLY DIFFERENTIATED ADENOCARCINOMA, SEE NOTE. B. BRAIN MASS:  - METASTATIC, POORLY DIFFERENTIATED ADENOCARCINOMA, SEE NOTE. CT-scan of the brain 12/2/22:  No acute intracranial abnormality. Two enlarging nodular areas of soft tissue thickening in the scalp. Differential includes pseudomeningoceles  or recurrent tumor. CT chest, abdomen, pelvis 12/2/22:  Chest:       Cardiomegaly. Coronary artery disease. No effusion. Right-sided MediPort   device tip within the right SVC near the cavoatrial junction. There is   mucosal thickening of the left bronchus intermedius and left lower lobe   bronchus which may be seen with bronchitis or emphysematous changes.   No   suspicious hilar or mediastinal adenopathy. There emphysematous changes. Previous left partial pneumonectomy. Tree-in-bud nodules identified throughout much of the left lung favored to be   due to small airways disease or aspiration. Pleural base nodule left upper   lung 3 mm in size stable dating back to 2018. Tracheostomy. Degenerate changes of the thoracic spine. No suspicious osseous lesions. Abdomen/Pelvis:       Motion and streak artifact challenge evaluation. Gallbladder is contracted. No suspicious liver, splenic, adrenal glands,   kidneys, or pancreas lesions. The pancreas is diminutive size and with   calcifications and ductal dilatation can be seen with chronic pancreatitis. Pancreatic duct measuring 5 mm. Common bile duct measures 5 mm. Mild retained stool. Thickening of the gastric antrum could relate to   underdistention. No bowel obstruction. Gastrostomy tube within the   stomach. .  Moderate stool rectosigmoid junction. No suspicious adenopathy. Lobulated uterus compatible with fibroids. Bladder is unremarkable. No suspicious adnexal mass. Moderate to severe   aortic calcifications. Evidence of bilateral iliac artery stents noted. Posterior changes right and left groin greatest on right. Loss of contrast   involving the right proximal femoral artery. Degenerate changes lumbar spine. Degenerate changes of the hips pelvis. Age   indeterminate compression fracture at L4. With mild retropulsion.       Hemoglobin A1C 9/29/22: 5.9%  Accuchecks:   Recent Labs     12/10/22  1720 12/10/22  1956 12/11/22  1208   POCGLU 153* 172* 202*      Hepatic Function Panel:    Lab Results   Component Value Date/Time    ALKPHOS 92 11/28/2022 05:10 AM    ALT 15 11/28/2022 05:10 AM    AST 20 11/28/2022 05:10 AM    PROT 6.4 11/28/2022 05:10 AM    BILITOT 0.3 11/28/2022 05:10 AM    BILIDIR 0.2 10/02/2020 08:34 PM    IBILI 0.5 10/02/2020 08:34 PM    LABALBU 3.3 11/28/2022 05:10 AM     CBC with Differential:    Lab Results   Component Value Date/Time    WBC 11.0 12/04/2022 05:45 AM    RBC 3.04 12/04/2022 05:45 AM    HGB 8.8 12/04/2022 05:45 AM    HCT 28.0 12/04/2022 05:45 AM     12/04/2022 05:45 AM    MCV 92.1 12/04/2022 05:45 AM    MCH 28.9 12/04/2022 05:45 AM    MCHC 31.4 12/04/2022 05:45 AM    RDW 14.2 12/04/2022 05:45 AM    SEGSPCT 74.1 12/04/2022 05:45 AM    LYMPHOPCT 15.2 12/04/2022 05:45 AM    MONOPCT 9.3 12/04/2022 05:45 AM    BASOPCT 0.3 12/04/2022 05:45 AM    MONOSABS 1.0 12/04/2022 05:45 AM    LYMPHSABS 1.7 12/04/2022 05:45 AM    EOSABS 0.1 12/04/2022 05:45 AM    BASOSABS 0.0 12/04/2022 05:45 AM    DIFFTYPE AUTOMATED DIFFERENTIAL 12/04/2022 05:45 AM     BMP:    Lab Results   Component Value Date/Time     12/04/2022 05:45 AM    K 3.9 12/04/2022 05:45 AM     12/04/2022 05:45 AM    CO2 23 12/04/2022 05:45 AM    BUN 8 12/04/2022 05:45 AM    LABALBU 3.3 11/28/2022 05:10 AM    CREATININE 0.6 12/04/2022 05:45 AM    CALCIUM 8.9 12/04/2022 05:45 AM    GFRAA >60 09/29/2022 07:53 PM    LABGLOM >60 12/04/2022 05:45 AM    GLUCOSE 206 12/04/2022 05:45 AM       Physical Exam:   /71   Pulse 90   Temp 97.9 °F (36.6 °C) (Axillary)   Resp 20   SpO2 100%   General: asleep, appears comfortable, NAD  Skin: wound images reviewed in the media tab  HEENT: Mucous membranes are dry,   Neck: tracheostomy in place with trach collar oxygen  Chest: right Mediport    Heart: distant tones, mildly tachycardic RRR, S1S2, no murmurs  Lungs:  coarse breath sounds bilaterally, diminished at the bases, without rales, scattered rhonchi   Abdomen: soft, bowel sounds present, no apparent tenderness, PEG in place, nondistended   and rectal: deferred  Extremities:  dressing in place, left hallux surgically absent, no mottling, no edema  Neurologic: slept through the physical exam portion     Assessment/Plan:  Metastatic Non Small Cell left lung cancer diagnosed October 2019 with right frontal cerebral metastasis and gamma knife June 2022. The patient is continued on [facility] for management of pain, delirium and restlessness, respiratory failure and possible end of life care. PPS 30%  Cancer pain and postop pain. Methadone 3 mg BID started on 12/9/2022. Continue prn Morphine for BTP while awaiting Methadone therapeutic level. Agitation/restlessness. Ativan seems to be effective with her agitation. Considering ATC ativan to help if she continues to require frequent PRN doses. Metabolic encephalopathy, likely multifactorial  Dysphagia, has PEG on tube feeding, speech recommends npo. Discussed nutrition at end of life with family on 12/9/22 and request to continue for now.    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, followed by ID and on TMP/SMX with end date 1/11/2023  Chronic respiratory failure with tracheostomy  Peripheral vascular disease   Seizure disorder with brain metastases, on Lacosamide (history of status epilepticus September 2022)  Type 2 diabetes mellitus, Hemoglobin A1C 9/29/22: 5.9%       Patient Active Problem List   Diagnosis Code    Chest pain R07.9    Dyspnea and respiratory abnormalities R06.00, R06.89    Acute gastritis without hemorrhage K29.00    Diabetic ulcer of toe of left foot associated with type 2 diabetes mellitus, with fat layer exposed ([address] E11.621, L97.522    Type 2 diabetes mellitus with peripheral neuropathy (HCC) E11.42    Peripheral vascular disease ([address] I73.9    History of nicotine dependence Z87.891    Sepsis ([address] A41.9    Brain metastases ([address] C79.31    Diabetic foot infection ([address] E11.628, L08.9    Cancer of left lung ([address] - removed at [facility] in 2019 C34.92    Former smoker - quit in 2019 Z87.891    DM (diabetes mellitus), type 2 ([address] E11.9    Seizures ([address] R56.9    Hypokalemia E87.6    Hypophosphatemia E83.39    Leukocytosis M83.894    Metabolic acidosis, increased anion gap E87.29    Elevated CK R74.8    Respiratory failure (HCC) J96.90    Acute on chronic anemia D64.9    Severe malnutrition (HCC) E43    Gastrointestinal hemorrhage K92.2    Diabetic foot ulcer with osteomyelitis (HCC) E11.621, E11.69, L97.509, M86.9    Adenocarcinoma, lung (HCC) C34.90    Tracheostomy status ([facility] Z93.0    Lung cancer metastatic to brain ([facility] C34.90, C79.31    Non-small cell lung cancer with metastasis ([facility] C34.90       Rikki Verduzco DO  12/11/2022

## 2022-12-11 NOTE — PROGRESS NOTES
[facility] Progress Note    Date: 12/10/2022  Name: Radu Alamo  MRN: [id number]  YOB: 1955   Patient's PCP: Virgie Leggett MD  Consultants during acute care: Pulmonary, ID, Podiatry, Gastroenterology, Wound care  Acute care admission date: 11/26 to 12/8/2022 and 11/11 to 11/23/22 at [facility] to [address]: 12/8/2022     Subjective:     Patient seen and examined. Patient's brother, Marianne Madera, presents at bedside. Patient is asleep and resting comfortable. Objective:   Pain is managed with Morphine IV prn with 4 doses (2 mg x 2; 4 mg x 2) in the past 24 hours, transdermal Scopolamine patch and Glycopyrrolate IV for terminal congestion, Haloperidol (1 mg x 1 in the last 24 hours) for nausea or delirium, and Lorazepam (1 mg x 1 in the last 24 hours) is available for anxiety. Data reviewed 12/10/2022:  Frontal lobe lesion resection 6/20/2022  FINAL DIAGNOSIS   A. BRAIN MASS:  - METASTATIC, POORLY DIFFERENTIATED ADENOCARCINOMA, SEE NOTE. B. BRAIN MASS:  - METASTATIC, POORLY DIFFERENTIATED ADENOCARCINOMA, SEE NOTE. CT-scan of the brain 12/2/22:  No acute intracranial abnormality. Two enlarging nodular areas of soft tissue thickening in the scalp. Differential includes pseudomeningoceles  or recurrent tumor. CT chest, abdomen, pelvis 12/2/22:  Chest:       Cardiomegaly. Coronary artery disease. No effusion. Right-sided MediPort   device tip within the right SVC near the cavoatrial junction. There is   mucosal thickening of the left bronchus intermedius and left lower lobe   bronchus which may be seen with bronchitis or emphysematous changes. No   suspicious hilar or mediastinal adenopathy. There emphysematous changes. Previous left partial pneumonectomy. Tree-in-bud nodules identified throughout much of the left lung favored to be   due to small airways disease or aspiration.   Pleural base nodule left upper   lung 3 mm in size stable dating back to 2018. Tracheostomy. Degenerate changes of the thoracic spine. No suspicious osseous lesions. Abdomen/Pelvis:       Motion and streak artifact challenge evaluation. Gallbladder is contracted. No suspicious liver, splenic, adrenal glands,   kidneys, or pancreas lesions. The pancreas is diminutive size and with   calcifications and ductal dilatation can be seen with chronic pancreatitis. Pancreatic duct measuring 5 mm. Common bile duct measures 5 mm. Mild retained stool. Thickening of the gastric antrum could relate to   underdistention. No bowel obstruction. Gastrostomy tube within the   stomach. .  Moderate stool rectosigmoid junction. No suspicious adenopathy. Lobulated uterus compatible with fibroids. Bladder is unremarkable. No suspicious adnexal mass. Moderate to severe   aortic calcifications. Evidence of bilateral iliac artery stents noted. Posterior changes right and left groin greatest on right. Loss of contrast   involving the right proximal femoral artery. Degenerate changes lumbar spine. Degenerate changes of the hips pelvis. Age   indeterminate compression fracture at L4. With mild retropulsion.       Hemoglobin A1C 9/29/22: 5.9%  Accuchecks:   Recent Labs     12/10/22  0740 12/10/22  1137 12/10/22  1720   POCGLU 187* 197* 153*      Hepatic Function Panel:    Lab Results   Component Value Date/Time    ALKPHOS 92 11/28/2022 05:10 AM    ALT 15 11/28/2022 05:10 AM    AST 20 11/28/2022 05:10 AM    PROT 6.4 11/28/2022 05:10 AM    BILITOT 0.3 11/28/2022 05:10 AM    BILIDIR 0.2 10/02/2020 08:34 PM    IBILI 0.5 10/02/2020 08:34 PM    LABALBU 3.3 11/28/2022 05:10 AM     CBC with Differential:    Lab Results   Component Value Date/Time    WBC 11.0 12/04/2022 05:45 AM    RBC 3.04 12/04/2022 05:45 AM    HGB 8.8 12/04/2022 05:45 AM    HCT 28.0 12/04/2022 05:45 AM     12/04/2022 05:45 AM    MCV 92.1 12/04/2022 05:45 AM    MCH 28.9 12/04/2022 05:45 AM    MCHC 31.4 12/04/2022 05:45 AM    RDW 14.2 12/04/2022 05:45 AM    SEGSPCT 74.1 12/04/2022 05:45 AM    LYMPHOPCT 15.2 12/04/2022 05:45 AM    MONOPCT 9.3 12/04/2022 05:45 AM    BASOPCT 0.3 12/04/2022 05:45 AM    MONOSABS 1.0 12/04/2022 05:45 AM    LYMPHSABS 1.7 12/04/2022 05:45 AM    EOSABS 0.1 12/04/2022 05:45 AM    BASOSABS 0.0 12/04/2022 05:45 AM    DIFFTYPE AUTOMATED DIFFERENTIAL 12/04/2022 05:45 AM     BMP:    Lab Results   Component Value Date/Time     12/04/2022 05:45 AM    K 3.9 12/04/2022 05:45 AM     12/04/2022 05:45 AM    CO2 23 12/04/2022 05:45 AM    BUN 8 12/04/2022 05:45 AM    LABALBU 3.3 11/28/2022 05:10 AM    CREATININE 0.6 12/04/2022 05:45 AM    CALCIUM 8.9 12/04/2022 05:45 AM    GFRAA >60 09/29/2022 07:53 PM    LABGLOM >60 12/04/2022 05:45 AM    GLUCOSE 206 12/04/2022 05:45 AM       Physical Exam:   /74   Pulse 89   Temp 97.3 °F (36.3 °C) (Oral)   Resp 18   SpO2 99%   General: asleep this afternoon, appears comfortable, NAD  Skin: wound images reviewed in the media tab  HEENT: Mucous membranes are dry,   Neck: tracheostomy in place with trach collar oxygen  Chest: right Mediport    Heart: distant tones, mildly tachycardic RRR, S1S2, no murmurs  Lungs:  coarse breath sounds bilaterally, diminished at the bases, without rales, scattered rhonchi   Abdomen: soft, bowel sounds present, no apparent tenderness, PEG in place, nondistended   and rectal: deferred  Extremities:  dressing in place, left hallux surgically absent, no mottling, no edema  Neurologic: asleep this afternoon, slept through physical exam     Assessment/Plan:  Metastatic Non Small Cell left lung cancer diagnosed October 2019 with right frontal cerebral metastasis and gamma knife June 2022. The patient is continued on [address] for management of pain, delirium and restlessness, respiratory failure and possible end of life care.  PPS 30%  Cancer pain and postop pain. Methadone 3 mg BID started on 12/9/2022. Continue prn Morphine for BTP while awaiting Methadone therapeutic level. Metabolic encephalopathy, likely multifactorial  Dysphagia, has PEG on tube feeding, speech recommends npo. Discussed nutrition at end of life with family on 12/9/22 and request to continue for now.    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, followed by ID and on TMP/SMX with end date 1/11/2023  Chronic respiratory failure with tracheostomy  Peripheral vascular disease   Seizure disorder with brain metastases, on Lacosamide (history of status epilepticus September 2022)  Type 2 diabetes mellitus, Hemoglobin A1C 9/29/22: 5.9%       Patient Active Problem List   Diagnosis Code    Chest pain R07.9    Dyspnea and respiratory abnormalities R06.00, R06.89    Acute gastritis without hemorrhage K29.00    Diabetic ulcer of toe of left foot associated with type 2 diabetes mellitus, with fat layer exposed ([address] E11.621, L97.522    Type 2 diabetes mellitus with peripheral neuropathy (HCC) E11.42    Peripheral vascular disease (HCC) I73.9    History of nicotine dependence Z87.891    Sepsis ([address] A41.9    Brain metastases (HCC) C79.31    Diabetic foot infection ([address] E11.628, L08.9    Cancer of left lung ([address] - removed at [facility] in 2019 C34.92    Former smoker - quit in 2019 Z87.891    DM (diabetes mellitus), type 2 ([address] E11.9    Seizures ([address] R56.9    Hypokalemia E87.6    Hypophosphatemia E83.39    Leukocytosis T80.066    Metabolic acidosis, increased anion gap E87.29    Elevated CK R74.8    Respiratory failure (HCC) J96.90    Acute on chronic anemia D64.9    Severe malnutrition (HCC) E43    Gastrointestinal hemorrhage K92.2    Diabetic foot ulcer with osteomyelitis (HCC) E11.621, E11.69, L97.509, M86.9    Adenocarcinoma, lung (HCC) C34.90    Tracheostomy status ([address] Z93.0    Lung cancer metastatic to brain ([address] C34.90, C79.31    Non-small cell lung cancer with metastasis ([facility] C34.90       Rikki Verduzco DO  12/10/2022

## 2022-12-12 NOTE — PROGRESS NOTES
[facility] Progress Note    Date: 12/12/2022  Name: Landon Montanez  MRN: [id number]  YOB: 1955   Patient's PCP: Sravanthi Kaplan MD  Consultants during acute care: Pulmonary, ID, Podiatry, Gastroenterology, Wound care  Acute care admission date: 11/26 to 12/8/2022 and 11/11 to 11/23/22 at [address] [place] [address] to [address]: 12/8/2022     Subjective: The patient's eyes are open and there is upper airway noise and she is being suctioned per respiratory therapist. There are no family here. The patient stares off and does not follow commands. There is intermittent facial grimacing and she has had 5 doses of Morphine in the past 24 hours. She remains on tube feeding at family request.     Objective:   Pain is managed with Morphine IV prn with 5 doses in the past 24 hours, transdermal Scopolamine patch and Glycopyrrolate IV for terminal congestion, Haloperidol for nausea or delirium, and Lorazepam is available for anxiety with 3 doses. Data reviewed 12/12/2022:  Frontal lobe lesion resection 6/20/2022  FINAL DIAGNOSIS   A. BRAIN MASS:  - METASTATIC, POORLY DIFFERENTIATED ADENOCARCINOMA, SEE NOTE. B. BRAIN MASS:  - METASTATIC, POORLY DIFFERENTIATED ADENOCARCINOMA, SEE NOTE. CT-scan of the brain 12/2/22:  No acute intracranial abnormality. Two enlarging nodular areas of soft tissue thickening in the scalp. Differential includes pseudomeningoceles  or recurrent tumor. CT chest, abdomen, pelvis 12/2/22:  Chest:       Cardiomegaly. Coronary artery disease. No effusion. Right-sided MediPort   device tip within the right SVC near the cavoatrial junction. There is   mucosal thickening of the left bronchus intermedius and left lower lobe   bronchus which may be seen with bronchitis or emphysematous changes. No   suspicious hilar or mediastinal adenopathy. There emphysematous changes. Previous left partial pneumonectomy. Tree-in-bud nodules identified throughout much of the left lung favored to be   due to small airways disease or aspiration. Pleural base nodule left upper   lung 3 mm in size stable dating back to 2018. Tracheostomy. Degenerate changes of the thoracic spine. No suspicious osseous lesions. Abdomen/Pelvis:       Motion and streak artifact challenge evaluation. Gallbladder is contracted. No suspicious liver, splenic, adrenal glands,   kidneys, or pancreas lesions. The pancreas is diminutive size and with   calcifications and ductal dilatation can be seen with chronic pancreatitis. Pancreatic duct measuring 5 mm. Common bile duct measures 5 mm. Mild retained stool. Thickening of the gastric antrum could relate to   underdistention. No bowel obstruction. Gastrostomy tube within the   stomach. .  Moderate stool rectosigmoid junction. No suspicious adenopathy. Lobulated uterus compatible with fibroids. Bladder is unremarkable. No suspicious adnexal mass. Moderate to severe   aortic calcifications. Evidence of bilateral iliac artery stents noted. Posterior changes right and left groin greatest on right. Loss of contrast   involving the right proximal femoral artery. Degenerate changes lumbar spine. Degenerate changes of the hips pelvis. Age   indeterminate compression fracture at L4. With mild retropulsion.       Hemoglobin A1C 9/29/22: 5.9%  Accuchecks:   Recent Labs     12/11/22  1730 12/11/22  1931 12/12/22  0720   POCGLU 125* 187* 185*        Hepatic Function Panel:    Lab Results   Component Value Date/Time    ALKPHOS 92 11/28/2022 05:10 AM    ALT 15 11/28/2022 05:10 AM    AST 20 11/28/2022 05:10 AM    PROT 6.4 11/28/2022 05:10 AM    BILITOT 0.3 11/28/2022 05:10 AM    BILIDIR 0.2 10/02/2020 08:34 PM    IBILI 0.5 10/02/2020 08:34 PM    LABALBU 3.3 11/28/2022 05:10 AM     CBC with Differential:    Lab Results   Component Value Date/Time    WBC 11.0 12/04/2022 05:45 AM    RBC 3.04 12/04/2022 05:45 AM    HGB 8.8 12/04/2022 05:45 AM    HCT 28.0 12/04/2022 05:45 AM     12/04/2022 05:45 AM    MCV 92.1 12/04/2022 05:45 AM    MCH 28.9 12/04/2022 05:45 AM    MCHC 31.4 12/04/2022 05:45 AM    RDW 14.2 12/04/2022 05:45 AM    SEGSPCT 74.1 12/04/2022 05:45 AM    LYMPHOPCT 15.2 12/04/2022 05:45 AM    MONOPCT 9.3 12/04/2022 05:45 AM    BASOPCT 0.3 12/04/2022 05:45 AM    MONOSABS 1.0 12/04/2022 05:45 AM    LYMPHSABS 1.7 12/04/2022 05:45 AM    EOSABS 0.1 12/04/2022 05:45 AM    BASOSABS 0.0 12/04/2022 05:45 AM    DIFFTYPE AUTOMATED DIFFERENTIAL 12/04/2022 05:45 AM     BMP:    Lab Results   Component Value Date/Time     12/04/2022 05:45 AM    K 3.9 12/04/2022 05:45 AM     12/04/2022 05:45 AM    CO2 23 12/04/2022 05:45 AM    BUN 8 12/04/2022 05:45 AM    LABALBU 3.3 11/28/2022 05:10 AM    CREATININE 0.6 12/04/2022 05:45 AM    CALCIUM 8.9 12/04/2022 05:45 AM    GFRAA >60 09/29/2022 07:53 PM    LABGLOM >60 12/04/2022 05:45 AM    GLUCOSE 206 12/04/2022 05:45 AM       Physical Exam:   /61   Pulse 84   Temp 98.8 °F (37.1 °C) (Axillary)   Resp 16   SpO2 99%   General: eyes are open and she stares off, nonverbal, chronically ill appearing   Skin: wound images reviewed in the media tab  HEENT: Mucous membranes are dry, mild conjunctival pallor   Neck: tracheostomy in place with trach collar oxygen  Chest: right Mediport    Heart: distant tones, RRR, S1S2, no murmurs  Lungs:  coarse breath sounds bilaterally, diminished at the bases, without rales, scattered rhonchi   Abdomen: soft, bowel sounds present, no apparent tenderness, PEG in place, nondistended   and rectal: deferred  Extremities:  dressing in place, left hallux surgically absent, no mottling, no edema  Neurologic: staring off, does not follow commands      Assessment/Plan:  . Metastatic Non Small Cell left lung cancer diagnosed October 2019 with right frontal cerebral metastasis and gamma knife June 2022.  The patient is continued on [address] for management of pain, delirium and restlessness, respiratory failure and possible end of life care. PPS 30%  Cancer pain and postop pain, started Methadone 12/9/22 and continue prn Morphine. Metabolic encephalopathy, likely multifactorial  Dysphagia, has PEG on tube feeding, speech recommends npo. Discussed nutrition at end of life with family on 12/9/22 and request to continue for now.    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, followed by ID and on TMP/SMX with end date 1/11/2023  Chronic respiratory failure with tracheostomy  Peripheral vascular disease   Seizure disorder with brain metastases, on Lacosamide (history of status epilepticus September 2022)  Type 2 diabetes mellitus, Hemoglobin A1C 9/29/22: 5.9%         Patient Active Problem List   Diagnosis Code    Chest pain R07.9    Dyspnea and respiratory abnormalities R06.00, R06.89    Acute gastritis without hemorrhage K29.00    Diabetic ulcer of toe of left foot associated with type 2 diabetes mellitus, with fat layer exposed ([address] E11.621, L97.522    Type 2 diabetes mellitus with peripheral neuropathy (HCC) E11.42    Peripheral vascular disease ([address] I73.9    History of nicotine dependence Z87.891    Sepsis ([address] A41.9    Brain metastases (HCC) C79.31    Diabetic foot infection ([address] E11.628, L08.9    Cancer of left lung ([address] - removed at [facility] in 2019 C34.92    Former smoker - quit in 2019 Z87.891    DM (diabetes mellitus), type 2 ([address] E11.9    Seizures ([address] R56.9    Hypokalemia E87.6    Hypophosphatemia E83.39    Leukocytosis Z12.019    Metabolic acidosis, increased anion gap E87.29    Elevated CK R74.8    Respiratory failure (HCC) J96.90    Acute on chronic anemia D64.9    Severe malnutrition (HCC) E43    Gastrointestinal hemorrhage K92.2    Diabetic foot ulcer with osteomyelitis (HCC) E11.621, E11.69, L97.509, M86.9    Adenocarcinoma, lung (HCC) C34.90    Tracheostomy status ([facility] Z93.0    Lung cancer metastatic to brain ([facility] C34.90, C79.31    Non-small cell lung cancer with metastasis ([facility] C34.90       BERTRMA Garcia MD  12/12/2022

## 2022-12-12 NOTE — PLAN OF CARE
Problem: Discharge Planning  Goal: Discharge to home or other facility with appropriate resources  12/12/2022 0855 by Nan Murcia RN  Outcome: Progressing  Flowsheets (Taken 12/12/2022 9329)  Discharge to home or other facility with appropriate resources:   Identify barriers to discharge with patient and caregiver   Arrange for needed discharge resources and transportation as appropriate   Identify discharge learning needs (meds, wound care, etc)   Arrange for interpreters to assist at discharge as needed   Refer to discharge planning if patient needs post-hospital services based on physician order or complex needs related to functional status, cognitive ability or social support system     Problem: Skin/Tissue Integrity  Goal: Absence of new skin breakdown  Description: 1. Monitor for areas of redness and/or skin breakdown  2. Assess vascular access sites hourly  3. Every 4-6 hours minimum:  Change oxygen saturation probe site  4. Every 4-6 hours:  If on nasal continuous positive airway pressure, respiratory therapy assess nares and determine need for appliance change or resting period.   12/12/2022 0855 by Nan Murcia RN  Outcome: Progressing     Problem: Chronic Conditions and Co-morbidities  Goal: Patient's chronic conditions and co-morbidity symptoms are monitored and maintained or improved  12/12/2022 0855 by Nan Mucria RN  Outcome: Progressing  Flowsheets (Taken 12/12/2022 5601)  Care Plan - Patient's Chronic Conditions and Co-Morbidity Symptoms are Monitored and Maintained or Improved:   Monitor and assess patient's chronic conditions and comorbid symptoms for stability, deterioration, or improvement   Collaborate with multidisciplinary team to address chronic and comorbid conditions and prevent exacerbation or deterioration   Update acute care plan with appropriate goals if chronic or comorbid symptoms are exacerbated and prevent overall improvement and discharge     Problem: Pain  Goal: Verbalizes/displays adequate comfort level or baseline comfort level  12/12/2022 0855 by Marian Rios RN  Outcome: Progressing     Problem: Safety - Adult  Goal: Free from fall injury  12/12/2022 0855 by Marian Rios RN  Outcome: Progressing

## 2022-12-13 NOTE — CONSULTS
[address] [id number] Continence Nurse  Consult Note       Abdiaziz Park  AGE: 74 y.o. GENDER: female  : 1955  TODAY'S DATE:  2022    Subjective:     Reason for  Evaluation and Assessment: wound care reassessment. Abdiaziz Park is a 74 y.o. female referred by:   [x] Physician  [] Nursing  [] Other:     Wound Identification:  Wound Type: arterial, diabetic, pressure, and non-healing surgical  Contributing Factors: diabetes, chronic pressure, decreased mobility, and arterial insufficiency        PAST MEDICAL HISTORY        Diagnosis Date    Brain metastases ([address] 8/10/2022    Biopsy (frozen section) done  at [facility] showed METASTATIC, [address] Cancer ([address]     Diabetes mellitus ([address]     Hyperlipidemia     Hypertension     Lung cancer metastatic to brain ([address] 2022    Seizure ([address]     Tracheostomy status ([facility] 11/10/2022       PAST SURGICAL HISTORY    Past Surgical History:   Procedure Laterality Date    LUNG CANCER SURGERY Left 10/2019    TOE AMPUTATION Left 2022    LEFT FOOT HALLUX AMPUTATION EXCISIONAL DEBRIDEMENT ALL NON VIABLE   TISSUE AND BONE performed by Ruben Waller DPM at [facility] N/A 10/3/2020    EGD BIOPSY performed by Michael Hylton MD at [facility]    No family history on file. SOCIAL HISTORY    Social History     Tobacco Use    Smoking status: Former     Packs/day: 1.00     Types: Cigarettes     Quit date: 2019     Years since quitting: 3.9    Smokeless tobacco: Never    Tobacco comments:     10/2019   Substance Use Topics    Alcohol use: Yes     Alcohol/week: 1.0 standard drink     Types: 1 Cans of beer per week    Drug use: Yes     Types: Marijuana (Weed)       ALLERGIES    No Known Allergies    MEDICATIONS    No current facility-administered medications on file prior to encounter.      Current Outpatient Medications on File Prior to Encounter   Medication Sig Dispense Refill    oxyCODONE (ROXICODONE) 5 MG immediate release tablet Take 5 mg by mouth every 4 hours as needed for Pain. aspirin 81 MG chewable tablet 81 mg by Per G Tube route daily      apixaban (ELIQUIS) 5 MG TABS tablet by Per G Tube route 2 times daily      carvedilol (COREG) 25 MG tablet 25 mg by Per G Tube route 2 times daily (with meals)      folic acid (FOLVITE) 1 MG tablet 1 mg by Per G Tube route daily      glycopyrrolate (ROBINUL) 1 MG tablet 1 mg by Per G Tube route 3 times daily      insulin glargine (LANTUS) 100 UNIT/ML injection vial Inject 20 Units into the skin daily (with breakfast)      Lactobacillus Extra Strength CAPS by Per G Tube route      lansoprazole (PREVACID) 30 MG delayed release capsule Take 30 mg by mouth daily      losartan (COZAAR) 50 MG tablet 50 mg by Per G Tube route daily      scopolamine (TRANSDERM-SCOP) transdermal patch Place 1 patch onto the skin every 72 hours      vitamin B-1 (THIAMINE) 100 MG tablet 100 mg by Per G Tube route daily      lacosamide (VIMPAT) 50 MG TABS tablet 200 mg by Per G Tube route 2 times daily. alendronate (FOSAMAX) 70 MG tablet Take 1 tablet by mouth once a week      atorvastatin (LIPITOR) 40 MG tablet Take 1 tablet by mouth in the morning. (Patient taking differently: Take 20 mg by mouth daily) 30 tablet 1    gabapentin (NEURONTIN) 300 MG capsule Take 300 mg by mouth at bedtime.       [DISCONTINUED] simvastatin (ZOCOR) 20 MG tablet Take 40 mg by mouth nightly            Objective:      /79   Pulse 93   Temp 98.6 °F (37 °C) (Axillary)   Resp 16   SpO2 100%   Jose Angel Risk Score: Jose Angel Scale Score: 10    LABS    CBC:   Lab Results   Component Value Date/Time    WBC 11.0 12/04/2022 05:45 AM    RBC 3.04 12/04/2022 05:45 AM    HGB 8.8 12/04/2022 05:45 AM    HCT 28.0 12/04/2022 05:45 AM    MCV 92.1 12/04/2022 05:45 AM    MCH 28.9 12/04/2022 05:45 AM    MCHC 31.4 12/04/2022 05:45 AM    RDW 14.2 12/04/2022 05:45 AM     12/04/2022 05:45 AM    MPV 9.5 12/04/2022 05:45 AM     CMP:    Lab Results   Component Value Date/Time     12/04/2022 05:45 AM    K 3.9 12/04/2022 05:45 AM     12/04/2022 05:45 AM    CO2 23 12/04/2022 05:45 AM    BUN 8 12/04/2022 05:45 AM    CREATININE 0.6 12/04/2022 05:45 AM    GFRAA >60 09/29/2022 07:53 PM    LABGLOM >60 12/04/2022 05:45 AM    GLUCOSE 206 12/04/2022 05:45 AM    PROT 6.4 11/28/2022 05:10 AM    LABALBU 3.3 11/28/2022 05:10 AM    CALCIUM 8.9 12/04/2022 05:45 AM    BILITOT 0.3 11/28/2022 05:10 AM    ALKPHOS 92 11/28/2022 05:10 AM    AST 20 11/28/2022 05:10 AM    ALT 15 11/28/2022 05:10 AM     Albumin:    Lab Results   Component Value Date/Time    LABALBU 3.3 11/28/2022 05:10 AM     PT/INR:    Lab Results   Component Value Date/Time    PROTIME 13.7 11/28/2022 05:10 AM    INR 1.06 11/28/2022 05:10 AM     HgBA1c:    Lab Results   Component Value Date/Time    LABA1C 5.9 09/29/2022 07:53 PM         Assessment:     Patient Active Problem List   Diagnosis    Chest pain    Dyspnea and respiratory abnormalities    Acute gastritis without hemorrhage    Diabetic ulcer of toe of left foot associated with type 2 diabetes mellitus, with fat layer exposed ([address]    Type 2 diabetes mellitus with peripheral neuropathy (HCC)    Peripheral vascular disease ([address]    History of nicotine dependence    Sepsis ([address]    Brain metastases ([address]    Diabetic foot infection ([address]    Cancer of left lung ([address] - removed at [facility] in 2019    Former smoker - quit in 2019    DM (diabetes mellitus), type 2 (HCC)    Seizures (HCC)    Hypokalemia    Hypophosphatemia    Leukocytosis    Metabolic acidosis, increased anion gap    Elevated CK    Respiratory failure (HCC)    Acute on chronic anemia    Severe malnutrition (HCC)    Gastrointestinal hemorrhage    Diabetic foot ulcer with osteomyelitis ([address]    Adenocarcinoma, lung (HCC)    Tracheostomy status (HCC)    Lung cancer metastatic to brain ([address]    Non-small cell lung cancer with metastasis ([facility]       Measurements:  Wound 08/16/22 Toe (Comment  which one) Anterior; Left Left Great Toe Incision (Active)   Number of days: 118       Wound 11/27/22 Toe (Comment  which one) Anterior; Left great toe Soft tissue ulceration at the surgical stump,erythema (Active)   Wound Image   12/13/22 0941   Wound Etiology Non-Healing Surgical 12/13/22 0941   Dressing Status New dressing applied 12/13/22 0941   Wound Cleansed Cleansed with saline 12/13/22 0941   Dressing/Treatment Open to air 12/13/22 1049   Wound Length (cm) 3 cm 12/13/22 0941   Wound Width (cm) 2.5 cm 12/13/22 0941   Wound Depth (cm) 0.1 cm 12/13/22 0941   Wound Surface Area (cm^2) 7.5 cm^2 12/13/22 0941   Change in Wound Size % (l*w) -20 12/13/22 0941   Wound Volume (cm^3) 0.75 cm^3 12/13/22 0941   Wound Healing % 40 12/13/22 0941   Distance Tunneling (cm) 0 cm 12/13/22 0941   Tunneling Position ___ O'Clock 0 12/13/22 0941   Undermining Starts ___ O'Clock 0 12/13/22 0941   Undermining Ends___ O'Clock 0 12/13/22 0941   Undermining Maxium Distance (cm) 0 12/13/22 0941   Wound Assessment Pink/red 12/13/22 1049   Drainage Amount None 12/13/22 1049   Drainage Description Serosanguinous 12/11/22 2030   Odor None 12/13/22 1049   Lauren-wound Assessment Blanchable erythema; Intact 12/13/22 1049   Margins Defined edges; Attached edges 12/13/22 1049   Wound Thickness Description not for Pressure Injury Full thickness 12/13/22 0941   Number of days: 16       Wound 11/27/22 Foot Left;Lateral presure injury (Active)   Wound Image   12/13/22 0941   Wound Etiology Diabetic 12/13/22 0941   Dressing Status New dressing applied 12/13/22 0941   Wound Cleansed Cleansed with saline 12/13/22 0941   Dressing/Treatment Open to air 12/13/22 1049   Wound Length (cm) 1.3 cm 12/13/22 0941   Wound Width (cm) 0.9 cm 12/13/22 0941   Wound Depth (cm) 0.1 cm 12/13/22 0941   Wound Surface Area (cm^2) 1.17 cm^2 12/13/22 0941   Change in Wound Size % (l*w) -21.88 12/13/22 0941 Wound Volume (cm^3) 0.117 cm^3 12/13/22 0941   Wound Healing % -22 12/13/22 0941   Distance Tunneling (cm) 0 cm 12/13/22 0941   Tunneling Position ___ O'Clock 0 12/13/22 0941   Undermining Starts ___ O'Clock 0 12/13/22 0941   Undermining Ends___ O'Clock 0 12/13/22 0941   Undermining Maxium Distance (cm) 0 12/13/22 0941   Wound Assessment Eschar dry 12/13/22 1049   Drainage Amount Scant 12/13/22 1049   Drainage Description Serosanguinous 12/13/22 1049   Odor None 12/13/22 1049   Lauren-wound Assessment Blanchable erythema 12/13/22 1049   Margins Attached edges 12/13/22 1049   Wound Thickness Description not for Pressure Injury Full thickness 12/13/22 0941   Number of days: 16       Wound 11/29/22 Coccyx (Active)   Wound Image   11/29/22 1025   Wound Etiology Pressure Stage 2 12/13/22 1049   Dressing Status Clean;Dry; Intact 12/13/22 1049   Wound Cleansed Cleansed with saline 12/11/22 2030   Dressing/Treatment Silicone border 18/36/95 1049   Wound Length (cm) 0 cm 12/13/22 0941   Wound Width (cm) 0 cm 12/13/22 0941   Wound Depth (cm) 0 cm 12/13/22 0941   Wound Surface Area (cm^2) 0 cm^2 12/13/22 0941   Change in Wound Size % (l*w) 100 12/13/22 0941   Wound Volume (cm^3) 0 cm^3 12/13/22 0941   Wound Healing % 100 12/13/22 0941   Distance Tunneling (cm) 0 cm 12/11/22 2030   Tunneling Position ___ O'Clock 0 12/11/22 2030   Undermining Starts ___ O'Clock 0 12/11/22 2030   Undermining Ends___ O'Clock 0 12/11/22 2030   Undermining Maxium Distance (cm) 0 12/11/22 2030   Wound Assessment Other (Comment) 12/13/22 1049   Drainage Amount None 12/13/22 1049   Drainage Description Serosanguinous 12/11/22 2030   Odor None 12/13/22 1049   Lauren-wound Assessment Other (Comment) 12/13/22 1049   Margins Attached edges 12/11/22 2030   Wound Thickness Description not for Pressure Injury Partial thickness 12/11/22 2030   Number of days: 13       Wound 12/13/22 Heel Left (Active)   Wound Image   12/13/22 0941   Wound Etiology Arterial 12/13/22 0941   Wound Cleansed Not Cleansed 12/13/22 0941   Dressing/Treatment ABD;Roll gauze 12/13/22 0941   Wound Length (cm) 1.5 cm 12/13/22 0941   Wound Width (cm) 2 cm 12/13/22 0941   Wound Depth (cm) 0 cm 12/13/22 0941   Wound Surface Area (cm^2) 3 cm^2 12/13/22 0941   Wound Volume (cm^3) 0 cm^3 12/13/22 0941   Distance Tunneling (cm) 0 cm 12/13/22 0941   Tunneling Position ___ O'Clock 0 12/13/22 0941   Undermining Starts ___ O'Clock 0 12/13/22 0941   Undermining Ends___ O'Clock 0 12/13/22 0941   Undermining Maxium Distance (cm) 0 12/13/22 0941   Wound Assessment Purple/maroon 12/13/22 0941   Drainage Amount None 12/13/22 0941   Odor None 12/13/22 0941   Margins Attached edges 12/13/22 0941   Number of days: 0       Wound 12/13/22 Foot Left;Medial to medial ankle cluster (Active)   Wound Image   12/13/22 0941   Wound Etiology Arterial 12/13/22 0941   Dressing Status New dressing applied 12/13/22 0941   Wound Cleansed Not Cleansed 12/13/22 0941   Dressing/Treatment ABD;Roll gauze 12/13/22 0941   Wound Length (cm) 8 cm 12/13/22 0941   Wound Width (cm) 11 cm 12/13/22 0941   Wound Depth (cm) 0 cm 12/13/22 0941   Wound Surface Area (cm^2) 88 cm^2 12/13/22 0941   Wound Volume (cm^3) 0 cm^3 12/13/22 0941   Distance Tunneling (cm) 0 cm 12/13/22 0941   Tunneling Position ___ O'Clock 0 12/13/22 0941   Undermining Starts ___ O'Clock 0 12/13/22 0941   Undermining Ends___ O'Clock 0 12/13/22 0941   Undermining Maxium Distance (cm) 0 12/13/22 0941   Wound Assessment Purple/maroon 12/13/22 0941   Drainage Amount None 12/13/22 0941   Odor None 12/13/22 0941   Margins Attached edges 12/13/22 0941   Number of days: 0       Wound 12/13/22 Toe (Comment  which one) Left 2nd to 5th toes cluster (Active)   Wound Image   12/13/22 0941   Wound Etiology Diabetic 12/13/22 0941   Dressing Status New dressing applied 12/13/22 0941   Wound Cleansed Not Cleansed 12/13/22 0941   Dressing/Treatment ABD;Roll gauze; Hydrofiber Ag 12/13/22 0238 Wound Length (cm) 2 cm 12/13/22 0941   Wound Width (cm) 6 cm 12/13/22 0941   Wound Depth (cm) 0 cm 12/13/22 0941   Wound Surface Area (cm^2) 12 cm^2 12/13/22 0941   Wound Volume (cm^3) 0 cm^3 12/13/22 0941   Distance Tunneling (cm) 0 cm 12/13/22 0941   Tunneling Position ___ O'Clock 0 12/13/22 0941   Undermining Starts ___ O'Clock 0 12/13/22 0941   Undermining Ends___ O'Clock 0 12/13/22 0941   Undermining Maxium Distance (cm) 0 12/13/22 0941   Wound Assessment Purple/maroon;Eschar dry 12/13/22 0941   Drainage Amount None 12/13/22 0941   Odor None 12/13/22 0941   Lauren-wound Assessment Fragile 12/13/22 0941   Margins Attached edges 12/13/22 0941   Number of days: 0       Wound 12/13/22 Tibial Left;Posterior leg/ankle (Active)   Wound Image   12/13/22 0941   Wound Etiology Diabetic 12/13/22 0941   Dressing Status New dressing applied 12/13/22 0941   Wound Cleansed Not Cleansed 12/13/22 0941   Dressing/Treatment ABD;Roll gauze 12/13/22 0941   Wound Length (cm) 6 cm 12/13/22 0941   Wound Width (cm) 1 cm 12/13/22 0941   Wound Depth (cm) 0.1 cm 12/13/22 0941   Wound Surface Area (cm^2) 6 cm^2 12/13/22 0941   Wound Volume (cm^3) 0.6 cm^3 12/13/22 0941   Distance Tunneling (cm) 0 cm 12/13/22 0941   Tunneling Position ___ O'Clock 0 12/13/22 0941   Undermining Starts ___ O'Clock 0 12/13/22 0941   Undermining Ends___ O'Clock 0 12/13/22 0941   Undermining Maxium Distance (cm) 0 12/13/22 0941   Wound Assessment Pink/red 12/13/22 0941   Drainage Amount None 12/13/22 0941   Lauren-wound Assessment Intact 12/13/22 0941   Margins Attached edges 12/13/22 0941   Number of days: 0       Response to treatment:  Well tolerated by patient. Pain Assessment:  Severity:  none  Quality of pain:   Wound Pain Timing/Severity:   Premedicated: no    Plan:     Plan of Care: Wound 11/27/22 Toe (Comment  which one) Anterior; Left great toe Soft tissue ulceration at the surgical stump,erythema-Dressing/Treatment: Open to air  Wound 11/27/22 Foot Left;Lateral presure injury-Dressing/Treatment: Open to air  Wound 11/29/22 Coccyx-Dressing/Treatment: Silicone border  Wound 12/13/22 Heel Left-Dressing/Treatment: ABD, Roll gauze  Wound 12/13/22 Foot Left;Medial to medial ankle cluster-Dressing/Treatment: ABD, Roll gauze  Wound 12/13/22 Toe (Comment  which one) Left 2nd to 5th toes cluster-Dressing/Treatment: ABD, Roll gauze, Hydrofiber Ag (in between toes)  Wound 12/13/22 Tibial Left;Posterior leg/ankle-Dressing/Treatment: ABD, Roll gauze    Patient in bed eyes closed. Wound care for reassessment of left foot wounds. Pt has eschar wound to left great toe amp site and left lateral foot. Left great toe with exposed bone. Cleansed with NS. Measured and pictured. Applied betadine moist gauze. Left foot medial/heel/2nd to 5th toe discolored/eschar. Measured and pictured. Applied aquacel ag to base of toes abd kerlix. Rt heel intact both heels floated with heel boots. Sacrum intact applied foam border. Pt turned to rt side with pillow support. Atmos air pump placed to the bed. Ordered a skin guard float mattress. Pt is at high risk for skin breakdown AEB magnus. Follow magnus orders. Specialty Bed Required : yes  [x] Low Air Loss   [x] Pressure Redistribution  [] Fluid Immersion  [] Bariatric  [] Total Pressure Relief  [] Other:     Discharge Plan:  Placement for patient upon discharge: tbd  Hospice Care: yes  Patient appropriate for Outpatient [address] [place] [address] Street: [facility]    Patient/Caregiver Teaching:  Level of patient/caregiver understanding able to: pt not appropriate for teaching. Electronically signed by Julio Cesar Wilkins RN, on 12/13/2022 at 11:29 AM

## 2022-12-13 NOTE — PROGRESS NOTES
12/13/22 1103   Encounter Summary   Encounter Overview/Reason  Initial Encounter   Service Provided For: Patient   Referral/Consult From: [address] [facility] [address] Family members   Last Encounter  12/13/22  (patient was not engaging this  during the visit. short visit.)   Complexity of Encounter Low   Begin Time 1046   End Time  1104   Total Time Calculated 18 min   Encounter    Type Follow up   Spiritual/Emotional needs   Type Spiritual Support   Grief, Loss, and Adjustments   Type Adjustment to illness; Life Adjustments   Assessment/Intervention/Outcome   Assessment Calm;Passive   Intervention Empowerment; Active listening;Sustaining Presence/Ministry of presence   Outcome Coping   Plan and Referrals   Plan/Referrals No future visits requested  (to follow up per patient request.)

## 2022-12-13 NOTE — PROGRESS NOTES
RRT checked in on pt, Pt is 97% on 35% t-piece, RRT Suctioned pt for a scant amt of thick yellow secretions.     Pt tolerating well, Pt sleeping

## 2022-12-13 NOTE — PROGRESS NOTES
[facility] Progress Note    Date: 12/13/2022  Name: Heide Luna  MRN: [id number]  YOB: 1955   Patient's PCP: Carina Jaeger MD  Consultants during acute care: Pulmonary, ID, Podiatry, Gastroenterology, Wound care  Acute care admission date: 11/26 to 12/8/2022 and 11/11 to 11/23/22 at [facility] to [address]: 12/8/2022     Subjective: The patient's eyes are closed and not as responsive today. There is upper airway noise and she has been suctioned for tan material from trach (suggestive of tube feeding) per respiratory therapist. Discussed with the patient's nurses, respiratory therapist, and hospice nurse. I met with the patient's spouse, Zoe Quezada, and the patient's brother, Seth Luciano, at the bedside. Zoe Quezada was concerned that Haloperidol caused vomiting and I tried to reassure him. We discussed that with elevated tube feeding residuals and suctioning material from trach that we should stop the tube feeding and he understands. I shared the patient's ongoing decline and that she is approaching end of life. I had discussed the same with the patient's daughter, Mallorie Smith and niece, Ila Last on 12/9/22. There was high tube feeding residual of 140 ml overnight 12/12 to 12/13 on 40 ml/hr and the tube feeding was stopped. Objective:   Pain is managed with Methadone 3 mg per G tube twice daily plus Morphine IV prn with 6 doses in the past 24 hours, transdermal Scopolamine patch and Glycopyrrolate IV for terminal congestion, Haloperidol for nausea or delirium, and Lorazepam is available for anxiety with 1 dose. Data reviewed 12/13/2022:  Frontal lobe lesion resection 6/20/2022  FINAL DIAGNOSIS   A. BRAIN MASS:  - METASTATIC, POORLY DIFFERENTIATED ADENOCARCINOMA, SEE NOTE. B. BRAIN MASS:  - METASTATIC, POORLY DIFFERENTIATED ADENOCARCINOMA, SEE NOTE. CT-scan of the brain 12/2/22:  No acute intracranial abnormality.    Two enlarging nodular areas of soft tissue thickening in the scalp. Differential includes pseudomeningoceles  or recurrent tumor. CT chest, abdomen, pelvis 12/2/22:  Chest:       Cardiomegaly. Coronary artery disease. No effusion. Right-sided MediPort   device tip within the right SVC near the cavoatrial junction. There is   mucosal thickening of the left bronchus intermedius and left lower lobe   bronchus which may be seen with bronchitis or emphysematous changes. No   suspicious hilar or mediastinal adenopathy. There emphysematous changes. Previous left partial pneumonectomy. Tree-in-bud nodules identified throughout much of the left lung favored to be   due to small airways disease or aspiration. Pleural base nodule left upper   lung 3 mm in size stable dating back to 2018. Tracheostomy. Degenerate changes of the thoracic spine. No suspicious osseous lesions. Abdomen/Pelvis:       Motion and streak artifact challenge evaluation. Gallbladder is contracted. No suspicious liver, splenic, adrenal glands,   kidneys, or pancreas lesions. The pancreas is diminutive size and with   calcifications and ductal dilatation can be seen with chronic pancreatitis. Pancreatic duct measuring 5 mm. Common bile duct measures 5 mm. Mild retained stool. Thickening of the gastric antrum could relate to   underdistention. No bowel obstruction. Gastrostomy tube within the   stomach. .  Moderate stool rectosigmoid junction. No suspicious adenopathy. Lobulated uterus compatible with fibroids. Bladder is unremarkable. No suspicious adnexal mass. Moderate to severe   aortic calcifications. Evidence of bilateral iliac artery stents noted. Posterior changes right and left groin greatest on right. Loss of contrast   involving the right proximal femoral artery. Degenerate changes lumbar spine. Degenerate changes of the hips pelvis.   Age   indeterminate compression fracture at L4. With mild retropulsion.       Hemoglobin A1C 9/29/22: 5.9%  Accuchecks:   Recent Labs     12/12/22  1155 12/12/22  1632 12/12/22 2056   POCGLU 184* 174* 197*        Hepatic Function Panel:    Lab Results   Component Value Date/Time    ALKPHOS 92 11/28/2022 05:10 AM    ALT 15 11/28/2022 05:10 AM    AST 20 11/28/2022 05:10 AM    PROT 6.4 11/28/2022 05:10 AM    BILITOT 0.3 11/28/2022 05:10 AM    BILIDIR 0.2 10/02/2020 08:34 PM    IBILI 0.5 10/02/2020 08:34 PM    LABALBU 3.3 11/28/2022 05:10 AM     CBC with Differential:    Lab Results   Component Value Date/Time    WBC 11.0 12/04/2022 05:45 AM    RBC 3.04 12/04/2022 05:45 AM    HGB 8.8 12/04/2022 05:45 AM    HCT 28.0 12/04/2022 05:45 AM     12/04/2022 05:45 AM    MCV 92.1 12/04/2022 05:45 AM    MCH 28.9 12/04/2022 05:45 AM    MCHC 31.4 12/04/2022 05:45 AM    RDW 14.2 12/04/2022 05:45 AM    SEGSPCT 74.1 12/04/2022 05:45 AM    LYMPHOPCT 15.2 12/04/2022 05:45 AM    MONOPCT 9.3 12/04/2022 05:45 AM    BASOPCT 0.3 12/04/2022 05:45 AM    MONOSABS 1.0 12/04/2022 05:45 AM    LYMPHSABS 1.7 12/04/2022 05:45 AM    EOSABS 0.1 12/04/2022 05:45 AM    BASOSABS 0.0 12/04/2022 05:45 AM    DIFFTYPE AUTOMATED DIFFERENTIAL 12/04/2022 05:45 AM     BMP:    Lab Results   Component Value Date/Time     12/04/2022 05:45 AM    K 3.9 12/04/2022 05:45 AM     12/04/2022 05:45 AM    CO2 23 12/04/2022 05:45 AM    BUN 8 12/04/2022 05:45 AM    LABALBU 3.3 11/28/2022 05:10 AM    CREATININE 0.6 12/04/2022 05:45 AM    CALCIUM 8.9 12/04/2022 05:45 AM    GFRAA >60 09/29/2022 07:53 PM    LABGLOM >60 12/04/2022 05:45 AM    GLUCOSE 206 12/04/2022 05:45 AM       Physical Exam:   /70   Pulse 88   Temp 98.2 °F (36.8 °C) (Axillary)   Resp 17   SpO2 100%   General: unresponsive this morning, does not open her eyes, + upper airway noise, chronically ill appearing   Skin: wound images reviewed in the media tab  HEENT: Mucous membranes are dry   Neck: tracheostomy in place with trach collar oxygen  Chest: right Mediport    Heart: distant tones, mildly tachycardic RRR, S1S2, no murmurs  Lungs:  coarse breath sounds bilaterally, diminished at the bases, without rales, scattered coarse rhonchi   Abdomen: soft, bowel sounds hypoactive, no apparent tenderness, PEG in place, nondistended   and rectal: deferred  Extremities:  dressing in place, left hallux surgically absent, no mottling, no edema  Neurologic: unresponsive      Assessment/Plan:  . Metastatic Non Small Cell left lung cancer diagnosed October 2019 with right frontal cerebral metastasis with gamma knife June 2022. The patient is continued on [facility] for management of pain, delirium and restlessness, respiratory failure and possible end of life care. PPS 20%. Cancer pain and postop pain, increase Methadone to 5 mg BID on 12/13/22 and continue prn Morphine. Metabolic encephalopathy, likely multifactorial  Dysphagia, has PEG on tube feeding, speech recommends npo. Elevated tube feeding residuals and suction tan material from trach and sttopped tube feeding 12/13/22. 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 I talked with Pharmacy yesterday and they were ordering Vimpat liquid to give per G Tube.    Type 2 diabetes mellitus, Hemoglobin A1C 9/29/22: 5.9%         Patient Active Problem List   Diagnosis Code    Chest pain R07.9    Dyspnea and respiratory abnormalities R06.00, R06.89    Acute gastritis without hemorrhage K29.00    Diabetic ulcer of toe of left foot associated with type 2 diabetes mellitus, with fat layer exposed ([address] E11.621, L97.522    Type 2 diabetes mellitus with peripheral neuropathy (HCC) E11.42    Peripheral vascular disease ([address] I73.9 History of nicotine dependence Z87.891    Sepsis ([address] A41.9    Brain metastases ([address] C79.31    Diabetic foot infection (HCC) E11.628, L08.9    Cancer of left lung ([address] - removed at [facility] in 2019 C34.92    Former smoker - quit in 2019 Z87.891    DM (diabetes mellitus), type 2 ([address] E11.9    Seizures ([address] R56.9    Hypokalemia E87.6    Hypophosphatemia E83.39    Leukocytosis D08.515    Metabolic acidosis, increased anion gap E87.29    Elevated CK R74.8    Respiratory failure (HCC) J96.90    Acute on chronic anemia D64.9    Severe malnutrition (HCC) E43    Gastrointestinal hemorrhage K92.2    Diabetic foot ulcer with osteomyelitis (HCC) E11.621, E11.69, L97.509, M86.9    Adenocarcinoma, lung (HCC) C34.90    Tracheostomy status ([address] Z93.0    Lung cancer metastatic to brain ([address] C34.90, C79.31    Non-small cell lung cancer with metastasis ([address] C34.90       BERTRAM Garcia MD  12/13/2022

## 2022-12-13 NOTE — CONSULTS
Weekly routine line dressing change completed. Patient's right SC MediPort dressing changed using sterile technique per protocol. Patient tolerated well. Please consult IV/PICC Team for any questions or if patient's needs change.

## 2022-12-14 NOTE — PROGRESS NOTES
ONCOLOGY HEMATOLOGY CARE ([facility])  PROGRESS NOTE      2022  8:24 AM    Patient:    Ashley Suazo  : 1955   74 y.o. MRN: [id number]  Admitted: 2022 10:29 PM ATT: Rebecca Ho MD   [id number]/[id number]  AdmitDx: Non-small cell lung cancer with metastasis ([facility] [C34.90]  PCP: Maninder Mcconnell MD      Patient was seen and examined today  Opens her eyes minimally  Foot discoloration per   And also suctioning green stuff    PHYSICAL EXAM :    Vitals: /70   Pulse 84   Temp 97.5 °F (36.4 °C)   Resp 14   SpO2 100%     CONSTITUTIONAL: somnelent  LUNGS: coarse bs claudia, t peice   CARDIOVASCULAR: s1s2   ABDOMEN: bs pos +Gtube  MUSCULOSKELETAL: left foot discolored  EXTREMITIES: no LE edema    LABORATORY RESULTS  CBC:   No results for input(s): WBC, HGB, PLT in the last 72 hours. BMP:    No results for input(s): NA, K, CL, CO2, BUN, CREATININE, GLUCOSE in the last 72 hours. Hepatic: No results for input(s): AST, ALT, ALB, BILITOT, ALKPHOS in the last 72 hours. INR: No results for input(s): INR in the last 72 hours. RADIOLOGY REPORTS  Reviewed    ASSESSMENT & RECOMMENDATIONS:  Metastatic lung cancer-stage BRENDA(cTx, Cnx, pM1b),had a very lengthy discussion with  regarding her findings, diagnosis, declining health status and discussed that I will not recommend any systemic treatment including radiation, foot amputations or transfer to [facility] and strongly recommend [facility]. [employer] service and advised foot wrapping. Discussed with Dr Julia Howell    Please call if any other  questions    Discussed with nursing staff as well.  Thanks     Spent 45 min     DEEP

## 2022-12-14 NOTE — PROGRESS NOTES
12/14/22 1103   Encounter Summary   Encounter Overview/Reason  Spiritual/Emotional Needs   Service Provided For: Patient;Patient and family together;Friend   Referral/Consult From: Nurse   Support System Spouse   Last Encounter  12/14/22  (Spouse and support group from Hospice present; singing at bedside.)   Complexity of Encounter Moderate   Begin Time 1035   End Time  1105   Total Time Calculated 30 min   Encounter    Type Follow up   Spiritual/Emotional needs   Type Spiritual Support   Rituals, Rites and Sacraments   Type Blessings   Assessment/Intervention/Outcome   Assessment Calm;Coping;Peaceful   Intervention Active listening;End of Life Care;Discussed relationship with God;Nurtured Hope;Prayer (assurance of)/[place];Read/Provided Scripture;Sustaining Presence/Ministry of presence  (Jerry Mcgarry was able to converse; pt. opened her eyes, acknowledged presence and was aware prayer and singing was provided for her.  Patient was comforted by both.)   Outcome Acceptance;Comfort;Coping;Engaged in conversation;Expressed feelings, needs, and concerns;Expressed Gratitude   Plan and Referrals   Plan/Referrals Continue to visit, (comment)

## 2022-12-14 NOTE — PROGRESS NOTES
RRT checked on pt, RRT suctioned pt for small creamy yellow secretions. Cleaned around the trach with the yankuaers for thin brown/clear secreations. Placed a new aerosol set-up. Pt is on 35% t-piece.

## 2022-12-14 NOTE — PLAN OF CARE
Problem: Discharge Planning  Goal: Discharge to home or other facility with appropriate resources  12/14/2022 1027 by Judge Wilfredo LPN  Outcome: Progressing  12/14/2022 0044 by Rupal Stewart RN  Outcome: Progressing     Problem: Skin/Tissue Integrity  Goal: Absence of new skin breakdown  Description: 1. Monitor for areas of redness and/or skin breakdown  2. Assess vascular access sites hourly  3. Every 4-6 hours minimum:  Change oxygen saturation probe site  4. Every 4-6 hours:  If on nasal continuous positive airway pressure, respiratory therapy assess nares and determine need for appliance change or resting period. 12/14/2022 1027 by Judge Wilfredo LPN  Outcome: Progressing  12/14/2022 0044 by Rupal Stewart RN  Outcome: Progressing     Problem: Chronic Conditions and Co-morbidities  Goal: Patient's chronic conditions and co-morbidity symptoms are monitored and maintained or improved  12/14/2022 1027 by Judge Wilfredo LPN  Outcome: Progressing  12/14/2022 0044 by Rupal Stewart RN  Outcome: Progressing     Problem: Pain  Goal: Verbalizes/displays adequate comfort level or baseline comfort level  12/14/2022 1027 by Judge Wilfredo LPN  Outcome: Progressing  12/14/2022 0044 by Rupal Stewart RN  Outcome: Progressing     Problem: Safety - Adult  Goal: Free from fall injury  12/14/2022 1027 by Judge Wilfredo LPN  Outcome: Progressing  12/14/2022 0044 by Rupal Stewart RN  Outcome: Progressing     Problem: Dyspnea Due to End of Life  Goal: Demonstrate understanding of and ability to manage respiratory symptoms at end of life  Description: Patient  and or family/caregiver will verbalize recall of breathing strategies to maintain an effective breathing pattern during the inpatient hospice stay.         12/14/2022 1027 by Judge Wilfredo LPN  Outcome: Progressing  12/14/2022 0044 by Rupal Stewart RN  Outcome: Progressing     Problem: Communication Deficit  Goal: Effectively communicate symptoms, needs, and concerns  Description: Patient  and/or family/caregiver will be able to communicate symptoms, needs, and concerns as evidenced by the use of language services during the inpatient hospice stay.     12/14/2022 1027 by Gissell Rebolledo LPN  Outcome: Progressing  12/14/2022 0044 by Becky Kimball RN  Outcome: Progressing

## 2022-12-14 NOTE — PROGRESS NOTES
[address] [place] [address] Madeline     Discussed with Dr Destiney Prado and I appreciate her follow up with the patient's spouse today. Dr. Prakash Campo agreed that immunotherapy and additional cancer directed therapy are not options given the patient's declining course and Dr. Prakash Campo is in agreement with comfort care. I discussed earlier with the patient's nurse, and the hospice nurse. The patient's daughter is to visit today. For now continuing comfort care.     Italo Escobar MD

## 2022-12-14 NOTE — PROGRESS NOTES
[facility] Progress Note    Date: 12/14/2022  Name: Jaqueline Hernandez  MRN: [id number]  YOB: 1955   Patient's PCP: Cristina Field MD  Consultants during acute care: Pulmonary, ID, Podiatry, Gastroenterology, Wound care  Acute care admission date: 11/26 to 12/8/2022 and 11/11 to 11/23/22 at [facility] to [address] [place] [address]: 12/8/2022     Subjective: The patient is minimally responsive, and did open her eyes to her husbands stimulation, but does not track. There is upper airway noise and she has been off tube feeding since early morning 12/13/22 due to elevated residual. On 12/13/22 evening, the nurse got 450 ml from G tube and it was placed to gravity drain. Discussed with the patient's nurse and the hospice nurse. I met with the patient's spouse, Tea Lang, and the patient's brother, Francis Austin, at the bedside on 12/13/22 morning and related the patient's declining condition. I did talk with the patient's spouse by phone 12/13 evening and he was asking about aggressive care and immunotherapy which he states was previously suggested. I don't think she will be a candidate. I did message Dr Lowell Paulino who agreed and said that she would visit on 12/14. I met with Tea Lang at the bedside this morning and reviewed the patient's ongoing decline, high gastric volumes despite no tube feeding for greater that 24 hours, and my fear that she is approaching end of life. The patient was painful and restless prior to coming to [address] [place] [address] on 12/8/22 and the comfort medications have been beneficial. The patient's spouse is asking about transfer to [facility], and I suspect that they may not accept her (and certainly won't accept on hospice). I asked that he talk with the patient's daughter, Jennifer Feliciano, also as I spent quite a bit of time talking to her on 12/9. I am not sure that the patient's spouse understands that her condition is poor.  She has had prolonged hospitalizations over the past months, hallux amputation 8/8/22, tracheostomy and PEG 10/17/22, and now is not tolerating tube feeding. Tammi Menarenee was concerned that Haloperidol caused vomiting and I tried to reassure him but have stopped it. We discussed that with elevated tube feeding residuals and suctioning material from trach that we should stop the tube feeding and he understands. I shared the patient's ongoing decline and that she is approaching end of life. I had discussed the same with the patient's daughter, Marii Dubon and niece, Mansoor Otoole on 12/9/22. Objective:   Pain is managed with Methadone 5 mg per G tube twice daily plus Morphine IV prn with 3 doses in the past 24 hours, transdermal Scopolamine patch and Glycopyrrolate IV for terminal congestion with 5 doses, Haloperidol is refused by the patient's spouse, and Lorazepam is available for anxiety with 3 doses. Data reviewed 12/14/2022:  Frontal lobe lesion resection 6/20/2022  FINAL DIAGNOSIS   A. BRAIN MASS:  - METASTATIC, POORLY DIFFERENTIATED ADENOCARCINOMA, SEE NOTE. B. BRAIN MASS:  - METASTATIC, POORLY DIFFERENTIATED ADENOCARCINOMA, SEE NOTE. CT-scan of the brain 12/2/22:  No acute intracranial abnormality. Two enlarging nodular areas of soft tissue thickening in the scalp. Differential includes pseudomeningoceles  or recurrent tumor. CT chest, abdomen, pelvis 12/2/22:  Chest:       Cardiomegaly. Coronary artery disease. No effusion. Right-sided MediPort   device tip within the right SVC near the cavoatrial junction. There is   mucosal thickening of the left bronchus intermedius and left lower lobe   bronchus which may be seen with bronchitis or emphysematous changes. No   suspicious hilar or mediastinal adenopathy. There emphysematous changes. Previous left partial pneumonectomy.    Tree-in-bud nodules identified throughout much of the left lung favored to be   due to small airways disease or aspiration. Pleural base nodule left upper   lung 3 mm in size stable dating back to 2018. Tracheostomy. Degenerate changes of the thoracic spine. No suspicious osseous lesions. Abdomen/Pelvis:       Motion and streak artifact challenge evaluation. Gallbladder is contracted. No suspicious liver, splenic, adrenal glands,   kidneys, or pancreas lesions. The pancreas is diminutive size and with   calcifications and ductal dilatation can be seen with chronic pancreatitis. Pancreatic duct measuring 5 mm. Common bile duct measures 5 mm. Mild retained stool. Thickening of the gastric antrum could relate to   underdistention. No bowel obstruction. Gastrostomy tube within the   stomach. .  Moderate stool rectosigmoid junction. No suspicious adenopathy. Lobulated uterus compatible with fibroids. Bladder is unremarkable. No suspicious adnexal mass. Moderate to severe   aortic calcifications. Evidence of bilateral iliac artery stents noted. Posterior changes right and left groin greatest on right. Loss of contrast   involving the right proximal femoral artery. Degenerate changes lumbar spine. Degenerate changes of the hips pelvis. Age   indeterminate compression fracture at L4. With mild retropulsion.       Hemoglobin A1C 9/29/22: 5.9%  Accuchecks:   Recent Labs     12/12/22 2056 12/13/22  1038 12/13/22  1533   POCGLU 197* 211* 120*        Hepatic Function Panel:    Lab Results   Component Value Date/Time    ALKPHOS 92 11/28/2022 05:10 AM    ALT 15 11/28/2022 05:10 AM    AST 20 11/28/2022 05:10 AM    PROT 6.4 11/28/2022 05:10 AM    BILITOT 0.3 11/28/2022 05:10 AM    BILIDIR 0.2 10/02/2020 08:34 PM    IBILI 0.5 10/02/2020 08:34 PM    LABALBU 3.3 11/28/2022 05:10 AM     CBC with Differential:    Lab Results   Component Value Date/Time    WBC 11.0 12/04/2022 05:45 AM    RBC 3.04 12/04/2022 05:45 AM    HGB 8.8 12/04/2022 05:45 AM    HCT 28.0 12/04/2022 05:45 AM    PLT 320 12/04/2022 05:45 AM    MCV 92.1 12/04/2022 05:45 AM    MCH 28.9 12/04/2022 05:45 AM    MCHC 31.4 12/04/2022 05:45 AM    RDW 14.2 12/04/2022 05:45 AM    SEGSPCT 74.1 12/04/2022 05:45 AM    LYMPHOPCT 15.2 12/04/2022 05:45 AM    MONOPCT 9.3 12/04/2022 05:45 AM    BASOPCT 0.3 12/04/2022 05:45 AM    MONOSABS 1.0 12/04/2022 05:45 AM    LYMPHSABS 1.7 12/04/2022 05:45 AM    EOSABS 0.1 12/04/2022 05:45 AM    BASOSABS 0.0 12/04/2022 05:45 AM    DIFFTYPE AUTOMATED DIFFERENTIAL 12/04/2022 05:45 AM     BMP:    Lab Results   Component Value Date/Time     12/04/2022 05:45 AM    K 3.9 12/04/2022 05:45 AM     12/04/2022 05:45 AM    CO2 23 12/04/2022 05:45 AM    BUN 8 12/04/2022 05:45 AM    LABALBU 3.3 11/28/2022 05:10 AM    CREATININE 0.6 12/04/2022 05:45 AM    CALCIUM 8.9 12/04/2022 05:45 AM    GFRAA >60 09/29/2022 07:53 PM    LABGLOM >60 12/04/2022 05:45 AM    GLUCOSE 206 12/04/2022 05:45 AM       Physical Exam:   /62   Pulse 96   Temp 99.1 °F (37.3 °C) (Axillary)   Resp 16   SpO2 99%   General: minimally responsive, briefly opening eyes with her husbands stimulation but does not track, + upper airway noise, chronically ill appearing, no facial grimacing   Skin: wound images reviewed in the media tab  HEENT: Mucous membranes are dry   Neck: tracheostomy in place with trach collar oxygen  Chest: right Mediport    Heart: distant tones, tachycardic RRR, S1S2, no murmurs  Lungs:  coarse breath sounds bilaterally, diminished at the bases, without rales, scattered coarse rhonchi   Abdomen: soft, bowel sounds hypoactive, no apparent tenderness, PEG in place, nondistended   and rectal: deferred  Extremities:  dressing has come off, left hallux surgically absent dry base, chronic changes, no edema  Neurologic: minimally responsive      Assessment/Plan:  . Metastatic Non Small Cell left lung cancer diagnosed October 2019 with right frontal cerebral metastasis with gamma knife June 2022.  The patient is continued [facility] for management of pain, delirium and restlessness, respiratory failure and possible end of life care. PPS 20%. . The patient is not tolerating tube feeding with high residuals and they are stopped. Cancer pain and postop pain, increased Methadone to 5 mg BID on 12/13/22 and continue prn Morphine. Metabolic encephalopathy, likely multifactorial  Dysphagia, has PEG on tube feeding, speech recommends npo. Elevated tube feeding residuals and suction tan material from trach and sttopped tube feeding 12/13/22. 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 I talked with Pharmacy yesterday and they were ordering Vimpat liquid to give per G Tube. Type 2 diabetes mellitus, Hemoglobin A1C 9/29/22: 5.9%, decrease Lantus as no tube feeding now  I spent 22 minutes with the patient's spouse discussing condition and plan.          Patient Active Problem List   Diagnosis Code    Chest pain R07.9    Dyspnea and respiratory abnormalities R06.00, R06.89    Acute gastritis without hemorrhage K29.00    Diabetic ulcer of toe of left foot associated with type 2 diabetes mellitus, with fat layer exposed ([address] E11.621, L97.522    Type 2 diabetes mellitus with peripheral neuropathy (HCC) E11.42    Peripheral vascular disease ([address] I73.9    History of nicotine dependence Z87.891    Sepsis ([address] A41.9    Brain metastases ([address] C79.31    Diabetic foot infection ([address] E11.628, L08.9    Cancer of left lung ([address] - removed at [facility] in 2019 C34.92    Former smoker - quit in 2019 Z87.891    DM (diabetes mellitus), type 2 ([address] E11.9    Seizures ([address] R56.9    Hypokalemia E87.6    Hypophosphatemia E83.39    Leukocytosis Q88.238    Metabolic acidosis, increased anion gap E87.29    Elevated CK R74.8    Respiratory failure (HCC) J96.90    Acute on chronic anemia D64.9    Severe malnutrition (HCC) E43    Gastrointestinal hemorrhage K92.2    Diabetic foot ulcer with osteomyelitis (HCC) E11.621, E11.69, L97.509, M86.9    Adenocarcinoma, lung (HCC) C34.90    Tracheostomy status ([facility] Z93.0    Lung cancer metastatic to brain ([address] C34.90, C79.31    Non-small cell lung cancer with metastasis ([address] C34.90       BERTRAM Christine MD  12/14/2022

## 2022-12-14 NOTE — PROGRESS NOTES
RRT did trach care for pt. Family hasn't seen the nurse do the pt's trach care, so I did her cleaning of the trach, placed a clean split drain pad, Suctioned the pt for a small thick creamy yellow secretions. Pt resting in bed, Family was leaving as I was finished with the pt. Pt tolerated well.

## 2022-12-15 NOTE — PROGRESS NOTES
ONCOLOGY HEMATOLOGY CARE ([facility])  PROGRESS NOTE      12/15/2022  9:28 AM    Patient:    Lottie Abernathy  : 1955   74 y.o. MRN: [id number]  Admitted: 2022 10:29 PM ATT: Susan Contreras MD   [id number]-A  AdmitDx: Non-small cell lung cancer with metastasis ([facility] [C34.90]  PCP: Bernadette Tijerina MD      Patient was seen and examined today  No family member in the room    PHYSICAL EXAM :    Vitals: /70   Pulse 88   Temp 98.5 °F (36.9 °C) (Axillary)   Resp 16   Ht 5' 6\" (1.676 m)   Wt 196 lb 3.4 oz (89 kg)   SpO2 100%   BMI 31.67 kg/m²     CONSTITUTIONAL: somnelent  LUNGS: coarse bs claudia, t peice   CARDIOVASCULAR: s1s2   ABDOMEN: bs pos +Gtube  MUSCULOSKELETAL: left foot discolored  EXTREMITIES: no LE edema    LABORATORY RESULTS  CBC:   No results for input(s): WBC, HGB, PLT in the last 72 hours. BMP:    No results for input(s): NA, K, CL, CO2, BUN, CREATININE, GLUCOSE in the last 72 hours. Hepatic: No results for input(s): AST, ALT, ALB, BILITOT, ALKPHOS in the last 72 hours. INR: No results for input(s): INR in the last 72 hours. RADIOLOGY REPORTS  Reviewed    ASSESSMENT & RECOMMENDATIONS:  Metastatic lung cancer-stage BRENDA(cTx, Cnx, pM1b), continue hospice care. Appreciate Dr Julieta Torres. Please call if any other  questions, will sign off again    Discussed with nursing staff as well.  Thanks       DEEP

## 2022-12-15 NOTE — PLAN OF CARE
Problem: Discharge Planning  Goal: Discharge to home or other facility with appropriate resources  Outcome: Progressing     Problem: Skin/Tissue Integrity  Goal: Absence of new skin breakdown  Description: 1. Monitor for areas of redness and/or skin breakdown  2. Assess vascular access sites hourly  3. Every 4-6 hours minimum:  Change oxygen saturation probe site  4. Every 4-6 hours:  If on nasal continuous positive airway pressure, respiratory therapy assess nares and determine need for appliance change or resting period. Outcome: Progressing     Problem: Chronic Conditions and Co-morbidities  Goal: Patient's chronic conditions and co-morbidity symptoms are monitored and maintained or improved  Outcome: Progressing     Problem: Pain  Goal: Verbalizes/displays adequate comfort level or baseline comfort level  Outcome: Progressing     Problem: Safety - Adult  Goal: Free from fall injury  Outcome: Progressing  Flowsheets (Taken 12/15/2022 1534)  Free From Fall Injury:   Instruct family/caregiver on patient safety   Based on caregiver fall risk screen, instruct family/caregiver to ask for assistance with transferring infant if caregiver noted to have fall risk factors     Problem: Dyspnea Due to End of Life  Goal: Demonstrate understanding of and ability to manage respiratory symptoms at end of life  Description: Patient  and or family/caregiver will verbalize recall of breathing strategies to maintain an effective breathing pattern during the inpatient hospice stay. Outcome: Progressing     Problem: Communication Deficit  Goal: Effectively communicate symptoms, needs, and concerns  Description: Patient  and/or family/caregiver will be able to communicate symptoms, needs, and concerns as evidenced by the use of language services during the inpatient hospice stay.     Outcome: Progressing

## 2022-12-15 NOTE — PLAN OF CARE
Problem: Skin/Tissue Integrity  Goal: Absence of new skin breakdown  Description: 1. Monitor for areas of redness and/or skin breakdown  2. Assess vascular access sites hourly  3. Every 4-6 hours minimum:  Change oxygen saturation probe site  4. Every 4-6 hours:  If on nasal continuous positive airway pressure, respiratory therapy assess nares and determine need for appliance change or resting period. 12/14/2022 2209 by Rupinder Garcia RN  Outcome: Progressing  12/14/2022 1027 by Judge Wilfredo LPN  Outcome: Progressing     Problem: Chronic Conditions and Co-morbidities  Goal: Patient's chronic conditions and co-morbidity symptoms are monitored and maintained or improved  12/14/2022 2209 by Rupinder Garcia RN  Outcome: Progressing  12/14/2022 1027 by Judge Wilfredo LPN  Outcome: Progressing     Problem: Pain  Goal: Verbalizes/displays adequate comfort level or baseline comfort level  12/14/2022 2209 by Rupinder Garcia RN  Outcome: Progressing  12/14/2022 1027 by Judge Wilfredo LPN  Outcome: Progressing     Problem: Safety - Adult  Goal: Free from fall injury  12/14/2022 2209 by Rupinder Garcia RN  Outcome: Progressing  12/14/2022 1027 by Judge Wilfredo LPN  Outcome: Progressing     Problem: Communication Deficit  Goal: Effectively communicate symptoms, needs, and concerns  Description: Patient  and/or family/caregiver will be able to communicate symptoms, needs, and concerns as evidenced by the use of language services during the inpatient hospice stay. 12/14/2022 2209 by Rupinder Garcia RN  Outcome: Progressing  12/14/2022 1027 by Judge Wilfredo LPN  Outcome: Progressing     Problem: Dyspnea Due to End of Life  Goal: Demonstrate understanding of and ability to manage respiratory symptoms at end of life  Description: Patient  and or family/caregiver will verbalize recall of breathing strategies to maintain an effective breathing pattern during the inpatient hospice stay.         12/14/2022 2209 by Rupinder Garcia RN  Outcome: Progressing  12/14/2022 1027 by Archana Larkin LPN  Outcome: Progressing

## 2022-12-15 NOTE — PROGRESS NOTES
12/15/22 1202   Encounter Summary   Encounter Overview/Reason  Spiritual/Emotional Needs   Service Provided For: Patient   Referral/Consult From: [facility]   Support System Spouse   Last Encounter  12/15/22  (Kirke Form (spouse) not present. Patient opened eyes during prayer, did not follow.   Music playing; peaceful)   Complexity of Encounter Low   Begin Time 1155   End Time  1206   Total Time Calculated 11 min   Encounter    Type Follow up   Spiritual/Emotional needs   Type Spiritual Support   Rituals, Rites and Sacraments   Type Blessings   Assessment/Intervention/Outcome   Assessment Calm;Coping   Intervention Active listening;End of Life Care   Outcome Comfort;Coping;Peace   Plan and Referrals   Plan/Referrals Continue to visit, (comment)

## 2022-12-15 NOTE — PROGRESS NOTES
[facility]patient [facility] Progress Note    Date: 12/15/2022  Name: Maria Elena Rosa  MRN: [id number]  YOB: 1955   Patient's PCP: Valentin Spears MD  Consultants during acute care: Pulmonary, ID, Podiatry, Gastroenterology, Wound care  Acute care admission date: 11/26 to 12/8/2022 and 11/11 to 11/23/22 at [facility] to [address]: 12/8/2022     Subjective: The patient is minimally responsive, briefly opens her eyes to exam but does not track. There is mild upper airway noise. Discussed with the patient's nurse and the hospice nurse. There are no family here. .     Objective:   Pain is managed with Methadone 5 mg per G tube twice daily plus Morphine IV prn with 4 doses in the past 24 hours, transdermal Scopolamine patch and Glycopyrrolate IV for terminal congestion with 1 dose, Haloperidol is refused by the patient's spouse, and Lorazepam is available for anxiety with 2 doses. Glucose was low on 12/14/22 and received a Dextrose infusion), Lantus dose adjusted with parameters. Data reviewed 12/15/2022:  Frontal lobe lesion resection 6/20/2022  FINAL DIAGNOSIS   A. BRAIN MASS:  - METASTATIC, POORLY DIFFERENTIATED ADENOCARCINOMA, SEE NOTE. B. BRAIN MASS:  - METASTATIC, POORLY DIFFERENTIATED ADENOCARCINOMA, SEE NOTE. CT-scan of the brain 12/2/22:  No acute intracranial abnormality. Two enlarging nodular areas of soft tissue thickening in the scalp. Differential includes pseudomeningoceles  or recurrent tumor. CT chest, abdomen, pelvis 12/2/22:  Chest:       Cardiomegaly. Coronary artery disease. No effusion. Right-sided MediPort   device tip within the right SVC near the cavoatrial junction. There is   mucosal thickening of the left bronchus intermedius and left lower lobe   bronchus which may be seen with bronchitis or emphysematous changes. No   suspicious hilar or mediastinal adenopathy. There emphysematous changes. Previous left partial pneumonectomy. Tree-in-bud nodules identified throughout much of the left lung favored to be   due to small airways disease or aspiration. Pleural base nodule left upper   lung 3 mm in size stable dating back to 2018. Tracheostomy. Degenerate changes of the thoracic spine. No suspicious osseous lesions. Abdomen/Pelvis:       Motion and streak artifact challenge evaluation. Gallbladder is contracted. No suspicious liver, splenic, adrenal glands,   kidneys, or pancreas lesions. The pancreas is diminutive size and with   calcifications and ductal dilatation can be seen with chronic pancreatitis. Pancreatic duct measuring 5 mm. Common bile duct measures 5 mm. Mild retained stool. Thickening of the gastric antrum could relate to   underdistention. No bowel obstruction. Gastrostomy tube within the   stomach. .  Moderate stool rectosigmoid junction. No suspicious adenopathy. Lobulated uterus compatible with fibroids. Bladder is unremarkable. No suspicious adnexal mass. Moderate to severe   aortic calcifications. Evidence of bilateral iliac artery stents noted. Posterior changes right and left groin greatest on right. Loss of contrast   involving the right proximal femoral artery. Degenerate changes lumbar spine. Degenerate changes of the hips pelvis. Age   indeterminate compression fracture at L4. With mild retropulsion.       Hemoglobin A1C 9/29/22: 5.9%  Accuchecks:   Recent Labs     12/14/22  1153 12/14/22  1654 12/14/22  1946   POCGLU 72 67* 159*        Hepatic Function Panel:    Lab Results   Component Value Date/Time    ALKPHOS 92 11/28/2022 05:10 AM    ALT 15 11/28/2022 05:10 AM    AST 20 11/28/2022 05:10 AM    PROT 6.4 11/28/2022 05:10 AM    BILITOT 0.3 11/28/2022 05:10 AM    BILIDIR 0.2 10/02/2020 08:34 PM    IBILI 0.5 10/02/2020 08:34 PM    LABALBU 3.3 11/28/2022 05:10 AM     CBC with Differential:    Lab Results   Component Value Date/Time    WBC 11.0 12/04/2022 05:45 AM    RBC 3.04 12/04/2022 05:45 AM    HGB 8.8 12/04/2022 05:45 AM    HCT 28.0 12/04/2022 05:45 AM     12/04/2022 05:45 AM    MCV 92.1 12/04/2022 05:45 AM    MCH 28.9 12/04/2022 05:45 AM    MCHC 31.4 12/04/2022 05:45 AM    RDW 14.2 12/04/2022 05:45 AM    SEGSPCT 74.1 12/04/2022 05:45 AM    LYMPHOPCT 15.2 12/04/2022 05:45 AM    MONOPCT 9.3 12/04/2022 05:45 AM    BASOPCT 0.3 12/04/2022 05:45 AM    MONOSABS 1.0 12/04/2022 05:45 AM    LYMPHSABS 1.7 12/04/2022 05:45 AM    EOSABS 0.1 12/04/2022 05:45 AM    BASOSABS 0.0 12/04/2022 05:45 AM    DIFFTYPE AUTOMATED DIFFERENTIAL 12/04/2022 05:45 AM     BMP:    Lab Results   Component Value Date/Time     12/04/2022 05:45 AM    K 3.9 12/04/2022 05:45 AM     12/04/2022 05:45 AM    CO2 23 12/04/2022 05:45 AM    BUN 8 12/04/2022 05:45 AM    LABALBU 3.3 11/28/2022 05:10 AM    CREATININE 0.6 12/04/2022 05:45 AM    CALCIUM 8.9 12/04/2022 05:45 AM    GFRAA >60 09/29/2022 07:53 PM    LABGLOM >60 12/04/2022 05:45 AM    GLUCOSE 206 12/04/2022 05:45 AM       Physical Exam:   /69   Pulse 75   Temp 97.4 °F (36.3 °C) (Axillary)   Resp 16   Ht 5' 6\" (1.676 m)   Wt 196 lb 3.4 oz (89 kg)   SpO2 100%   BMI 31.67 kg/m²   General: minimally responsive, briefly opening eyes with exam but does not track, mild upper airway noise, chronically ill appearing, no facial grimacing   Skin: wound images reviewed in the media tab  HEENT: Mucous membranes are dry   Neck: tracheostomy in place with trach collar oxygen  Chest: right Mediport    Heart: distant tones, RRR, S1S2, no murmurs  Lungs:  coarse breath sounds bilaterally, diminished at the bases, without rales, scattered coarse rhonchi   Abdomen: soft, bowel sounds hypoactive, no apparent tenderness, PEG in place, nondistended   and rectal: deferred  Extremities:  dressing on left foot, chronic changes, no edema  Neurologic: minimally responsive Assessment/Plan:  . Metastatic Non Small Cell left lung cancer diagnosed October 2019 with right frontal cerebral metastasis with gamma knife June 2022. The patient is continued on [address] [place] [address] for management of pain, delirium and restlessness, respiratory failure and possible end of life care. PPS 20%. . The patient is not tolerating tube feeding with high residuals and they were stopped 12/13. The patient requires ongoing [address] [place] [address] care for high level of nursing care that can't be provided at home and need for frequent assessment of pain, delirium and symptom management with daily physician visits for medication adjustment. Cancer pain and postop pain, increased Methadone to 5 mg BID on 12/13/22 and continue prn Morphine. Metabolic encephalopathy, multifactorial  Dysphagia, has PEG and was on tube feeding which she no longer tolerates, speech recommends npo. Elevated tube feeding residuals and suction tan material from trach and sttopped tube feeding 12/13/22. 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   Type 2 diabetes mellitus, Hemoglobin A1C 9/29/22: 5.9%, decreased Lantus as no tube feeding now        Patient Active Problem List   Diagnosis Code    Chest pain R07.9    Dyspnea and respiratory abnormalities R06.00, R06.89    Acute gastritis without hemorrhage K29.00    Diabetic ulcer of toe of left foot associated with type 2 diabetes mellitus, with fat layer exposed ([address] E11.621, L97.522    Type 2 diabetes mellitus with peripheral neuropathy (HCC) E11.42    Peripheral vascular disease (HCC) I73.9    History of nicotine dependence Z87.891    Sepsis ([address] A41.9    Brain metastases ([address] C79.31    Diabetic foot infection ([address] E11.628, L08.9    Cancer of left lung ([address] - removed at [facility] in 2019 C34.92    Former smoker - quit in 2019 Z87.891    DM (diabetes mellitus), type 2 (HCC) E11.9    Seizures ([address] R56.9    Hypokalemia E87.6    Hypophosphatemia E83.39    Leukocytosis N24.863    Metabolic acidosis, increased anion gap E87.29    Elevated CK R74.8    Respiratory failure (HCC) J96.90    Acute on chronic anemia D64.9    Severe malnutrition (HCC) E43    Gastrointestinal hemorrhage K92.2    Diabetic foot ulcer with osteomyelitis (HCC) E11.621, E11.69, L97.509, M86.9    Adenocarcinoma, lung (HCC) C34.90    Tracheostomy status ([address] Z93.0    Lung cancer metastatic to brain ([address] C34.90, C79.31    Non-small cell lung cancer with metastasis ([address] C34.90       BERTRAM Toro MD  12/15/2022

## 2022-12-16 NOTE — PROGRESS NOTES
12/16/22 1028   Encounter Summary   Encounter Overview/Reason  Spiritual/Emotional Needs   Service Provided For: Patient   Referral/Consult From: [facility]   Support System Spouse   Last Encounter  12/16/22  (Patient alone in room; opened her eyes when I spoke.   She is listening to music and has a fireside scene on computer; very comforting.)   Complexity of Encounter Low   Begin Time 1010   End Time  1030   Total Time Calculated 20 min   Encounter    Type Follow up   Spiritual/Emotional needs   Type Spiritual Support   Rituals, Rites and Sacraments   Type Blessings   Assessment/Intervention/Outcome   Assessment Calm   Intervention Active listening;Read/Provided Scripture;Prayer (assurance of)/[place];Sustaining Presence/Ministry of presence   Outcome Comfort;Encouraged   Plan and Referrals   Plan/Referrals Continue to visit, (comment)

## 2022-12-16 NOTE — PROGRESS NOTES
[address] [facility] Inpatient Hospice Progress Note    Date: 12/16/2022  Name: Angie De La Torre  MRN: [id number]  YOB: 1955   Patient's PCP: Rosann Cabot, MD  Consultants during acute care: Pulmonary, ID, Podiatry, Gastroenterology, Wound care  Acute care admission date: 11/26 to 12/8/2022 and 11/11 to 11/23/22 at [facility] to [address]: 12/8/2022     Subjective: The patient remains minimally responsive, briefly opens her eyes to exam but does not track. There is mild upper airway noise, tube feeding remain off. Discussed with the patient's nurse and the hospice nurse. There are no family here. .     Objective:   Pain is managed with Methadone 5 mg per G tube twice daily plus Morphine IV prn with 1 prn dose in the past 24 hours, transdermal Scopolamine patch and Glycopyrrolate IV for terminal congestion with 1 dose, Haloperidol is refused by the patient's spouse, and Lorazepam is available for anxiety with 2 doses. Glucose was low on 12/14/22 and received a Dextrose infusion), Lantus dose adjusted with parameters. Data reviewed 12/16/2022:  Frontal lobe lesion resection 6/20/2022  FINAL DIAGNOSIS   A. BRAIN MASS:  - METASTATIC, POORLY DIFFERENTIATED ADENOCARCINOMA, SEE NOTE. B. BRAIN MASS:  - METASTATIC, POORLY DIFFERENTIATED ADENOCARCINOMA, SEE NOTE. CT-scan of the brain 12/2/22:  No acute intracranial abnormality. Two enlarging nodular areas of soft tissue thickening in the scalp. Differential includes pseudomeningoceles  or recurrent tumor. CT chest, abdomen, pelvis 12/2/22:  Chest:       Cardiomegaly. Coronary artery disease. No effusion. Right-sided MediPort   device tip within the right SVC near the cavoatrial junction. There is   mucosal thickening of the left bronchus intermedius and left lower lobe   bronchus which may be seen with bronchitis or emphysematous changes. No   suspicious hilar or mediastinal adenopathy. There emphysematous changes. Previous left partial pneumonectomy. Tree-in-bud nodules identified throughout much of the left lung favored to be   due to small airways disease or aspiration. Pleural base nodule left upper   lung 3 mm in size stable dating back to 2018. Tracheostomy. Degenerate changes of the thoracic spine. No suspicious osseous lesions. Abdomen/Pelvis:       Motion and streak artifact challenge evaluation. Gallbladder is contracted. No suspicious liver, splenic, adrenal glands,   kidneys, or pancreas lesions. The pancreas is diminutive size and with   calcifications and ductal dilatation can be seen with chronic pancreatitis. Pancreatic duct measuring 5 mm. Common bile duct measures 5 mm. Mild retained stool. Thickening of the gastric antrum could relate to   underdistention. No bowel obstruction. Gastrostomy tube within the   stomach. .  Moderate stool rectosigmoid junction. No suspicious adenopathy. Lobulated uterus compatible with fibroids. Bladder is unremarkable. No suspicious adnexal mass. Moderate to severe   aortic calcifications. Evidence of bilateral iliac artery stents noted. Posterior changes right and left groin greatest on right. Loss of contrast   involving the right proximal femoral artery. Degenerate changes lumbar spine. Degenerate changes of the hips pelvis. Age   indeterminate compression fracture at L4. With mild retropulsion.       Hemoglobin A1C 9/29/22: 5.9%  Accuchecks:   Recent Labs     12/15/22  1902 12/16/22  0506 12/16/22  0619   POCGLU 88 121* 119*        Hepatic Function Panel:    Lab Results   Component Value Date/Time    ALKPHOS 92 11/28/2022 05:10 AM    ALT 15 11/28/2022 05:10 AM    AST 20 11/28/2022 05:10 AM    PROT 6.4 11/28/2022 05:10 AM    BILITOT 0.3 11/28/2022 05:10 AM    BILIDIR 0.2 10/02/2020 08:34 PM    IBILI 0.5 10/02/2020 08:34 PM    LABALBU 3.3 11/28/2022 05:10 AM     CBC with Differential:    Lab Results   Component Value Date/Time    WBC 11.0 12/04/2022 05:45 AM    RBC 3.04 12/04/2022 05:45 AM    HGB 8.8 12/04/2022 05:45 AM    HCT 28.0 12/04/2022 05:45 AM     12/04/2022 05:45 AM    MCV 92.1 12/04/2022 05:45 AM    MCH 28.9 12/04/2022 05:45 AM    MCHC 31.4 12/04/2022 05:45 AM    RDW 14.2 12/04/2022 05:45 AM    SEGSPCT 74.1 12/04/2022 05:45 AM    LYMPHOPCT 15.2 12/04/2022 05:45 AM    MONOPCT 9.3 12/04/2022 05:45 AM    BASOPCT 0.3 12/04/2022 05:45 AM    MONOSABS 1.0 12/04/2022 05:45 AM    LYMPHSABS 1.7 12/04/2022 05:45 AM    EOSABS 0.1 12/04/2022 05:45 AM    BASOSABS 0.0 12/04/2022 05:45 AM    DIFFTYPE AUTOMATED DIFFERENTIAL 12/04/2022 05:45 AM     BMP:    Lab Results   Component Value Date/Time     12/04/2022 05:45 AM    K 3.9 12/04/2022 05:45 AM     12/04/2022 05:45 AM    CO2 23 12/04/2022 05:45 AM    BUN 8 12/04/2022 05:45 AM    LABALBU 3.3 11/28/2022 05:10 AM    CREATININE 0.6 12/04/2022 05:45 AM    CALCIUM 8.9 12/04/2022 05:45 AM    GFRAA >60 09/29/2022 07:53 PM    LABGLOM >60 12/04/2022 05:45 AM    GLUCOSE 206 12/04/2022 05:45 AM       Physical Exam:   BP (!) 145/82   Pulse 88   Temp 97.9 °F (36.6 °C) (Axillary)   Resp 16   Ht 5' 6\" (1.676 m)   Wt 196 lb 3.4 oz (89 kg)   SpO2 100%   BMI 31.67 kg/m²   General: minimally responsive, opening eyes with exam but does not track, mild upper airway noise, chronically ill appearing, no facial grimacing   Skin: wound images reviewed in the media tab  HEENT: Mucous membranes are dry   Neck: tracheostomy in place with trach collar oxygen  Chest: right Mediport    Heart: distant tones, RRR, S1S2, no murmurs  Lungs:  coarse breath sounds bilaterally, diminished at the bases, without rales, scattered coarse rhonchi   Abdomen: soft, bowel sounds hypoactive, no apparent tenderness, PEG in place, nondistended   and rectal: deferred  Extremities:  dressing on left foot, chronic changes, no edema  Neurologic: minimally responsive      Assessment/Plan:  . Metastatic Non Small Cell left lung cancer diagnosed October 2019 with right frontal cerebral metastasis with gamma knife June 2022. The patient is continued on [address] [place] [address] for management of pain, delirium and restlessness, respiratory failure and possible end of life care. PPS 20%. . The patient is not tolerating tube feeding with high residuals and they were stopped 12/13. The patient requires ongoing [address] [place] [address] care for high level of nursing care that can't be provided at home and need for frequent assessment of pain, delirium and symptom management with daily physician visits for medication adjustment. Cancer pain and postop pain, increased Methadone to 5 mg BID on 12/13/22 and continue prn Morphine. Metabolic encephalopathy, multifactorial  Dysphagia, has PEG and was on tube feeding which she no longer tolerates, speech recommends npo. Elevated tube feeding residuals and suction tan material from trach and sttopped tube feeding 12/13/22. 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   Type 2 diabetes mellitus, Hemoglobin A1C 9/29/22: 5.9%, decreased Lantus as no tube feeding now        Patient Active Problem List   Diagnosis Code    Chest pain R07.9    Dyspnea and respiratory abnormalities R06.00, R06.89    Acute gastritis without hemorrhage K29.00    Diabetic ulcer of toe of left foot associated with type 2 diabetes mellitus, with fat layer exposed ([address] E11.621, L97.522    Type 2 diabetes mellitus with peripheral neuropathy (HCC) E11.42    Peripheral vascular disease (HCC) I73.9    History of nicotine dependence Z87.891    Sepsis ([address] A41.9    Brain metastases ([address] C79.31    Diabetic foot infection ([address] E11.628, L08.9    Cancer of left lung ([address] - removed at [facility] in 2019 C34.92    Former smoker - quit in 2019 Z87.891    DM (diabetes mellitus), type 2 ([address] E11.9    Seizures ([address] R56.9    Hypokalemia E87.6    Hypophosphatemia E83.39    Leukocytosis X34.338    Metabolic acidosis, increased anion gap E87.29    Elevated CK R74.8    Respiratory failure (HCC) J96.90    Acute on chronic anemia D64.9    Severe malnutrition (HCC) E43    Gastrointestinal hemorrhage K92.2    Diabetic foot ulcer with osteomyelitis (HCC) E11.621, E11.69, L97.509, M86.9    Adenocarcinoma, lung (HCC) C34.90    Tracheostomy status ([address] Z93.0    Lung cancer metastatic to brain ([address] C34.90, C79.31    Non-small cell lung cancer with metastasis ([address] C34.90       BERTRAM Nguyen MD  12/16/2022

## 2022-12-16 NOTE — PLAN OF CARE
Patient appears comfortable in bed, awake and alert today. Trach care and suction provided through out morning, patient tolerates well. No family at bedside, telephone update given to daughter this morning. PRN medications given per eMAR. Problem: Discharge Planning  Goal: Discharge to home or other facility with appropriate resources  Outcome: Progressing     Problem: Skin/Tissue Integrity  Goal: Absence of new skin breakdown  Description: 1. Monitor for areas of redness and/or skin breakdown  2. Assess vascular access sites hourly  3. Every 4-6 hours minimum:  Change oxygen saturation probe site  4. Every 4-6 hours:  If on nasal continuous positive airway pressure, respiratory therapy assess nares and determine need for appliance change or resting period.   Outcome: Progressing     Problem: Chronic Conditions and Co-morbidities  Goal: Patient's chronic conditions and co-morbidity symptoms are monitored and maintained or improved  Outcome: Progressing  Flowsheets (Taken 12/16/2022 1004)  Care Plan - Patient's Chronic Conditions and Co-Morbidity Symptoms are Monitored and Maintained or Improved: Monitor and assess patient's chronic conditions and comorbid symptoms for stability, deterioration, or improvement     Problem: Pain  Goal: Verbalizes/displays adequate comfort level or baseline comfort level  Outcome: Progressing  Flowsheets (Taken 12/16/2022 0796)  Verbalizes/displays adequate comfort level or baseline comfort level:   Encourage patient to monitor pain and request assistance   Assess pain using appropriate pain scale   Administer analgesics based on type and severity of pain and evaluate response   Implement non-pharmacological measures as appropriate and evaluate response   Consider cultural and social influences on pain and pain management   Notify Licensed Independent Practitioner if interventions unsuccessful or patient reports new pain     Problem: Safety - Adult  Goal: Free from fall injury  Outcome: Progressing     Problem: Dyspnea Due to End of Life  Goal: Demonstrate understanding of and ability to manage respiratory symptoms at end of life  Description: Patient  and or family/caregiver will verbalize recall of breathing strategies to maintain an effective breathing pattern during the inpatient hospice stay. Outcome: Progressing     Problem: Communication Deficit  Goal: Effectively communicate symptoms, needs, and concerns  Description: Patient  and/or family/caregiver will be able to communicate symptoms, needs, and concerns as evidenced by the use of language services during the inpatient hospice stay.     Outcome: LIZZETTE FernandezN, RN

## 2022-12-17 NOTE — PROGRESS NOTES
[facility] Progress Note    Date: 12/17/2022  Name: Todd Orlando  MRN: [id number]  YOB: 1955   Patient's PCP: Brad Calderon MD  Consultants during acute care: Pulmonary, ID, Podiatry, Gastroenterology, Wound care  Acute care admission date: 11/26 to 12/8/2022 and 11/11 to 11/23/22 at [facility] to [address]: 12/8/2022     Subjective: The patient remains minimally responsive, briefly opens her eyes to exam and stares off. There is mild upper airway noise, tube feeding remain off. Discussed with the patient's nurse. The patient's spouse is asleep on the couch, awakens and we discussed the patient's ongoing decline. He now seems to understands her poor prognosis and that she is not safe to have the tube feeding. Her congestion has improved with stopping the tube feeding. She is intermittently painful, and the current comfort medications are beneficial. Support is provided to the patient's spouse. Objective:   Pain is managed with Methadone 5 mg per G tube twice daily plus Morphine IV prn with 4 prn doses in the past 24 hours, transdermal Scopolamine patch and Glycopyrrolate IV for terminal congestion with 0 doses, Haloperidol is refused by the patient's spouse, and Lorazepam is available for anxiety with 3 doses. Glucose was low on 12/14/22 and received a Dextrose infusion), Lantus dose adjusted with parameters. Data reviewed 12/17/2022:  Frontal lobe lesion resection 6/20/2022  FINAL DIAGNOSIS   A. BRAIN MASS:  - METASTATIC, POORLY DIFFERENTIATED ADENOCARCINOMA, SEE NOTE. B. BRAIN MASS:  - METASTATIC, POORLY DIFFERENTIATED ADENOCARCINOMA, SEE NOTE. CT-scan of the brain 12/2/22:  No acute intracranial abnormality. Two enlarging nodular areas of soft tissue thickening in the scalp. Differential includes pseudomeningoceles  or recurrent tumor.       CT chest, abdomen, pelvis 12/2/22:  Chest: Cardiomegaly. Coronary artery disease. No effusion. Right-sided MediPort   device tip within the right SVC near the cavoatrial junction. There is   mucosal thickening of the left bronchus intermedius and left lower lobe   bronchus which may be seen with bronchitis or emphysematous changes. No   suspicious hilar or mediastinal adenopathy. There emphysematous changes. Previous left partial pneumonectomy. Tree-in-bud nodules identified throughout much of the left lung favored to be   due to small airways disease or aspiration. Pleural base nodule left upper   lung 3 mm in size stable dating back to 2018. Tracheostomy. Degenerate changes of the thoracic spine. No suspicious osseous lesions. Abdomen/Pelvis:       Motion and streak artifact challenge evaluation. Gallbladder is contracted. No suspicious liver, splenic, adrenal glands,   kidneys, or pancreas lesions. The pancreas is diminutive size and with   calcifications and ductal dilatation can be seen with chronic pancreatitis. Pancreatic duct measuring 5 mm. Common bile duct measures 5 mm. Mild retained stool. Thickening of the gastric antrum could relate to   underdistention. No bowel obstruction. Gastrostomy tube within the   stomach. .  Moderate stool rectosigmoid junction. No suspicious adenopathy. Lobulated uterus compatible with fibroids. Bladder is unremarkable. No suspicious adnexal mass. Moderate to severe   aortic calcifications. Evidence of bilateral iliac artery stents noted. Posterior changes right and left groin greatest on right. Loss of contrast   involving the right proximal femoral artery. Degenerate changes lumbar spine. Degenerate changes of the hips pelvis. Age   indeterminate compression fracture at L4. With mild retropulsion.       Hemoglobin A1C 9/29/22: 5.9%  Accuchecks:   Recent Labs     12/16/22  1721 12/16/22 2002 12/17/22  0153   POCGLU 158* 151* 180* Hepatic Function Panel:    Lab Results   Component Value Date/Time    ALKPHOS 92 11/28/2022 05:10 AM    ALT 15 11/28/2022 05:10 AM    AST 20 11/28/2022 05:10 AM    PROT 6.4 11/28/2022 05:10 AM    BILITOT 0.3 11/28/2022 05:10 AM    BILIDIR 0.2 10/02/2020 08:34 PM    IBILI 0.5 10/02/2020 08:34 PM    LABALBU 3.3 11/28/2022 05:10 AM     CBC with Differential:    Lab Results   Component Value Date/Time    WBC 11.0 12/04/2022 05:45 AM    RBC 3.04 12/04/2022 05:45 AM    HGB 8.8 12/04/2022 05:45 AM    HCT 28.0 12/04/2022 05:45 AM     12/04/2022 05:45 AM    MCV 92.1 12/04/2022 05:45 AM    MCH 28.9 12/04/2022 05:45 AM    MCHC 31.4 12/04/2022 05:45 AM    RDW 14.2 12/04/2022 05:45 AM    SEGSPCT 74.1 12/04/2022 05:45 AM    LYMPHOPCT 15.2 12/04/2022 05:45 AM    MONOPCT 9.3 12/04/2022 05:45 AM    BASOPCT 0.3 12/04/2022 05:45 AM    MONOSABS 1.0 12/04/2022 05:45 AM    LYMPHSABS 1.7 12/04/2022 05:45 AM    EOSABS 0.1 12/04/2022 05:45 AM    BASOSABS 0.0 12/04/2022 05:45 AM    DIFFTYPE AUTOMATED DIFFERENTIAL 12/04/2022 05:45 AM     BMP:    Lab Results   Component Value Date/Time     12/04/2022 05:45 AM    K 3.9 12/04/2022 05:45 AM     12/04/2022 05:45 AM    CO2 23 12/04/2022 05:45 AM    BUN 8 12/04/2022 05:45 AM    LABALBU 3.3 11/28/2022 05:10 AM    CREATININE 0.6 12/04/2022 05:45 AM    CALCIUM 8.9 12/04/2022 05:45 AM    GFRAA >60 09/29/2022 07:53 PM    LABGLOM >60 12/04/2022 05:45 AM    GLUCOSE 206 12/04/2022 05:45 AM       Physical Exam:   /65   Pulse 89   Temp 97.7 °F (36.5 °C) (Oral)   Resp 18   Ht 5' 6\" (1.676 m)   Wt 196 lb 3.4 oz (89 kg)   SpO2 99%   BMI 31.67 kg/m²   General: minimally responsive, opening eyes with exam and stares, no upper airway noise today, chronically ill appearing, no facial grimacing   Skin: wound images reviewed in the media tab  HEENT: Mucous membranes are dry   Neck: tracheostomy in place with trach collar oxygen  Chest: right Mediport    Heart: distant tones, RRR, S1S2, no murmurs  Lungs:  coarse breath sounds bilaterally, diminished at the bases, without rales, scattered coarse rhonchi   Abdomen: soft, bowel sounds hypoactive, no apparent tenderness, PEG in place, nondistended   and rectal: deferred  Extremities:  dressing on left foot, chronic changes in legs, no edema  Neurologic: minimally responsive      Assessment/Plan:  . Metastatic Non Small Cell left lung cancer diagnosed October 2019 with right frontal cerebral metastasis with gamma knife June 2022. The patient is continued on [address] [place] [address] for management of pain, delirium and restlessness, respiratory failure and possible end of life care. PPS 20%. . The patient did not tolerating tube feeding with high residuals and they were stopped 12/13. The patient requires ongoing [address] [place] [address] care for high level of nursing and respiratory are that can't be provided at home and need for frequent assessment of pain, delirium and symptom management with daily physician visits for medication adjustment. Cancer pain and postop pain, increased Methadone to 5 mg BID on 12/13/22 and continue prn Morphine. Metabolic encephalopathy, multifactorial  Dysphagia, has PEG and was on tube feeding which she no longer tolerates, speech recommends npo. Elevated tube feeding residuals and suction tan material from trach and sttopped tube feeding 12/13/22. 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   Type 2 diabetes mellitus, Hemoglobin A1C 9/29/22: 5.9%, decreased Lantus as no tube feeding now        Patient Active Problem List   Diagnosis Code    Chest pain R07.9    Dyspnea and respiratory abnormalities R06.00, R06.89    Acute gastritis without hemorrhage K29.00 Diabetic ulcer of toe of left foot associated with type 2 diabetes mellitus, with fat layer exposed ([address] E11.621, L97.522    Type 2 diabetes mellitus with peripheral neuropathy (HCC) E11.42    Peripheral vascular disease (HCC) I73.9    History of nicotine dependence Z87.891    Sepsis ([address] A41.9    Brain metastases (HCC) C79.31    Diabetic foot infection (HCC) E11.628, L08.9    Cancer of left lung ([address] - removed at [facility] in 2019 C34.92    Former smoker - quit in 2019 Z87.891    DM (diabetes mellitus), type 2 ([address] E11.9    Seizures ([address] R56.9    Hypokalemia E87.6    Hypophosphatemia E83.39    Leukocytosis W20.182    Metabolic acidosis, increased anion gap E87.29    Elevated CK R74.8    Respiratory failure (HCC) J96.90    Acute on chronic anemia D64.9    Severe malnutrition (HCC) E43    Gastrointestinal hemorrhage K92.2    Diabetic foot ulcer with osteomyelitis (HCC) E11.621, E11.69, L97.509, M86.9    Adenocarcinoma, lung (HCC) C34.90    Tracheostomy status ([address] Z93.0    Lung cancer metastatic to brain ([address] C34.90, C79.31    Non-small cell lung cancer with metastasis ([address] C34.90       BERTRAM Mchugh MD  12/17/2022

## 2022-12-18 NOTE — PROGRESS NOTES
[facility] Progress Note    Date: 12/18/2022  Name: Abdiaziz Park  MRN: [id number]  YOB: 1955   Patient's PCP: Oz Fernandez MD  Consultants during acute care: Pulmonary, ID, Podiatry, Gastroenterology, Wound care  Acute care admission date: 11/26 to 12/8/2022 and 11/11 to 11/23/22 at [facility] to [address]: 12/8/2022     Subjective: The patient is less responsive, eyes are closed today, no facial grimacing. There is mild upper airway noise, tube feeding remain off. She is intermittently painful, and the current comfort medications are beneficial although requires doses of Morphine x 4. Objective:   Pain is managed with Methadone 5 mg per G tube twice daily plus Morphine IV prn with 4 prn doses in the past 24 hours, transdermal Scopolamine patch and Glycopyrrolate IV for terminal congestion with 0 doses, Haloperidol is refused by the patient's spouse, and Lorazepam is available for anxiety with 2 doses. Glucose was low on 12/14/22 and received a Dextrose infusion), Lantus dose adjusted with parameters. Data reviewed 12/18/2022:  Frontal lobe lesion resection 6/20/2022  FINAL DIAGNOSIS   A. BRAIN MASS:  - METASTATIC, POORLY DIFFERENTIATED ADENOCARCINOMA, SEE NOTE. B. BRAIN MASS:  - METASTATIC, POORLY DIFFERENTIATED ADENOCARCINOMA, SEE NOTE. CT-scan of the brain 12/2/22:  No acute intracranial abnormality. Two enlarging nodular areas of soft tissue thickening in the scalp. Differential includes pseudomeningoceles  or recurrent tumor. CT chest, abdomen, pelvis 12/2/22:  Chest:       Cardiomegaly. Coronary artery disease. No effusion. Right-sided MediPort   device tip within the right SVC near the cavoatrial junction. There is   mucosal thickening of the left bronchus intermedius and left lower lobe   bronchus which may be seen with bronchitis or emphysematous changes.   No   suspicious hilar or mediastinal adenopathy. There emphysematous changes. Previous left partial pneumonectomy. Tree-in-bud nodules identified throughout much of the left lung favored to be   due to small airways disease or aspiration. Pleural base nodule left upper   lung 3 mm in size stable dating back to 2018. Tracheostomy. Degenerate changes of the thoracic spine. No suspicious osseous lesions. Abdomen/Pelvis:       Motion and streak artifact challenge evaluation. Gallbladder is contracted. No suspicious liver, splenic, adrenal glands,   kidneys, or pancreas lesions. The pancreas is diminutive size and with   calcifications and ductal dilatation can be seen with chronic pancreatitis. Pancreatic duct measuring 5 mm. Common bile duct measures 5 mm. Mild retained stool. Thickening of the gastric antrum could relate to   underdistention. No bowel obstruction. Gastrostomy tube within the   stomach. .  Moderate stool rectosigmoid junction. No suspicious adenopathy. Lobulated uterus compatible with fibroids. Bladder is unremarkable. No suspicious adnexal mass. Moderate to severe   aortic calcifications. Evidence of bilateral iliac artery stents noted. Posterior changes right and left groin greatest on right. Loss of contrast   involving the right proximal femoral artery. Degenerate changes lumbar spine. Degenerate changes of the hips pelvis. Age   indeterminate compression fracture at L4. With mild retropulsion.       Hemoglobin A1C 9/29/22: 5.9%  Accuchecks:   Recent Labs     12/17/22  1741 12/17/22  1940 12/18/22  0756   POCGLU 122* 135* 148*        Hepatic Function Panel:    Lab Results   Component Value Date/Time    ALKPHOS 92 11/28/2022 05:10 AM    ALT 15 11/28/2022 05:10 AM    AST 20 11/28/2022 05:10 AM    PROT 6.4 11/28/2022 05:10 AM    BILITOT 0.3 11/28/2022 05:10 AM    BILIDIR 0.2 10/02/2020 08:34 PM    IBILI 0.5 10/02/2020 08:34 PM    LABALBU 3.3 11/28/2022 05:10 AM     CBC with Differential:    Lab Results   Component Value Date/Time    WBC 11.0 12/04/2022 05:45 AM    RBC 3.04 12/04/2022 05:45 AM    HGB 8.8 12/04/2022 05:45 AM    HCT 28.0 12/04/2022 05:45 AM     12/04/2022 05:45 AM    MCV 92.1 12/04/2022 05:45 AM    MCH 28.9 12/04/2022 05:45 AM    MCHC 31.4 12/04/2022 05:45 AM    RDW 14.2 12/04/2022 05:45 AM    SEGSPCT 74.1 12/04/2022 05:45 AM    LYMPHOPCT 15.2 12/04/2022 05:45 AM    MONOPCT 9.3 12/04/2022 05:45 AM    BASOPCT 0.3 12/04/2022 05:45 AM    MONOSABS 1.0 12/04/2022 05:45 AM    LYMPHSABS 1.7 12/04/2022 05:45 AM    EOSABS 0.1 12/04/2022 05:45 AM    BASOSABS 0.0 12/04/2022 05:45 AM    DIFFTYPE AUTOMATED DIFFERENTIAL 12/04/2022 05:45 AM     BMP:    Lab Results   Component Value Date/Time     12/04/2022 05:45 AM    K 3.9 12/04/2022 05:45 AM     12/04/2022 05:45 AM    CO2 23 12/04/2022 05:45 AM    BUN 8 12/04/2022 05:45 AM    LABALBU 3.3 11/28/2022 05:10 AM    CREATININE 0.6 12/04/2022 05:45 AM    CALCIUM 8.9 12/04/2022 05:45 AM    GFRAA >60 09/29/2022 07:53 PM    LABGLOM >60 12/04/2022 05:45 AM    GLUCOSE 206 12/04/2022 05:45 AM       Physical Exam:   BP (!) 141/83   Pulse (!) 109   Temp 98.5 °F (36.9 °C) (Axillary)   Resp 18   Ht 5' 6\" (1.676 m)   Wt 196 lb 3.4 oz (89 kg)   SpO2 97%   BMI 31.67 kg/m²   General: less responsive, no opening eyes with exam, mild upper airway noise, chronically ill appearing, no facial grimacing   Skin: wound images reviewed in the media tab  HEENT: Mucous membranes are dry   Neck: tracheostomy in place with trach collar oxygen  Chest: right Mediport    Heart: distant tones, tachycardic RRR, S1S2, no murmurs  Lungs:  coarse breath sounds bilaterally, diminished at the bases, without rales, scattered coarse rhonchi   Abdomen: soft, bowel sounds hypoactive, no apparent tenderness, PEG in place, nondistended   and rectal: deferred  Extremities:  dressing on left foot, chronic changes in legs, no edema  Neurologic: less responsive      Assessment/Plan:  . Metastatic Non Small Cell left lung cancer diagnosed October 2019 with right frontal cerebral metastasis with gamma knife June 2022. The patient is continued on [address] for management of pain, delirium and restlessness, respiratory failure and possible end of life care. PPS 20%. . The patient did not tolerate tube feeding with high residuals and they were stopped 12/13. The patient has declined with less responsiveness and the patient requires ongoing [address] [place] [address] care for high level of nursing and respiratory care that can't be provided at home and need for frequent assessment of pain, delirium and symptom management with daily physician visits for medication adjustment. Cancer pain and postop pain, increased Methadone to 5 mg BID on 12/13/22 and continue prn Morphine. Metabolic encephalopathy, multifactorial  Dysphagia, has PEG and was on tube feeding which she no longer tolerates, speech recommends npo. Elevated tube feeding residuals and suction tan material from trach and sttopped tube feeding 12/13/22. 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   Type 2 diabetes mellitus, Hemoglobin A1C 9/29/22: 5.9%, decreased Lantus as no tube feeding now        Patient Active Problem List   Diagnosis Code    Chest pain R07.9    Dyspnea and respiratory abnormalities R06.00, R06.89    Acute gastritis without hemorrhage K29.00    Diabetic ulcer of toe of left foot associated with type 2 diabetes mellitus, with fat layer exposed ([facility] E11.621, L97.522    Type 2 diabetes mellitus with peripheral neuropathy (HCC) E11.42    Peripheral vascular disease (HCC) I73.9    History of nicotine dependence Z87.891    Sepsis ([address] A41.9    Brain metastases ([address] C79.31    Diabetic foot infection ([address] E11.628, L08.9    Cancer of left lung ([address] - removed at [facility] in 2019 C34.92    Former smoker - quit in 2019 Z87.891    DM (diabetes mellitus), type 2 ([address] E11.9    Seizures ([address] R56.9    Hypokalemia E87.6    Hypophosphatemia E83.39    Leukocytosis Y19.567    Metabolic acidosis, increased anion gap E87.29    Elevated CK R74.8    Respiratory failure (HCC) J96.90    Acute on chronic anemia D64.9    Severe malnutrition (HCC) E43    Gastrointestinal hemorrhage K92.2    Diabetic foot ulcer with osteomyelitis (HCC) E11.621, E11.69, L97.509, M86.9    Adenocarcinoma, lung (HCC) C34.90    Tracheostomy status ([address] Z93.0    Lung cancer metastatic to brain ([address] C34.90, C79.31    Non-small cell lung cancer with metastasis ([address] C34.90       BERTRAM House MD  12/18/2022

## 2022-12-18 NOTE — PLAN OF CARE
Problem: Skin/Tissue Integrity  Goal: Absence of new skin breakdown  Description: 1. Monitor for areas of redness and/or skin breakdown  2. Assess vascular access sites hourly  3. Every 4-6 hours minimum:  Change oxygen saturation probe site  4. Every 4-6 hours:  If on nasal continuous positive airway pressure, respiratory therapy assess nares and determine need for appliance change or resting period. Outcome: Progressing     Problem: Chronic Conditions and Co-morbidities  Goal: Patient's chronic conditions and co-morbidity symptoms are monitored and maintained or improved  Outcome: Progressing     Problem: Pain  Goal: Verbalizes/displays adequate comfort level or baseline comfort level  Outcome: Progressing     Problem: Safety - Adult  Goal: Free from fall injury  Outcome: Progressing     Problem: Dyspnea Due to End of Life  Goal: Demonstrate understanding of and ability to manage respiratory symptoms at end of life  Description: Patient  and or family/caregiver will verbalize recall of breathing strategies to maintain an effective breathing pattern during the inpatient hospice stay. Outcome: Progressing     Problem: Communication Deficit  Goal: Effectively communicate symptoms, needs, and concerns  Description: Patient  and/or family/caregiver will be able to communicate symptoms, needs, and concerns as evidenced by the use of language services during the inpatient hospice stay.     Outcome: Progressing

## 2022-12-19 PROBLEM — G93.40 ENCEPHALOPATHY: Status: ACTIVE | Noted: 2022-01-01

## 2022-12-19 PROBLEM — R07.9 CHEST PAIN: Status: RESOLVED | Noted: 2020-10-03 | Resolved: 2022-01-01

## 2022-12-19 PROBLEM — C34.90 METASTATIC NON-SMALL CELL LUNG CANCER (HCC): Status: ACTIVE | Noted: 2022-01-01

## 2022-12-19 NOTE — PROGRESS NOTES
[facility] Inpatient [facility] Progress Note    Date: 12/19/2022  Name: Gia Kumari  MRN: [id number]  YOB: 1955   Patient's PCP: Sebastian Peter MD  Consultants during acute care: Pulmonary, ID, Podiatry, Gastroenterology, Wound care  Acute care admission date: 11/26 to 12/8/2022 and 11/11 to 11/23/22 at [facility] to [address] [place] [address] Road: 12/8/2022     Subjective: The patient is minimally responsive and does not open her eyes. There is no facial grimacing. There is mild upper airway noise, tube feeding remain off. She has pain intermittently and requires prn doses of Morphine x 2. Objective:   Pain is managed with Methadone 5 mg per G tube twice daily plus Morphine IV prn with 2 prn doses in the past 24 hours, transdermal Scopolamine patch and Glycopyrrolate IV for terminal congestion with 0 doses, Haloperidol is refused by the patient's spouse, and Lorazepam is available for anxiety with 1 dose. Glucose was low on 12/14/22 and received a Dextrose infusion), Lantus dose adjusted with parameters. Data reviewed 12/19/2022:  Frontal lobe lesion resection 6/20/2022  FINAL DIAGNOSIS   A. BRAIN MASS:  - METASTATIC, POORLY DIFFERENTIATED ADENOCARCINOMA, SEE NOTE. B. BRAIN MASS:  - METASTATIC, POORLY DIFFERENTIATED ADENOCARCINOMA, SEE NOTE. CT-scan of the brain 12/2/22:  No acute intracranial abnormality. Two enlarging nodular areas of soft tissue thickening in the scalp. Differential includes pseudomeningoceles  or recurrent tumor. CT chest, abdomen, pelvis 12/2/22:  Chest:       Cardiomegaly. Coronary artery disease. No effusion. Right-sided MediPort   device tip within the right SVC near the cavoatrial junction. There is   mucosal thickening of the left bronchus intermedius and left lower lobe   bronchus which may be seen with bronchitis or emphysematous changes. No   suspicious hilar or mediastinal adenopathy.        There emphysematous changes. Previous left partial pneumonectomy. Tree-in-bud nodules identified throughout much of the left lung favored to be   due to small airways disease or aspiration. Pleural base nodule left upper   lung 3 mm in size stable dating back to 2018. Tracheostomy. Degenerate changes of the thoracic spine. No suspicious osseous lesions. Abdomen/Pelvis:       Motion and streak artifact challenge evaluation. Gallbladder is contracted. No suspicious liver, splenic, adrenal glands,   kidneys, or pancreas lesions. The pancreas is diminutive size and with   calcifications and ductal dilatation can be seen with chronic pancreatitis. Pancreatic duct measuring 5 mm. Common bile duct measures 5 mm. Mild retained stool. Thickening of the gastric antrum could relate to   underdistention. No bowel obstruction. Gastrostomy tube within the   stomach. .  Moderate stool rectosigmoid junction. No suspicious adenopathy. Lobulated uterus compatible with fibroids. Bladder is unremarkable. No suspicious adnexal mass. Moderate to severe   aortic calcifications. Evidence of bilateral iliac artery stents noted. Posterior changes right and left groin greatest on right. Loss of contrast   involving the right proximal femoral artery. Degenerate changes lumbar spine. Degenerate changes of the hips pelvis. Age   indeterminate compression fracture at L4. With mild retropulsion.       Hemoglobin A1C 9/29/22: 5.9%  Accuchecks:   Recent Labs     12/18/22  1141 12/18/22  1711 12/18/22  1951   POCGLU 150* 169* 191*        Hepatic Function Panel:    Lab Results   Component Value Date/Time    ALKPHOS 92 11/28/2022 05:10 AM    ALT 15 11/28/2022 05:10 AM    AST 20 11/28/2022 05:10 AM    PROT 6.4 11/28/2022 05:10 AM    BILITOT 0.3 11/28/2022 05:10 AM    BILIDIR 0.2 10/02/2020 08:34 PM    IBILI 0.5 10/02/2020 08:34 PM    LABALBU 3.3 11/28/2022 05:10 AM     CBC with Differential: Lab Results   Component Value Date/Time    WBC 11.0 12/04/2022 05:45 AM    RBC 3.04 12/04/2022 05:45 AM    HGB 8.8 12/04/2022 05:45 AM    HCT 28.0 12/04/2022 05:45 AM     12/04/2022 05:45 AM    MCV 92.1 12/04/2022 05:45 AM    MCH 28.9 12/04/2022 05:45 AM    MCHC 31.4 12/04/2022 05:45 AM    RDW 14.2 12/04/2022 05:45 AM    SEGSPCT 74.1 12/04/2022 05:45 AM    LYMPHOPCT 15.2 12/04/2022 05:45 AM    MONOPCT 9.3 12/04/2022 05:45 AM    BASOPCT 0.3 12/04/2022 05:45 AM    MONOSABS 1.0 12/04/2022 05:45 AM    LYMPHSABS 1.7 12/04/2022 05:45 AM    EOSABS 0.1 12/04/2022 05:45 AM    BASOSABS 0.0 12/04/2022 05:45 AM    DIFFTYPE AUTOMATED DIFFERENTIAL 12/04/2022 05:45 AM     BMP:    Lab Results   Component Value Date/Time     12/04/2022 05:45 AM    K 3.9 12/04/2022 05:45 AM     12/04/2022 05:45 AM    CO2 23 12/04/2022 05:45 AM    BUN 8 12/04/2022 05:45 AM    LABALBU 3.3 11/28/2022 05:10 AM    CREATININE 0.6 12/04/2022 05:45 AM    CALCIUM 8.9 12/04/2022 05:45 AM    GFRAA >60 09/29/2022 07:53 PM    LABGLOM >60 12/04/2022 05:45 AM    GLUCOSE 206 12/04/2022 05:45 AM       Physical Exam:   /71   Pulse 94   Temp 100.4 °F (38 °C)   Resp 14   Ht 5' 6\" (1.676 m)   Wt 196 lb 3.4 oz (89 kg)   SpO2 97%   BMI 31.67 kg/m²   General: minimally responsive, does not open her eyes, mild upper airway noise, chronically ill appearing, no facial grimacing   Skin: wound images reviewed in the media tab  HEENT: Mucous membranes are dry   Neck: tracheostomy in place with trach collar oxygen  Chest: right Mediport    Heart: distant tones, mildly tachycardic RRR, S1S2, no murmurs  Lungs:  coarse breath sounds bilaterally, diminished at the bases, without rales, scattered coarse rhonchi   Abdomen: soft, bowel sounds hypoactive, no apparent tenderness, PEG in place, nondistended   and rectal: deferred  Extremities:  dressing on left foot, chronic changes in legs, no edema  Neurologic: minimally responsive Assessment/Plan:  . Metastatic Non Small Cell left lung cancer diagnosed October 2019 with right frontal cerebral metastasis with gamma knife June 2022. The patient is continued on [facility] for management of pain, delirium and restlessness, respiratory failure and possible end of life care. PPS 20%. . The patient did not tolerate tube feeding with high residuals and were were stopped 12/13. The patient continues with daily decline, no nutrition, less responsiveness and the patient requires ongoing General Inpatient Hospice care due to high level of nursing and respiratory care that can't be provided at home and need for frequent assessment of pain, delirium and symptom management with daily physician visits for medication adjustment. Cancer pain and postop pain, increased Methadone to 5 mg BID on 12/13/22 and continue prn Morphine. Metabolic encephalopathy, multifactorial  Dysphagia, has PEG and was on tube feeding which she no longer tolerates, speech recommends npo. There were elevated tube feeding residuals and suctioning tan material from trach and sttopped tube feeding 12/13/22. 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 Type 2 diabetes mellitus, Hemoglobin A1C 9/29/22: 5.9%, decreased Lantus as no tube feeding now.       Patient Active Problem List   Diagnosis Code    Chest pain R07.9    Dyspnea and respiratory abnormalities R06.00, R06.89    Acute gastritis without hemorrhage K29.00    Diabetic ulcer of toe of left foot associated with type 2 diabetes mellitus, with fat layer exposed ([address] E11.621, L97.522    Type 2 diabetes mellitus with peripheral neuropathy (HCC) E11.42    Peripheral vascular disease ([address] I73.9    History of nicotine dependence Z87.891    Sepsis ([address] A41.9    Brain metastases ([address] C79.31    Diabetic foot infection (HCC) E11.628, L08.9    Cancer of left lung ([address] - removed at [facility] in 2019 C34.92    Former smoker - quit in 2019 Z87.891    DM (diabetes mellitus), type 2 ([address] E11.9    Seizures ([address] R56.9    Hypokalemia E87.6    Hypophosphatemia E83.39    Leukocytosis D57.519    Metabolic acidosis, increased anion gap E87.29    Elevated CK R74.8    Respiratory failure (HCC) J96.90    Acute on chronic anemia D64.9    Severe malnutrition (HCC) E43    Gastrointestinal hemorrhage K92.2    Diabetic foot ulcer with osteomyelitis (HCC) E11.621, E11.69, L97.509, M86.9    Adenocarcinoma, lung (HCC) C34.90    Tracheostomy status ([address] Z93.0    Lung cancer metastatic to brain ([address] C34.90, C79.31    Non-small cell lung cancer with metastasis ([address] C34.90       BERTRAM Bridges MD  12/19/2022

## 2022-12-19 NOTE — PROGRESS NOTES
12/19/22 0859   Oxygen Therapy/Pulse Ox   O2 Therapy Oxygen humidified   $Oxygen $Daily Charge   O2 Device Trach mask   O2 Flow Rate (L/min) 8 L/min   FiO2  35 %   SpO2 93 %   Pulse Oximeter Device Mode Intermittent   Pulse Oximeter Device Location Finger   $Pulse Oximeter $Spot check (multiple/continuous)   Blood Gas  Performed?  No

## 2022-12-19 NOTE — H&P
History and Physical 22        NAME: Mardy Dance  : 1955  MRN: [id number]      Assessment/Plan:  Mardy Dance is a 74 y.o. female with a history of history of non-small cell cancer of the lung s/p partial left pneumonectomy in 2019 with cerebral metastases and gamma knife in 2022 after which she developed seizure disorder on lacosamide severe protein calorie malnutrition with a PEG tube who presented to [facility] 2022 with metastatic lung cancer     History of metastatic lung cancer s/p left lobectomy no chemo or radiation, met to right frontal lob s/p  right frontal craniotomy on 2022: h/o metastatic lung ca to brain, oncology was consulted to discuss goals of care, pt previously following with oncologist at [place] in [facility], Metastatic lung cancer-stage BRENDA(cTx, Cnx, pM1b), follows with Dr. Radha Finch at [facility]. Discussed the findings, stage, very poor prognosis and very very limited options and discussed BSC/Hospice care and family has agreed to explore that option, meeting with hospice today at 7pm, Pathology results are consistent with poorly differentiated metastatic adenocarcinoma.   Patient subsequently had gamma knife radiosurgery    Dysphagia patient has a PEG tube however tube feeds have been held since patient has increased residuals and has developed aspiration    Acute metabolic encephalopathy possibly secondary to decreased respiratory effort multiple metastasis and declining clinical condition    At this time the patient's clinical condition does not warrant any aggressive treatment or therapy I have discussed in great detail with the  however he wants me to initiate a transfer to [facility] [employer] which I have done    DVT Prophylaxis: enoxaparin   Code Status/Surrogate Decision Maker: FARHAT Coburn Query is the        Current living situation: Hospice   Expected Disposition: Hospice  Estimated discharge date: TBD      Chief Complaint:    Metastatic lung cancer    History of Present Illness:    22-year-old female is being admitted from the hospice service to internal medicine for potential transfer over to [facility]. Patient has known history of non-small cell cancer of the lung s/p partial left pneumonectomy in October 2019 with cerebral metastases and gamma knife in July 2022 after which she developed seizure disorder on lacosamide severe protein calorie malnutrition with a PEG tube however tube feeds were held since patient has had increased residuals with aspiration at this time patient has respiratory failure with a tracheostomy peripheral vascular disease with left hallux amputation distal phalanx osteomyelitis type 2 diabetes hypertension hyperlipidemia. At this time the patient has been transferred from hospice service since the  thinks that patient has increased energy patient was seen by Dr. Marcelo Mckeon on December 15 and she recommended continuing hospice care on December 15, 2022 Dr. Tasha Alanis documents that patient's declining health status she discussed with the family that she is not a candidate for any systemic treatment including radiation foot amputations or transfer to [facility] she strongly suggests hospice care. ROS:    Review of Systems     Patient is not alert and oriented enough to do a review of systems    Past Medical, Surgical, Social, Family History:   Past Medical History:   Diagnosis Date    Brain metastases ([address] 8/10/2022    Biopsy (frozen section) done 6/202022 at [facility] showed METASTATIC, [address]      Cancer ([address]     Diabetes mellitus ([address]     Hyperlipidemia     Hypertension     Lung cancer metastatic to brain ([address] 11/12/2022    Seizure ([address]     Tracheostomy status ([address] 11/10/2022     Past Surgical History:   Procedure Laterality Date    LUNG CANCER SURGERY Left 10/2019    TOE AMPUTATION Left 8/8/2022    LEFT FOOT HALLUX AMPUTATION EXCISIONAL DEBRIDEMENT ALL NON VIABLE   TISSUE AND BONE performed by Ruben Waller DPM at [facility] N/A 10/3/2020    EGD BIOPSY performed by Michael Hylton MD at [facility] ENDOSCOPY     Social History     Socioeconomic History    Marital status:      Spouse name: Not on file    Number of children: Not on file    Years of education: Not on file    Highest education level: Not on file   Occupational History    Not on file   Tobacco Use    Smoking status: Former     Packs/day: 1.00     Types: Cigarettes     Quit date: 2019     Years since quitting: 3.9    Smokeless tobacco: Never    Tobacco comments:     10/2019   Substance and Sexual Activity    Alcohol use: Yes     Alcohol/week: 1.0 standard drink     Types: 1 Cans of beer per week    Drug use: Yes     Types: Marijuana Pallavi Ege)    Sexual activity: Yes     Partners: Male   Other Topics Concern    Not on file   Social History Narrative    Not on file     Social Determinants of Health     Financial Resource Strain: Not on file   Food Insecurity: Not on file   Transportation Needs: Not on file   Physical Activity: Not on file   Stress: Not on file   Social Connections: Not on file   Intimate Partner Violence: Not on file   Housing Stability: Not on file     No family history on file. Home Medications:  Prior to Admission medications    Medication Sig Start Date End Date Taking? Authorizing Provider   oxyCODONE (ROXICODONE) 5 MG immediate release tablet Take 5 mg by mouth every 4 hours as needed for Pain.     Historical Provider, MD   aspirin 81 MG chewable tablet 81 mg by Per G Tube route daily    Historical Provider, MD   apixaban (ELIQUIS) 5 MG TABS tablet by Per G Tube route 2 times daily    Historical Provider, MD   carvedilol (COREG) 25 MG tablet 25 mg by Per G Tube route 2 times daily (with meals)    Historical Provider, MD   folic acid (FOLVITE) 1 MG tablet 1 mg by Per G Tube route daily    Historical Provider, MD   glycopyrrolate (ROBINUL) 1 MG tablet 1 mg by Per G Tube route 3 times daily Historical Provider, MD   insulin glargine (LANTUS) 100 UNIT/ML injection vial Inject 20 Units into the skin daily (with breakfast)    Historical Provider, MD   Lactobacillus Extra Strength CAPS by Per G Tube route    Historical Provider, MD   lansoprazole (PREVACID) 30 MG delayed release capsule Take 30 mg by mouth daily    Historical Provider, MD   losartan (COZAAR) 50 MG tablet 50 mg by Per G Tube route daily    Historical Provider, MD   scopolamine (TRANSDERM-SCOP) transdermal patch Place 1 patch onto the skin every 72 hours    Historical Provider, MD   vitamin B-1 (THIAMINE) 100 MG tablet 100 mg by Per G Tube route daily    Historical Provider, MD   lacosamide (VIMPAT) 50 MG TABS tablet 200 mg by Per G Tube route 2 times daily. Historical Provider, MD   alendronate (FOSAMAX) 70 MG tablet Take 1 tablet by mouth once a week 6/12/22   Historical Provider, MD   atorvastatin (LIPITOR) 40 MG tablet Take 1 tablet by mouth in the morning. Patient taking differently: Take 20 mg by mouth daily 8/12/22   Jonas Arana MD   gabapentin (NEURONTIN) 300 MG capsule Take 300 mg by mouth at bedtime. 3/15/22   Historical Provider, MD   simvastatin (ZOCOR) 20 MG tablet Take 40 mg by mouth nightly   8/11/22  Historical Provider, MD         Physical Exam:     /60   Pulse 92   Temp 100.4 °F (38 °C) (Axillary)   Resp 16   Ht 5' 6\" (1.676 m)   Wt 196 lb 3.4 oz (89 kg)   SpO2 93%   BMI 31.67 kg/m²     General: NAD, tracheostomy in place, sleeping   Eyes: EOMI  ENT: neck supple  Cardiovascular: Regular rate. Respiratory: B/L crackles   Gastrointestinal: Soft, non tender  Genitourinary: no suprapubic tenderness  Musculoskeletal: No edema  Skin: warm, dry  Neuro: non-arousable  Psych: not-assessed         Labs, Imaging, and Studies reviewed:    No results found. CBC: No results for input(s): WBC, HGB, PLT in the last 72 hours.   BMP:  No results for input(s): NA, K, CL, CO2, BUN, CREATININE, GLUCOSE in the last 72 hours. Hepatic: No results for input(s): AST, ALT, ALB, BILITOT, ALKPHOS in the last 72 hours. Lipids: No results found for: CHOL, HDL, TRIG  Hemoglobin A1C:   Lab Results   Component Value Date/Time    LABA1C 5.9 09/29/2022 07:53 PM     TSH: No results found for: TSH  Troponin:   Lab Results   Component Value Date/Time    TROPONINT <0.010 09/29/2022 12:44 PM    TROPONINT <0.010 09/29/2022 12:44 PM    TROPONINT <0.010 10/03/2020 05:54 AM     Lactic Acid: No results for input(s): LACTA in the last 72 hours. BNP: No results for input(s): PROBNP in the last 72 hours.   UA:  Lab Results   Component Value Date/Time    NITRU NEGATIVE 10/03/2020 12:51 AM    COLORU YELLOW 10/03/2020 12:51 AM    WBCUA 4 10/03/2020 12:51 AM    RBCUA NONE SEEN 10/03/2020 12:51 AM    MUCUS RARE 10/03/2020 12:51 AM    TRICHOMONAS NONE SEEN 10/03/2020 12:51 AM    BACTERIA RARE 10/03/2020 12:51 AM    CLARITYU HAZY 10/03/2020 12:51 AM    SPECGRAV 1.005 10/03/2020 12:51 AM    LEUKOCYTESUR MODERATE 10/03/2020 12:51 AM    UROBILINOGEN NORMAL 10/03/2020 12:51 AM    BILIRUBINUR NEGATIVE 10/03/2020 12:51 AM    BLOODU NEGATIVE 10/03/2020 12:51 AM    KETUA NEGATIVE 10/03/2020 12:51 AM     Urine Cultures: No results found for: Elfida Dominion  Blood Cultures: No results found for: BC  No results found for: BLOODCULT2  Organism: No results found for: [facility]          Electronically signed by Mirtha Nolan MD on 12/19/2022 at 5:20 PM

## 2022-12-19 NOTE — PROGRESS NOTES
Patients spouse, Skylar Greer, called requesting to speak to Dr. Tahmina Rodriguez and that it was \"urgent\". Asked if there was anything nursing staff could help answer for him and he stated \"I want to transfer Flako Lorenzo to [facility]\". When asked why he wanted her moved he stated \"for cancer treatment\". Message left for Dr. Tahmina Rodriguez to call Skylar Percy to discuss plan of care.      Amy Gandhi, BSN, RN

## 2022-12-19 NOTE — DISCHARGE SUMMARY
[facility] Discharge Summary    Date: 12/19/2022  Name: Morgan Sepulveda  MRN: [id number]  YOB: 1955     Patient's PCP: Leopoldo Monroy MD  Consultants during acute care: Pulmonary, ID, Podiatry, Gastroenterology, Wound care  Acute care admission date: 11/26 to 12/8/2022 and 11/11 to 11/23/22 at [facility]   Date of Admission: 12/8/2022 to [address] [place] [address]  Date of Discharge: 12/19/2022    Admitting Physician: Crissy Genao MD to [address] [place] [address]   Discharge Physician: Ninfa Fitzpatrick MD  Consultation: Dr German Padilla   Disposition: the patient's spouse chooses to revoke hospice and readmit to acute medical care  Condition on discharge: poor with a terminal prognosis    Invasive procedures: none during General Inpatient Hospice     Discharge Diagnoses:   Metastatic Non Small Cell left lung cancer (adenocarcinoma) diagnosed October 2019 with right frontal cerebral metastasis with resection and gamma knife June 2022 through [facility]. The patient was admitted to [address] [place] [address] from the hospital medicine service for management of pain, delirium and restlessness, respiratory failure and possible end of life care. PPS 20%. The patient's spouse has chosen to revoke hospice services on 12/19/22 and wants to return to medical care. Cancer pain and postop pain, improved with increased Methadone to 5 mg BID on 12/13/22 and continue prn Morphine. Metabolic encephalopathy, multifactorial  Dysphagia, has PEG and was on tube feeding which she no longer tolerates, speech recommends npo. There were elevated tube feeding residuals and suctioning tan material from trach and sttopped tube feeding 12/13/22.     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 8/8/22, followed by ID and on TMP/SMX with end date 1/11/2023  Chronic respiratory failure with tracheostomy  Peripheral vascular disease   Seizure disorder with brain metastases, on Lacosamide (history of status epilepticus September 2022) no active sezures  Type 2 diabetes mellitus, Hemoglobin A1C 9/29/22: 5.9%, decreased Lantus as no tube feeding now. Patient Active Problem List   Diagnosis Code    Dyspnea and respiratory abnormalities R06.00, R06.89    Acute gastritis without hemorrhage K29.00    Diabetic ulcer of toe of left foot associated with type 2 diabetes mellitus, with fat layer exposed ([facility] E11.621, L97.522    Type 2 diabetes mellitus with peripheral neuropathy (HCC) E11.42    Peripheral vascular disease (HCC) I73.9    History of nicotine dependence Z87.891    Sepsis ([facility] A41.9    Brain metastases (HCC) C79.31    Diabetic foot infection (HCC) E11.628, L08.9    Cancer of left lung ([facility] - removed at [facility] in 2019 C34.92    Former smoker - quit in 2019 Z87.891    DM (diabetes mellitus), type 2 ([address] E11.9    Seizures ([address] R56.9    Hypokalemia E87.6    Hypophosphatemia E83.39    Leukocytosis L92.026    Metabolic acidosis, increased anion gap E87.29    Elevated CK R74.8    Respiratory failure (HCC) J96.90    Acute on chronic anemia D64.9    Severe malnutrition (HCC) E43    Gastrointestinal hemorrhage K92.2    Diabetic foot ulcer with osteomyelitis (HCC) E11.621, E11.69, L97.509, M86.9    Adenocarcinoma, lung (HCC) C34.90    Tracheostomy status ([address] Z93.0    Lung cancer metastatic to brain ([facility] C34.90, C79.31    Non-small cell lung cancer with metastasis (HCC) C34.90    Encephalopathy G93.40       Brief History, reason for admission: Please see the acute care H+P, discharge summary, consultants notes, and my notes. The patient was admitted to [address] [place] [address] on 12/8/22.       This is a 74 y.o. female nursing home resident with a history of Non Small Cell left lung cancer with partial left pneumonectomy 10/2019 (adenocarcinoma) with cerebral metastases and gamma knife in June - July 2022, seizure disorder on Lacosamide, severe protein calorie malnutrition with PEG, respiratory failure with tracheostomy, peripheral vascular disease with left hallux amputation for distal phalanx osteomyelitis, Type 2 diabetes mellitus, hypertension, hyperlipidemia, anemia,  readmitted on 11/26/2022. The patient has been treated for diabetic left foot infection, encephalopathy and is on Bactrim DS per G tube. The patient has had 29 pounds of involuntary weight loss from 8/5/22 to 12/4/22 (21% of body weight). The patient is dependent for all ADL's and non verbal. The patient has Morphine 2 mg IV every 4 hours prn pain and Lorazepam 1 mg IV every 6 hours prn anxiety. Oncology has been through [facility]. The hospice nurse liaisons met with the patient's spouse and daughter on 12/5/22 and provided hospice information. The patient's spouse was not ready to decide at the visit and wants to contemplate the decision. There was discussion about taking the patient home rather than returning to the Nursing Home. The patient is DNR-comfort care. I collaborated with the hospice nurse liaison on 12/8/22 who has met with the patient's spouse. The family is in agreement with comfort care and hospice. The patient has become more agitated and painful since yesterday and family is agreeable to [address] [place] [address] for symptom management of pain, delirium, anxiety and possible end of life care. Tube feeding will be continued for now. Oncology history [facility] from Care Everywhere:  Metastatic adenocarcinoma of the lung of lung, mets to brain    A 61-year-old female with history of lung cancer s/p left lobectomy 10/2019 who initially presented to [facility] emergency department 6/18/2022 due to acute altered mental status.   MRI brain 6/18/2022 showed multiple brain lesions including 3.5 x 2.5 x 3.7 cm lesion located in the right frontal, 1.4 x 1.3 cm lesion left occipital pole, and 0.7 cm enhancing dural implant overlying the vertex. CT chest/abdomen/pelvis with IV contrast 6/20/2022 showed postsurgical changes with increased density next to surgical clips and 8 mm focal lesion in the proximal pancreatic body but otherwise no suspicious area for malignancy. S/p craniotomy 6/20/2022 with resection of the right frontal lobe the surgical pathology review metastatic poorly differentiated adenocarcinoma, primary lung cancer is favored. S/p gamma knife to post op cavity as well as remaining lesions 7/2022. CURRENT TREATMENT:   Surgical resection of right frontal lobe lesion 6/20/2022. Gamma knife for the postop cavity, remaining lesions 7/2022    Hospital Course: The patient was admitted on 12/8/2022  to [place] [address] in transfer from the hospital medicine service for management of pain, delirium and restlessness, respiratory failure and possible end of life care. PPS 20%. The patient was started on Methadone which has been beneficial in managing her pain, with prn Morphine for breakthrough pain. .The patient's delirium was initially managed with Haloperidol but the patient's spouse has refused Haloperidol as he felt it made her vomit. Vimpat was continued and there were no seizures. Tube feedings were continued on [facility] admission, but the patient developed high residuals and were were stopped 12/13, and there had been some tan material suctioned from her tracheostomy suggestive of tube feeding. I had discussion with the patient's daughter, Nataly Aguilar and a niece, Leonard Cortez, on [facility] admission and they were aware of the decline and agreeable to comfort care. The patient's spouse also consented. On 12/13/22, I met with the patient's spouse, Radha Méndez, and the patient's brother, Lilo Meek, at the bedside. Radha Méndez was concerned that Haloperidol caused vomiting and I tried to reassure him.  We discussed that with elevated tube feeding residuals and suctioning material from trach that we should stop the tube feeding and he understands. I shared the patient's ongoing decline and that she is approaching end of life. Mr. Tres De La Fuente asked that the patient be considered for immunotherapy, and Dr Ben Sapp kindly followed up on December 14 and 13 and talked with Mr. Tres De La Fuente and did not feel that cancer treatment would be beneficial.       The patient continued with daily decline, no nutrition, less responsiveness. I had talked with Mr. Tres De La Fuente on 12/17 to update him. On 12/19/22, I received a message from the patient's nurse that the patient's spouse wanted me to phone him at [phone]. I phoned Mr. Tres De La Fuente, and he tells me that he wants the patient to go to [facility] to get immunotherapy and that he had called them and they told them that they had a bed ready for her, although he could not give me a name of who he spoke to or the name of a physician. I reviewed our previous conversations and also Dr Rhina Mccarthy notes with Mr. Tres De La Fuente also. I phoned the [facility] transfer center and they have no record of the patient or any bed being available. [facility] would not consider accepting a hospice patient in transfer (which is what I expected). On 12/19/22 at 1706, I returned a phone call to Mr. Tres De La Fuente and explained the above. We had a long conversation (at least 25 minutes today) regarding the patient's condition, declining status and that I did not feel that she would be able to tolerate any aggressive treatment. Mr. Tres De La Fuente wants to revoke hospice and return to medical care. I did speak with Dr. Erwin Pickering from the hospital medicine group who kindly accepts the patient. I have collaborated with the patient's nurse and the hospice team also. I will place discharge readmit orders to return to the hospital medicine service as the patient's spouse has chosen to revoke hospice services on 12/19/22 and wants to return to medical care.        I spent 55 minutes in preparation for discharge, coordination with the patient, family and the hospice nurse. Data reviewed 12/19/2022:   Frontal lobe lesion resection 6/20/2022  FINAL DIAGNOSIS   A. BRAIN MASS:  - METASTATIC, POORLY DIFFERENTIATED ADENOCARCINOMA, SEE NOTE. B. BRAIN MASS:  - METASTATIC, POORLY DIFFERENTIATED ADENOCARCINOMA, SEE NOTE. CT-scan of the brain 12/2/22:  No acute intracranial abnormality. Two enlarging nodular areas of soft tissue thickening in the scalp. Differential includes pseudomeningoceles  or recurrent tumor. CT chest, abdomen, pelvis 12/2/22:  Chest:       Cardiomegaly. Coronary artery disease. No effusion. Right-sided MediPort   device tip within the right SVC near the cavoatrial junction. There is   mucosal thickening of the left bronchus intermedius and left lower lobe   bronchus which may be seen with bronchitis or emphysematous changes. No   suspicious hilar or mediastinal adenopathy. There emphysematous changes. Previous left partial pneumonectomy. Tree-in-bud nodules identified throughout much of the left lung favored to be   due to small airways disease or aspiration. Pleural base nodule left upper   lung 3 mm in size stable dating back to 2018. Tracheostomy. Degenerate changes of the thoracic spine. No suspicious osseous lesions. Abdomen/Pelvis:       Motion and streak artifact challenge evaluation. Gallbladder is contracted. No suspicious liver, splenic, adrenal glands,   kidneys, or pancreas lesions. The pancreas is diminutive size and with   calcifications and ductal dilatation can be seen with chronic pancreatitis. Pancreatic duct measuring 5 mm. Common bile duct measures 5 mm. Mild retained stool. Thickening of the gastric antrum could relate to   underdistention. No bowel obstruction. Gastrostomy tube within the   stomach. .  Moderate stool rectosigmoid junction. No suspicious adenopathy.   Lobulated uterus compatible with fibroids. Bladder is unremarkable. No suspicious adnexal mass. Moderate to severe   aortic calcifications. Evidence of bilateral iliac artery stents noted. Posterior changes right and left groin greatest on right. Loss of contrast   involving the right proximal femoral artery. Degenerate changes lumbar spine. Degenerate changes of the hips pelvis. Age   indeterminate compression fracture at L4. With mild retropulsion.       Hemoglobin A1C 9/29/22: 5.9%  Accuchecks:         Recent Labs     12/18/22  1141 12/18/22  1711 12/18/22  1951   POCGLU 150* 169* 191*         Hepatic Function Panel:          Lab Results   Component Value Date/Time     ALKPHOS 92 11/28/2022 05:10 AM     ALT 15 11/28/2022 05:10 AM     AST 20 11/28/2022 05:10 AM     PROT 6.4 11/28/2022 05:10 AM     BILITOT 0.3 11/28/2022 05:10 AM     BILIDIR 0.2 10/02/2020 08:34 PM     IBILI 0.5 10/02/2020 08:34 PM     LABALBU 3.3 11/28/2022 05:10 AM      CBC with Differential:          Lab Results   Component Value Date/Time     WBC 11.0 12/04/2022 05:45 AM     RBC 3.04 12/04/2022 05:45 AM     HGB 8.8 12/04/2022 05:45 AM     HCT 28.0 12/04/2022 05:45 AM      12/04/2022 05:45 AM     MCV 92.1 12/04/2022 05:45 AM     MCH 28.9 12/04/2022 05:45 AM     MCHC 31.4 12/04/2022 05:45 AM     RDW 14.2 12/04/2022 05:45 AM     SEGSPCT 74.1 12/04/2022 05:45 AM     LYMPHOPCT 15.2 12/04/2022 05:45 AM     MONOPCT 9.3 12/04/2022 05:45 AM     BASOPCT 0.3 12/04/2022 05:45 AM     MONOSABS 1.0 12/04/2022 05:45 AM     LYMPHSABS 1.7 12/04/2022 05:45 AM     EOSABS 0.1 12/04/2022 05:45 AM     BASOSABS 0.0 12/04/2022 05:45 AM     DIFFTYPE AUTOMATED DIFFERENTIAL 12/04/2022 05:45 AM      BMP:          Lab Results   Component Value Date/Time      12/04/2022 05:45 AM     K 3.9 12/04/2022 05:45 AM      12/04/2022 05:45 AM     CO2 23 12/04/2022 05:45 AM     BUN 8 12/04/2022 05:45 AM     LABALBU 3.3 11/28/2022 05:10 AM     CREATININE 0.6 12/04/2022 05:45 AM     CALCIUM 8.9 12/04/2022 05:45 AM     GFRAA >60 09/29/2022 07:53 PM     LABGLOM >60 12/04/2022 05:45 AM     GLUCOSE 206 12/04/2022 05:45 AM         Physical Exam:   /71   Pulse 94   Temp 100.4 °F (38 °C)   Resp 14   Ht 5' 6\" (1.676 m)   Wt 196 lb 3.4 oz (89 kg)   SpO2 97%   BMI 31.67 kg/m²   General: minimally responsive, does not open her eyes, mild upper airway noise, chronically ill appearing, no facial grimacing   Skin: wound images reviewed in the media tab  HEENT: Mucous membranes are dry   Neck: tracheostomy in place with trach collar oxygen  Chest: right Mediport    Heart: distant tones, mildly tachycardic RRR, S1S2, no murmurs  Lungs:  coarse breath sounds bilaterally, diminished at the bases, without rales, scattered coarse rhonchi   Abdomen: soft, bowel sounds hypoactive, no apparent tenderness, PEG in place, nondistended   and rectal: deferred  Extremities:  dressing on left foot, chronic changes in legs, no edema  Neurologic: minimally responsive     Medications on discharge: please see the discharge readmit orders.      Ivory Lennon MD, [facility]   12/19/2022

## 2022-12-19 NOTE — PROGRESS NOTES
[address] [place] [address]       I received a message from the patient's nurse that the patient's spouse wanted me to phone him at [phone]. I phoned Mr. Shelia Wise, and he tells me that he wants the patient to go to [facility] to get immunotherapy and that he had called them and they told them that they had a bed ready for her, although he could not give me a name of who he spoke to or the name of a physician. I reviewed our previous conversations and also Dr Marian Doll notes with Mr. Shelia Wise also. I phoned the [facility] transfer center and they have no record of the patient or any bed being available. [facility] would not consider accepting a hospice patient (which is what I expected). I returned a phone call to Mr. Shelia Wise and explained the above. We had a long conversation (at least 25 minutes today) regarding her condition, declining status and that I did not feel that she would be able to tolerate any aggressive treatment. Mr. Shelia Wise wants to revoke hospice and return to medical care. I did speak with Dr. Shayna Miles from the hospital medicine group who kindly accepts the patient. I have collaborated with the patient's nurse and the hospice team also. I will place discharge readmit orders to return to the hospital medicine service.     Daniel Schwab MD

## 2022-12-20 NOTE — PROGRESS NOTES
Got a [employer] message at 7:30 PM-stating that [facility] called, hospitalist and oncologist feel that there is no need for transfer now. If  wishes to pursue a second opinion after patient is discharged she can be seen in clinic for second opinion evaluation. Cephalexin Counseling: I counseled the patient regarding use of cephalexin as an antibiotic for prophylactic and/or therapeutic purposes. Cephalexin (commonly prescribed under brand name Keflex) is a cephalosporin antibiotic which is active against numerous classes of bacteria, including most skin bacteria. Side effects may include nausea, diarrhea, gastrointestinal upset, rash, hives, yeast infections, and in rare cases, hepatitis, kidney disease, seizures, fever, confusion, neurologic symptoms, and others. Patients with severe allergies to penicillin medications are cautioned that there is about a 10% incidence of cross-reactivity with cephalosporins. When possible, patients with penicillin allergies should use alternatives to cephalosporins for antibiotic therapy.

## 2022-12-20 NOTE — CODE DOCUMENTATION
NICOLE KILGORE CALLED THIS CHARGE NURSE BECAUSE PATIENT'S STATS WERE IN 70'S. COULD NOT GET THEM ABOVE THAT. I THEN CALLED JACE FROM RESPIRATORY. JACE CAME AND ADJUSTED O2 LEVEL AND SUCTIONED PATIENT. DECIDE TO CALL A RR BECAUSE O2 COULD  NOT GET ABOVE 80'S.

## 2022-12-20 NOTE — PLAN OF CARE
Problem: Skin/Tissue Integrity  Goal: Absence of new skin breakdown  Description: 1. Monitor for areas of redness and/or skin breakdown  2. Assess vascular access sites hourly  3. Every 4-6 hours minimum:  Change oxygen saturation probe site  4. Every 4-6 hours:  If on nasal continuous positive airway pressure, respiratory therapy assess nares and determine need for appliance change or resting period.   12/19/2022 2329 by Arun Barlow RN  Outcome: Progressing  12/19/2022 2329 by Arun Barlow RN  Outcome: Progressing     Problem: Safety - Adult  Goal: Free from fall injury  Outcome: Progressing     Problem: Pain  Goal: Verbalizes/displays adequate comfort level or baseline comfort level  Outcome: Progressing

## 2022-12-20 NOTE — CARE COORDINATION
CM into see pt. Pt is alone in room, trach mask in place. CM will follow with family. Pt was previous on Hospice GIP. Family decided to revoke hospice and return to Medical care.  wanted pt to go to [facility]. Hospitalist received the following message. stating that [facility] called, hospitalist and oncologist feel that there is no need for transfer now. If  wishes to pursue a second opinion after patient is discharged she can be seen in clinic for second opinion evaluation. LH  1300 CM called pts .  is tired and informed CM that his dtr would be coming in and CM could speak with her.  still wants pursue [facility]. CM hopes to meet with pts dtr.  [address]

## 2022-12-20 NOTE — PROGRESS NOTES
Comprehensive Nutrition Assessment    Type and Reason for Visit:  Initial, Positive Nutrition Screen, Wound    Nutrition Recommendations/Plan:    Begin EN with Glucerna 1.5 (Diabetic formula) progressed as tolerated to 45 ml/hr to provide ~1620 kcals (93%) and 89 gm (100%), if appropriate with POC  Please consult RD for TF order/mgt as needed     Malnutrition Assessment:  Malnutrition Status:  Severe malnutrition (12/20/22 1600)    Context:  Chronic Illness     Findings of the 6 clinical characteristics of malnutrition:  Energy Intake:  Mild decrease in energy intake   Weight Loss:  Unable to assess     Body Fat Loss:  Severe body fat loss Buccal region, Triceps, Fat Overlying Ribs   Muscle Mass Loss:  Severe muscle mass loss Calf (gastrocnemius), Hand (interosseous), Clavicles (pectoralis & deltoids)  Fluid Accumulation:  No significant fluid accumulation Extremities   Strength:  Not Performed    Nutrition Assessment:    Pt admitted with lung cancer, mets to brain, previously on hospice and EN. H/O trach/PEG, severe malnutrition, dysphagia. Has PEG and was on tube feeding which she no longer tolerates, speech recommends npo noted. There were elevated tube feeding residuals and suctioning tan material from trach and stopped tube feeding 12/13/22 noted. Current wt of 196 likely inaccurate. Has arterial, diabetic and non-healing surgical wounds noted. Will continue to follow as high nutrition risk. Nutrition Related Findings:      Wound Type: Multiple, Pressure Injury, Stage II, Diabetic Ulcer       Current Nutrition Intake & Therapies:    Average Meal Intake: NPO  Average Supplements Intake: NPO  Diet NPO Exceptions are: Ice Chips    Anthropometric Measures:  Height: 5' 6\" (167.6 cm)  Ideal Body Weight (IBW): 130 lbs (59 kg)    Admission Body Weight:  (?)  Current Body Weight:  (?),   IBW.     Current BMI (kg/m2):    Usual Body Weight: 128 lb 8 oz (58.3 kg) (8/11/22)                       BMI Categories: Underweight (BMI less than 22) age over 72    Estimated Daily Nutrient Needs:  Energy Requirements Based On: Kcal/kg     Energy (kcal/day): 1253-8235 (30-35 kcal/kg)  Weight Used for Protein Requirements: Ideal  Protein (g/day): 71-89 (1.2-1.5 g/kg IBW)  Method Used for Fluid Requirements: 1 ml/kcal  Fluid (ml/day):  8885-7617    Nutrition Diagnosis:   Severe malnutrition, In context of chronic illness related to inadequate enteral nutrition infusion as evidenced by severe loss of subcutaneous fat, severe muscle loss, NPO or clear liquid status due to medical condition    Nutrition Interventions:   Food and/or Nutrient Delivery: Continue NPO, Start Tube Feeding  Nutrition Education/Counseling: Education not indicated  Coordination of Nutrition Care: Continue to monitor while inpatient       Goals:     Goals:  Tolerate nutrition support at goal rate       Nutrition Monitoring and Evaluation:   Behavioral-Environmental Outcomes: None Identified  Food/Nutrient Intake Outcomes: Enteral Nutrition Intake/Tolerance, IVF Intake  Physical Signs/Symptoms Outcomes: Biochemical Data, GI Status, Nutrition Focused Physical Findings, Skin, Weight    Discharge Planning:    Enteral Nutrition     Soila Ruiz, [address] [place] [address],   Contact: [phone]

## 2022-12-20 NOTE — PROGRESS NOTES
V2.0  [facility] [place] [facility] Progress Note      Name:  Radu Alamo /Age/Sex: 1955  (74 y.o. female)   MRN & CSN:  [id number] & [id number] Encounter Date/Time: 2022 2:59 PM EST    Location:  [address] [place] [address]-A PCP: Virgie Leggett MD       Hospital Day: 2    Assessment and Plan:   Radu Alamo is a 74 y.o. female with pmh of  who presents with Lung cancer metastatic to brain [facility])      Plan:    History of metastatic lung cancer s/p left lobectomy no chemo or radiation, met to right frontal lob s/p  right frontal craniotomy on 2022: h/o metastatic lung ca to brain,  pt previously following with oncologist at [facility] in [facility], Metastatic lung cancer-stage BRENDA(cTx, Cnx, pM1b), follows with Dr. Piter Lundberg at [facility]. Pathology results are consistent with poorly differentiated metastatic adenocarcinoma. Patient subsequently had gamma knife radiosurgery. Guarded prognosis Patient was on hospice service till yesterday and  wanted the code status to be changed to full code and patient to be transferred to [facility] for immunotherrapy, [facility]  did not accept the patient and advised that for second opinion can follow up as outpatient. Acute on chronic anemia likely GI bleed: Hemoglobin 9.4  previously 14.2 in September, FOBT positive. Holding off on intervention  as stable H&H  Acute metabolic encephalopathy--pt somnolent, would not open eyes, turned head to verbal and physical stimulation, head CT-no acute intracranial abnormality, Two enlarging nodular areas of soft tissue thickening in the scalp. Differential includes pseudomeningoceles or metastatic tumor.   Left DFI with Residual OM and Dehiscence Secondary to PAD: Stenotrophomonas maltophilia in Respiratory Culture:   Afebrile, no leukocytosis  Pct 0.089, CRP 13.4  -BC 0/2-NGTD  -Resp culture: Stenotrophomonas maltophilia (resistant to levofloxacin)  Past left foot cultures 2022-Serratia marcescens, Staphylococcus epidermidis 11/27-Left Foot Culture: Serratia marcescens  11/27-XR Foot Left: 1. Status post amputation of the 1st proximal phalanx. No suspicious osseous   lesion. 2. Soft tissue ulceration at the surgical stump. 3. Bony demineralization. Continue per PEG tube- Bactrim DS bid x 6 weeks (end date 1/11/2023)    Diet Diet NPO Exceptions are: Ice Chips   DVT Prophylaxis [] Lovenox, []  Heparin, [x] SCDs, [] Ambulation,  [] Eliquis, [] Xarelto  [] Coumadin   Code Status DNR-CCA   Disposition From: snf  Expected Disposition: TBD  Estimated Date of Discharge: TBD  Patient requires continued admission due to metastatic Ca   Surrogate Decision Maker/ POA      Subjective:     Chief Complaint: No chief complaint on file. Rosie Tay is a 74 y.o. female who presents with AMS. Seen and examined at bedside,  at bedside, discussed goal of care. Patient appears comfortable. Does not follow commands. Review of Systems:    Review of Systems could not be obtained due to mentation. Objective:   No intake or output data in the 24 hours ending 12/20/22 1459     Vitals:   Vitals:    12/20/22 1408   BP: 111/62   Pulse: (!) 104   Resp: 22   Temp: (!) 100.7 °F (38.2 °C)   SpO2: 98%       Physical Exam:   Physical Exam   General: NAD, tracheostomy in place,  Eyes: EOMI  ENT: neck supple  Cardiovascular: Regular rate. Respiratory: B/L crackles L > R   Gastrointestinal: Soft, non tender  Genitourinary: no suprapubic tenderness  Musculoskeletal: No edema, L foot wrapped in dressing.   Skin: warm, dry  Neuro: non-arousable  Psych: not-assessed    Medications:   Medications:    carvedilol  12.5 mg Per G Tube BID WC    insulin glargine  10 Units SubCUTAneous Daily with breakfast    insulin lispro  0-4 Units SubCUTAneous Nightly    insulin lispro  0-8 Units SubCUTAneous TID WC    lacosamide (VIMPAT) IVPB  200 mg IntraVENous BID    losartan  50 mg Per G Tube Daily    methadone  5 mg Per G Tube BID    [START ON 12/21/2022] scopolamine  1 patch TransDERmal Q72H    sodium chloride flush  5-40 mL IntraVENous 2 times per day    sulfamethoxazole-trimethoprim  1 tablet PEG Tube 2 times per day      Infusions:    sodium chloride      dextrose       PRN Meds: sodium chloride, , PRN  acetaminophen, 650 mg, Q4H PRN  acetaminophen, 650 mg, Q6H PRN  bisacodyl, 10 mg, Daily PRN  dextrose, , Continuous PRN  dextrose bolus, 125 mL, PRN   Or  dextrose bolus, 250 mL, PRN  glucagon (rDNA), 1 mg, PRN  Glycopyrrolate, 0.2 mg, Q4H PRN  LORazepam, 0.5 mg, Q4H PRN  morphine, 2 mg, Q4H PRN  sodium chloride flush, 5-40 mL, PRN        Labs      Recent Results (from the past 24 hour(s))   POCT Glucose    Collection Time: 12/19/22  4:53 PM   Result Value Ref Range    POC Glucose 164 (H) 70 - 99 MG/DL   CBC with Auto Differential    Collection Time: 12/19/22  8:06 PM   Result Value Ref Range    WBC 14.7 (H) 4.0 - 10.5 K/CU MM    RBC 3.25 (L) 4.2 - 5.4 M/CU MM    Hemoglobin 8.9 (L) 12.5 - 16.0 GM/DL    Hematocrit 30.0 (L) 37 - 47 %    MCV 92.3 78 - 100 FL    MCH 27.4 27 - 31 PG    MCHC 29.7 (L) 32.0 - 36.0 %    RDW 13.8 11.7 - 14.9 %    Platelets 884 868 - 062 K/CU MM    MPV 8.5 7.5 - 11.1 FL    Differential Type AUTOMATED DIFFERENTIAL     Segs Relative 75.9 (H) 36 - 66 %    Lymphocytes % 15.9 (L) 24 - 44 %    Monocytes % 7.2 (H) 0 - 4 %    Eosinophils % 0.3 0 - 3 %    Basophils % 0.3 0 - 1 %    Segs Absolute 11.2 K/CU MM    Lymphocytes Absolute 2.3 K/CU MM    Monocytes Absolute 1.1 K/CU MM    Eosinophils Absolute 0.1 K/CU MM    Basophils Absolute 0.0 K/CU MM    Nucleated RBC % 0.0 %    Total Nucleated RBC 0.0 K/CU MM    Total Immature Neutrophil 0.06 K/CU MM    Immature Neutrophil % 0.4 0 - 0.43 %   Basic Metabolic Panel    Collection Time: 12/19/22  8:06 PM   Result Value Ref Range    Sodium 144 135 - 145 MMOL/L    Potassium 4.0 3.5 - 5.1 MMOL/L    Chloride 106 99 - 110 mMol/L    CO2 23 21 - 32 MMOL/L    Anion Gap 15 4 - 16    BUN 26 (H) 6 - 23 MG/DL Creatinine 0.8 0.6 - 1.1 MG/DL    Est, Glom Filt Rate >60 >60 mL/min/1.73m2    Glucose 155 (H) 70 - 99 MG/DL    Calcium 9.4 8.3 - 10.6 MG/DL   POCT Glucose    Collection Time: 12/19/22  8:15 PM   Result Value Ref Range    POC Glucose 159 (H) 70 - 99 MG/DL   POCT Glucose    Collection Time: 12/19/22  9:16 PM   Result Value Ref Range    POC Glucose 146 (H) 70 - 99 MG/DL   POCT Glucose    Collection Time: 12/20/22  7:33 AM   Result Value Ref Range    POC Glucose 174 (H) 70 - 99 MG/DL   POCT Glucose    Collection Time: 12/20/22  1:27 PM   Result Value Ref Range    POC Glucose 195 (H) 70 - 99 MG/DL   Lactic Acid    Collection Time: 12/20/22  1:30 PM   Result Value Ref Range    Lactate 1.4 0.5 - 1.9 mMOL/L   CBC with Auto Differential    Collection Time: 12/20/22  1:30 PM   Result Value Ref Range    WBC 19.1 (H) 4.0 - 10.5 K/CU MM    RBC 3.43 (L) 4.2 - 5.4 M/CU MM    Hemoglobin 9.4 (L) 12.5 - 16.0 GM/DL    Hematocrit 31.5 (L) 37 - 47 %    MCV 91.8 78 - 100 FL    MCH 27.4 27 - 31 PG    MCHC 29.8 (L) 32.0 - 36.0 %    RDW 14.0 11.7 - 14.9 %    Platelets 102 199 - 305 K/CU MM    MPV 8.9 7.5 - 11.1 FL    Differential Type AUTOMATED DIFFERENTIAL     Segs Relative 86.7 (H) 36 - 66 %    Lymphocytes % 6.6 (L) 24 - 44 %    Monocytes % 6.0 (H) 0 - 4 %    Eosinophils % 0.0 0 - 3 %    Basophils % 0.2 0 - 1 %    Segs Absolute 16.6 K/CU MM    Lymphocytes Absolute 1.3 K/CU MM    Monocytes Absolute 1.2 K/CU MM    Eosinophils Absolute 0.0 K/CU MM    Basophils Absolute 0.0 K/CU MM    Nucleated RBC % 0.0 %    Total Nucleated RBC 0.0 K/CU MM    Total Immature Neutrophil 0.09 K/CU MM    Immature Neutrophil % 0.5 (H) 0 - 0.43 %   Basic Metabolic Panel w/ Reflex to MG    Collection Time: 12/20/22  1:30 PM   Result Value Ref Range    Sodium 149 (H) 135 - 145 MMOL/L    Potassium 4.1 3.5 - 5.1 MMOL/L    Chloride 110 99 - 110 mMol/L    CO2 22 21 - 32 MMOL/L    Anion Gap 17 (H) 4 - 16    BUN 30 (H) 6 - 23 MG/DL    Creatinine 0.9 0.6 - 1.1 MG/DL Est, Glom Filt Rate >60 >60 mL/min/1.73m2    Glucose 188 (H) 70 - 99 MG/DL    Calcium 9.3 8.3 - 10.6 MG/DL   Troponin    Collection Time: 12/20/22  1:30 PM   Result Value Ref Range    Troponin T 0.013 (H) <0.01 NG/ML        Imaging/Diagnostics Last 24 Hours   No results found.     Electronically signed by Gilberto De Luna MD on 12/20/2022 at 2:59 PM

## 2022-12-20 NOTE — PLAN OF CARE
Problem: Discharge Planning  Goal: Discharge to home or other facility with appropriate resources  Outcome: Progressing     Problem: Skin/Tissue Integrity  Goal: Absence of new skin breakdown  Description: 1. Monitor for areas of redness and/or skin breakdown  2. Assess vascular access sites hourly  3. Every 4-6 hours minimum:  Change oxygen saturation probe site  4. Every 4-6 hours:  If on nasal continuous positive airway pressure, respiratory therapy assess nares and determine need for appliance change or resting period.   Outcome: Progressing     Problem: Safety - Adult  Goal: Free from fall injury  Outcome: Progressing     Problem: Pain  Goal: Verbalizes/displays adequate comfort level or baseline comfort level  Outcome: Progressing     Problem: Chronic Conditions and Co-morbidities  Goal: Patient's chronic conditions and co-morbidity symptoms are monitored and maintained or improved  Outcome: Progressing     Problem: Nutrition Deficit:  Goal: Optimize nutritional status  Outcome: Progressing  Flowsheets (Taken 12/20/2022 1545 by Jamel Baca, LUCY, LD)  Nutrient intake appropriate for improving, restoring, or maintaining nutritional needs: Assess nutritional status and recommend course of action

## 2022-12-21 NOTE — PROGRESS NOTES
V2.0  [facility] [place] Hospitalist Progress Note      Name:  Jaqueline Hernandez /Age/Sex: 1955  (74 y.o. female)   MRN & CSN:  [id number] & [id number] Encounter Date/Time: 2022 2:59 PM EST    [facility]  [facility] PCP: Cristina Field MD       Hospital Day: 3    Assessment and Plan:   Jaqueline Hernandez is a 74 y.o. female with pmh of  who presents with Lung cancer metastatic to brain [facility])      Plan:    History of metastatic lung cancer s/p left lobectomy no chemo or radiation, met to right frontal lob s/p  right frontal craniotomy on 2022: h/o metastatic lung ca to brain,  pt previously following with oncologist at [facility] in [facility], Metastatic lung cancer-stage BRENDA(cTx, Cnx, pM1b), follows with Dr. Jaime Garvey at [facility]. Pathology results are consistent with poorly differentiated metastatic adenocarcinoma. Patient subsequently had gamma knife radiosurgery. Guarded prognosis Discussed with  the CXR findings and clinically worsening condition,  wants hospice to be reconsulted and code status was changed to DNR CC    Acute on chronic anemia likely GI bleed: Hemoglobin 9.4  previously 14.2 in September, FOBT positive. Holding off on intervention  as stable H&H  Acute metabolic encephalopathy--pt somnolent, would not open eyes, turned head to verbal and physical stimulation, head CT-no acute intracranial abnormality, Two enlarging nodular areas of soft tissue thickening in the scalp. Differential includes pseudomeningoceles or metastatic tumor. Left DFI with Residual OM and Dehiscence Secondary to PAD: Stenotrophomonas maltophilia in Respiratory Culture:   Afebrile, no leukocytosis  Pct 0.089, CRP 13.4  -BC 0/2-NGTD  -Resp culture: Stenotrophomonas maltophilia (resistant to levofloxacin)  Past left foot cultures 2022-Serratia marcescens, Staphylococcus epidermidis   -Left Foot Culture: Serratia marcescens  -XR Foot Left: 1. Status post amputation of the 1st proximal phalanx.   No suspicious osseous   lesion. 2. Soft tissue ulceration at the surgical stump. 3. Bony demineralization. Continue per PEG tube- Bactrim DS bid x 6 weeks (end date 1/11/2023)    Diet Diet NPO Exceptions are: Ice Chips   DVT Prophylaxis [] Lovenox, []  Heparin, [x] SCDs, [] Ambulation,  [] Eliquis, [] Xarelto  [] Coumadin   Code Status DNR-CC   Disposition From: snf  Expected Disposition: TBD  Estimated Date of Discharge: TBD  Patient requires continued admission due to metastatic Ca   Surrogate Decision Maker/ POA      Subjective:     Chief Complaint: No chief complaint on file. Lottie Abernathy is a 74 y.o. female who presents with AMS. Seen and examined at bedside, positioned in Left lateral position, on 100% FiO2, vitals stable. Review of Systems:    Review of Systems could not be obtained due to mentation. Objective: Intake/Output Summary (Last 24 hours) at 12/21/2022 1541  Last data filed at 12/20/2022 2000  Gross per 24 hour   Intake 0 ml   Output --   Net 0 ml          Vitals:   Vitals:    12/21/22 1339   BP: 134/83   Pulse: 84   Resp: 16   Temp: 98 °F (36.7 °C)   SpO2: 91%       Physical Exam:   Physical Exam   General: NAD, tracheostomy in place,  Eyes: EOMI  ENT: neck supple  Cardiovascular: Regular rate. Respiratory: B/L crackles L > R   Gastrointestinal: Soft, non tender  Genitourinary: no suprapubic tenderness  Musculoskeletal: No edema, L foot wrapped in dressing.   Skin: warm, dry  Neuro: non-arousable  Psych: not-assessed    Medications:   Medications:    methadone  5 mg Per G Tube BID    carvedilol  12.5 mg Per G Tube BID WC    insulin glargine  10 Units SubCUTAneous Daily with breakfast    insulin lispro  0-4 Units SubCUTAneous Nightly    insulin lispro  0-8 Units SubCUTAneous TID WC    lacosamide (VIMPAT) IVPB  200 mg IntraVENous BID    losartan  50 mg Per G Tube Daily    scopolamine  1 patch TransDERmal Q72H    sodium chloride flush  5-40 mL IntraVENous 2 times per day sulfamethoxazole-trimethoprim  1 tablet PEG Tube 2 times per day      Infusions:    sodium chloride      dextrose       PRN Meds: sodium chloride, , PRN  acetaminophen, 650 mg, Q4H PRN  acetaminophen, 650 mg, Q6H PRN  bisacodyl, 10 mg, Daily PRN  dextrose, , Continuous PRN  dextrose bolus, 125 mL, PRN   Or  dextrose bolus, 250 mL, PRN  glucagon (rDNA), 1 mg, PRN  Glycopyrrolate, 0.2 mg, Q4H PRN  LORazepam, 0.5 mg, Q4H PRN  morphine, 2 mg, Q4H PRN  sodium chloride flush, 5-40 mL, PRN      Labs      Recent Results (from the past 24 hour(s))   POCT Glucose    Collection Time: 12/20/22  5:32 PM   Result Value Ref Range    POC Glucose 179 (H) 70 - 99 MG/DL   POCT Glucose    Collection Time: 12/20/22  7:31 PM   Result Value Ref Range    POC Glucose 188 (H) 70 - 99 MG/DL   POCT Glucose    Collection Time: 12/21/22  8:00 AM   Result Value Ref Range    POC Glucose 202 (H) 70 - 99 MG/DL   POCT Glucose    Collection Time: 12/21/22 11:31 AM   Result Value Ref Range    POC Glucose 189 (H) 70 - 99 MG/DL        Imaging/Diagnostics Last 24 Hours   No results found.     Electronically signed by aCm Powell MD on 12/21/2022 at 3:41 PM

## 2022-12-21 NOTE — ADT AUTH CERT
Utilization Reviews       PA Recommendation by Vidal Byrnes RN       Review Status Review Entered   In Primary 2022 7813       Created By   Vidal Byrnes RN      Criteria Review   slr keep in  We recommend that the following pt's current hospitalization under INPATIENT status is APPROPRIATE . Name: Bartolo Joe   : 1955   CSN: [id number]   INSURANCE: [employer]        Clinical summary Assessment and Plan:  Bartolo Joe is a 74 y.o. female with pmh of who presents with Lung cancer metastatic to brain [facility])        Plan:     1. History of metastatic lung cancer s/p left lobectomy no chemo or radiation, met to right frontal lob s/p  right frontal craniotomy on 2022: h/o metastatic lung ca to brain, pt previously following with oncologist at [facility] in [facility], Metastatic lung cancer-stage BRENDA(cTx, Cnx, pM1b), follows with Dr. Eulalio Badillo at [facility]. Pathology results are consistent with poorly differentiated metastatic adenocarcinoma. Patient subsequently had gamma knife radiosurgery. Guarded prognosis Patient was on  hospice service till yesterday and  wanted the code status to be changed to full code and patient to be transferred to [facility] cancer centre for immunotherrapy, [facility] did not accept the patient and advised that for second opinion can follow up as outpatient. 2. Acute on chronic anemia likely GI bleed: Hemoglobin 9.4 previously 14.2 in September, FOBT positive. Holding off on intervention as stable H&H  3. Acute metabolic encephalopathy--pt somnolent, would not open eyes, turned head to verbal and physical stimulation, head CT-no acute intracranial abnormality, Two enlarging nodular areas of soft tissue thickening in the scalp. Differential includes pseudomeningoceles or metastatic tumor.   4. Left DFI with Residual OM and Dehiscence Secondary to PAD: Stenotrophomonas maltophilia in Respiratory Culture:   º Afebrile, no leukocytosis  º Pct 0.089, CRP 13.4  º -BC 0/2-NGTD  º 11/28-Resp culture: Stenotrophomonas maltophilia (resistant to levofloxacin)  º Past left foot cultures 8/8/2022-Serratia marcescens, Staphylococcus epidermidis   º 11/27-Left Foot Culture: Serratia marcescens  º 11/27-XR Foot Left: 1. Status post amputation of the 1st proximal phalanx. No suspicious osseous   lesion. 2. Soft tissue ulceration at the surgical stump. 3. Bony demineralization.    · Continue per PEG tube- Bactrim DS bid x 6 weeks (end date 1/11/2023)    Vitals tachycardia  Labs and Imaging reviewed  MCG criteria applies yes,   Comments Inpt geovany      This chart was reviewed at 9:09 AM 12/21/2022    [facility]   CELL : [phone]        Medical Oncology [address] - Care Day 2 (12/20/2022) by Cliff Hernandez RN       Review Status Review Entered   Completed 12/21/2022 0941       Created By   Cliff Hernandez RN      Criteria Review      Care Day: 2 Care Date: 12/20/2022 Level of Care: Inpatient Floor    Guideline Day 2    Level Of Care    (X) Floor    12/21/2022 9:41 AM EST by Chris Luna    Clinical Status    ( ) * No ICU or intermediate care needs    Interventions    (X) Inpatient interventions continue    12/21/2022 9:41 AM EST by Eleni Knight      lacosamide (VIMPAT) 200 mg in sodium chloride 0.9 % 70 mL IVPB 2xs day    * Milestone   Additional Notes   DATE: 12/20            PERTINENT UPDATES:   O2 Device Trach mask   O2 Flow Rate (L/min) 10 L/min   FiO2 40 %   SpO2 87%   Diet NPO Exceptions are: Ice Chips         VITALS:   T 101.7 @ 0847   HR   101   104   103   Resp   22   25   26   108/66      ABNL/PERTINENT LABS/RADIOLOGY/DIAGNOSTIC STUDIES:   POC Glucose 174 (H) 70 - 99 MG/DL     POC Glucose 195 (H) 70 - 99 MG/DL     Lactate 1.4      WBC 19.1 (H)      RBC 3.43 (L)      Hemoglobin 9.4 (L)      Hematocrit 31.5 (L)    Sodium 149 (H)      Potassium 4.1      Chloride 110      CO2 22      Anion Gap 17 (H) BUN 30 (H)       Troponin T 0.013 (H)       Poor data quality, interpretation may be adversely affected    Sinus tachycardia    Rightward axis    Low voltage QRS    Cannot rule out Anteroseptal infarct , age undetermined    Abnormal ECG    When compared with ECG of 29-SEP-2022 12:54,    Significant changes have occurred            PHYSICAL EXAM:   General: NAD, tracheostomy in place,   Eyes: EOMI   ENT: neck supple   Cardiovascular: Regular rate. Respiratory: B/L crackles L > R    Gastrointestinal: Soft, non tender   Genitourinary: no suprapubic tenderness   Musculoskeletal: No edema, L foot wrapped in dressing. Skin: warm, dry   Neuro: non-arousable   Psych: not-assessed      MD CONSULTS/ASSESSMENT AND PLAN:   Plan:       1. History of metastatic lung cancer s/p left lobectomy no chemo or radiation, met to right frontal lob s/p  right frontal craniotomy on 6/20/2022: h/o metastatic lung ca to brain,  pt previously following with oncologist at [facility] in [facility], Metastatic lung cancer-stage BRENDA(cTx, Cnx, pM1b), follows with Dr. Jae Manning at [facility]. Pathology results are consistent with poorly differentiated metastatic adenocarcinoma. Patient subsequently had gamma knife radiosurgery. Guarded prognosis Patient was on hospice service till yesterday and  wanted the code status to be changed to full code and patient to be transferred to [facility] centre for immunotherrapy, [facility]  did not accept the patient and advised that for second opinion can follow up as outpatient. 2. Acute on chronic anemia likely GI bleed: Hemoglobin 9.4  previously 14.2 in September, FOBT positive. Holding off on intervention  as stable H&H   3. Acute metabolic encephalopathy--pt somnolent, would not open eyes, turned head to verbal and physical stimulation, head CT-no acute intracranial abnormality, Two enlarging nodular areas of soft tissue thickening in the scalp.  Differential includes pseudomeningoceles or metastatic tumor. 4. Left DFI with Residual OM and Dehiscence Secondary to PAD: Stenotrophomonas maltophilia in Respiratory Culture:    º Afebrile, no leukocytosis   º Pct 0.089, CRP 13.4   º 11/27-BC 0/2-NGTD   º 11/28-Resp culture: Stenotrophomonas maltophilia (resistant to levofloxacin)   2. Soft tissue ulceration at the surgical stump. 3. Bony demineralization. · Continue per PEG tube- Bactrim DS bid x 6 weeks (end date 1/11/2023)      MEDICATIONS:   sulfamethoxazole-trimethoprim (BACTRIM DS;SEPTRA DS) 800-160 MG per tablet 1 tablet 2xs day            CM ASSESSMENT OR NOTES:   Hospitalist received the following message. stating that [address] called, hospitalist and oncologist feel that there is no need for transfer now. If  wishes to pursue a second opinion after patient is discharged she can be seen in clinic for second opinion evaluation.  LH        still wants pursue [facility]

## 2022-12-21 NOTE — CONSULTS
[address] [id number] Continence Nurse  Consult Note       Eboni Ibarra  AGE: 74 y.o. GENDER: female  : 1955  TODAY'S DATE:  2022    Subjective:     Reason for CWOCN Evaluation and Assessment: wound assessment      Eboni Ibarra is a 74 y.o. female referred by:   [x] Physician  [] Nursing  [] Other:     Wound Identification:  Wound Type: arterial, diabetic, and pressure  Contributing Factors: diabetes, chronic pressure, decreased mobility, arterial insufficiency, decreased tissue oxygenation, malnutrition, incontinence of stool, and incontinence of urine        PAST MEDICAL HISTORY        Diagnosis Date    Brain metastases ([address] 8/10/2022    Biopsy (frozen section) done  at [facility] showed METASTATIC, [address] Cancer ([address]     Diabetes mellitus ([address]     Hyperlipidemia     Hypertension     Lung cancer metastatic to brain ([address] 2022    Seizure ([address]     Tracheostomy status ([facility] 11/10/2022       PAST SURGICAL HISTORY    Past Surgical History:   Procedure Laterality Date    LUNG CANCER SURGERY Left 10/2019    TOE AMPUTATION Left 2022    LEFT FOOT HALLUX AMPUTATION EXCISIONAL DEBRIDEMENT ALL NON VIABLE   TISSUE AND BONE performed by Kisha Feng DPM at [facility] N/A 10/3/2020    EGD BIOPSY performed by Sanjana Valle MD at [facility]    No family history on file. SOCIAL HISTORY    Social History     Tobacco Use    Smoking status: Former     Packs/day: 1.00     Types: Cigarettes     Quit date: 2019     Years since quitting: 3.9    Smokeless tobacco: Never    Tobacco comments:     10/2019   Substance Use Topics    Alcohol use: Yes     Alcohol/week: 1.0 standard drink     Types: 1 Cans of beer per week    Drug use: Yes     Types: Marijuana (Weed)       ALLERGIES    No Known Allergies    MEDICATIONS    No current facility-administered medications on file prior to encounter.      Current Outpatient Medications on File Prior to Encounter   Medication Sig Dispense Refill    oxyCODONE (ROXICODONE) 5 MG immediate release tablet Take 5 mg by mouth every 4 hours as needed for Pain. aspirin 81 MG chewable tablet 81 mg by Per G Tube route daily      apixaban (ELIQUIS) 5 MG TABS tablet by Per G Tube route 2 times daily      carvedilol (COREG) 25 MG tablet 25 mg by Per G Tube route 2 times daily (with meals)      folic acid (FOLVITE) 1 MG tablet 1 mg by Per G Tube route daily      glycopyrrolate (ROBINUL) 1 MG tablet 1 mg by Per G Tube route 3 times daily      insulin glargine (LANTUS) 100 UNIT/ML injection vial Inject 20 Units into the skin daily (with breakfast)      Lactobacillus Extra Strength CAPS by Per G Tube route      lansoprazole (PREVACID) 30 MG delayed release capsule Take 30 mg by mouth daily      losartan (COZAAR) 50 MG tablet 50 mg by Per G Tube route daily      scopolamine (TRANSDERM-SCOP) transdermal patch Place 1 patch onto the skin every 72 hours      vitamin B-1 (THIAMINE) 100 MG tablet 100 mg by Per G Tube route daily      lacosamide (VIMPAT) 50 MG TABS tablet 200 mg by Per G Tube route 2 times daily. alendronate (FOSAMAX) 70 MG tablet Take 1 tablet by mouth once a week      atorvastatin (LIPITOR) 40 MG tablet Take 1 tablet by mouth in the morning. (Patient taking differently: Take 20 mg by mouth daily) 30 tablet 1    gabapentin (NEURONTIN) 300 MG capsule Take 300 mg by mouth at bedtime.       [DISCONTINUED] simvastatin (ZOCOR) 20 MG tablet Take 40 mg by mouth nightly            Objective:      /76   Pulse (!) 105   Temp 98.1 °F (36.7 °C) (Axillary)   Resp 16   Ht 5' 6\" (1.676 m)   SpO2 96%   BMI 31.67 kg/m²   Jose Angel Risk Score: Jose Angel Scale Score: 11    LABS    CBC:   Lab Results   Component Value Date/Time    WBC 19.1 12/20/2022 01:30 PM    RBC 3.43 12/20/2022 01:30 PM    HGB 9.4 12/20/2022 01:30 PM    HCT 31.5 12/20/2022 01:30 PM    MCV 91.8 12/20/2022 01:30 PM    MCH 27.4 12/20/2022 01:30 PM    MCHC 29.8 12/20/2022 01:30 PM    RDW 14.0 12/20/2022 01:30 PM     12/20/2022 01:30 PM    MPV 8.9 12/20/2022 01:30 PM     CMP:    Lab Results   Component Value Date/Time     12/20/2022 01:30 PM    K 4.1 12/20/2022 01:30 PM     12/20/2022 01:30 PM    CO2 22 12/20/2022 01:30 PM    BUN 30 12/20/2022 01:30 PM    CREATININE 0.9 12/20/2022 01:30 PM    GFRAA >60 09/29/2022 07:53 PM    LABGLOM >60 12/20/2022 01:30 PM    GLUCOSE 188 12/20/2022 01:30 PM    PROT 6.4 11/28/2022 05:10 AM    LABALBU 3.3 11/28/2022 05:10 AM    CALCIUM 9.3 12/20/2022 01:30 PM    BILITOT 0.3 11/28/2022 05:10 AM    ALKPHOS 92 11/28/2022 05:10 AM    AST 20 11/28/2022 05:10 AM    ALT 15 11/28/2022 05:10 AM     Albumin:    Lab Results   Component Value Date/Time    LABALBU 3.3 11/28/2022 05:10 AM     PT/INR:    Lab Results   Component Value Date/Time    PROTIME 13.7 11/28/2022 05:10 AM    INR 1.06 11/28/2022 05:10 AM     HgBA1c:    Lab Results   Component Value Date/Time    LABA1C 5.9 09/29/2022 07:53 PM         Assessment:     Patient Active Problem List   Diagnosis    Dyspnea and respiratory abnormalities    Acute gastritis without hemorrhage    Diabetic ulcer of toe of left foot associated with type 2 diabetes mellitus, with fat layer exposed ([address]    Type 2 diabetes mellitus with peripheral neuropathy (HCC)    Peripheral vascular disease ([address]    History of nicotine dependence    Sepsis ([address]    Brain metastases ([address]    Diabetic foot infection ([address]    Cancer of left lung ([address] - removed at [facility] in 2019    Former smoker - quit in 2019    DM (diabetes mellitus), type 2 (HCC)    Seizures (HCC)    Hypokalemia    Hypophosphatemia    Leukocytosis    Metabolic acidosis, increased anion gap    Elevated CK    Respiratory failure ([address]    Acute on chronic anemia    Severe malnutrition (HCC)    Gastrointestinal hemorrhage    Diabetic foot ulcer with osteomyelitis ([address]    Adenocarcinoma, lung ([address] [facility]ostomy status ([facility]    Lung cancer metastatic to brain [facility])    Non-small cell lung cancer with metastasis ([facility]    Encephalopathy    Metastatic non-small cell lung cancer ([facility]       Measurements:  Wound 08/16/22 Toe (Comment  which one) Anterior; Left Left Great Toe Incision (Active)   Number of days: 126       Wound 11/27/22 Toe (Comment  which one) Anterior; Left great toe Soft tissue ulceration at the surgical stump,erythema (Active)   Wound Image   12/21/22 1000   Wound Etiology Arterial 12/21/22 1000   Dressing Status New dressing applied 12/21/22 1000   Wound Cleansed Cleansed with saline 12/21/22 1000   Dressing/Treatment Open to air 12/21/22 0910   Wound Length (cm) 3 cm 12/21/22 1000   Wound Width (cm) 3 cm 12/21/22 1000   Wound Depth (cm) 0.1 cm 12/21/22 1000   Wound Surface Area (cm^2) 9 cm^2 12/21/22 1000   Change in Wound Size % (l*w) -44 12/21/22 1000   Wound Volume (cm^3) 0.9 cm^3 12/21/22 1000   Wound Healing % 28 12/21/22 1000   Distance Tunneling (cm) 0 cm 12/21/22 1000   Tunneling Position ___ O'Clock 0 12/18/22 0841   Undermining Starts ___ O'Clock 0 12/21/22 1000   Undermining Ends___ O'Clock 0 12/21/22 1000   Undermining Maxium Distance (cm) 0 12/21/22 1000   Wound Assessment Eschar dry 12/21/22 1000   Drainage Amount None 12/21/22 1000   Drainage Description Serosanguinous 12/18/22 0841   Odor None 12/21/22 1000   Lauren-wound Assessment Fragile 12/21/22 1000   Margins Attached edges 12/21/22 1000   Wound Thickness Description not for Pressure Injury Full thickness 12/18/22 0841   Number of days: 24       Wound 11/27/22 Foot Left;Lateral presure injury (Active)   Wound Image   12/21/22 1000   Wound Etiology Arterial 12/21/22 1000   Dressing Status New dressing applied 12/21/22 1000   Wound Cleansed Cleansed with saline 12/21/22 1000   Dressing/Treatment ABD 12/21/22 1000   Wound Length (cm) 1 cm 12/21/22 1000   Wound Width (cm) 0.8 cm 12/21/22 1000   Wound Depth (cm) 0.1 cm 12/21/22 1000 Wound Surface Area (cm^2) 0.8 cm^2 12/21/22 1000   Change in Wound Size % (l*w) 16.67 12/21/22 1000   Wound Volume (cm^3) 0.08 cm^3 12/21/22 1000   Wound Healing % 17 12/21/22 1000   Distance Tunneling (cm) 0 cm 12/21/22 1000   Tunneling Position ___ O'Clock 0 12/21/22 1000   Undermining Starts ___ O'Clock 0 12/21/22 1000   Undermining Ends___ O'Clock 0 12/21/22 1000   Undermining Maxium Distance (cm) 0 12/21/22 1000   Wound Assessment Eschar dry 12/21/22 1000   Drainage Amount None 12/21/22 1000   Drainage Description Serosanguinous 12/20/22 2000   Odor None 12/21/22 1000   Lauren-wound Assessment Fragile 12/21/22 1000   Margins Attached edges 12/21/22 1000   Wound Thickness Description not for Pressure Injury Full thickness 12/18/22 0841   Number of days: 24       Wound 11/29/22 Coccyx (Active)   Wound Image   11/29/22 1025   Wound Etiology Pressure Stage 2 12/20/22 2000   Dressing Status New dressing applied 12/21/22 1000   Wound Cleansed Cleansed with saline 12/21/22 1000   Dressing/Treatment Silicone border 65/57/77 1000   Wound Length (cm) 0 cm 12/21/22 1000   Wound Width (cm) 0 cm 12/21/22 1000   Wound Depth (cm) 0 cm 12/21/22 1000   Wound Surface Area (cm^2) 0 cm^2 12/21/22 1000   Change in Wound Size % (l*w) 100 12/21/22 1000   Wound Volume (cm^3) 0 cm^3 12/21/22 1000   Wound Healing % 100 12/21/22 1000   Distance Tunneling (cm) 0 cm 12/21/22 1000   Tunneling Position ___ O'Clock 0 12/21/22 1000   Undermining Starts ___ O'Clock 0 12/21/22 1000   Undermining Ends___ O'Clock 0 12/21/22 1000   Undermining Maxium Distance (cm) 0 12/21/22 1000   Wound Assessment Other (Comment) 12/20/22 2000   Drainage Amount None 12/21/22 1000   Drainage Description Serosanguinous 12/18/22 0841   Odor None 12/21/22 1000   Lauren-wound Assessment Blanchable erythema 12/21/22 1000   Margins Attached edges 12/21/22 1000   Wound Thickness Description not for Pressure Injury Partial thickness 12/18/22 0841   Number of days: 21 Wound 12/13/22 Heel Left (Active)   Wound Image   12/13/22 0941   Wound Etiology Arterial 12/20/22 2000   Dressing Status New dressing applied 12/21/22 1000   Wound Cleansed Cleansed with saline 12/18/22 0841   Dressing/Treatment ABD 12/21/22 1000   Wound Length (cm) 0 cm 12/21/22 1000   Wound Width (cm) 0 cm 12/21/22 1000   Wound Depth (cm) 0 cm 12/21/22 1000   Wound Surface Area (cm^2) 0 cm^2 12/21/22 1000   Change in Wound Size % (l*w) 100 12/21/22 1000   Wound Volume (cm^3) 0 cm^3 12/21/22 1000   Distance Tunneling (cm) 0 cm 12/18/22 0841   Tunneling Position ___ O'Clock 0 12/21/22 1000   Undermining Starts ___ O'Clock 0 12/21/22 1000   Undermining Ends___ O'Clock 0 12/21/22 1000   Undermining Maxium Distance (cm) 0 12/21/22 1000   Wound Assessment Eschar dry 12/21/22 1000   Drainage Amount None 12/21/22 1000   Odor None 12/21/22 1000   Lauren-wound Assessment Intact 12/21/22 1000   Margins Attached edges 12/21/22 1000   Number of days: 8       Wound 12/13/22 Foot Left;Medial to medial ankle cluster (Active)   Wound Image   12/21/22 1000   Wound Etiology Arterial 12/21/22 1000   Dressing Status New dressing applied 12/21/22 1000   Wound Cleansed Cleansed with saline 12/21/22 1000   Dressing/Treatment Betadine swabs/povidone iodine 12/21/22 1000   Wound Length (cm) 12 cm 12/21/22 1000   Wound Width (cm) 7 cm 12/21/22 1000   Wound Depth (cm) 0.1 cm 12/21/22 1000   Wound Surface Area (cm^2) 84 cm^2 12/21/22 1000   Change in Wound Size % (l*w) 4.55 12/21/22 1000   Wound Volume (cm^3) 8.4 cm^3 12/21/22 1000   Distance Tunneling (cm) 0 cm 12/21/22 1000   Tunneling Position ___ O'Clock 0 12/21/22 1000   Undermining Starts ___ O'Clock 0 12/21/22 1000   Undermining Ends___ O'Clock 0 12/21/22 1000   Undermining Maxium Distance (cm) 0 12/21/22 1000   Wound Assessment Eschar dry 12/21/22 1000   Drainage Amount None 12/21/22 1000   Odor None 12/21/22 1000   Lauren-wound Assessment Fragile 12/21/22 1000   Margins Attached edges 12/21/22 1000   Number of days: 8       Wound 12/13/22 Toe (Comment  which one) Left 2nd to 5th toes cluster (Active)   Wound Image   12/21/22 1000   Wound Etiology Arterial 12/21/22 1000   Dressing Status New dressing applied 12/21/22 1000   Wound Cleansed Cleansed with saline 12/21/22 1000   Dressing/Treatment ABD 12/21/22 1000   Wound Length (cm) 3 cm 12/21/22 1000   Wound Width (cm) 5 cm 12/21/22 1000   Wound Depth (cm) 0.1 cm 12/21/22 1000   Wound Surface Area (cm^2) 15 cm^2 12/21/22 1000   Change in Wound Size % (l*w) -25 12/21/22 1000   Wound Volume (cm^3) 1.5 cm^3 12/21/22 1000   Distance Tunneling (cm) 0 cm 12/21/22 1000   Tunneling Position ___ O'Clock 0 12/21/22 1000   Undermining Starts ___ O'Clock 0 12/21/22 1000   Undermining Ends___ O'Clock 0 12/18/22 0841   Undermining Maxium Distance (cm) 0 12/21/22 1000   Wound Assessment Eschar dry 12/21/22 1000   Drainage Amount None 12/21/22 1000   Odor None 12/21/22 1000   Lauren-wound Assessment Fragile 12/21/22 1000   Margins Attached edges 12/21/22 1000   Number of days: 8       Wound 12/13/22 Tibial Left;Posterior leg/ankle (Active)   Wound Image   12/13/22 0941   Wound Etiology Diabetic 12/20/22 2000   Dressing Status Clean;Dry; Intact 12/21/22 0910   Wound Cleansed Betadine/povidone iodine;Cleansed with saline 12/18/22 0841   Dressing/Treatment ABD;Roll gauze 12/21/22 0910   Wound Length (cm) 0 cm 12/21/22 1000   Wound Width (cm) 0 cm 12/21/22 1000   Wound Depth (cm) 0 cm 12/21/22 1000   Wound Surface Area (cm^2) 0 cm^2 12/21/22 1000   Change in Wound Size % (l*w) 100 12/21/22 1000   Wound Volume (cm^3) 0 cm^3 12/21/22 1000   Wound Healing % 100 12/21/22 1000   Distance Tunneling (cm) 0 cm 12/18/22 0841   Tunneling Position ___ O'Clock 0 12/18/22 0841   Undermining Starts ___ O'Clock 0 12/18/22 0841   Undermining Ends___ O'Clock 0 12/18/22 0841   Undermining Maxium Distance (cm) 0 12/18/22 0841   Wound Assessment Pink/red 12/18/22 0841   Drainage Amount None 12/18/22 0841   Lauren-wound Assessment Intact 12/18/22 0841   Margins Attached edges 12/18/22 0841   Number of days: 8       Response to treatment:  Well tolerated by patient. Pain Assessment:  Severity:  none  Quality of pain: na  Wound Pain Timing/Severity: na  Premedicated: no    Plan:     Plan of Care: Wound 12/13/22 Heel Left-Dressing/Treatment: ABD (kerlix)  Wound 12/13/22 Foot Left;Medial to medial ankle cluster-Dressing/Treatment: Betadine swabs/povidone iodine (abd,kerlix)  Wound 12/13/22 Toe (Comment  which one) Left 2nd to 5th toes cluster-Dressing/Treatment: ABD (paint with betadine,kerlix)  Wound 12/13/22 Tibial Left;Posterior leg/ankle-Dressing/Treatment: ABD, Roll gauze  Wound 11/27/22 Toe (Comment  which one) Anterior; Left great toe Soft tissue ulceration at the surgical stump,erythema-Dressing/Treatment:  (paint with betadine, abd, kerlix)  Wound 11/27/22 Foot Left;Lateral presure injury-Dressing/Treatment: ABD (paint with betadine,kerlix)  Wound 11/29/22 Coccyx-Dressing/Treatment: Silicone border    Pt in bed. Seen for reassessment. Nurse in room. Known PAD to left leg. Areas of discoloration and dry eschar noted to great toe amp site and remainder of toes. Discoloration appears worsened. Pictures and measurements taken. Recommend paint dry eschar with betadine and cover with abd, kerlix. Sacrum with blanchable erythema/fragile. Silicone foam border applied. Heels intact. Atmos air pump on bed but rental mattress was ordered last week. Nurse manager stated family refused. Pt needs to be on speciality mattress with high Jose Angel risk. Positioned to left side. If pursuing aggressive care, recommend surgical evaluation of left foot-relayed to Dr. Blas Miller. Hospice consult is ordered. Pt is a high risk for skin breakdown AEB Jose Angel. Follow Jose Angel orders.      Specialty Bed Required : yes  [x] Low Air Loss   [x] Pressure Redistribution  [] Fluid Immersion  [] Bariatric  [] Total Pressure Relief  [] Other: Discharge Plan:  Placement for patient upon discharge: tbd  Hospice Care: yes per MD  Patient appropriate for Outpatient [address]: [facility]    Patient/Caregiver Teaching:  Level of patient/caregiver understanding able to:   No evidence.         Electronically signed by Darlene Cano RN, Ephraim Saeed on 12/21/2022 at 12:55 PM

## 2022-12-21 NOTE — CARE COORDINATION
Reviewed chart, spoke with Dr Skyalr Porter, then discussed in [address] [facility]. Dr Queenie Cho speak with pt's  about plan of care and possible resigning up for hospice. Also will discuss /pt goals and make sure they align with Hospice. 1200 Dr Queenie Cho spoke with  and states agreeable to hospice and he is notify Dr Skylar Porter. 1350 Need new referral to Hospice if Hospice the plan. PS to Dr Queenie Cho.        Carolina howe called to [facility] at [facility]. 1540 Plan is for family to meet to [place] hospice tomorrow at 0900.

## 2022-12-21 NOTE — PLAN OF CARE
Problem: Discharge Planning  Goal: Discharge to home or other facility with appropriate resources  12/21/2022 0049 by Dimas Meredith RN  Outcome: Progressing  12/20/2022 1843 by Sabine Allen RN  Outcome: Progressing     Problem: Skin/Tissue Integrity  Goal: Absence of new skin breakdown  Description: 1. Monitor for areas of redness and/or skin breakdown  2. Assess vascular access sites hourly  3. Every 4-6 hours minimum:  Change oxygen saturation probe site  4. Every 4-6 hours:  If on nasal continuous positive airway pressure, respiratory therapy assess nares and determine need for appliance change or resting period.   12/21/2022 0049 by Dimas Meredith RN  Outcome: Progressing  12/20/2022 1843 by Sabine Allen RN  Outcome: Progressing     Problem: Safety - Adult  Goal: Free from fall injury  12/21/2022 0049 by Dimas Meredith RN  Outcome: Progressing  12/20/2022 1843 by Sabine Allen RN  Outcome: Progressing     Problem: Pain  Goal: Verbalizes/displays adequate comfort level or baseline comfort level  12/21/2022 0049 by Dimas Meredith RN  Outcome: Progressing  12/20/2022 1843 by Sabine Allen RN  Outcome: Progressing

## 2022-12-22 PROBLEM — K92.2 GASTROINTESTINAL HEMORRHAGE: Status: RESOLVED | Noted: 2022-01-01 | Resolved: 2022-01-01

## 2022-12-22 PROBLEM — E87.6 HYPOKALEMIA: Status: RESOLVED | Noted: 2022-01-01 | Resolved: 2022-01-01

## 2022-12-22 PROBLEM — Z87.891 FORMER SMOKER: Status: RESOLVED | Noted: 2022-01-01 | Resolved: 2022-01-01

## 2022-12-22 PROBLEM — R74.8 ELEVATED CK: Status: RESOLVED | Noted: 2022-01-01 | Resolved: 2022-01-01

## 2022-12-22 PROBLEM — E83.39 HYPOPHOSPHATEMIA: Status: RESOLVED | Noted: 2022-01-01 | Resolved: 2022-01-01

## 2022-12-22 PROBLEM — J69.0 ASPIRATION PNEUMONITIS (HCC): Status: ACTIVE | Noted: 2022-01-01

## 2022-12-22 PROBLEM — K29.00 ACUTE GASTRITIS WITHOUT HEMORRHAGE: Status: RESOLVED | Noted: 2020-10-04 | Resolved: 2022-01-01

## 2022-12-22 PROBLEM — E87.29 METABOLIC ACIDOSIS, INCREASED ANION GAP: Status: RESOLVED | Noted: 2022-01-01 | Resolved: 2022-01-01

## 2022-12-22 PROBLEM — C34.90 METASTATIC NON-SMALL CELL LUNG CANCER (HCC): Status: RESOLVED | Noted: 2022-01-01 | Resolved: 2022-01-01

## 2022-12-22 NOTE — PROGRESS NOTES
[address] [place] [address]  Progress Note    Date: 12/22/2022  Name: Ashley Suazo  MRN: [id number]  YOB: 1955   Patient's PCP: Maninder Mcconnell MD  Consultants during initial acute care admission 11/26 to 12/8/22: Pulmonary, ID, Podiatry, Gastroenterology, Wound care  Acute care admission date: 11/26 to 12/8/2022 and 11/11 to 11/23/22 at [address] [place] [address] Street: 12/8 to 12/19/2022 with the patient's spouse choosing to revoke hospice and readmit to acute medical care  Acute care admission: 12/19/22         Subjective: The patient's eyes are open, she stares off and does not track. There is no facial grimacing. There is mild upper airway noise and the tube feeding remains off. She has pain intermittently and requires prn doses of Morphine with 2 doses. BP is lower. I have reviewed the interval records, diagnostic studies, collaborated with Dr. Bruce Davis, Dr Betsey Mcmahon and case management. The patient's spouse revoked hospice on 12/19/22 (see my previous documentation) as he wanted the patient transferred to [facility]. [facility] (appropriately) declined to accept the transfer when I called on 12/19/22. Mr. Carlos Macedo asked that the patient be revoked from hospice and the hospital medicine kindly resumed care. [facility] declined the transfer when the hospitalist requested also, and said that the patient could follow up as an out patient if they want a second opinion. The patient had a rapid response on 12/20/22 with respiratory distress. Chest X-ray shows left sided infiltrates, and I suspect aspiration pneumonitis. Mr. Carlos Macedo now requests that the patient be [facility]. Mr. Carlos Macedo is not here, but I did phone him and we discussed comfort care again, and he is agreeable.  He states that the family visited last evening and he is aware that the patient had a fever to 103 degrees and that she is not doing well, unable to tolerate tube feeding, and he requests comfort. One of the hospice nurse liaisons is meeting with Mr. Uday Houston this morning for consent. Objective:   Pain is managed with Methadone 5 mg per G tube twice daily plus Morphine IV prn with 2 prn doses in the past 24 hours, transdermal Scopolamine patch and Glycopyrrolate IV for terminal congestion with 0 doses, Haloperidol is refused by the patient's spouse, and Lorazepam is available for anxiety with 1 dose. Data reviewed 12/22/2022:  Frontal lobe lesion resection 6/20/2022  FINAL DIAGNOSIS   A. BRAIN MASS:  - METASTATIC, POORLY DIFFERENTIATED ADENOCARCINOMA, SEE NOTE. B. BRAIN MASS:  - METASTATIC, POORLY DIFFERENTIATED ADENOCARCINOMA, SEE NOTE. CT-scan of the brain 12/2/22:  No acute intracranial abnormality. Two enlarging nodular areas of soft tissue thickening in the scalp. Differential includes pseudomeningoceles  or recurrent tumor. CT chest, abdomen, pelvis 12/2/22:  Chest:       Cardiomegaly. Coronary artery disease. No effusion. Right-sided MediPort   device tip within the right SVC near the cavoatrial junction. There is   mucosal thickening of the left bronchus intermedius and left lower lobe   bronchus which may be seen with bronchitis or emphysematous changes. No   suspicious hilar or mediastinal adenopathy. There emphysematous changes. Previous left partial pneumonectomy. Tree-in-bud nodules identified throughout much of the left lung favored to be   due to small airways disease or aspiration. Pleural base nodule left upper   lung 3 mm in size stable dating back to 2018. Tracheostomy. Degenerate changes of the thoracic spine. No suspicious osseous lesions. Abdomen/Pelvis:       Motion and streak artifact challenge evaluation. Gallbladder is contracted. No suspicious liver, splenic, adrenal glands,   kidneys, or pancreas lesions.   The pancreas is diminutive size and with   calcifications and ductal dilatation can be seen with chronic pancreatitis. Pancreatic duct measuring 5 mm. Common bile duct measures 5 mm. Mild retained stool. Thickening of the gastric antrum could relate to   underdistention. No bowel obstruction. Gastrostomy tube within the   stomach. .  Moderate stool rectosigmoid junction. No suspicious adenopathy. Lobulated uterus compatible with fibroids. Bladder is unremarkable. No suspicious adnexal mass. Moderate to severe   aortic calcifications. Evidence of bilateral iliac artery stents noted. Posterior changes right and left groin greatest on right. Loss of contrast   involving the right proximal femoral artery. Degenerate changes lumbar spine. Degenerate changes of the hips pelvis. Age   indeterminate compression fracture at L4. With mild retropulsion. Chest X-ray  12/20/22: There are new airspace opacities throughout the left lung, which may   represent pneumonia or pleural effusion with atelectasis. Consider repeat   radiograph for further evaluation given limitations of the examination due to   patient rotation.      Hemoglobin A1C 9/29/22: 5.9%  Accuchecks:   Recent Labs     12/21/22  1131 12/21/22  1633 12/22/22  0805   POCGLU 189* 241* 262*      Hepatic Function Panel:    Lab Results   Component Value Date/Time    ALKPHOS 92 11/28/2022 05:10 AM    ALT 15 11/28/2022 05:10 AM    AST 20 11/28/2022 05:10 AM    PROT 6.4 11/28/2022 05:10 AM    BILITOT 0.3 11/28/2022 05:10 AM    BILIDIR 0.2 10/02/2020 08:34 PM    IBILI 0.5 10/02/2020 08:34 PM    LABALBU 3.3 11/28/2022 05:10 AM     CBC with Differential:    Lab Results   Component Value Date/Time    WBC 19.1 12/20/2022 01:30 PM    RBC 3.43 12/20/2022 01:30 PM    HGB 9.4 12/20/2022 01:30 PM    HCT 31.5 12/20/2022 01:30 PM     12/20/2022 01:30 PM    MCV 91.8 12/20/2022 01:30 PM    MCH 27.4 12/20/2022 01:30 PM    MCHC 29.8 12/20/2022 01:30 PM    RDW 14.0 12/20/2022 01:30 PM    SEGSPCT 86.7 12/20/2022 01:30 PM LYMPHOPCT 6.6 12/20/2022 01:30 PM    MONOPCT 6.0 12/20/2022 01:30 PM    BASOPCT 0.2 12/20/2022 01:30 PM    MONOSABS 1.2 12/20/2022 01:30 PM    LYMPHSABS 1.3 12/20/2022 01:30 PM    EOSABS 0.0 12/20/2022 01:30 PM    BASOSABS 0.0 12/20/2022 01:30 PM    DIFFTYPE AUTOMATED DIFFERENTIAL 12/20/2022 01:30 PM     BMP:    Lab Results   Component Value Date/Time     12/20/2022 01:30 PM    K 4.1 12/20/2022 01:30 PM     12/20/2022 01:30 PM    CO2 22 12/20/2022 01:30 PM    BUN 30 12/20/2022 01:30 PM    LABALBU 3.3 11/28/2022 05:10 AM    CREATININE 0.9 12/20/2022 01:30 PM    CALCIUM 9.3 12/20/2022 01:30 PM    GFRAA >60 09/29/2022 07:53 PM    LABGLOM >60 12/20/2022 01:30 PM    GLUCOSE 188 12/20/2022 01:30 PM       Physical Exam:   BP 91/65   Pulse 77   Temp 98.4 °F (36.9 °C)   Resp 14   Ht 5' 6\" (1.676 m)   SpO2 100%   BMI 31.67 kg/m²   General: eyes are open and she stares off, there is mild upper airway noise, chronically ill appearing, no facial grimacing   Skin: wound images reviewed in the media tab  HEENT: Mucous membranes are dry   Neck: tracheostomy in place with trach collar oxygen  Chest: right Mediport    Heart: distant tones, RRR, S1S2, no murmurs  Lungs:  coarse breath sounds bilaterally, diminished at the bases, with left basilar rales, scattered coarse rhonchi   Abdomen: soft, bowel sounds hypoactive, no apparent tenderness, PEG in place to drainage, nondistended   and rectal: deferred  Extremities:  dressing on left foot, the right foot is cool with chronic changes in legs, no edema  Neurologic: eyes are open and stares, does not follow commands,       Assessment/Plan:  Metastatic Non Small Cell left lung cancer diagnosed October 2019 with right frontal cerebral metastasis with gamma knife June 2022.  The patient's spouse revoked hospice on 12/19/22 but now seems to understand the patient's grim prognosis and requests General Inpatient Hospice and comfort care for management of pain, delirium and restlessness, respiratory failure and probable end of life care. The patient continues with daily decline, no nutrition, less responsiveness and requires General Inpatient Hospice for high acuity of nursing and respiratory needs with daily physician visits for assessment and medication adjustment as needed. PPS 20%. I will enter transfer orders to return to [facility] after consents are obtained. Aspiration pneumonitis continue to hold tube feeding and suction prn with comfort meds as above. Dysphagia and nutrition: the patient has PEG and the patient did not tolerate tube feeding with high residuals, suctioning tan material from trach and the tube feeding was stopped 12/13. Cancer pain and postop pain, increased Methadone to 5 mg BID on 12/13/22 which has been beneficial and continue prn Morphine. 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 Type 2 diabetes mellitus, Hemoglobin A1C 9/29/22: 5.9%, decreased Lantus as no tube feeding now.       Patient Active Problem List   Diagnosis Code    Dyspnea and respiratory abnormalities R06.00, R06.89    Acute gastritis without hemorrhage K29.00    Diabetic ulcer of toe of left foot associated with type 2 diabetes mellitus, with fat layer exposed ([address] E11.621, L97.522    Type 2 diabetes mellitus with peripheral neuropathy (HCC) E11.42    Peripheral vascular disease ([address] I73.9    History of nicotine dependence Z87.891    Sepsis ([address] A41.9    Brain metastases ([address] C79.31    Diabetic foot infection ([address] E11.628, L08.9    Cancer of left lung ([address] - removed at [facility] in 2019 C34.92    Former smoker - quit in 2019 Z87.891    DM (diabetes mellitus), type 2 ([facility] E11.9    Seizures ([facility] R56.9    Hypokalemia E87.6    Hypophosphatemia E83.39    Leukocytosis G57.939    Metabolic acidosis, increased anion gap E87.29    Elevated CK R74.8    Respiratory failure (HCC) J96.90    Acute on chronic anemia D64.9    Severe malnutrition (HCC) E43    Gastrointestinal hemorrhage K92.2    Diabetic foot ulcer with osteomyelitis (HCC) E11.621, E11.69, L97.509, M86.9    Adenocarcinoma, lung (HCC) C34.90    Tracheostomy status ([facility] Z93.0    Lung cancer metastatic to brain ([facility] C34.90, C79.31    Non-small cell lung cancer with metastasis (HCC) C34.90    Encephalopathy G93.40    Metastatic non-small cell lung cancer ([facility] C34.90       BERTRAM Goodmna MD  12/22/2022

## 2022-12-22 NOTE — PLAN OF CARE
Problem: Discharge Planning  Goal: Discharge to home or other facility with appropriate resources  Outcome: Progressing  Flowsheets (Taken 12/22/2022 1099)  Discharge to home or other facility with appropriate resources:   Identify barriers to discharge with patient and caregiver   Arrange for needed discharge resources and transportation as appropriate   Identify discharge learning needs (meds, wound care, etc)   Arrange for interpreters to assist at discharge as needed   Refer to discharge planning if patient needs post-hospital services based on physician order or complex needs related to functional status, cognitive ability or social support system     Problem: Skin/Tissue Integrity  Goal: Absence of new skin breakdown  Description: 1. Monitor for areas of redness and/or skin breakdown  2. Assess vascular access sites hourly  3. Every 4-6 hours minimum:  Change oxygen saturation probe site  4. Every 4-6 hours:  If on nasal continuous positive airway pressure, respiratory therapy assess nares and determine need for appliance change or resting period.   Outcome: Progressing     Problem: Safety - Adult  Goal: Free from fall injury  Outcome: Progressing     Problem: Pain  Goal: Verbalizes/displays adequate comfort level or baseline comfort level  Outcome: Progressing     Problem: Chronic Conditions and Co-morbidities  Goal: Patient's chronic conditions and co-morbidity symptoms are monitored and maintained or improved  Outcome: Progressing  Flowsheets (Taken 12/22/2022 0918)  Care Plan - Patient's Chronic Conditions and Co-Morbidity Symptoms are Monitored and Maintained or Improved:   Monitor and assess patient's chronic conditions and comorbid symptoms for stability, deterioration, or improvement   Collaborate with multidisciplinary team to address chronic and comorbid conditions and prevent exacerbation or deterioration   Update acute care plan with appropriate goals if chronic or comorbid symptoms are exacerbated and prevent overall improvement and discharge     Problem: Nutrition Deficit:  Goal: Optimize nutritional status  Outcome: Progressing     Problem: Dyspnea Due to End of Life  Goal: Demonstrate understanding of and ability to manage respiratory symptoms at end of life  Description: Patient  and or family/caregiver will verbalize recall of breathing strategies to maintain an effective breathing pattern during the inpatient hospice stay. Outcome: Progressing     Problem: Communication Deficit  Goal: Effectively communicate symptoms, needs, and concerns  Description: Patient  and/or family/caregiver will be able to communicate symptoms, needs, and concerns as evidenced by the use of language services during the inpatient hospice stay.     Outcome: Progressing

## 2022-12-22 NOTE — H&P
[address] [place] [address]+    Date: 12/22/2022  Name: Laurence Carver  MRN: [id number]  YOB: 1955   Patient's PCP: Antony Perez MD and [facility]  Consultants during acute care: Pulmonary, ID, Podiatry, Gastroenterology, Wound care  Acute care admission date: 12/19 to 12/22/2022 and 11/26 to 12/8/2022 and 11/11 to 11/23/22 at [address] [place] [address] Street: 12/8 to 12/19/2022 with the patient's spouse choosing to revoke hospice and readmit to acute medical care  Readmission to General Inpatient Hospice: 12/22/2022     Informant: Chart reviewed, discussed with the patient's nurse, Dr. Lauro Strange on 12/21/22, and the hospice nurse liaison. I examined the patient who is unable to provide any information due to clinical status. I have talked with the patient's spouse, Dalia Grey, by phone on 12/22/22 at [phone]. CC: lung cancer    [place]: This is a 74 y.o. female nursing home resident with a history of Non Small Cell left lung cancer with partial left pneumonectomy 10/2019 (adenocarcinoma) with cerebral metastases and gamma knife in June - July 2022, seizure disorder on Lacosamide, severe protein calorie malnutrition with PEG, respiratory failure with tracheostomy, peripheral vascular disease with left hallux amputation for distal phalanx osteomyelitis, Type 2 diabetes mellitus, hypertension, hyperlipidemia, anemia,  readmitted on 11/26/2022. The patient has been treated for diabetic left foot infection, encephalopathy and is on Bactrim DS per G tube. The patient has had 29 pounds of involuntary weight loss from 8/5/22 to 12/4/22 (21% of body weight). The patient is dependent for all ADL's and non verbal. The patient has Morphine 2 mg IV every 4 hours prn pain and Lorazepam 1 mg IV every 6 hours prn anxiety. Oncology has been through [facility]. The hospice nurse liaisons met with the patient's spouse and daughter on 12/5/22 and provided hospice information.  The patient's spouse was not ready to decide at the visit and wants to contemplate the decision. There was discussion about taking the patient home rather than returning to the Nursing Home. The patient is DNR-comfort care. I collaborated with the hospice nurse liaison on 12/8/22 who has met with the patient's spouse. The family was in agreement with comfort care and hospice. The patient has become more agitated and painful since yesterday and family is agreeable to [address] [place] [address] for symptom management of pain, delirium, anxiety and possible end of life care. Tube feeding will be continued for now. I had discussion with the patient's daughter, Donnie Garza and a niece, Ruben Ash, on [facility] admission and they were aware of the decline and agreeable to comfort care. The patient's spouse also consented. On 12/13/22, I met with the patient's spouse, Obi Hdudleston, and the patient's brother, Patricio Goff, at the bedside. Obi Huddleston was concerned that Haloperidol caused vomiting and I tried to reassure him. We discussed that with elevated tube feeding residuals and suctioning material from trach that we should stop the tube feeding and he understands. I shared the patient's ongoing decline and that she is approaching end of life. Mago Jovita Worthington asked that the patient be considered for immunotherapy, and Dr Mitzie Habermann kindly followed up on December 14 and 15 and talked with Mr. Jovita Worthington and did not feel that cancer treatment would be beneficial.        The patient continued on 11 [facility] with daily decline, no nutrition, less responsiveness. I had talked with Mr. Jovita Worthington on 12/17 to update him. On 12/19/22, I received a message from the patient's nurse that the patient's spouse wanted me to phone him at [phone].  I phoned Mr. Jovita Worthington, and he tells me that he wants the patient to go to [facility] to get immunotherapy and that he had called them and they told them that they had a bed ready for her, although he could not give me a name of who he spoke to or the name of a physician. I reviewed our previous conversations and also Dr Zi Cabrales notes with Mr. Kingston Pope also. I phoned the [facility] transfer center and they have no record of the patient or any bed being available. [facility] would not consider accepting a hospice patient in transfer (which is what I expected). On 12/19/22 at 1706, I returned a phone call to Mr. Kingston Pope and explained the above. We had a long conversation (at least 25 minutes today) regarding the patient's condition, declining status and that I did not feel that she would be able to tolerate any aggressive treatment. Mr. Kingston Pope wants to revoke hospice and return to medical care. I did speak with Dr. Cary Butterfield from the hospital medicine group who kindly accepts the patient. I have collaborated with the patient's nurse and the hospice team also. I placed discharge readmit orders to return to the hospital medicine service as the patient's spouse has chosen to revoke hospice services on 12/19/22 and wants to return to medical care. On 12/22/22, I was asked to re evaluate the patient for General Inpatient Hospice as her spouse was now agreeable to comfort care. On exam, the patient's eyes are open, she stares off and does not track. There is no facial grimacing. There is mild upper airway noise and the tube feeding remains off. She has pain intermittently and required prn doses of Morphine with 2 doses in the past 24 hours. BP is lower. I have reviewed the interval records, diagnostic studies, collaborated with Dr. Raven Andino, Dr Geroldine Cockayne and case management. The patient's spouse revoked hospice on 12/19/22 (see my previous documentation) as he wanted the patient transferred to [facility]. [facility] (appropriately) declined to accept the transfer when I called on 12/19/22.  Mr. Kingston Pope asked that the patient be revoked from hospice and the hospital medicine kindly resumed care. [facility] declined the transfer when the hospitalist requested also, and said that the patient could follow up as an out patient if they want a second opinion. The patient had a rapid response on 12/20/22 with respiratory distress. Chest X-ray shows left sided infiltrates, and I suspect aspiration pneumonitis. Mr. Nurys Dykes now requests that the patient be accepted back to [facility]. Mr. Nurys Dykes is not here, but I did phone him and we discussed comfort care again, and he is agreeable. He states that the family visited last evening and he is aware that the patient had a fever to 103 degrees and that she is not doing well, unable to tolerate tube feeding, and he requests comfort. A hospice nurse liaison met with Mr. Nurys Dykes for consents and the patient is readmitted to [facility] for management of pain, congestion, delirium, respiratory failure and probable end of life care. Prognosis is grim and life expectancy is likely days. Oncology history [place] [facility] from Care Everywhere:  Metastatic adenocarcinoma of the lung of lung, mets to brain    A 62-year-old female with history of lung cancer s/p left lobectomy 10/2019 who initially presented to [facility] emergency department 6/18/2022 due to acute altered mental status. MRI brain 6/18/2022 showed multiple brain lesions including 3.5 x 2.5 x 3.7 cm lesion located in the right frontal, 1.4 x 1.3 cm lesion left occipital pole, and 0.7 cm enhancing dural implant overlying the vertex. CT chest/abdomen/pelvis with IV contrast 6/20/2022 showed postsurgical changes with increased density next to surgical clips and 8 mm focal lesion in the proximal pancreatic body but otherwise no suspicious area for malignancy. S/p craniotomy 6/20/2022 with resection of the right frontal lobe the surgical pathology review metastatic poorly differentiated adenocarcinoma, primary lung cancer is favored.   S/p gamma knife to post op cavity as well as remaining lesions 7/2022. CURRENT TREATMENT:   Surgical resection of right frontal lobe lesion 6/20/2022. Gamma knife for the postop cavity, remaining lesions 7/2022    Past Medical History:   Diagnosis Date    Brain metastases ([address] 8/10/2022    Biopsy (frozen section) done 6/202022 at [facility] showed METASTATIC, [address] Cancer ([address]     Diabetes mellitus ([address]     Hyperlipidemia     Hypertension     Lung cancer metastatic to brain ([address] 11/12/2022    Seizure ([address]     Tracheostomy status ([address] 11/10/2022       Past Surgical History:   Procedure Laterality Date    LUNG CANCER SURGERY Left 10/2019    TOE AMPUTATION Left 8/8/2022    LEFT FOOT HALLUX AMPUTATION EXCISIONAL DEBRIDEMENT ALL NON VIABLE   TISSUE AND BONE performed by Yusef Girard DPM at [address] [place] N[place]A 10/3/2020    EGD BIOPSY performed by Alexa Claros MD at [facility]       Social History     Socioeconomic History    Marital status:      Spouse name: Not on file    Number of children: Not on file    Years of education: Not on file    Highest education level: Not on file   Occupational History    Not on file   Tobacco Use    Smoking status: Former     Packs/day: 1.00     Types: Cigarettes     Quit date: 2019     Years since quitting: 3.9    Smokeless tobacco: Never    Tobacco comments:     10/2019   Substance and Sexual Activity    Alcohol use:  Yes     Alcohol/week: 1.0 standard drink     Types: 1 Cans of beer per week    Drug use: Yes     Types: Marijuana Leatha Manuel)    Sexual activity: Yes     Partners: Male   Other Topics Concern    Not on file   Social History Narrative    Not on file     Social Determinants of Health     Financial Resource Strain: Not on file   Food Insecurity: Not on file   Transportation Needs: Not on file   Physical Activity: Not on file   Stress: Not on file   Social Connections: Not on file   Intimate Partner Violence: Not on file   Housing Stability: Not on file       No family history on file. No Known Allergies    Medication list reviewed  Prior to Admission medications    Medication Sig Start Date End Date Taking? Authorizing Provider   oxyCODONE (ROXICODONE) 5 MG immediate release tablet Take 5 mg by mouth every 4 hours as needed for Pain. Historical Provider, MD   aspirin 81 MG chewable tablet 81 mg by Per G Tube route daily    Historical Provider, MD   apixaban (ELIQUIS) 5 MG TABS tablet by Per G Tube route 2 times daily    Historical Provider, MD   carvedilol (COREG) 25 MG tablet 25 mg by Per G Tube route 2 times daily (with meals)    Historical Provider, MD   folic acid (FOLVITE) 1 MG tablet 1 mg by Per G Tube route daily    Historical Provider, MD   glycopyrrolate (ROBINUL) 1 MG tablet 1 mg by Per G Tube route 3 times daily    Historical Provider, MD   insulin glargine (LANTUS) 100 UNIT/ML injection vial Inject 20 Units into the skin daily (with breakfast)    Historical Provider, MD   Lactobacillus Extra Strength CAPS by Per G Tube route    Historical Provider, MD   lansoprazole (PREVACID) 30 MG delayed release capsule Take 30 mg by mouth daily    Historical Provider, MD   losartan (COZAAR) 50 MG tablet 50 mg by Per G Tube route daily    Historical Provider, MD   scopolamine (TRANSDERM-SCOP) transdermal patch Place 1 patch onto the skin every 72 hours    Historical Provider, MD   vitamin B-1 (THIAMINE) 100 MG tablet 100 mg by Per G Tube route daily    Historical Provider, MD   lacosamide (VIMPAT) 50 MG TABS tablet 200 mg by Per G Tube route 2 times daily. Historical Provider, MD   alendronate (FOSAMAX) 70 MG tablet Take 1 tablet by mouth once a week 6/12/22   Historical Provider, MD   atorvastatin (LIPITOR) 40 MG tablet Take 1 tablet by mouth in the morning.   Patient taking differently: Take 20 mg by mouth daily 8/12/22   Angela Mann MD   gabapentin (NEURONTIN) 300 MG capsule Take 300 mg by mouth at bedtime. 3/15/22   Historical Provider, MD   simvastatin (ZOCOR) 20 MG tablet Take 40 mg by mouth nightly   8/11/22  Historical Provider, MD       ROS: As noted in [address], all other systems are unobtainable due to the patient's clinical condition    Weight:    Wt Readings from Last 5 Encounters:   12/22/22 185 lb 9.6 oz (84.2 kg)   12/14/22 196 lb 3.4 oz (89 kg)   12/08/22 196 lb 11.2 oz (89.2 kg)   12/02/22 108 lb (49 kg)   09/29/22 128 lb (58.1 kg)       Data reviewed 12/22/2022:  Frontal lobe lesion resection 6/20/2022  FINAL DIAGNOSIS   A. BRAIN MASS:  - METASTATIC, POORLY DIFFERENTIATED ADENOCARCINOMA, SEE NOTE. B. BRAIN MASS:  - METASTATIC, POORLY DIFFERENTIATED ADENOCARCINOMA, SEE NOTE. CT-scan of the brain 12/2/22:  No acute intracranial abnormality. Two enlarging nodular areas of soft tissue thickening in the scalp. Differential includes pseudomeningoceles  or recurrent tumor. CT chest, abdomen, pelvis 12/2/22:  Chest:       Cardiomegaly. Coronary artery disease. No effusion. Right-sided MediPort   device tip within the right SVC near the cavoatrial junction. There is   mucosal thickening of the left bronchus intermedius and left lower lobe   bronchus which may be seen with bronchitis or emphysematous changes. No   suspicious hilar or mediastinal adenopathy. There emphysematous changes. Previous left partial pneumonectomy. Tree-in-bud nodules identified throughout much of the left lung favored to be   due to small airways disease or aspiration. Pleural base nodule left upper   lung 3 mm in size stable dating back to 2018. Tracheostomy. Degenerate changes of the thoracic spine. No suspicious osseous lesions. Abdomen/Pelvis:       Motion and streak artifact challenge evaluation. Gallbladder is contracted. No suspicious liver, splenic, adrenal glands,   kidneys, or pancreas lesions.   The pancreas is diminutive size and with   calcifications and ductal dilatation can be seen with chronic pancreatitis. Pancreatic duct measuring 5 mm. Common bile duct measures 5 mm. Mild retained stool. Thickening of the gastric antrum could relate to   underdistention. No bowel obstruction. Gastrostomy tube within the   stomach. .  Moderate stool rectosigmoid junction. No suspicious adenopathy. Lobulated uterus compatible with fibroids. Bladder is unremarkable. No suspicious adnexal mass. Moderate to severe   aortic calcifications. Evidence of bilateral iliac artery stents noted. Posterior changes right and left groin greatest on right. Loss of contrast   involving the right proximal femoral artery. Degenerate changes lumbar spine. Degenerate changes of the hips pelvis. Age   indeterminate compression fracture at L4. With mild retropulsion. Chest X-ray  12/20/22: There are new airspace opacities throughout the left lung, which may   represent pneumonia or pleural effusion with atelectasis. Consider repeat   radiograph for further evaluation given limitations of the examination due to   patient rotation.         Hemoglobin A1C 9/29/22: 5.9%  Accuchecks:   Recent Labs     12/21/22  1633 12/21/22  1950 12/22/22  0805   POCGLU 241* 244* 262*        Hepatic Function Panel:    Lab Results   Component Value Date/Time    ALKPHOS 92 11/28/2022 05:10 AM    ALT 15 11/28/2022 05:10 AM    AST 20 11/28/2022 05:10 AM    PROT 6.4 11/28/2022 05:10 AM    BILITOT 0.3 11/28/2022 05:10 AM    BILIDIR 0.2 10/02/2020 08:34 PM    IBILI 0.5 10/02/2020 08:34 PM    LABALBU 3.3 11/28/2022 05:10 AM     CBC with Differential:          Lab Results   Component Value Date/Time     WBC 11.0 12/04/2022 05:45 AM     RBC 3.04 12/04/2022 05:45 AM     HGB 8.8 12/04/2022 05:45 AM     HCT 28.0 12/04/2022 05:45 AM      12/04/2022 05:45 AM     MCV 92.1 12/04/2022 05:45 AM     MCH 28.9 12/04/2022 05:45 AM     MCHC 31.4 12/04/2022 05:45 AM     RDW 14.2 12/04/2022 05:45 AM SEGSPCT 74.1 12/04/2022 05:45 AM     LYMPHOPCT 15.2 12/04/2022 05:45 AM     MONOPCT 9.3 12/04/2022 05:45 AM     BASOPCT 0.3 12/04/2022 05:45 AM     MONOSABS 1.0 12/04/2022 05:45 AM     LYMPHSABS 1.7 12/04/2022 05:45 AM     EOSABS 0.1 12/04/2022 05:45 AM     BASOSABS 0.0 12/04/2022 05:45 AM     DIFFTYPE AUTOMATED DIFFERENTIAL 12/04/2022 05:45 AM      BMP:          Lab Results   Component Value Date/Time      12/04/2022 05:45 AM     K 3.9 12/04/2022 05:45 AM      12/04/2022 05:45 AM     CO2 23 12/04/2022 05:45 AM     BUN 8 12/04/2022 05:45 AM     LABALBU 3.3 11/28/2022 05:10 AM     CREATININE 0.6 12/04/2022 05:45 AM     CALCIUM 8.9 12/04/2022 05:45 AM     GFRAA >60 09/29/2022 07:53 PM     LABGLOM >60 12/04/2022 05:45 AM     GLUCOSE 206 12/04/2022 05:45 AM     Physical Exam:   BP 91/65   Pulse 77   Temp 98.4 °F (36.9 °C)   Resp 14   Ht 5' 6\" (1.676 m)   SpO2 100%   BMI 31.67 kg/m²   General: eyes are open and she stares off, there is mild upper airway noise, chronically ill appearing, no facial grimacing   Skin: wound images reviewed in the media tab  HEENT: Mucous membranes are dry   Neck: tracheostomy in place with trach collar oxygen  Chest: right Mediport    Heart: distant tones, RRR, S1S2, no murmurs  Lungs:  coarse breath sounds bilaterally, diminished at the bases, with left basilar rales, scattered coarse rhonchi   Abdomen: soft, bowel sounds hypoactive, no apparent tenderness, PEG in place to drainage, nondistended   and rectal: deferred  Extremities:  dressing on left foot, the right foot is cool with chronic changes in legs, no edema  Neurologic: eyes are open and stares, does not follow commands,       Assessment/Plan:  Metastatic Non Small Cell left lung cancer diagnosed October 2019 with right frontal cerebral metastasis with gamma knife June 2022.  The patient's spouse revoked hospice on 12/19/22 but now seems to understand the patient's grim prognosis and requests General Inpatient Hospice and comfort care for management of pain, delirium and restlessness, respiratory failure and probable end of life care. The patient continues with daily decline, no nutrition, less responsiveness and requires General Inpatient Hospice for high acuity of nursing and respiratory needs with daily physician visits for assessment and medication adjustment as needed. PPS 20%. I have entered transfer orders to return to [facility] after consents are obtained. Aspiration pneumonitis continue to hold tube feeding and suction prn with comfort meds as above. Dysphagia and nutrition: the patient has PEG and the patient did not tolerate tube feeding with high residuals, suctioning tan material from trach and the tube feeding was stopped 12/13. Cancer pain and postop pain, increased Methadone to 5 mg BID on 12/13/22 which has been beneficial and continue prn Morphine. 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 Type 2 diabetes mellitus, Hemoglobin A1C 9/29/22: 5.9%, decreased Lantus as no tube feeding now.       Patient Active Problem List   Diagnosis Code    Dyspnea and respiratory abnormalities R06.00, R06.89    Diabetic ulcer of toe of left foot associated with type 2 diabetes mellitus, with fat layer exposed ([address] E11.621, L97.522    Type 2 diabetes mellitus with peripheral neuropathy (HCC) E11.42    Peripheral vascular disease (HCC) I73.9    History of nicotine dependence Z87.891    Sepsis ([address] A41.9    Brain metastases (HCC) C79.31    Diabetic foot infection (HCC) E11.628, L08.9    Cancer of left lung ([address] - removed at [facility] in 2019 C34.92    DM (diabetes mellitus), type 2 ([address] E11.9

## 2022-12-22 NOTE — PROGRESS NOTES
V2.0  [facility] Hospitalist Progress Note      Name:  Wanda Funes /Age/Sex: 1955  (74 y.o. female)   MRN & CSN:  [id number] & [id number] Encounter Date/Time: 2022 2:59 PM EST    Location:  [address] [place] [address]-A PCP: Niki Bhatia MD       Hospital Day: 4    Assessment and Plan:   Wanda Funes is a 74 y.o. female with pmh of  who presents with Lung cancer metastatic to brain [facility])      Plan:    History of metastatic lung cancer s/p left lobectomy no chemo or radiation, met to right frontal lob s/p  right frontal craniotomy on 2022: h/o metastatic lung ca to brain,  pt previously following with oncologist at [facility] in [facility], Metastatic lung cancer-stage BRENDA(cTx, Cnx, pM1b), follows with Dr. Jemma Lima at [facility]. Pathology results are consistent with poorly differentiated metastatic adenocarcinoma. Patient subsequently had gamma knife radiosurgery. Guarded prognosis Discussed with  the CXR findings and clinically worsening condition,  wants hospice to be reconsulted and code status was changed to DNR CC meeting with hospice today. Acute on chronic anemia likely GI bleed: Hemoglobin 9.4  previously 14.2 in September, FOBT positive. Holding off on intervention  as stable H&H  Acute metabolic encephalopathy--pt somnolent, would not open eyes, turned head to verbal and physical stimulation, head CT-no acute intracranial abnormality, Two enlarging nodular areas of soft tissue thickening in the scalp. Differential includes pseudomeningoceles or metastatic tumor. Left DFI with Residual OM and Dehiscence Secondary to PAD: Stenotrophomonas maltophilia in Respiratory Culture:   Afebrile, no leukocytosis  Pct 0.089, CRP 13.4  -BC 0/2-NGTD  -Resp culture: Stenotrophomonas maltophilia (resistant to levofloxacin)  Past left foot cultures 2022-Serratia marcescens, Staphylococcus epidermidis   -Left Foot Culture: Serratia marcescens  -XR Foot Left: 1.  Status post amputation of the 1st proximal phalanx. No suspicious osseous   lesion. 2. Soft tissue ulceration at the surgical stump. 3. Bony demineralization. Continue per PEG tube- Bactrim DS bid x 6 weeks (end date 1/11/2023)    Diet Diet NPO Exceptions are: Ice Chips   DVT Prophylaxis [] Lovenox, []  Heparin, [x] SCDs, [] Ambulation,  [] Eliquis, [] Xarelto  [] Coumadin   Code Status DNR-CC   Disposition From: snf  Expected Disposition: TBD  Estimated Date of Discharge: TBD  Patient requires continued admission due to metastatic Ca   Surrogate Decision Maker/ POA      Subjective:     Chief Complaint: No chief complaint on file. Radu Alamo is a 74 y.o. female who presents with AMS. Seen and examined at bedside, positioned in Left lateral position, on 100% FiO2, vitals stable. Review of Systems:    Review of Systems could not be obtained due to mentation. Objective: Intake/Output Summary (Last 24 hours) at 12/22/2022 0731  Last data filed at 12/22/2022 0617  Gross per 24 hour   Intake --   Output 550 ml   Net -550 ml          Vitals:   Vitals:    12/22/22 0609   BP:    Pulse:    Resp: 22   Temp:    SpO2:        Physical Exam:   Physical Exam   General: NAD, tracheostomy in place,  Eyes: EOMI  ENT: neck supple  Cardiovascular: Regular rate. Respiratory: B/L crackles L > R   Gastrointestinal: Soft, non tender  Genitourinary: no suprapubic tenderness  Musculoskeletal: No edema, L foot wrapped in dressing.   Skin: warm, dry  Neuro: non-arousable  Psych: not-assessed    Medications:   Medications:    methadone  5 mg Per G Tube BID    carvedilol  12.5 mg Per G Tube BID WC    insulin glargine  10 Units SubCUTAneous Daily with breakfast    insulin lispro  0-4 Units SubCUTAneous Nightly    insulin lispro  0-8 Units SubCUTAneous TID WC    lacosamide (VIMPAT) IVPB  200 mg IntraVENous BID    losartan  50 mg Per G Tube Daily    scopolamine  1 patch TransDERmal Q72H    sodium chloride flush  5-40 mL IntraVENous 2 times per day sulfamethoxazole-trimethoprim  1 tablet PEG Tube 2 times per day      Infusions:    sodium chloride 25 mL/hr at 12/21/22 2154    dextrose       PRN Meds: sodium chloride, , PRN  acetaminophen, 650 mg, Q4H PRN  acetaminophen, 650 mg, Q6H PRN  bisacodyl, 10 mg, Daily PRN  dextrose, , Continuous PRN  dextrose bolus, 125 mL, PRN   Or  dextrose bolus, 250 mL, PRN  glucagon (rDNA), 1 mg, PRN  Glycopyrrolate, 0.2 mg, Q4H PRN  LORazepam, 0.5 mg, Q4H PRN  morphine, 2 mg, Q4H PRN  sodium chloride flush, 5-40 mL, PRN      Labs      Recent Results (from the past 24 hour(s))   POCT Glucose    Collection Time: 12/21/22  8:00 AM   Result Value Ref Range    POC Glucose 202 (H) 70 - 99 MG/DL   POCT Glucose    Collection Time: 12/21/22 11:31 AM   Result Value Ref Range    POC Glucose 189 (H) 70 - 99 MG/DL   POCT Glucose    Collection Time: 12/21/22  4:33 PM   Result Value Ref Range    POC Glucose 241 (H) 70 - 99 MG/DL        Imaging/Diagnostics Last 24 Hours   No results found.     Electronically signed by Terryl Koyanagi, MD on 12/22/2022 at 7:31 AM

## 2022-12-22 NOTE — PROGRESS NOTES
12/22/22 0059   Patient Observation   Heart Rate 87   SpO2 95 %   Ventilator Settings   FiO2  98 %   Airway Clearance   Suction Trach   Subglottic Suction Done Yes   Suction Device Suction catheter   Suction Catheter Size 14 Fr   Sputum Method Obtained Tracheal   Sputum Amount Moderate   Sputum Color/Odor Yellow   Sputum Consistency Thick   $Obtained Sample $Induced Sputum (charge not used for Bronchoscopy)   Surgical Airway (Trach)   No placement date or time found. Surgical Airway Type: Tracheostomy   Status Secured   Site Assessment Clean;Dry   Site Care Cleansed;Dried;Dressing applied   Inner Cannula Care Changed/new   Ties Assessment Clean;Dry; Intact; Secure   Cuff Pressure   (pt found with cuff deflated)   Spare Trach at Bedside Yes   Spare Trach Tube One Size Smaller at Bedside Yes   Ambu Bag With Mask at Bedside Yes

## 2022-12-23 NOTE — PROGRESS NOTES
Patient bathed, dressings changed and trach care completed. Telephone updates given to daughter, Delvin Amaya through out day.      Carlie Judge, LIZZETTEN, RN

## 2022-12-23 NOTE — PLAN OF CARE
Problem: Discharge Planning  Goal: Discharge to home or other facility with appropriate resources  12/23/2022 1024 by Joleen Pedroza RN  Outcome: Progressing     Problem: Pain  Goal: Verbalizes/displays adequate comfort level or baseline comfort level  12/23/2022 1024 by Joleen Pedroza RN  Outcome: Progressing  Flowsheets (Taken 12/23/2022 0900)  Verbalizes/displays adequate comfort level or baseline comfort level:   Encourage patient to monitor pain and request assistance   Assess pain using appropriate pain scale   Administer analgesics based on type and severity of pain and evaluate response   Implement non-pharmacological measures as appropriate and evaluate response   Consider cultural and social influences on pain and pain management   Notify Licensed Independent Practitioner if interventions unsuccessful or patient reports new pain     Problem: Skin/Tissue Integrity  Goal: Absence of new skin breakdown  Description: 1. Monitor for areas of redness and/or skin breakdown  2. Assess vascular access sites hourly  3. Every 4-6 hours minimum:  Change oxygen saturation probe site  4. Every 4-6 hours:  If on nasal continuous positive airway pressure, respiratory therapy assess nares and determine need for appliance change or resting period.   12/23/2022 1024 by Joleen Pedroza RN  Outcome: Progressing     Problem: Safety - Adult  Goal: Free from fall injury  Outcome: Progressing     Problem: Chronic Conditions and Co-morbidities  Goal: Patient's chronic conditions and co-morbidity symptoms are monitored and maintained or improved  Outcome: Progressing

## 2022-12-23 NOTE — PROGRESS NOTES
[facility]  General Inpatient Hospice Progress Note    Date: 12/23/2022  Name: Eboni Ibarra  MRN: [id number]  YOB: 1955   Patient's PCP: Qamar Gurrola MD  Consultants during initial acute care admission 11/26 to 12/8/22: Pulmonary, ID, Podiatry, Gastroenterology, Wound care  Acute care admission date: 12/19 to 12/22/2022 and 11/26 to 12/8/2022 and 11/11 to 11/23/22 at [address] [place] [address] Street: 12/8 to 12/19/2022 with the patient's spouse choosing to revoke hospice and readmit to acute medical care  Readmission to General Inpatient Hospice: 12/22/2022        Subjective: The patient is minimally responsive, briefly moving her eyelids with my exam but did not open. There is no facial grimacing. There is mild upper airway noise and the tube feeding remains off. She has pain intermittently and requires prn doses of Morphine with 2 doses. I collaborated with the patient's nurse and the hospice nurse. There are no family here. Objective:   Pain is managed with Methadone 5 mg per G tube twice daily plus Morphine IV prn with 2 prn doses in the past 24 hours, transdermal Scopolamine patch and Glycopyrrolate IV for terminal congestion with 0 doses, Haloperidol is refused by the patient's spouse, and Lorazepam is available for anxiety with 2 doses. Data reviewed 12/23/2022:  Frontal lobe lesion resection 6/20/2022  FINAL DIAGNOSIS   A. BRAIN MASS:  - METASTATIC, POORLY DIFFERENTIATED ADENOCARCINOMA, SEE NOTE. B. BRAIN MASS:  - METASTATIC, POORLY DIFFERENTIATED ADENOCARCINOMA, SEE NOTE. CT-scan of the brain 12/2/22:  No acute intracranial abnormality. Two enlarging nodular areas of soft tissue thickening in the scalp. Differential includes pseudomeningoceles  or recurrent tumor. CT chest, abdomen, pelvis 12/2/22:  Chest:       Cardiomegaly. Coronary artery disease. No effusion.   Right-sided MediPort   device tip within the right SVC near the cavoatrial junction. There is   mucosal thickening of the left bronchus intermedius and left lower lobe   bronchus which may be seen with bronchitis or emphysematous changes. No   suspicious hilar or mediastinal adenopathy. There emphysematous changes. Previous left partial pneumonectomy. Tree-in-bud nodules identified throughout much of the left lung favored to be   due to small airways disease or aspiration. Pleural base nodule left upper   lung 3 mm in size stable dating back to 2018. Tracheostomy. Degenerate changes of the thoracic spine. No suspicious osseous lesions. Abdomen/Pelvis:       Motion and streak artifact challenge evaluation. Gallbladder is contracted. No suspicious liver, splenic, adrenal glands,   kidneys, or pancreas lesions. The pancreas is diminutive size and with   calcifications and ductal dilatation can be seen with chronic pancreatitis. Pancreatic duct measuring 5 mm. Common bile duct measures 5 mm. Mild retained stool. Thickening of the gastric antrum could relate to   underdistention. No bowel obstruction. Gastrostomy tube within the   stomach. .  Moderate stool rectosigmoid junction. No suspicious adenopathy. Lobulated uterus compatible with fibroids. Bladder is unremarkable. No suspicious adnexal mass. Moderate to severe   aortic calcifications. Evidence of bilateral iliac artery stents noted. Posterior changes right and left groin greatest on right. Loss of contrast   involving the right proximal femoral artery. Degenerate changes lumbar spine. Degenerate changes of the hips pelvis. Age   indeterminate compression fracture at L4. With mild retropulsion. Chest X-ray  12/20/22: There are new airspace opacities throughout the left lung, which may   represent pneumonia or pleural effusion with atelectasis.   Consider repeat   radiograph for further evaluation given limitations of the examination due to   patient rotation.      Hemoglobin A1C 9/29/22: 5.9%  Accuchecks:   Recent Labs     12/22/22  1628 12/22/22  1936 12/23/22  0741   POCGLU 206* 204* 177*        Hepatic Function Panel:    Lab Results   Component Value Date/Time    ALKPHOS 92 11/28/2022 05:10 AM    ALT 15 11/28/2022 05:10 AM    AST 20 11/28/2022 05:10 AM    PROT 6.4 11/28/2022 05:10 AM    BILITOT 0.3 11/28/2022 05:10 AM    BILIDIR 0.2 10/02/2020 08:34 PM    IBILI 0.5 10/02/2020 08:34 PM    LABALBU 3.3 11/28/2022 05:10 AM     CBC with Differential:    Lab Results   Component Value Date/Time    WBC 19.1 12/20/2022 01:30 PM    RBC 3.43 12/20/2022 01:30 PM    HGB 9.4 12/20/2022 01:30 PM    HCT 31.5 12/20/2022 01:30 PM     12/20/2022 01:30 PM    MCV 91.8 12/20/2022 01:30 PM    MCH 27.4 12/20/2022 01:30 PM    MCHC 29.8 12/20/2022 01:30 PM    RDW 14.0 12/20/2022 01:30 PM    SEGSPCT 86.7 12/20/2022 01:30 PM    LYMPHOPCT 6.6 12/20/2022 01:30 PM    MONOPCT 6.0 12/20/2022 01:30 PM    BASOPCT 0.2 12/20/2022 01:30 PM    MONOSABS 1.2 12/20/2022 01:30 PM    LYMPHSABS 1.3 12/20/2022 01:30 PM    EOSABS 0.0 12/20/2022 01:30 PM    BASOSABS 0.0 12/20/2022 01:30 PM    DIFFTYPE AUTOMATED DIFFERENTIAL 12/20/2022 01:30 PM     BMP:    Lab Results   Component Value Date/Time     12/20/2022 01:30 PM    K 4.1 12/20/2022 01:30 PM     12/20/2022 01:30 PM    CO2 22 12/20/2022 01:30 PM    BUN 30 12/20/2022 01:30 PM    LABALBU 3.3 11/28/2022 05:10 AM    CREATININE 0.9 12/20/2022 01:30 PM    CALCIUM 9.3 12/20/2022 01:30 PM    GFRAA >60 09/29/2022 07:53 PM    LABGLOM >60 12/20/2022 01:30 PM    GLUCOSE 188 12/20/2022 01:30 PM       Physical Exam:   /68   Pulse (!) 107   Temp 100.4 °F (38 °C) (Axillary)   Resp 10   SpO2 97%   General: minimally responsive, eyes are closed, there is mild upper airway noise, chronically ill appearing, no facial grimacing   Skin: wound images reviewed in the media tab  HEENT: Mucous membranes are dry   Neck: tracheostomy in place with trach collar oxygen  Chest: right Mediport    Heart: distant tones, tachycardic IRRR, S1S2, no murmurs  Lungs:  coarse breath sounds bilaterally, diminished at the bases, with few left basilar rales, scattered coarse rhonchi   Abdomen: soft, bowel sounds hypoactive, no apparent tenderness, PEG in place to drainage, nondistended   and rectal: deferred  Extremities:  dressing on left foot, the right foot is cool with chronic changes in legs, no edema  Neurologic: minimally responsive, eyes are closed and she briefly moved her eyelids but did not open,       Assessment/Plan:  Metastatic Non Small Cell left lung cancer diagnosed October 2019 with right frontal cerebral metastasis with gamma knife June 2022. The patient's spouse revoked hospice on 12/19/22 for acute care. The patient's spouse again requested General Inpatient Hospice and comfort care for management of pain, delirium and restlessness, respiratory failure and probable end of life care and was readmitted to [address] [place] [address] 12/22/22. The patient continues with daily decline, no nutrition, minimally responsive and requires General Inpatient Hospice for high acuity of nursing and respiratory needs with daily physician visits for assessment and medication adjustment as needed. PPS 20%. Aspiration pneumonitis continue to hold tube feeding and suction prn with comfort meds as above. Dysphagia and nutrition: the patient has PEG and the patient did not tolerate tube feeding with high residuals, suctioning tan material from trach and the tube feeding was stopped 12/13. Cancer pain and postop pain, increased Methadone to 5 mg BID on 12/13/22 which has been beneficial and continue prn Morphine.    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 Type 2 diabetes mellitus, Hemoglobin A1C 9/29/22: 5.9%, decreased Lantus as no tube feeding. Holiday coverage for the General Inpatient Hospice patients for Dr. Jesys Howell is as follows: Dr. Nathalie Fox will be covering Friday December 23 at 1700 until Sunday December 25 at 0800. Dr. Zelalem Phipps will cover from Sunday December 25 0800 until Sunday December 25 at 1700. Dr. Nathalie Fox will cover from Sunday December 25 at 1700 until Monday December 26 at 0800. Dr. Selina Taylor and Dr. Zelalem Phipps can be reached through [facility]. Patient Active Problem List   Diagnosis Code    Dyspnea and respiratory abnormalities R06.00, R06.89    Diabetic ulcer of toe of left foot associated with type 2 diabetes mellitus, with fat layer exposed ([address] E11.621, L97.522    Type 2 diabetes mellitus with peripheral neuropathy (HCC) E11.42    Peripheral vascular disease (HCC) I73.9    History of nicotine dependence Z87.891    Sepsis ([address] A41.9    Brain metastases (HCC) C79.31    Diabetic foot infection (HCC) E11.628, L08.9    Cancer of left lung ([address] - removed at [facility] in 2019 C34.92    DM (diabetes mellitus), type 2 (HCC) E11.9    Seizures (HCC) R56.9    Leukocytosis D72.829    Respiratory failure (HCC) J96.90    Acute on chronic anemia D64.9    Severe malnutrition ([address] E43    Diabetic foot ulcer with osteomyelitis (HCC) E11.621, E11.69, L97.509, M86.9    Adenocarcinoma, lung (HCC) C34.90    Tracheostomy status ([address] Z93.0    Lung cancer metastatic to brain ([address] C34.90, C79.31    Non-small cell lung cancer with metastasis (HCC) C34.90    Encephalopathy G93.40    Aspiration pneumonitis ([address] J69.0       BERTRAM Gallegos MD  12/23/2022

## 2022-12-24 NOTE — PROGRESS NOTES
Contacted Dr. Emma Horowitz by secure message. Notified patient is not eating. Requested Accu Checks and insulin coverage to be d/c. Waiting for response.

## 2022-12-24 NOTE — DISCHARGE SUMMARY
Deandre Wallace is a 51-year-old female with past medical history of lung cancer metastatic to brain, she was on hospice services but after 's discussion with the [facility] regarding new immunotherapy he wanted his wife to receive that so CODE STATUS was changed to full code, transfer was initiated but [facility] declined her transfer as they cannot give immunotherapy as patient was terminally ill, her respiratory status worsened and was requiring higher levels of oxygen through tracheostomy and has been decided to change the CODE STATUS back to DNR CCA and hospice was consulted. Patient was discharged to inpatient hospice.

## 2022-12-24 NOTE — PROGRESS NOTES
[address] [place] [address]  General Inpatient Hospice Progress Note    Date: 12/24/2022  Name: Deann Cartagena  MRN: [id number]  YOB: 1955   Patient's PCP: Leif Stoll MD  Consultants during initial acute care admission 11/26 to 12/8/22: Pulmonary, ID, Podiatry, Gastroenterology, Wound care  Acute care admission date: 12/19 to 12/22/2022 and 11/26 to 12/8/2022 and 11/11 to 11/23/22 at [address] [place] [address] Street: 12/8 to 12/19/2022 with the patient's spouse choosing to revoke hospice and readmit to acute medical care  Readmission to General Inpatient Hospice: 12/22/2022         Subjective: The patient is entirely unresponsive and nonverbal.  Nurse reports pt has been comfortable. Dtr in room reports pt is comfortable and showing no signs of pain or discomfort. Not waking up. I collaborated with the patient's unit nurse and the hospice nurse. Objective:   Pain is managed with Methadone 5 mg per G tube twice daily plus Morphine IV prn with 3prn doses in the past 24 hours, transdermal Scopolamine patch and Glycopyrrolate IV for terminal congestion with 0 doses, Haloperidol is refused by the patient's spouse, and Lorazepam is available for anxiety with 2 doses. She has also received tylenol x 1. Data reviewed 12/24/2022:  Frontal lobe lesion resection 6/20/2022  FINAL DIAGNOSIS   A. BRAIN MASS:  - METASTATIC, POORLY DIFFERENTIATED ADENOCARCINOMA, SEE NOTE. B. BRAIN MASS:  - METASTATIC, POORLY DIFFERENTIATED ADENOCARCINOMA, SEE NOTE. CT-scan of the brain 12/2/22:  No acute intracranial abnormality. Two enlarging nodular areas of soft tissue thickening in the scalp. Differential includes pseudomeningoceles  or recurrent tumor. CT chest, abdomen, pelvis 12/2/22:  Chest:       Cardiomegaly. Coronary artery disease. No effusion. Right-sided MediPort   device tip within the right SVC near the cavoatrial junction.   There is   mucosal thickening of the left mottling/duskiness to feet around dressing  HEENT: Mucous membranes are dry   Neck: tracheostomy in place with trach collar oxygen  Chest: right Mediport    Heart: distant tones, tachycardic IRRR, S1S2, no murmurs  Lungs:  coarse breath sounds bilaterally, diminished at the bases, with few left basilar rales, scattered coarse rhonchi   Abdomen: soft, bowel sounds hypoactive, no apparent tenderness, PEG in place to drainage, nondistended   and rectal: deferred  Extremities:  calves are loose and equal  Neurologic: unresponsive to gentle tactile and verbal stim during exam.     Assessment/Plan:  Metastatic Non Small Cell left lung cancer diagnosed October 2019 with right frontal cerebral metastasis with gamma knife June 2022. The patient's spouse revoked hospice on 12/19/22 for acute care. The patient's spouse again requested General Inpatient Hospice and comfort care for management of pain, delirium and restlessness, respiratory failure and probable end of life care and was readmitted to [address] [place] [address] 12/22/22. The patient continues with daily decline, no nutrition, unresponsive and requires General Inpatient Hospice for high acuity of nursing and respiratory needs with daily physician visits for assessment and medication adjustment as needed. PPS 20%. Aspiration pneumonitis continue to hold tube feeding and suction prn with comfort meds as above. Dysphagia and nutrition: the patient has PEG and the patient did not tolerate tube feeding with high residuals, suctioning tan material from trach and the tube feeding was stopped 12/13. Cancer pain and postop pain, increased Methadone to 5 mg BID on 12/13/22 which has been beneficial and continue prn Morphine.    Metabolic encephalopathy, multifactorial  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 8/8/22, followed by ID and on TMP/SMX with end date 1/11/2023  Chronic respiratory failure with tracheostomy  Peripheral vascular disease Seizure disorder with brain metastases, on Lacosamide (history of status epilepticus September 2022). Type 2 diabetes mellitus, Hemoglobin A1C 9/29/22: 5.9%, decreased Lantus as no tube feeding. Family Copiing- met with dtr of pt in room, she is thankful for patient's comfort level and appears to be coping well. Holiday coverage for the General Inpatient Hospice patients for Dr. Destiny Wright is as follows: Dr. Kylah Thorpe will be covering Friday December 23 at 1700 until Sunday December 25 at 0800. Dr. Russell Bentley will cover from Sunday December 25 0800 until Sunday December 25 at 1700. Dr. Kylah Thorpe will cover from Sunday December 25 at 1700 until Monday December 26 at 0800. Dr. Vikash Bennett and Dr. Russell Bentley can be reached through [facility].             Patient Active Problem List   Diagnosis Code    Dyspnea and respiratory abnormalities R06.00, R06.89    Diabetic ulcer of toe of left foot associated with type 2 diabetes mellitus, with fat layer exposed ([address] E11.621, L97.522    Type 2 diabetes mellitus with peripheral neuropathy (HCC) E11.42    Peripheral vascular disease (HCC) I73.9    History of nicotine dependence Z87.891    Sepsis ([address] A41.9    Brain metastases (HCC) C79.31    Diabetic foot infection (HCC) E11.628, L08.9    Cancer of left lung ([address] - removed at [facility] in 2019 C34.92    DM (diabetes mellitus), type 2 (HCC) E11.9    Seizures (HCC) R56.9    Leukocytosis D72.829    Respiratory failure (HCC) J96.90    Acute on chronic anemia D64.9    Severe malnutrition (HCC) E43    Diabetic foot ulcer with osteomyelitis (HCC) E11.621, E11.69, L97.509, M86.9    Adenocarcinoma, lung (HCC) C34.90    Tracheostomy status ([address] Z93.0    Lung cancer metastatic to brain ([address] C34.90, C79.31    Non-small cell lung cancer with metastasis ([address] C34.90    Encephalopathy G93.40    Aspiration pneumonitis ([address] J69.0       Electronically signed by Hung Mendoza MD on 12/24/2022 at 5:08 PM

## 2022-12-25 NOTE — PROGRESS NOTES
Rounded at patient at 0040 and noted no respiratory effort and no apical pulse. Confirmed time of death with second RN, Harley Perez. [address] and Dr. Maura Rutledge from hospice notified. Previously confirmed that this is not a  case.  Refusal from Donor Referral #[id number].   [id number] Called and spoke with daughter Angelina and she will call patient's  Rafiq Miranda and they will be en route to hospital.   Andrew Cantrell and hospital  notified of passing

## 2022-12-25 NOTE — DISCHARGE SUMMARY
[address] [place] [address]    Death Summary    Date: 12/25/2022  Name: Todd Orlando  MRN: [id number]  YOB: 1955     Patient's PCP: Brad Calderon MD  Admit Date: 12/22/2022 to [address] [place] [address]  Date of Death: 12/25/2022  Time: 00:40  Admitting Physician: Aliyah Cantor MD  Discharge Physician: Italo Escobar MD  Consultation: none during General Inpatient Hospice stay    Invasive procedures: none during General Inpatient Hospice stay    Discharge Diagnoses:   Non small cell cancer of the lung   Metastasis to Brain   Acute on chronic Hypoxic respiratory failure with Tracheostomy   Aspiration pneumonia  Diabetes Mellitus Type 2  Diabetic foot ulcer with osteomyelitis  Protein calorie malnutrition   Peripheral vascular disease  Seizure disorder secondary to brain metastasis       Patient Active Problem List   Diagnosis Code    Dyspnea and respiratory abnormalities R06.00, R06.89    Diabetic ulcer of toe of left foot associated with type 2 diabetes mellitus, with fat layer exposed ([address] E11.621, L97.522    Type 2 diabetes mellitus with peripheral neuropathy (HCC) E11.42    Peripheral vascular disease (HCC) I73.9    History of nicotine dependence Z87.891    Sepsis ([address] A41.9    Brain metastases ([address] C79.31    Diabetic foot infection ([address] E11.628, L08.9    Cancer of left lung ([address] - removed at [facility] in 2019 C34.92    DM (diabetes mellitus), type 2 ([address] E11.9    Seizures ([address] R56.9    Leukocytosis D72.829    Respiratory failure ([address] J96.90    Acute on chronic anemia D64.9    Severe malnutrition ([address] E43    Diabetic foot ulcer with osteomyelitis (HCC) E11.621, E11.69, L97.509, M86.9    Adenocarcinoma, lung (HCC) C34.90    Tracheostomy status ([address] Z93.0    Lung cancer metastatic to brain ([address] C34.90, C79.31    Non-small cell lung cancer with metastasis ([address] C34.90    Encephalopathy G93.40    Aspiration pneumonitis ([address] J69.0       Brief History, reason for admission:      This is a 74 y.o. female nursing home resident with a history of Non Small Cell left lung cancer with partial left pneumonectomy 10/2019 (adenocarcinoma) with cerebral metastases and gamma knife in  - 2022, seizure disorder on Lacosamide, severe protein calorie malnutrition with PEG, respiratory failure with tracheostomy, peripheral vascular disease with left hallux amputation for distal phalanx osteomyelitis, Type 2 diabetes mellitus, hypertension, hyperlipidemia, anemia,  readmitted on 2022. The patient has been treated for diabetic left foot infection, encephalopathy and is on Bactrim DS per G tube. The patient was admitted to the [facility] on 2022 because of rapid decline and no improvement despite multiple interventions     The patient developed encephalopathy, was not eating or drinking and the family decided to for hospice care with DNR status after discussion with Dr Larissa Centeno and hospice staff. Hospital Course: The patient was admitted to [facility] with the above, for complete details, please see the acute care History and Physical, progress notes, consultant notes and discharge summary. The patient was treated with comfort medications, and symptoms were managed. Emotional and spiritual support was provided to the patient and family. The patient  as noted above.     Cause of death: non small cell carcinoma of the lung    Significant Diagnostic Studies:  See computerized record in Epic      Electronically signed by Eva Daugherty DO on 2022 at 12:43 PM

## 2024-05-01 NOTE — PLAN OF CARE
Problem: Discharge Planning  Goal: Discharge to home or other facility with appropriate resources  Outcome: Progressing  Flowsheets (Taken 12/16/2022 2230 by Belia Calderon LPN)  Discharge to home or other facility with appropriate resources: Identify barriers to discharge with patient and caregiver     Problem: Skin/Tissue Integrity  Goal: Absence of new skin breakdown  Description: 1. Monitor for areas of redness and/or skin breakdown  2. Assess vascular access sites hourly  3. Every 4-6 hours minimum:  Change oxygen saturation probe site  4. Every 4-6 hours:  If on nasal continuous positive airway pressure, respiratory therapy assess nares and determine need for appliance change or resting period. Outcome: Progressing     Problem: Chronic Conditions and Co-morbidities  Goal: Patient's chronic conditions and co-morbidity symptoms are monitored and maintained or improved  Outcome: Progressing  Flowsheets (Taken 12/16/2022 2230 by Belia Calderon LPN)  Care Plan - Patient's Chronic Conditions and Co-Morbidity Symptoms are Monitored and Maintained or Improved: Monitor and assess patient's chronic conditions and comorbid symptoms for stability, deterioration, or improvement     Problem: Safety - Adult  Goal: Free from fall injury  Outcome: Progressing     Problem: Dyspnea Due to End of Life  Goal: Demonstrate understanding of and ability to manage respiratory symptoms at end of life  Description: Patient  and or family/caregiver will verbalize recall of breathing strategies to maintain an effective breathing pattern during the inpatient hospice stay. Outcome: Progressing     Problem: Communication Deficit  Goal: Effectively communicate symptoms, needs, and concerns  Description: Patient  and/or family/caregiver will be able to communicate symptoms, needs, and concerns as evidenced by the use of language services during the inpatient hospice stay.     Outcome: Progressing  Symptoms

## 2025-02-04 NOTE — DISCHARGE SUMMARY
Appears stable.  Monitor.   V2.0  Discharge Summary    Name:  Capri Lozada /Age/Sex: 1955 (8 y.o. female)   Admit Date: 8/3/2022  Discharge Date: 22    MRN & CSN:  [id number] & [id number] Encounter Date:  8/15/22    Attending:  Dr. Paulette Stanford Discharging Provider: Jonelle Moeller MD       Hospital Course:     Brief HPI and Hospital Course:         Capri Lozada is a 61 y.o. female who presented to ED with left great toe redness and swelling. She was sent to ED by her podiatrist and advised that she needs IV antibiotics and an amputation. She was admitted. While here she was found to have osteomyelitis of the left foot and had a partial hallux amputation with excisional debridement of all nonviable tissue and bone of the left foot on . She is doing well now, and has been discharged home on oral antibiotics per infectious disease recommendation. She was here for 8 days. Brief Problem Based Course:       Infected diabetic foot ulcers status post partial left hallux amputation on   -Doing well, follow-up with podiatry  -She did have associated severe sepsis while here. Metastatic lung cancer  -Per  she has had 1 lesion resected and 2 other lesions treated with gamma knife at [facility] has follow-up with her radiation oncologist soon. She was also advised to follow-up with her neurosurgeon as soon as possible. See neurosurgery consult on chart.     History of lung cancer-she had that resected at the [facility] about 3 years ago    Diabetes mellitus type 2  Hypertension  Hyperlipidemia    Consults this admission:  IP CONSULT TO PODIATRY  IP CONSULT TO HOSPITALIST  PHARMACY TO DOSE VANCOMYCIN  IP CONSULT TO VASCULAR SURGERY  IP CONSULT TO IV TEAM  IP CONSULT TO IV TEAM  IP CONSULT TO CARDIOLOGY  IP CONSULT TO IV TEAM  IP CONSULT TO INFECTIOUS DISEASES  IP CONSULT TO NEUROSURGERY  IP CONSULT TO HOME CARE NEEDS    Discharge Diagnosis:   Sepsis [facility])        Discharge Instruction:   Follow up appointments: Podiatry, radiation oncology, neurosurgery  Primary care physician: Juana Garcia MD within 2 weeks  Diet: diabetic diet   Activity: activity as tolerated  Disposition: Discharged to:   Home health care  Condition on discharge: Stable  Labs and Tests to be Followed up as an outpatient by PCP or Specialist: Follow-up with neurosurgeon for abnormal CT head    Discharge Medications:        Medication List        START taking these medications      atorvastatin 40 MG tablet  Commonly known as: LIPITOR  Take 1 tablet by mouth in the morning. Replaces: simvastatin 20 MG tablet     levoFLOXacin 750 MG tablet  Commonly known as: Levaquin  Take 1 tablet by mouth in the morning for 4 days. linezolid 600 MG tablet  Commonly known as: ZYVOX  Take 1 tablet by mouth in the morning and 1 tablet before bedtime. Do all this for 8 doses. CHANGE how you take these medications      HYDROcodone-acetaminophen 5-325 MG per tablet  Commonly known as: Norco  Take 1 tablet by mouth every 6 hours as needed for Pain for up to 7 days. Intended supply: 3 days.  Take lowest dose possible to manage pain  What changed: when to take this            CONTINUE taking these medications      alendronate 70 MG tablet  Commonly known as: FOSAMAX     cilostazol 100 MG tablet  Commonly known as: PLETAL     cloNIDine 0.1 MG tablet  Commonly known as: CATAPRES     gabapentin 100 MG capsule  Commonly known as: NEURONTIN     glimepiride 2 MG tablet  Commonly known as: AMARYL     lisinopril 5 MG tablet  Commonly known as: PRINIVIL;ZESTRIL     metFORMIN 1000 MG tablet  Commonly known as: GLUCOPHAGE     pantoprazole 40 MG tablet  Commonly known as: PROTONIX  Take 1 tablet by mouth daily            STOP taking these medications      simvastatin 20 MG tablet  Commonly known as: ZOCOR  Replaced by: atorvastatin 40 MG tablet               Where to Get Your Medications        These medications were sent to [address] [place] [address] [address] [facility], [address] [place] [address], [address] [facility]       Phone: [phone]   atorvastatin 40 MG tablet  levoFLOXacin 750 MG tablet  linezolid 600 MG tablet       You can get these medications from any pharmacy    Bring a paper prescription for each of these medications  HYDROcodone-acetaminophen 5-325 MG per tablet        Objective Findings at Discharge:   /89   Pulse 77   Temp 97.9 °F (36.6 °C) (Oral)   Resp 17   Ht 5' 4\" (1.626 m)   Wt 128 lb 8.5 oz (58.3 kg)   SpO2 96%   BMI 22.06 kg/m²       Physical Exam:   General: NAD  Eyes: EOMI        Labs and Imaging   CT HEAD WO CONTRAST    Result Date: 8/10/2022  EXAMINATION: CT OF THE HEAD WITHOUT CONTRAST  8/10/2022 6:07 am TECHNIQUE: CT of the head was performed without the administration of intravenous contrast. Automated exposure control, iterative reconstruction, and/or weight based adjustment of the mA/kV was utilized to reduce the radiation dose to as low as reasonably achievable. COMPARISON: 8/9/2022 HISTORY: ORDERING SYSTEM PROVIDED HISTORY: right frontal subdural collection TECHNOLOGIST PROVIDED HISTORY: Reason for exam:->right frontal subdural collection Has a \"code stroke\" or \"stroke alert\" been called? ->No Reason for Exam: right frontal subdural collection FINDINGS: BRAIN/VENTRICLES: The cerebral and cerebellar parenchyma demonstrate volume loss. Scattered and confluent low-attenuation areas are noted supratentorially, compatible with chronic microvascular white matter ischemic disease. Encephalomalacia is noted in the right frontal lobe beneath the right-sided craniotomy defect. Beneath the craniotomy, there is extra-axial fluid measuring 3 mm in thickness, unchanged. This could also be related to dural thickening. There are areas of encephalomalacia along the lateral left frontal lobe with an associated cortical calcification, unchanged.   The ventricles are proportional to the cerebral sulci. The basal cisterns are patent. ORBITS: The orbits are unremarkable. SINUSES: There is scattered mild paranasal sinus disease with left sphenoid mucous retention cysts. The mastoid air cells are clear. SOFT TISSUES/SKULL:  The calvarium is intact. No appreciable scalp soft tissue swelling. *Sequela of right anterior craniotomy with fluid/soft tissue noted beneath the craniotomy defect measuring 3 mm in thickness, unchanged. *Cerebral parenchymal volume loss with moderate-severe chronic microvascular white matter ischemic disease, stable. CT HEAD WO CONTRAST    Result Date: 8/9/2022  EXAMINATION: CT OF THE HEAD WITHOUT CONTRAST  8/9/2022 4:27 pm TECHNIQUE: CT of the head was performed without the administration of intravenous contrast. Automated exposure control, iterative reconstruction, and/or weight based adjustment of the mA/kV was utilized to reduce the radiation dose to as low as reasonably achievable. COMPARISON: None. HISTORY: ORDERING SYSTEM PROVIDED HISTORY: S/P right craniotomy TECHNOLOGIST PROVIDED HISTORY: Reason for exam:->S/P right craniotomy Has a \"code stroke\" or \"stroke alert\" been called? ->No Reason for Exam: S/P right craniotomy FINDINGS: BRAIN/VENTRICLES: There is no mass effect or midline shift. The gray-white differentiation is maintained without evidence of an acute infarct. There is no evidence of hydrocephalus. Periventricular and deep subcortical white matter hypoattenuation, consistent microangiopathic change. There is mild parenchymal volume loss. There is right frontal encephalomalacia. There is a 3 mm hyperattenuating right frontal subdural collection underlying the craniotomy ORBITS: The visualized portion of the orbits demonstrate no acute abnormality. SINUSES: The visualized paranasal sinuses and mastoid air cells demonstrate no acute abnormality. SOFT TISSUES/SKULL:  Prior right craniotomy.      1. Prior right craniotomy

## (undated) DEVICE — SUTURE VCRL SZ 3-0 L27IN ABSRB UD L17MM RB-1 1/2 CIR J215H

## (undated) DEVICE — SWAB CULT SGL AMIES W/O CHAR FOR THRT VAG SKIN HRT CULTSWAB

## (undated) DEVICE — SYRINGE, LUER LOCK, 10ML: Brand: MEDLINE

## (undated) DEVICE — TRAY PREP DRY W/ PREM GLV 2 APPL 6 SPNG 2 UNDPD 1 OVERWRAP

## (undated) DEVICE — NEEDLE,25GX1.5",REG,BEVEL: Brand: MEDLINE

## (undated) DEVICE — BANDAGE,GAUZE,BULKEE II,4.5"X4.1YD,STRL: Brand: MEDLINE

## (undated) DEVICE — Z DISCONTINUED (USE MFG CAT MVABO)  TUBING GAS SAMPLING STD 6.5 FT FEMALE CONN SMRT CAPNOLINE

## (undated) DEVICE — BANDAGE,ELASTIC,ESMARK,STERILE,4"X9',LF: Brand: MEDLINE

## (undated) DEVICE — GLOVE ORANGE PI 7 1/2   MSG9075

## (undated) DEVICE — YANKAUER,FLEXIBLE HANDLE,REGLR CAPACITY: Brand: MEDLINE INDUSTRIES, INC.

## (undated) DEVICE — PENCIL ES CRD L10FT HND SWCHING ROCK SWCH W/ EDGE COAT BLDE

## (undated) DEVICE — GLOVE ORANGE PI 8   MSG9080

## (undated) DEVICE — PACK,BASIC,SIRUS,V: Brand: MEDLINE

## (undated) DEVICE — INTENDED FOR TISSUE SEPARATION, AND OTHER PROCEDURES THAT REQUIRE A SHARP SURGICAL BLADE TO PUNCTURE OR CUT.: Brand: BARD-PARKER ® STAINLESS STEEL BLADES

## (undated) DEVICE — MARKER SURG SKIN UTIL REGULAR/FINE 2 TIP W/ RUL AND 9 LBL

## (undated) DEVICE — SUTURE NONABSORBABLE MONOFILAMENT 4-0 PS-2 18 IN BLK ETHILON 1667G

## (undated) DEVICE — SPONGE LAP W18XL18IN WHT COT 4 PLY FLD STRUNG RADPQ DISP ST

## (undated) DEVICE — SUTURE VCRL SZ 4-0 L27IN ABSRB UD L17MM RB-1 1/2 CIR J214H

## (undated) DEVICE — DRESSING,GAUZE,XEROFORM,CURAD,1"X8",ST: Brand: CURAD

## (undated) DEVICE — SUTURE ETHLN SZ 3-0 L18IN NONABSORBABLE BLK FS-1 L24MM 3/8 663H

## (undated) DEVICE — GOWN,SIRUS,FABRNF,RAGLAN,XL,ST,28/CS: Brand: MEDLINE

## (undated) DEVICE — SHEET,DRAPE,53X77,STERILE: Brand: MEDLINE

## (undated) DEVICE — GLOVE SURG SZ 8 L12IN THK75MIL DK GRN LTX FREE

## (undated) DEVICE — SPONGE GZ W4XL8IN COT WVN 12 PLY

## (undated) DEVICE — CONTAINER,SPECIMEN,OR STERILE,4OZ: Brand: MEDLINE

## (undated) DEVICE — THIN OFFSET (9.0 X 0.38 X 25.0MM)

## (undated) DEVICE — COUNTER NDL 30 COUNT FOAM STRP SGL MAG

## (undated) DEVICE — FORCEPS BX L240CM JAW DIA2.8MM L CAP W/ NDL MIC MESH TOOTH

## (undated) DEVICE — SYRINGE IRRIG 60ML SFT PLIABLE BLB EZ TO GRP 1 HND USE W/

## (undated) DEVICE — GAUZE,SPONGE,4"X4",16PLY,XRAY,STRL,LF: Brand: MEDLINE

## (undated) DEVICE — TOWEL,OR,DSP,ST,BLUE,STD,6/PK,12PK/CS: Brand: MEDLINE

## (undated) DEVICE — TUBING, SUCTION, 9/32" X 10', STRAIGHT: Brand: MEDLINE

## (undated) DEVICE — SET,IRRIGATION,CYSTO/TUR: Brand: MEDLINE